# Patient Record
Sex: MALE | Race: WHITE | NOT HISPANIC OR LATINO | Employment: FULL TIME | URBAN - METROPOLITAN AREA
[De-identification: names, ages, dates, MRNs, and addresses within clinical notes are randomized per-mention and may not be internally consistent; named-entity substitution may affect disease eponyms.]

---

## 2017-03-17 NOTE — PROGRESS NOTES
"  SUBJECTIVE:                                                    Meño Omalley is a 46 year old male who presents to clinic today for the following health issues:      HPI    Diarrhea     Onset: 2 months    Description:   Consistency of stool: runny  Blood in stool: YES- occ  Number of loose stools in past 24 hours: 6    Progression of Symptoms:  same    Accompanying Signs & Symptoms:  Fever: no   Nausea or vomiting; no   Abdominal pain: no - stomach ache sometimes  Episodes of constipation: no   Weight loss: no    History:   Ill contacts: no   Recent use of antibiotics: no    Recent travels: no          Recent medication-new or changes(Rx or OTC): no     Precipitating factors:   Haven't changed anything eating habits    Alleviating factors:   no       Therapies Tried and outcome:  Haven't tried anything; Outcome:   Blood in stool, more red than brown. Not black. Also on toilet paper.   Has had hemorrhoids in the past, not specific to this and not similar.   Father colon cancer- Mid 60's   Sister Colon Cancer - Mid 50's.   No abdominal pain. Cramping occasionally.   No fevers or chills.   No nausea and no emesis  Able to eat and appetite is the same, no change in foods.   Stool is Soft and occasionally runnier as the day progresses.   Consistently the last 2 months. No formation to bowel movement, sometime malformed. Brown in color if no blood. Only seen blood 20% of the time.   No heart burn or history of heart burn.   No pain with urination.     Problem list and histories reviewed & adjusted, as indicated.  Additional history: as documented      ROS:  C: NEGATIVE for fever, chills, change in weight  E/M: NEGATIVE for ear, mouth and throat problems  R: NEGATIVE for significant cough or SOB  CV: NEGATIVE for chest pain, palpitations or peripheral edema    OBJECTIVE:                                                    /76  Pulse 72  Temp 98  F (36.7  C) (Temporal)  Resp 8  Ht 5' 7.95\" (1.726 m)  Wt 203 lb " 6.4 oz (92.3 kg)  BMI 30.97 kg/m2  Body mass index is 30.97 kg/(m^2).  Physical Exam   Constitutional: Vital signs are normal.   Eyes: Conjunctivae are normal.   Cardiovascular: Normal rate, regular rhythm and normal heart sounds.    Pulmonary/Chest: Effort normal and breath sounds normal.   Abdominal: Normal appearance and bowel sounds are normal. There is no tenderness. There is no rigidity, no rebound, no guarding and negative Cassidy's sign.   Skin: Skin is warm, dry and intact.         Diagnostic Test Results:  Stool studies pending  Colonoscopy scheduled for 3/24/2017       ASSESSMENT/PLAN:                                                        1. Diarrhea, unspecified type  - Stable diarrhea at this time. No active bleeding. Given his family history I did recommend he get a Colonoscopy. He did decline a rectal exam, I have ordered the stool studies and would like him to bring these in. If he decides he can't get the colonoscopy this Friday then we will have the occult stool.   - Patient was advised on signs and symptoms to monitor.   - Reassuring abdominal exam was negative for pain and tenderness and vitals are stable.   - CBC with platelets  - Ova and Parasite Exam Routine; Future  - Enteric Bacteria and Virus Panel by CELIA Stool; Future  - H Pylori antigen, stool; Future  - Clostridium difficile Toxin B PCR; Future  - GASTROENTEROLOGY ADULT REF PROCEDURE ONLY    2. Blood in stool    - CBC with platelets  - Fecal colorectal cancer screen (FIT); Future  - GASTROENTEROLOGY ADULT REF PROCEDURE ONLY    3. Family history of colon cancer    - GASTROENTEROLOGY ADULT REF PROCEDURE ONLY    See Patient Instructions    SARWAT Carreon [facility]

## 2017-03-20 ENCOUNTER — OFFICE VISIT (OUTPATIENT)
Dept: FAMILY MEDICINE | Facility: OTHER | Age: 47
End: 2017-03-20
Payer: COMMERCIAL

## 2017-03-20 VITALS
HEART RATE: 72 BPM | WEIGHT: 203.4 LBS | DIASTOLIC BLOOD PRESSURE: 76 MMHG | SYSTOLIC BLOOD PRESSURE: 120 MMHG | BODY MASS INDEX: 30.83 KG/M2 | TEMPERATURE: 98 F | RESPIRATION RATE: 8 BRPM | HEIGHT: 68 IN

## 2017-03-20 DIAGNOSIS — K92.1 BLOOD IN STOOL: ICD-10-CM

## 2017-03-20 DIAGNOSIS — Z80.0 FAMILY HISTORY OF COLON CANCER: ICD-10-CM

## 2017-03-20 DIAGNOSIS — R19.7 DIARRHEA, UNSPECIFIED TYPE: Primary | ICD-10-CM

## 2017-03-20 LAB
ERYTHROCYTE [DISTWIDTH] IN BLOOD BY AUTOMATED COUNT: 12.7 % (ref 10–15)
HCT VFR BLD AUTO: 43.1 % (ref 40–53)
HGB BLD-MCNC: 15 G/DL (ref 13.3–17.7)
MCH RBC QN AUTO: 30.9 PG (ref 26.5–33)
MCHC RBC AUTO-ENTMCNC: 34.8 G/DL (ref 31.5–36.5)
MCV RBC AUTO: 89 FL (ref 78–100)
PLATELET # BLD AUTO: 313 10E9/L (ref 150–450)
RBC # BLD AUTO: 4.85 10E12/L (ref 4.4–5.9)
WBC # BLD AUTO: 5.7 10E9/L (ref 4–11)

## 2017-03-20 PROCEDURE — 99214 OFFICE O/P EST MOD 30 MIN: CPT | Performed by: NURSE PRACTITIONER

## 2017-03-20 PROCEDURE — 36415 COLL VENOUS BLD VENIPUNCTURE: CPT | Performed by: NURSE PRACTITIONER

## 2017-03-20 PROCEDURE — 85027 COMPLETE CBC AUTOMATED: CPT | Performed by: NURSE PRACTITIONER

## 2017-03-20 RX ORDER — ESCITALOPRAM OXALATE 20 MG/1
20 TABLET ORAL EVERY MORNING
COMMUNITY
End: 2017-05-27

## 2017-03-20 ASSESSMENT — PAIN SCALES - GENERAL: PAINLEVEL: NO PAIN (0)

## 2017-03-20 NOTE — NURSING NOTE
"Chief Complaint   Patient presents with     Gastric Problem     Health Maintenance     norberto, laynet, weight, height, aap yearly, act       Initial /76  Pulse 72  Temp 98  F (36.7  C) (Temporal)  Resp 8  Ht 5' 7.95\" (1.726 m)  Wt 203 lb 6.4 oz (92.3 kg)  BMI 30.97 kg/m2 Estimated body mass index is 30.97 kg/(m^2) as calculated from the following:    Height as of this encounter: 5' 7.95\" (1.726 m).    Weight as of this encounter: 203 lb 6.4 oz (92.3 kg).  Medication Reconciliation: complete  Sherron Cano CMA (AAMA)    "

## 2017-03-20 NOTE — LETTER

## 2017-03-20 NOTE — MR AVS SNAPSHOT
After Visit Summary   3/20/2017    Meño Omalley    MRN: [id number]           Patient Information     Date Of Birth          1970        Visit Information        Provider Department      3/20/2017 1:00 PM Alejandrina Robertson APRN CNP [facility]        Today's Diagnoses     Diarrhea, unspecified type    -  1    Blood in stool        Family history of colon cancer          Care Instructions    - Bring in stool studies, ok to bring into [facility].   - Colonoscopy this Friday  - Follow up based on results.   - If you develop any abdominal pain, nausea, vomiting, bleeding please go to the Emergency Room.     SARWAT Carreon CNP          Follow-ups after your visit        Additional Services     GASTROENTEROLOGY ADULT REF PROCEDURE ONLY       Last Lab Result: Creatinine (mg/dL)       Date                     Value                 02/07/2013               0.80             ----------  Body mass index is 30.97 kg/(m^2).     Needed:  No  Language:  English    Patient will be contacted to schedule procedure.     Please be aware that coverage of these services is subject to the terms and limitations of your health insurance plan.  Call member services at your health plan with any benefit or coverage questions.  Any procedures must be performed at a [facility] facility OR coordinated by your clinic's referral office.    Please bring the following with you to your appointment:    (1) Any X-Rays, CTs or MRIs which have been performed.  Contact the facility where they were done to arrange for  prior to your scheduled appointment.    (2) List of current medications   (3) This referral request   (4) Any documents/labs given to you for this referral                  Follow-up notes from your care team     Return if symptoms worsen or fail to improve.      Future tests that were ordered for you today     Open Future Orders        Priority Expected Expires Ordered    Ova  "and Parasite Exam Routine Routine  3/20/2018 3/20/2017    Fecal colorectal cancer screen (FIT) Routine 4/10/2017 2017 3/20/2017    Enteric Bacteria and Virus Panel by CELIA Stool Routine  3/20/2018 3/20/2017    H Pylori antigen, stool Routine  2017 3/20/2017    Clostridium difficile Toxin B PCR Routine  3/20/2018 3/20/2017            Who to contact     If you have questions or need follow up information about today's clinic visit or your schedule please contact [facility] directly at [phone].  Normal or non-critical lab and imaging results will be communicated to you by MyChart, letter or phone within 4 business days after the clinic has received the results. If you do not hear from us within 7 days, please contact the clinic through [employer] or phone. If you have a critical or abnormal lab result, we will notify you by phone as soon as possible.  Submit refill requests through [employer] or call your pharmacy and they will forward the refill request to us. Please allow 3 business days for your refill to be completed.          Additional Information About Your Visit        MyChart Information     [employer] lets you send messages to your doctor, view your test results, renew your prescriptions, schedule appointments and more. To sign up, go to [email or web address] . Click on \"Log in\" on the left side of the screen, which will take you to the Welcome page. Then click on \"Sign up Now\" on the right side of the page.     You will be asked to enter the access code listed below, as well as some personal information. Please follow the directions to create your username and password.     Your access code is: [email or web address]  Expires: 2017  1:49 PM     Your access code will  in 90 days. If you need help or a new code, please call your [facility] or [phone].        Care EveryWhere ID     This is your Care EveryWhere ID. This could be used by other organizations to access your " "[facility] medical records  [id number]        Your Vitals Were     Pulse Temperature Respirations Height BMI (Body Mass Index)       72 98  F (36.7  C) (Temporal) 8 5' 7.95\" (1.726 m) 30.97 kg/m2        Blood Pressure from Last 3 Encounters:   03/20/17 120/76   11/25/14 120/80   07/31/13 104/64    Weight from Last 3 Encounters:   03/20/17 203 lb 6.4 oz (92.3 kg)   11/25/14 206 lb (93.4 kg)   07/31/13 204 lb (92.5 kg)              We Performed the Following     CBC with platelets     GASTROENTEROLOGY ADULT REF PROCEDURE ONLY        Primary Care Provider Office Phone # Fax #    Delio Alcala [phone]       [place] [address] [place] [address]        Thank you!     Thank you for choosing [facility]  for your care. Our goal is always to provide you with excellent care. Hearing back from our patients is one way we can continue to improve our services. Please take a few minutes to complete the written survey that you may receive in the mail after your visit with us. Thank you!             Your Updated Medication List - Protect others around you: Learn how to safely use, store and throw away your medicines at www.disposemymeds.org.          This list is accurate as of: 3/20/17  1:49 PM.  Always use your most recent med list.                   Brand Name Dispense Instructions for use    FLOVENT  MCG/ACT Inhaler   Generic drug:  fluticasone      Take 2 puffs by mouth 2 times daily.         "

## 2017-03-20 NOTE — PATIENT INSTRUCTIONS
- Bring in stool studies, ok to bring into [facility].   - Colonoscopy this Friday  - Follow up based on results.   - If you develop any abdominal pain, nausea, vomiting, bleeding please go to the Emergency Room.     SARWAT Carreon CNP

## 2017-03-21 DIAGNOSIS — R19.7 DIARRHEA, UNSPECIFIED TYPE: ICD-10-CM

## 2017-03-21 LAB
C DIFF TOX B STL QL: NORMAL
SPECIMEN SOURCE: NORMAL

## 2017-03-21 PROCEDURE — 87177 OVA AND PARASITES SMEARS: CPT | Performed by: NURSE PRACTITIONER

## 2017-03-21 PROCEDURE — 87209 SMEAR COMPLEX STAIN: CPT | Performed by: NURSE PRACTITIONER

## 2017-03-21 PROCEDURE — 87338 HPYLORI STOOL AG IA: CPT | Performed by: NURSE PRACTITIONER

## 2017-03-21 PROCEDURE — 87506 IADNA-DNA/RNA PROBE TQ 6-11: CPT | Performed by: NURSE PRACTITIONER

## 2017-03-21 PROCEDURE — 87493 C DIFF AMPLIFIED PROBE: CPT | Mod: 59 | Performed by: NURSE PRACTITIONER

## 2017-03-21 ASSESSMENT — ASTHMA QUESTIONNAIRES: ACT_TOTALSCORE: 25

## 2017-03-22 ENCOUNTER — TELEPHONE (OUTPATIENT)
Dept: FAMILY MEDICINE | Facility: OTHER | Age: 47
End: 2017-03-22

## 2017-03-22 LAB
CAMPYLOBACTER GROUP BY NAT: NOT DETECTED
ENTERIC PATHOGEN COMMENT: NORMAL
H PYLORI AG STL QL IA: NORMAL
MICRO REPORT STATUS: NORMAL
MICRO REPORT STATUS: NORMAL
NOROVIRUS I AND II BY NAT: NOT DETECTED
O+P STL MICRO: NORMAL
ROTAVIRUS A BY NAT: NOT DETECTED
SALMONELLA SPECIES BY NAT: NOT DETECTED
SHIGA TOXIN 1 GENE BY NAT: NOT DETECTED
SHIGA TOXIN 2 GENE BY NAT: NOT DETECTED
SHIGELLA SP+EIEC IPAH STL QL NAA+PROBE: NOT DETECTED
SPECIMEN SOURCE: NORMAL
SPECIMEN SOURCE: NORMAL
VIBRIO GROUP BY NAT: NOT DETECTED
YERSINIA ENTEROCOLITICA BY NAT: NOT DETECTED

## 2017-03-22 NOTE — TELEPHONE ENCOUNTER
----- Message from SARWAT Okeefe CNP sent at 3/22/2017  2:59 PM CDT -----  Please let patient know that all his stool studies were negative, continue with plan to have colonoscopy on Friday.   SARWAT Carreon CNP

## 2017-03-23 DIAGNOSIS — K92.1 BLOOD IN STOOL: ICD-10-CM

## 2017-03-23 PROCEDURE — 82274 ASSAY TEST FOR BLOOD FECAL: CPT | Performed by: NURSE PRACTITIONER

## 2017-03-24 ENCOUNTER — SURGERY (OUTPATIENT)
Age: 47
End: 2017-03-24

## 2017-03-24 ENCOUNTER — HOSPITAL ENCOUNTER (OUTPATIENT)
Facility: CLINIC | Age: 47
Discharge: HOME | End: 2017-03-24
Attending: INTERNAL MEDICINE | Admitting: INTERNAL MEDICINE
Payer: COMMERCIAL

## 2017-03-24 ENCOUNTER — TELEPHONE (OUTPATIENT)
Dept: FAMILY MEDICINE | Facility: OTHER | Age: 47
End: 2017-03-24

## 2017-03-24 VITALS
HEART RATE: 76 BPM | DIASTOLIC BLOOD PRESSURE: 90 MMHG | OXYGEN SATURATION: 97 % | TEMPERATURE: 98 F | SYSTOLIC BLOOD PRESSURE: 122 MMHG | RESPIRATION RATE: 16 BRPM

## 2017-03-24 DIAGNOSIS — C18.9 CARCINOMA OF COLON (H): Primary | ICD-10-CM

## 2017-03-24 LAB
COLONOSCOPY: NORMAL
HEMOCCULT STL QL IA: POSITIVE

## 2017-03-24 PROCEDURE — 45381 COLONOSCOPY SUBMUCOUS NJX: CPT | Mod: PT | Performed by: INTERNAL MEDICINE

## 2017-03-24 PROCEDURE — 88305 TISSUE EXAM BY PATHOLOGIST: CPT | Performed by: INTERNAL MEDICINE

## 2017-03-24 PROCEDURE — 45380 COLONOSCOPY AND BIOPSY: CPT | Mod: XU,PT

## 2017-03-24 PROCEDURE — 88342 IMHCHEM/IMCYTCHM 1ST ANTB: CPT | Performed by: INTERNAL MEDICINE

## 2017-03-24 PROCEDURE — 88341 IMHCHEM/IMCYTCHM EA ADD ANTB: CPT | Performed by: INTERNAL MEDICINE

## 2017-03-24 PROCEDURE — 40000296 ZZH STATISTIC ENDO RECOVERY CLASS 1:2 FIRST HOUR: Performed by: INTERNAL MEDICINE

## 2017-03-24 PROCEDURE — 45385 COLONOSCOPY W/LESION REMOVAL: CPT | Mod: PT | Performed by: INTERNAL MEDICINE

## 2017-03-24 PROCEDURE — 88341 IMHCHEM/IMCYTCHM EA ADD ANTB: CPT | Mod: 26,76 | Performed by: INTERNAL MEDICINE

## 2017-03-24 PROCEDURE — 88342 IMHCHEM/IMCYTCHM 1ST ANTB: CPT | Mod: 26,76 | Performed by: INTERNAL MEDICINE

## 2017-03-24 PROCEDURE — 25000125 ZZHC RX 250: Performed by: INTERNAL MEDICINE

## 2017-03-24 PROCEDURE — 25000128 H RX IP 250 OP 636: Performed by: INTERNAL MEDICINE

## 2017-03-24 PROCEDURE — 88305 TISSUE EXAM BY PATHOLOGIST: CPT | Mod: 26,76 | Performed by: INTERNAL MEDICINE

## 2017-03-24 RX ORDER — FENTANYL CITRATE 50 UG/ML
INJECTION, SOLUTION INTRAMUSCULAR; INTRAVENOUS PRN
Status: DISCONTINUED | OUTPATIENT
Start: 2017-03-24 | End: 2017-03-24 | Stop reason: HOSPADM

## 2017-03-24 RX ORDER — LIDOCAINE 40 MG/G
CREAM TOPICAL
Status: DISCONTINUED | OUTPATIENT
Start: 2017-03-24 | End: 2017-03-24 | Stop reason: HOSPADM

## 2017-03-24 RX ADMIN — FENTANYL CITRATE 25 MCG: 50 INJECTION, SOLUTION INTRAMUSCULAR; INTRAVENOUS at 13:50

## 2017-03-24 RX ADMIN — MIDAZOLAM HYDROCHLORIDE 1 MG: 1 INJECTION, SOLUTION INTRAMUSCULAR; INTRAVENOUS at 13:47

## 2017-03-24 RX ADMIN — FENTANYL CITRATE 25 MCG: 50 INJECTION, SOLUTION INTRAMUSCULAR; INTRAVENOUS at 13:54

## 2017-03-24 RX ADMIN — LIDOCAINE HYDROCHLORIDE 1 ML: 10 INJECTION, SOLUTION EPIDURAL; INFILTRATION; INTRACAUDAL; PERINEURAL at 13:18

## 2017-03-24 RX ADMIN — MIDAZOLAM HYDROCHLORIDE 1 MG: 1 INJECTION, SOLUTION INTRAMUSCULAR; INTRAVENOUS at 13:46

## 2017-03-24 RX ADMIN — MIDAZOLAM HYDROCHLORIDE 1 MG: 1 INJECTION, SOLUTION INTRAMUSCULAR; INTRAVENOUS at 13:48

## 2017-03-24 RX ADMIN — FENTANYL CITRATE 50 MCG: 50 INJECTION, SOLUTION INTRAMUSCULAR; INTRAVENOUS at 13:45

## 2017-03-24 RX ADMIN — MIDAZOLAM HYDROCHLORIDE 2 MG: 1 INJECTION, SOLUTION INTRAMUSCULAR; INTRAVENOUS at 13:45

## 2017-03-24 NOTE — TELEPHONE ENCOUNTER
Lm with voicemail- for High risk patient- vivek -[phone] and Maeve [phone]. Lm patient information to call patient to schedule. Patient is aware that someone will be calling him to set this up.

## 2017-03-24 NOTE — TELEPHONE ENCOUNTER
FYI : Attempted to call patient to give him his colonoscopy results. If he calls back please refer him to me.   SARWAT Carreon CNP

## 2017-03-24 NOTE — CONSULTS
[facility] GI Pre-Procedure Physical Assessment    Meño Omalley MRN# [id number]   Age: 46 year old YOB: 1970      Date of Surgery: 3/24/2017  Location [facility]      Date of Exam 3/24/2017 Facility (Same day)       Home clinic: [facility]  Primary care provider: Delio Alcala         Active problem list:   Patient Active Problem List   Diagnosis     Hyperlipidemia LDL goal <160     Family history of colon cancer     Obesity     Mild persistent asthma            Medications (include herbals and vitamins):   Any Plavix use in the last 7 days?  No     Current Facility-Administered Medications   Medication     lidocaine 1 % 1 mL     lidocaine (LMX4) kit     sodium chloride (PF) 0.9% PF flush 3 mL     sodium chloride (PF) 0.9% PF flush 3 mL     fentaNYL Citrate (PF) (SUBLIMAZE) injection     midazolam (VERSED) injection             Allergies:    No Known Allergies  Allergy to Latex?  No  Allergy to tape?    No          Social History:     Social History   Substance Use Topics     Smoking status: Never Smoker     Smokeless tobacco: Never Used     Alcohol use No      Comment: On occasion            Physical Exam:   All vitals have been reviewed  Blood pressure 120/82, temperature 98  F (36.7  C), temperature source Oral, resp. rate 16, SpO2 95 %.  Airway assessment:   Patient is able to open mouth wide  Patient is able to stick out tongue      Lungs:   No increased work of breathing, good air exchange, clear to auscultation bilaterally, no crackles or wheezing      Cardiovascular:   Normal apical impulse, regular rate and rhythm, normal S1 and S2, no S3 or S4, and no murmur noted           Lab / Radiology Results:   All laboratory data reviewed          Assessment:   Appropriately NPO  Chief complaint or anatomic assessment of involved area: rectal bleeding, diarrhea         Plan:   Moderate (conscious) sedation     Patient's active problems diagnostically and  therapeutically optimized for the planned procedure  Risks, benefits, alternatives to sedation and blood explained and consent obtained  Risks, benefits, alternatives to procedure explained and consent obtained  Orders and progress notes are in the chart  Discharge from Phase 1 and / or Phase 2 recovery when patient meets criteria    I have reviewed the history and physical, lab finding(s), diagnostic data, medicaitons, and the plan for sedation.  I have determined this patient to be an appropriate candidate for the planned sedation / procedure and have reassessed the patient immediately prior to sedation / procedure.    I have personally and medically directed the administration of medications used.    Delio Daniel MD

## 2017-03-24 NOTE — TELEPHONE ENCOUNTER
Please set patient up with the soonest colorectal appointment. Please ask where he would like to go.     SARWAT Carreon CNP

## 2017-03-24 NOTE — TELEPHONE ENCOUNTER
Spoke with patient regarding his colonoscopy results. We discussed the next step is to visit with colorectal surgery. I have placed a referral for this, he is expecting a call to get this set up. He also know to monitor his symptoms s/p colonoscopy. His occult stool was positive and his hemoglobin was normal.     SARWAT Carreon CNP

## 2017-03-27 ENCOUNTER — HOSPITAL ENCOUNTER (OUTPATIENT)
Dept: CT IMAGING | Facility: CLINIC | Age: 47
Discharge: HOME | End: 2017-03-27
Attending: COLON & RECTAL SURGERY | Admitting: COLON & RECTAL SURGERY
Payer: COMMERCIAL

## 2017-03-27 ENCOUNTER — TELEPHONE (OUTPATIENT)
Dept: SURGERY | Facility: CLINIC | Age: 47
End: 2017-03-27

## 2017-03-27 DIAGNOSIS — C18.9 COLON CANCER (H): Primary | ICD-10-CM

## 2017-03-27 DIAGNOSIS — C18.9 COLON CANCER (H): ICD-10-CM

## 2017-03-27 PROCEDURE — 74177 CT ABD & PELVIS W/CONTRAST: CPT

## 2017-03-27 PROCEDURE — 71260 CT THORAX DX C+: CPT

## 2017-03-27 PROCEDURE — 25500064 ZZH RX 255 OP 636: Performed by: RADIOLOGY

## 2017-03-27 PROCEDURE — 25000125 ZZHC RX 250: Performed by: RADIOLOGY

## 2017-03-27 RX ORDER — IOPAMIDOL 755 MG/ML
100 INJECTION, SOLUTION INTRAVASCULAR ONCE
Status: COMPLETED | OUTPATIENT
Start: 2017-03-27 | End: 2017-03-27

## 2017-03-27 RX ADMIN — SODIUM CHLORIDE 60 ML: 9 INJECTION, SOLUTION INTRAVENOUS at 16:18

## 2017-03-27 RX ADMIN — IOPAMIDOL 99 ML: 755 INJECTION, SOLUTION INTRAVENOUS at 16:17

## 2017-03-27 NOTE — TELEPHONE ENCOUNTER
Patient is scheduled tomorrow 3/28/17 with Colorectal at the - they ordered a ct scan and  Getting this done today at [place]. Patient notified.

## 2017-03-27 NOTE — TELEPHONE ENCOUNTER
I received a message from Beatriz with  [facility], referring patient for new colon mass (path pending).  Discussed scheduling CT CAP with Dr. Rose, even though biopsies are still pending.  Dr. Rose states we can proceed with scheduling CT CAP.  Patient is scheduled at [facility] today for CT CAP at 4:00 pm, and to see Dr. Rose tomorrow at 12:00 pm.  Called and spoke with patient.  Patient confirms upcoming appointments and NPO restrictions for today.  Instructed patient to go to [facility] to start oral rep for CT scan.  Provided direct clinic address for tomorrow's appointment.  Patient has my direct number for additional questions or concerns.

## 2017-03-28 ENCOUNTER — OFFICE VISIT (OUTPATIENT)
Dept: SURGERY | Facility: CLINIC | Age: 47
End: 2017-03-28

## 2017-03-28 VITALS
HEIGHT: 68 IN | TEMPERATURE: 98.2 F | WEIGHT: 208.9 LBS | HEART RATE: 65 BPM | BODY MASS INDEX: 31.66 KG/M2 | SYSTOLIC BLOOD PRESSURE: 123 MMHG | DIASTOLIC BLOOD PRESSURE: 64 MMHG | OXYGEN SATURATION: 96 %

## 2017-03-28 DIAGNOSIS — C20 RECTAL CANCER (H): Primary | ICD-10-CM

## 2017-03-28 DIAGNOSIS — C18.9 COLON CANCER (H): ICD-10-CM

## 2017-03-28 LAB
ALBUMIN SERPL-MCNC: 4.1 G/DL (ref 3.4–5)
ALP SERPL-CCNC: 47 U/L (ref 40–150)
ALT SERPL W P-5'-P-CCNC: 31 U/L (ref 0–70)
ANION GAP SERPL CALCULATED.3IONS-SCNC: 9 MMOL/L (ref 3–14)
AST SERPL W P-5'-P-CCNC: 14 U/L (ref 0–45)
BILIRUB SERPL-MCNC: 0.8 MG/DL (ref 0.2–1.3)
BUN SERPL-MCNC: 12 MG/DL (ref 7–30)
CALCIUM SERPL-MCNC: 9 MG/DL (ref 8.5–10.1)
CEA SERPL-MCNC: NORMAL UG/L (ref 0–2.5)
CHLORIDE SERPL-SCNC: 105 MMOL/L (ref 94–109)
CO2 SERPL-SCNC: 28 MMOL/L (ref 20–32)
CREAT SERPL-MCNC: 0.82 MG/DL (ref 0.66–1.25)
GFR SERPL CREATININE-BSD FRML MDRD: NORMAL ML/MIN/1.7M2
GLUCOSE SERPL-MCNC: 87 MG/DL (ref 70–99)
POTASSIUM SERPL-SCNC: 4.2 MMOL/L (ref 3.4–5.3)
PROT SERPL-MCNC: 7.7 G/DL (ref 6.8–8.8)
SODIUM SERPL-SCNC: 141 MMOL/L (ref 133–144)

## 2017-03-28 ASSESSMENT — ENCOUNTER SYMPTOMS
JAUNDICE: 0
RECTAL PAIN: 0
RECTAL BLEEDING: 0
VOMITING: 0
NAUSEA: 0
BOWEL INCONTINENCE: 0
CONSTIPATION: 0
BLOOD IN STOOL: 1
HEARTBURN: 0
DIARRHEA: 1
ABDOMINAL PAIN: 0
BLOATING: 0

## 2017-03-28 ASSESSMENT — PAIN SCALES - GENERAL: PAINLEVEL: NO PAIN (0)

## 2017-03-28 NOTE — NURSING NOTE
"Chief Complaint   Patient presents with     Consult     New Consultation       Vitals:    03/28/17 1159   BP: 123/64   BP Location: Left arm   Patient Position: Chair   Cuff Size: Adult Large   Pulse: 65   Temp: 98.2  F (36.8  C)   SpO2: 96%   Weight: 94.8 kg (208 lb 14.4 oz)   Height: 1.727 m (5' 8\")       Body mass index is 31.76 kg/(m^2).      Justina Layne"

## 2017-03-28 NOTE — PROGRESS NOTES
Colon and Rectal Surgery Clinic Note      RE: Meño Omalley  : 1970  JENNY: 3/28/2017      HISTORY OF PRESENT ILLNESS:  Meño is a very pleasant 46-year-old man seen today with his wife, Chasidy, due to a recent diagnosis of presumptive colon cancer.  Meño has had consistency to loose stools associated with rectal bleeding for roughly the past month.  This prompted a colonoscopy by Dr. Delio Daniel on 2017.  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  Biopsies remain pending at this time.  A CT scan of the chest, abdomen and pelvis confirms the presence of a possible thickening at the rectosigmoid junction but no evidence of metastases.  A 3 mm right infrarenal calculus is noted.      PAST MEDICAL HISTORY:  Mild asthma, mild depression.      PAST SURGICAL HISTORY:  None.        CIGARETTES:  None.        ALCOHOL:  Occasional beer.        BLEEDING HISTORY:  No known bleeding problems.      ANESTHETIC HISTORY:  No previous general anesthetics.      SOCIAL HISTORY:  The patient is  and lives with his wife and 2 adopted children.  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      FAMILY HISTORY:  The patient's father had colon cancer in his 50s.  A sister also had colon cancer in her 50s.  Two sisters evidently had malignant polyps.  One sister had some type of gynecologic polyp but it is not certain that this is a cancer.      PHYSICAL EXAMINATION:  Well-developed, well-nourished man in no distress.  Meño is 5 feet 8 inches and weighs 208 pounds with a BMI of 31.8.  Sclerae are anicteric.  The abdomen is obese and soft.  There are no palpable masses.  There is no hepatomegaly.  Digital rectal examination is normal with good sphincter tone and normal prostate.        IMAGING:  I obtained written informed consent.  I performed flexible sigmoidoscopy.  There is a tattoo in the upper rectum, and just above this,  behind what appears to be the most proximal rectal fold is a 1/3 circumferential tumor that measures about 4 cm in length with an ulcerated groove in its center.  The bowel proximal to this is normal to roughly 25 cm.      ASSESSMENT AND PLAN:  I had a detailed discussion with Patrick regarding treatment of colorectal cancer.  I believe this tumor is in the upper rectum.  I carefully measured the distal margin on the flexible sigmoidoscope at 12-13 cm.  Given this level, I think I am going to go ahead and get a 3T MRI of the pelvis, though if there is nothing concerning through mesorectal margins it would be reasonable to treat this as an upper third cancer and not necessarily mandate neoadjuvant chemoradiation.  Additionally, based on the patient's early onset cancer and strongly positive family history, I am very concerned that the patient could have a diagnosis of Rodriguez syndrome.  We will make arrangements for referral for genetic counseling at the [facility].  We should have some preliminary indication about the Rodriguez possibility based upon the immunohistochemistry of the tumor, but even if this is negative, I would proceed with the genetic testing.  We discussed that if this is Rodriguez cancer that we could consider a total abdominal colectomy.  This would be more problematic with the tumor being in the upper rectum rather than in the distal sigmoid, so this would require more thought.  We had a detailed discussion regarding surgery which I would plan to be laparoscopic, though if there is a pelvic dissection I would do this portion open.  We discussed the risks associated with surgery in detail including but not limited to bleeding, infection, DVT/PE, pneumonia, MI, CVA, anastomotic failure and death.  As I thought this was likely a sigmoid resection, we discussed that covering ileostomy would be unlikely, but given the level of the tumor, that would be a possibility.  There would  also be some risk of associated pelvic nerve dysfunction with an anterior resection rather than sigmoid resection.      I answered all of Meño and Chasidy's questions to their stated satisfaction.  They expressed their understanding and agreement with the proposed plan.  I will plan to present Meño after he has had his scan and after we have had the final pathology results at the upcoming Tumor Board next week.      Total time 1 hour.  Greater than half the time spent counseling.        Ignacio Rose MD        cc:     Alejandrina Robertson, APRN, CNP     [facility]    [address]     [place] [address]        MD Delio Beard MD     [facility]    [address] [place] [address]           For details of past medical history, surgical history, family history, medications, allergies, and review of systems, please see details below.    Medical history:  Past Medical History:   Diagnosis Date     Depressive disorder      Uncomplicated asthma        Surgical history:  Past Surgical History:   Procedure Laterality Date     NO HISTORY OF SURGERY         Family history:  Family History   Problem Relation Age of Onset     Cancer - colorectal Father 62     C.A.D. Father 59     Hypertension Father      Neurologic Disorder Mother 70     ALS     Cancer - colorectal Sister 54       Medications:  Current Outpatient Prescriptions   Medication Sig Dispense Refill     escitalopram (LEXAPRO) 20 MG tablet Take 20 mg by mouth daily       fluticasone (FLOVENT HFA) 110 MCG/ACT inhaler Take 2 puffs by mouth 2 times daily as needed        Allergies:  The patienthas No Known Allergies.    Social history:  Social History   Substance Use Topics     Smoking status: Never Smoker     Smokeless tobacco: Never Used     Alcohol use No      Comment: On occasion     Marital status: .    Review of Systems:  Nursing Notes:   Justina Layne MA  3/28/2017 12:03 PM   "Signed  Chief Complaint   Patient presents with     Consult     New Consultation       Vitals:    03/28/17 1159   BP: 123/64   BP Location: Left arm   Patient Position: Chair   Cuff Size: Adult Large   Pulse: 65   Temp: 98.2  F (36.8  C)   SpO2: 96%   Weight: 94.8 kg (208 lb 14.4 oz)   Height: 1.727 m (5' 8\")       Body mass index is 31.76 kg/(m^2).      Justina Rose MD   Professor and Chief  Division of Colon and Rectal Surgery  [facility]      Referring Provider:  SARWAT Okeefe [facility]  [address]  [place] [address]     Primary Care Provider:  Delio Alcala for HPI/ROS submitted by the patient on 3/28/2017   General Symptoms: No  Skin Symptoms: No  HENT Symptoms: No  EYE SYMPTOMS: No  HEART SYMPTOMS: No  LUNG SYMPTOMS: No  INTESTINAL SYMPTOMS: Yes  URINARY SYMPTOMS: No  REPRODUCTIVE SYMPTOMS: No  SKELETAL SYMPTOMS: No  BLOOD SYMPTOMS: No  NERVOUS SYSTEM SYMPTOMS: No  MENTAL HEALTH SYMPTOMS: No  Heart burn or indigestion: No  Nausea: No  Vomiting: No  Abdominal pain: No  Bloating: No  Constipation: No  Diarrhea: Yes  Blood in stool: Yes  Black stools: No  Rectal or Anal pain: No  Fecal incontinence: No  Rectal bleeding: No  Yellowing of skin or eyes: No  Vomit with blood: No  Change in stools: Yes  Hemorrhoids: Yes    "

## 2017-03-28 NOTE — LETTER
3/28/2017       RE: Meño Omalley  [address] [facility] [address]     Dear Colleague,    Thank you for referring your patient, Meño Omalley, to the [facility] COLON AND RECTAL SURGERY at [facility]. Please see a copy of my visit note below.    Colon and Rectal Surgery Clinic Note      RE: Meño Omalley  : 1970  JENNY: 3/28/2017      HISTORY OF PRESENT ILLNESS:  Meño is a very pleasant 46-year-old man seen today with his wife, Chasidy, due to a recent diagnosis of presumptive colon cancer.  Meño has had consistency to loose stools associated with rectal bleeding for roughly the past month.  This prompted a colonoscopy by Dr. Delio Daniel on 2017.  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  Biopsies remain pending at this time.  A CT scan of the chest, abdomen and pelvis confirms the presence of a possible thickening at the rectosigmoid junction but no evidence of metastases.  A 3 mm right infrarenal calculus is noted.      PAST MEDICAL HISTORY:  Mild asthma, mild depression.      PAST SURGICAL HISTORY:  None.        CIGARETTES:  None.        ALCOHOL:  Occasional beer.        BLEEDING HISTORY:  No known bleeding problems.      ANESTHETIC HISTORY:  No previous general anesthetics.      SOCIAL HISTORY:  The patient is  and lives with his wife and 2 adopted children.  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      FAMILY HISTORY:  The patient's father had colon cancer in his 50s.  A sister also had colon cancer in her 50s.  Two sisters evidently had malignant polyps.  One sister had some type of gynecologic polyp but it is not certain that this is a cancer.      PHYSICAL EXAMINATION:  Well-developed, well-nourished man in no distress.  Meño is 5 feet 8 inches and weighs 208 pounds with a BMI of 31.8.  Sclerae are anicteric.  The abdomen is obese and soft.   There are no palpable masses.  There is no hepatomegaly.  Digital rectal examination is normal with good sphincter tone and normal prostate.        IMAGING:  I obtained written informed consent.  I performed flexible sigmoidoscopy.  There is a tattoo in the upper rectum, and just above this, behind what appears to be the most proximal rectal fold is a 1/3 circumferential tumor that measures about 4 cm in length with an ulcerated groove in its center.  The bowel proximal to this is normal to roughly 25 cm.      ASSESSMENT AND PLAN:  I had a detailed discussion with Patrick regarding treatment of colorectal cancer.  I believe this tumor is in the upper rectum.  I carefully measured the distal margin on the flexible sigmoidoscope at 12-13 cm.  Given this level, I think I am going to go ahead and get a 3T MRI of the pelvis, though if there is nothing concerning through mesorectal margins it would be reasonable to treat this as an upper third cancer and not necessarily mandate neoadjuvant chemoradiation.  Additionally, based on the patient's early onset cancer and strongly positive family history, I am very concerned that the patient could have a diagnosis of Rodriguez syndrome.  We will make arrangements for referral for genetic counseling at the [facility].  We should have some preliminary indication about the Rodriguez possibility based upon the immunohistochemistry of the tumor, but even if this is negative, I would proceed with the genetic testing.  We discussed that if this is Rodriguez cancer that we could consider a total abdominal colectomy.  This would be more problematic with the tumor being in the upper rectum rather than in the distal sigmoid, so this would require more thought.  We had a detailed discussion regarding surgery which I would plan to be laparoscopic, though if there is a pelvic dissection I would do this portion open.  We discussed the risks associated with surgery in detail  including but not limited to bleeding, infection, DVT/PE, pneumonia, MI, CVA, anastomotic failure and death.  As I thought this was likely a sigmoid resection, we discussed that covering ileostomy would be unlikely, but given the level of the tumor, that would be a possibility.  There would also be some risk of associated pelvic nerve dysfunction with an anterior resection rather than sigmoid resection.      I answered all of Meño and Chasidy's questions to their stated satisfaction.  They expressed their understanding and agreement with the proposed plan.  I will plan to present Meño after he has had his scan and after we have had the final pathology results at the upcoming Tumor Board next week.      Total time 1 hour.  Greater than half the time spent counseling.        Ignacio Rose MD        cc:     Alejandrina Robertson, APRN, CNP     [facility]    [address]     [place] [address]        MD Delio Beard MD     [facility]    [address] [place] [address]           For details of past medical history, surgical history, family history, medications, allergies, and review of systems, please see details below.    Medical history:  Past Medical History:   Diagnosis Date     Depressive disorder      Uncomplicated asthma        Surgical history:  Past Surgical History:   Procedure Laterality Date     NO HISTORY OF SURGERY         Family history:  Family History   Problem Relation Age of Onset     Cancer - colorectal Father 62     C.A.D. Father 59     Hypertension Father      Neurologic Disorder Mother 70     ALS     Cancer - colorectal Sister 54       Medications:  Current Outpatient Prescriptions   Medication Sig Dispense Refill     escitalopram (LEXAPRO) 20 MG tablet Take 20 mg by mouth daily       fluticasone (FLOVENT HFA) 110 MCG/ACT inhaler Take 2 puffs by mouth 2 times daily as needed        Allergies:  The patienthas No Known  "Allergies.    Social history:  Social History   Substance Use Topics     Smoking status: Never Smoker     Smokeless tobacco: Never Used     Alcohol use No      Comment: On occasion     Marital status: .    Review of Systems:  Nursing Notes:   Justina Layne MA  3/28/2017 12:03 PM  Signed  Chief Complaint   Patient presents with     Consult     New Consultation       Vitals:    03/28/17 1159   BP: 123/64   BP Location: Left arm   Patient Position: Chair   Cuff Size: Adult Large   Pulse: 65   Temp: 98.2  F (36.8  C)   SpO2: 96%   Weight: 94.8 kg (208 lb 14.4 oz)   Height: 1.727 m (5' 8\")       Body mass index is 31.76 kg/(m^2).      Justina Rose MD   Professor and Chief  Division of Colon and Rectal Surgery  [facility]      Referring Provider:  SARWAT Okeefe [address]  [place] [address]     Primary Care Provider:  Delio Alcala    "

## 2017-03-29 ENCOUNTER — MYC MEDICAL ADVICE (OUTPATIENT)
Dept: FAMILY MEDICINE | Facility: OTHER | Age: 47
End: 2017-03-29

## 2017-03-30 ENCOUNTER — CARE COORDINATION (OUTPATIENT)
Dept: SURGERY | Facility: CLINIC | Age: 47
End: 2017-03-30

## 2017-03-30 NOTE — PROGRESS NOTES
At Dr. Rose's request, pt was notified of pathology result of adenocarcinoma.  I informed patient that I would let him know when results of MMR testing are completed.

## 2017-03-31 ENCOUNTER — HOSPITAL ENCOUNTER (OUTPATIENT)
Dept: MRI IMAGING | Facility: CLINIC | Age: 47
Discharge: HOME | End: 2017-03-31
Attending: COLON & RECTAL SURGERY | Admitting: COLON & RECTAL SURGERY
Payer: COMMERCIAL

## 2017-03-31 ENCOUNTER — TRANSFERRED RECORDS (OUTPATIENT)
Dept: HEALTH INFORMATION MANAGEMENT | Facility: CLINIC | Age: 47
End: 2017-03-31

## 2017-03-31 DIAGNOSIS — C18.9 COLON CANCER (H): ICD-10-CM

## 2017-03-31 PROCEDURE — A9585 GADOBUTROL INJECTION: HCPCS | Performed by: COLON & RECTAL SURGERY

## 2017-03-31 PROCEDURE — 72197 MRI PELVIS W/O & W/DYE: CPT

## 2017-03-31 PROCEDURE — 25500064 ZZH RX 255 OP 636: Performed by: COLON & RECTAL SURGERY

## 2017-03-31 RX ORDER — GADOBUTROL 604.72 MG/ML
10 INJECTION INTRAVENOUS ONCE
Status: COMPLETED | OUTPATIENT
Start: 2017-03-31 | End: 2017-03-31

## 2017-03-31 RX ADMIN — GADOBUTROL 10 ML: 604.72 INJECTION INTRAVENOUS at 11:52

## 2017-04-06 ENCOUNTER — TELEPHONE (OUTPATIENT)
Dept: SURGERY | Facility: CLINIC | Age: 47
End: 2017-04-06

## 2017-04-06 DIAGNOSIS — C18.9 COLON CANCER (H): Primary | ICD-10-CM

## 2017-04-06 RX ORDER — METRONIDAZOLE 500 MG/1
500 TABLET ORAL EVERY 8 HOURS
Qty: 3 TABLET | Refills: 0 | Status: SHIPPED | OUTPATIENT
Start: 2017-04-06 | End: 2017-04-07

## 2017-04-06 RX ORDER — ONDANSETRON 4 MG/1
4 TABLET, FILM COATED ORAL EVERY 6 HOURS PRN
Qty: 3 TABLET | Refills: 0 | Status: SHIPPED | OUTPATIENT
Start: 2017-04-06 | End: 2017-04-10

## 2017-04-06 RX ORDER — NEOMYCIN SULFATE 500 MG/1
1000 TABLET ORAL EVERY 8 HOURS
Qty: 6 TABLET | Refills: 0 | Status: SHIPPED | OUTPATIENT
Start: 2017-04-06 | End: 2017-04-07

## 2017-04-06 RX ORDER — MAGNESIUM CITRATE
296 SOLUTION, ORAL ORAL ONCE
Qty: 296 ML | Refills: 0 | Status: SHIPPED | OUTPATIENT
Start: 2017-04-06 | End: 2017-04-06

## 2017-04-06 RX ORDER — POLYETHYLENE GLYCOL 3350 17 G/17G
POWDER, FOR SOLUTION ORAL
Qty: 238 G | Refills: 0 | Status: SHIPPED | OUTPATIENT
Start: 2017-04-06 | End: 2017-05-01

## 2017-04-06 NOTE — TELEPHONE ENCOUNTER
Per the request of Dr. Rose, patient is scheduled for surgery 4/12/17 at 1:00 pm with a 10:30 am arrival.  Patient is scheduled for the following appointments on 4/10/2017:    8:00 am- Genetic counselor (Faby Santos)  9:15 am- Lab work  10:30 am- WOC   3:30 pm- PAC  4:30 pm- Dr. Rose    Patient confirms all upcoming appointment date, times, and locations.  Patient asked that I email all information.  Surgery packet including upcoming appointment information was emailed to patient's address in [place].  Patient has my direct contact information for questions or concerns.

## 2017-04-07 ENCOUNTER — ANESTHESIA EVENT (OUTPATIENT)
Dept: SURGERY | Facility: CLINIC | Age: 47
DRG: 331 | End: 2017-04-07
Payer: COMMERCIAL

## 2017-04-07 LAB — COPATH REPORT: NORMAL

## 2017-04-08 NOTE — ANESTHESIA PREPROCEDURE EVALUATION
Anesthesia Evaluation     . Pt has not had prior anesthetic            ROS/MED HX    ENT/Pulmonary:     (+)Intermittent asthma Last exacerbation: exercise induced,, . .    Neurologic:  - neg neurologic ROS     Cardiovascular:     (+) Dyslipidemia, ----. : . . . :. . No previous cardiac testing      (-) taking anticoagulants/antiplatelets   METS/Exercise Tolerance:  >4 METS   Hematologic:  - neg hematologic  ROS      (-) History of Transfusion   Musculoskeletal:  - neg musculoskeletal ROS       GI/Hepatic:     (+) bowel prep,      (-) GERD   Renal/Genitourinary:     (+) Nephrolithiasis ,       Endo:     (+) Obesity, .      Psychiatric:     (+) psychiatric history depression      Infectious Disease:         Malignancy:   (+) Malignancy History of GI  GI CA Active status post, Colon cancer        Other:                     Physical Exam      Airway   Mallampati: II  TM distance: >3 FB  Neck ROM: full    Dental   (+) caps  Comment: Capped front upper tooth    Cardiovascular   Rhythm and rate: regular and normal      Pulmonary    breath sounds clear to auscultation    Other findings:   3/28/2017 14:24  Sodium: 141  Potassium: 4.2  Chloride: 105  Carbon Dioxide: 28  Urea Nitrogen: 12  Creatinine: 0.82  GFR Estimate: >90...  GFR Estimate If Black: >90...  Calcium: 9.0  Anion Gap: 9  Albumin: 4.1  Protein Total: 7.7  Bilirubin Total: 0.8  Alkaline Phosphatase: 47  ALT: 31  AST: 14  Glucose: 87  CEA: <0.5...      4/10/2017 17:40  WBC: 5.7  Hemoglobin: 14.4  Hematocrit: 41.5  Platelet Count: 321             PAC Discussion and Assessment    ASA Classification: 2  Case is suitable for: Alvin  Anesthetic techniques and relevant risks discussed: GA with regional block for post-op pain control  Invasive monitoring and risk discussed: No  Types:   Possibility and Risk of blood transfusion discussed: Yes  NPO instructions given:   Additional anesthetic preparation and risks discussed:   Needs early admission to pre-op area:  "  Other:     PAC Resident/NP Anesthesia Assessment:  Scheduled for Open Low Anterior Resection with Ileostomy Anesthesia Block on 4/12/17 by Dr. Rose in treatment of colon cancer.  PAC referral for risk assessment and optimization for anesthesia with comorbid conditions of:    **MALIGNANT HYPERTHERMIA PRECAUTIONS**  Father had fevers with anesthesia, sister placed in precautions  Avoid triggering agents.  Have Dantrolene available.     ERAS CRS    Pre-operative considerations:  1.  Cardiac:  Functional status excellent.  Does sprint marathons. 0.9%  risk of major adverse cardiac event.  No cardiac history.   2.  Pulm:  Airway feasible.  AMY risk: intermediate.  + exercise induced asthma.  Rare use of albuterol.   3.  GI:  Risk of PONV score =1.  If > 2, anti-emetic intervention recommended.  4.  Psych:  + depression (on lexapro)      Patient is optimized and is acceptable candidate for the proposed procedure.  No further diagnostic evaluation is needed.     Patient also evaluated by Dr. KAMILLE Antoine. See recommendations below.           Reviewed and Signed by PAC Mid-Level Provider/Resident  Mid-Level Provider/Resident: Jojo Diamond PA-C  Date: 4/10/17  Time: 1640    Attending Anesthesiologist Anesthesia Assessment:  I have seen the patient in PAC, discussed the preoperative evaluation with the NP, and I agree with the assessment.  45 yo male for low anterior resection.  Father had unknown \"febrile \" reaction under anesthesia, possible MH?.  Sister had nontriggering anesthetic recently, uneventful.  Recommend nontrigerring anesthetic and MH precautions for this patient. He was rescheduled as the first case in the OR on his date of surgery. Patient is otherwise optimized without further workup or medical management change.  Questions answered, instructions given.  The final anesthesia plan will be determined by the physician anesthesiologist caring for the patient on the day of surgery.    Patricia Antoine, " MD  04/10/17      Reviewed and Signed by PAC Anesthesiologist  Anesthesiologist:   Date:   Time:   Pass/Fail: Pass  Disposition:     PAC Pharmacist Assessment:        Pharmacist:   Date:   Time:      Anesthesia Plan      History & Physical Review  History and physical reviewed and following examination; no interval change.    ASA Status:  2 .        Plan for General and ETT with Intravenous induction. Maintenance will be TIVA.    PONV prophylaxis:  Ondansetron (or other 5HT-3) and Dexamethasone or Solumedrol  - Will avoid MH triggering agents.  - Epidural for post op pain.      Postoperative Care  Postoperative pain management:  Neuraxial analgesia.      Consents  Anesthetic plan, risks, benefits and alternatives discussed with:  Patient..          Jevon Nieto MD  7:05 AM April 12, 2017                     .

## 2017-04-10 ENCOUNTER — ALLIED HEALTH/NURSE VISIT (OUTPATIENT)
Dept: SURGERY | Facility: CLINIC | Age: 47
End: 2017-04-10

## 2017-04-10 ENCOUNTER — OFFICE VISIT (OUTPATIENT)
Dept: SURGERY | Facility: CLINIC | Age: 47
End: 2017-04-10

## 2017-04-10 ENCOUNTER — OFFICE VISIT (OUTPATIENT)
Dept: WOUND CARE | Facility: CLINIC | Age: 47
End: 2017-04-10

## 2017-04-10 ENCOUNTER — OFFICE VISIT (OUTPATIENT)
Dept: ONCOLOGY | Facility: CLINIC | Age: 47
End: 2017-04-10
Attending: GENETIC COUNSELOR, MS
Payer: COMMERCIAL

## 2017-04-10 VITALS
WEIGHT: 209.1 LBS | OXYGEN SATURATION: 93 % | BODY MASS INDEX: 31.79 KG/M2 | HEART RATE: 89 BPM | RESPIRATION RATE: 16 BRPM | TEMPERATURE: 97.9 F | SYSTOLIC BLOOD PRESSURE: 135 MMHG | DIASTOLIC BLOOD PRESSURE: 85 MMHG

## 2017-04-10 VITALS
WEIGHT: 209 LBS | OXYGEN SATURATION: 93 % | RESPIRATION RATE: 16 BRPM | SYSTOLIC BLOOD PRESSURE: 135 MMHG | HEART RATE: 89 BPM | HEIGHT: 69 IN | DIASTOLIC BLOOD PRESSURE: 85 MMHG | BODY MASS INDEX: 30.96 KG/M2 | TEMPERATURE: 97.9 F

## 2017-04-10 DIAGNOSIS — C18.9 MALIGNANT NEOPLASM OF COLON, UNSPECIFIED PART OF COLON (H): ICD-10-CM

## 2017-04-10 DIAGNOSIS — C18.7 MALIGNANT NEOPLASM OF SIGMOID COLON (H): ICD-10-CM

## 2017-04-10 DIAGNOSIS — C18.7 MALIGNANT NEOPLASM OF SIGMOID COLON (H): Primary | ICD-10-CM

## 2017-04-10 DIAGNOSIS — Z80.0 FAMILY HISTORY OF COLON CANCER: ICD-10-CM

## 2017-04-10 DIAGNOSIS — Z83.719 FAMILY HISTORY OF COLONIC POLYPS: ICD-10-CM

## 2017-04-10 DIAGNOSIS — C20 RECTAL CANCER (H): ICD-10-CM

## 2017-04-10 DIAGNOSIS — C18.9 MALIGNANT NEOPLASM OF COLON, UNSPECIFIED PART OF COLON (H): Primary | ICD-10-CM

## 2017-04-10 DIAGNOSIS — C20 RECTAL CANCER (H): Primary | ICD-10-CM

## 2017-04-10 LAB
CEA SERPL-MCNC: NORMAL UG/L (ref 0–2.5)
ERYTHROCYTE [DISTWIDTH] IN BLOOD BY AUTOMATED COUNT: 12.2 % (ref 10–15)
HCT VFR BLD AUTO: 41.5 % (ref 40–53)
HGB BLD-MCNC: 14.4 G/DL (ref 13.3–17.7)
MCH RBC QN AUTO: 30.7 PG (ref 26.5–33)
MCHC RBC AUTO-ENTMCNC: 34.7 G/DL (ref 31.5–36.5)
MCV RBC AUTO: 89 FL (ref 78–100)
MISCELLANEOUS TEST: NORMAL
PLATELET # BLD AUTO: 321 10E9/L (ref 150–450)
RBC # BLD AUTO: 4.69 10E12/L (ref 4.4–5.9)
WBC # BLD AUTO: 5.7 10E9/L (ref 4–11)

## 2017-04-10 PROCEDURE — 36415 COLL VENOUS BLD VENIPUNCTURE: CPT | Performed by: COLON & RECTAL SURGERY

## 2017-04-10 PROCEDURE — 96040 ZZH GENETIC COUNSELING, EACH 30 MINUTES: CPT | Mod: ZF | Performed by: GENETIC COUNSELOR, MS

## 2017-04-10 NOTE — LETTER
4/10/2017       RE: Meño Omalley  [address] [facility] [address]     Dear Colleague,    Thank you for referring your patient, Meño Omalley, to the [facility] COLON AND RECTAL SURGERY at [facility]. Please see a copy of my visit note below.    Colon and Rectal Surgery Clinic Note    RE: Meño Omalley  : 1970  JENNY: 4/10/2017      HISTORY OF PRESENT ILLNESS:  Meño returns to clinic today in anticipation of surgery scheduled for 2 days from now.  His case was discussed at Tumor Board, and the consensus here was that we should proceed directly with surgery rather than offer neoadjuvant chemoradiation given that this appears to be an upper third rectal cancer with a rather equivocal node and no threatened mesorectal margin.  The other issue with Meño is his positive family history of colorectal cancer.  An immunohistochemistry finally came back and this shows intact mismatch repair, which militates against a diagnosis of Rodriguez syndrome.  Meño saw the genetic counselor this afternoon and his genetic testing blood work is pending.      I had a rather detailed discussion with Meño about these issues last week and further discussed them today.  I explained the rationale for proceeding directly with surgery and he is quite comfortable with this and wishes to proceed.  With respect to the potential for Rodriguez syndrome, we again went over the fact that management of rectal cancer in Rodriguez syndrome is controversial but the current ASCRS guidelines are to simply remove the tumor and discuss total proctocolectomy.  My guess is that Meño will end up with a mid rectal anastomosis, which would not likely be suitable for an ileorectal anastomosis.  He says he would not want to undergo a total colectomy even with the diagnosis of Rodriguez syndrome but prefers just to treat the presently diagnosed rectal cancer.  I think this is a very reasonable viewpoint.  We  "did discuss and he does understand that he does have a significant risk of metachronous cancer and will need close monitoring.  We again reviewed all the risks associated with major pelvic surgery, which I have gone over previously.  We discussed that he may or may not end up with a covering ileostomy, depending on the level of his anastomosis.  He did meet with the [facility] nurses today.      The only identified problem for Meño is that there is a questionable history of malignant hyperthermia in his family.  This apparently was some sort of postoperative fever in his father, but a firm diagnosis of malignant hyperthermia does not seem to be in hand.  In any case, the PAC Clinic feels that he needs to be on potential malignant hyperthermia protocol, and we will comply with that.        Meño appears to have a thorough understanding of all the issues involved.  I answered all of his questions to his stated satisfaction.  He expresses understanding and agreement with the proposed plan.      Total time 25 minutes.  Greater than half the time spent counseling.        Ignacio Rose MD        cc:     Delio Alcala MD           For details of past medical history, surgical history, family history, medications, allergies, and review of systems, please see details below.    Medical history:  Past Medical History:   Diagnosis Date     Colon cancer (H)      Depressive disorder      Family history of malignant hyperthermia     UNCONFIRMED BUT FATHER HAD \"FEVER WITH ANESTHESIA\"     Nephrolithiasis     asymptomatic     Obese      Uncomplicated asthma     exercise induced       Surgical history:  Past Surgical History:   Procedure Laterality Date     NO HISTORY OF SURGERY         Family history:  Family History   Problem Relation Age of Onset     Cancer - colorectal Father 62     C.A.D. Father 59     Hypertension Father      Neurologic Disorder Mother 70     ALS     Cancer - colorectal Sister 54 " "      Medications:  Current Outpatient Prescriptions   Medication Sig Dispense Refill     polyethylene glycol (MIRALAX) powder Please follow instructions on \"Getting Ready for Colonoscopy\" or Surgery packet 238 g 0     escitalopram (LEXAPRO) 20 MG tablet Take 20 mg by mouth every morning        fluticasone (FLOVENT HFA) 110 MCG/ACT inhaler Take 2 puffs by mouth 2 times daily as needed        Allergies:  The patienthas No Known Allergies.    Social history:  Social History   Substance Use Topics     Smoking status: Never Smoker     Smokeless tobacco: Never Used     Alcohol use Yes      Comment: beer couple times per months     Marital status: .    Review of Systems:  There are no exam notes on file for this visit.         Ignacio Rsoe MD   Professor and Chief  Division of Colon and Rectal Surgery  [facility]    Referring Provider:  Delio Alcala MD  [address] [place] [address]     Primary Care Provider:  Delio Alcala      "

## 2017-04-10 NOTE — PROGRESS NOTES
4/10/2017    Referring Provider: Ignacio Rose MD    Presenting Information:   I met with Meño Omalley today for genetic counseling at the Cancer Risk Management Program at the [facility] to discuss his personal history of colon cancer and family history of colon cancer and colon polyps.  He is here today to review this history, cancer screening recommendations, and available genetic testing options.    Personal History:  Meño is a 46 year old male. He was diagnosed with invasive moderately-differentiated adenocarcinoma of the sigmoid colon at age 46; surgery is currently planned on 4/12/2017. Immunohistochemistry (IHC) tumor testing of the sigmoid tumor showed intact mismatch repair proteins.    Meño had his first colonoscopy recently at age 46, after blood was found in his stool. This colonoscopy identified two sessile serrated adenomas, one tubular adenoma, and the sigmoid tumor. He does not regularly do any other cancer screening at this time.  Meño reported no tobacco use and occasional alcohol use.    Family History: (Please see scanned pedigree for detailed family history information)    One sister is 62 and was diagnosed with colon cancer at age 54; treatment included surgery. She also has a history of approximately 12 colon polyps removed in her lifetime. At age 60, a mass was identified in her thigh and she was treated with radiation. Further details are unknown.    One sister is 54 and was diagnosed with vulva cancer at age 53; treatment included surgery. She has had approximately 10 or less colon polyps removed in her lifetime.    Five other brothers and sisters have each had approximately 10 or less colon polyps removed in their lifetimes.    Meño's father was diagnosed with colon cancer at age 60 and passed away at age 72; treatment included surgery and chemotherapy.    His maternal ethnicity is [place]. His paternal ethnicity is [place] and [place]. There is no known  Ashkenazi Spiritism ancestry on either side of his family. There is no reported consanguinity.    Discussion:    Meño's personal and family history of colon polyps and cancer is suggestive of a possible hereditary cancer syndrome.    We reviewed the features of sporadic, familial, and hereditary cancers.  In looking at Meño's family history, it is possible that a cancer susceptibility gene is present as Meño was diagnosed with colon cancer under age 51 and he has several other relatives (in two generations) that have either been diagnosed with colon cancer or polyps. That being said, the majority of his relatives have not been diagnosed with cancer (including seven other siblings) and none of his relatives have had more than 20 colon polyps removed in their lifetime.    We discussed the natural history and genetics of several hereditary cancer syndromes, including Rodriguez syndrome. A detailed handout regarding these syndromes and the information we discussed was provided to Meño at the end of our appointment today and can be found in the after visit summary.  Topics included: inheritance pattern, cancer risks, cancer screening recommendations, and also risks, benefits and limitations of testing.    We reviewed that the most common cause of hereditary colon cancer is Rodriguez syndrome, which is caused by mutations in the mismatch repair genes MLH1, MSH2, MSH6, PMS2, and EPCAM. Individuals with Rodriguez syndrome are at increased risk for several different cancers, including colon, uterine, ovarian, and stomach cancer. Published screening and/or surgery guidelines are available to help manage these risks.    We discussed that immunohistochemistry (IHC) testing of a tumor allows us to screen a patient for Rodriguez syndrome and is ideally done on a colon or endometrial tumor. Each mismatch repair gene (MLH1, MSH2, MSH6, PMS2) makes a protein.  IHC testing involves looking at the tumor to determine which of the proteins is  present and which (if any) are absent. If one or more of the mismatch repair proteins is absent in the tumor, it can indicate an underlying inherited mutation in the respective gene. In this way, IHC testing can help determine who is a candidate for additional genetic blood testing for Rodriguez syndrome. Meño's IHC tumor testing was normal, meaning all mismatch repair proteins were present. This significantly reduces, but does not eliminate, the chance that Meño could have Rodriguez syndrome.     Based on his personal and family history, Meño meets current National Comprehensive Cancer Network (NCCN) and Cameron II criteria for genetic testing of Rodriguez syndrome.      We discussed that there are also other additional genes that could cause increased risk of colon cancer and polyps. For example, mutations in the genes MUTYH and APC are associated with increased risk for colon cancer and polyps, though Meño and his relatives do not have the typical amount of polyps expected with these conditions. Also, mutations in the gene CHEK2 are associated with a more moderate increased risk for colon cancer, as well as breast cancer. As many of these genes present with overlapping features in a family, it would be reasonable for Meño to consider panel genetic testing to analyze multiple genes at once.    Meño explained that he would be interested in gathering as much information as possible from genetic testing. As such, we discussed the ColoNext gene panel.    Genetic testing is available for 17 genes associated with increased risk for colon cancer: ColoNext (APC, BMPR1A, CDH1, CHEK2, GREM1, EPCAM, MLH1, MSH2, MSH6, MUTYH, PMS2, POLD1, POLE, PTEN, SMAD4, STK11, and TP53).    We discussed that some of the genes in the ColoNext panel are associated with specific hereditary cancer syndromes and have published management guidelines: Rodriguez syndrome (MLH1, MSH2, MSH6, PMS2, EPCAM), Familial Adenomatous Polyposis (APC),  MUTYH Associated Polyposis (MUTYH), Juvenile Polyposis syndrome (SMAD4, BMPR1A), Peutz-Jeghers syndrome (STK11), Cowden syndrome (PTEN), Li Fraumeni syndrome (TP53), and Hereditary Diffuse Gastric Cancer (CDH1).     The CHEK2, GREM1, POLD1, and POLE genes have also been associated with increased colon cancer risk and have published management guidelines.    Meño was provided with a detailed brochure from [facility] explaining the ColoNext testing.    Consent was obtained and genetic testing for ColoNext was sent to [facility]. Turn around time: approximately 4 weeks.    Medical Management: The information from genetic testing may determine additional cancer screening recommendations (i.e. mammogram and breast MRI, more frequent colonoscopies and/or upper endoscopies, thyroid ultrasounds, etc.) as well as options for risk reducing surgeries (i.e. colectomy, surgery to remove the ovaries and/or uterus, etc.) for Meño and his relatives. These recommendations will be discussed in detail once genetic testing is completed.    Plan:  1) Today Meño elected to proceed with genetic testing using the ColoNext gene panel offered by [facility].  2) This information should be available in approximately 4 weeks.  3) Meño will return to clinic to discuss the results.    Face to face time: 40 minutes    Faby Santos MS, [facility]  Certified Genetic Counselor  Office: [phone]  Pager: [phone]

## 2017-04-10 NOTE — H&P
"  Pre-Operative H & P     CC:  Preoperative exam to assess for increased cardiopulmonary risk while undergoing surgery and anesthesia.    Date of Encounter: 4/10/2017  Primary Care Physician:  Delio Alcala  Meño Omalley is a 46 year old male who presents for pre-operative H & P in preparation for  Open Low Anterior Resection with Ileostomy Anesthesia Block on 4/12/17 by Dr. Rose in treatment of colon cancer.  Surgery at [facility]. He has a family history of cancer.  He noted diarrhea and blood in stool on and off.  States blood was small amount.  No abdominal pain.  He had a colonoscopy on 3/24/17:    SPECIMEN(S):   A: Cecal polyp   B: Colon polyp, right ascending   C: Colon polyp, transverse   D: Sigmoid colon biopsy     FINAL DIAGNOSIS:   A. Cecal polyp:   - Sessile serrated adenoma.     B. Colon polyp, right ascending:   - Sessile serrated adenoma.     C. Colon polyp, transverse:   - Tubular adenoma.   - Negative for high grade dysplasia or malignancy.     D. Sigmoid colon biopsy:   - Invasive moderately differentiated colonic adenocarcinoma.     No prior surgery.  Father had fever with anesthesia.     History is obtained from the patient.     Past Medical History  Past Medical History:   Diagnosis Date     Colon cancer (H)      Depressive disorder      Family history of malignant hyperthermia     UNCONFIRMED BUT FATHER HAD \"FEVER WITH ANESTHESIA\"     Nephrolithiasis     asymptomatic     Obese      Uncomplicated asthma     exercise induced       Past Surgical History  Past Surgical History:   Procedure Laterality Date     NO HISTORY OF SURGERY         Hx of Blood transfusions/reactions: no     Hx of abnormal bleeding or anti-platelet use: no    Steroid use in the last year: no    Personal or FH with difficulty with Anesthesia:  \"FATHER WITH FEVER WITH ANESTHESIA\"    Prior to Admission Medications  Current Outpatient Prescriptions   Medication Sig " "Dispense Refill     escitalopram (LEXAPRO) 20 MG tablet Take 20 mg by mouth every morning        fluticasone (FLOVENT HFA) 110 MCG/ACT inhaler Take 2 puffs by mouth 2 times daily as needed        polyethylene glycol (MIRALAX) powder Please follow instructions on \"Getting Ready for Colonoscopy\" or Surgery packet 238 g 0       Allergies  No Known Allergies    Social History  Social History     Social History     Marital status:      Spouse name: N/A     Number of children: 2     Years of education: N/A     Occupational History      [employer]     Social History Main Topics     Smoking status: Never Smoker     Smokeless tobacco: Never Used     Alcohol use Yes      Comment: beer couple times per months     Drug use: No     Sexual activity: Yes     Partners: Female     Other Topics Concern     Not on file     Social History Narrative    .  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     2 children - adopted    9 siblings 1 with colon cancer.     Father history of colon cancer.  Passed away age 89 after CABG    Mother -  ALS    No family history of bleeding, clotting disorders or complications with anesthesia.               Family History  Family History   Problem Relation Age of Onset     Cancer - colorectal Father 62     C.A.D. Father 59     Hypertension Father      Neurologic Disorder Mother 70     ALS     Cancer - colorectal Sister 54         ROS/MED HX  The complete review of systems is negative other than noted in the HPI or here.     ENT/Pulmonary:     (+)Intermittent asthma Last exacerbation: exercise induced,, . .    Neurologic:  - neg neurologic ROS     Cardiovascular:     (+) Dyslipidemia, ----. : . . . :. . No previous cardiac testing      (-) taking anticoagulants/antiplatelets   METS/Exercise Tolerance:  >4 METS   Hematologic:  - neg hematologic  ROS      (-) History of Transfusion   Musculoskeletal:  - neg musculoskeletal ROS       GI/Hepatic:     (+) bowel prep,      (-) GERD " "  Renal/Genitourinary:     (+) Nephrolithiasis ,       Endo:     (+) Obesity, .      Psychiatric:     (+) psychiatric history depression      Infectious Disease:         Malignancy:   (+) Malignancy History of GI  GI CA Active status post, Colon cancer        Other:                   Temp: 97.9  F (36.6  C) Temp src: Oral BP: 135/85 Pulse: 89   Resp: 16 SpO2: 93 %         209 lbs 0 oz  5' 9\"   Body mass index is 30.86 kg/(m^2).       Physical Exam  Constitutional: Awake, alert, cooperative, no apparent distress, and appears stated age.  Eyes: Pupils equal, round and reactive to light,  sclera clear, conjunctiva normal.  HENT: Normocephalic, oral pharynx with moist mucus membranes, good dentition. No goiter appreciated.   Respiratory: Clear to auscultation bilaterally, no crackles or wheezing.  Cardiovascular: Regular rate and rhythm, normal S1 and S2, and no murmur noted.  Carotids +2, no bruits. No edema. Palpable pulses to DP and PT arteries.   GI: Normal bowel sounds, soft, non-distended, non-tender, no masses palpated, no hepatosplenomegaly.    Lymph/Hematologic: No cervical lymphadenopathy and no supraclavicular lymphadenopathy.  Skin: Warm and dry.  No rashes at anticipated surgical site.   Musculoskeletal: Full ROM of neck. There is no redness, warmth, or swelling of the joints. Gross motor strength is normal.    Neurologic: Awake, alert, oriented to name, place and time. Cranial nerves II-XII are grossly intact. Gait is normal.   Neuropsychiatric: Calm, cooperative. Normal affect.     Labs: (personally reviewed)  Last Basic Metabolic Panel:  Lab Results   Component Value Date     03/28/2017      Lab Results   Component Value Date    POTASSIUM 4.2 03/28/2017     Lab Results   Component Value Date    CHLORIDE 105 03/28/2017     Lab Results   Component Value Date    CANDY 9.0 03/28/2017     Lab Results   Component Value Date    CO2 28 03/28/2017     Lab Results   Component Value Date    BUN 12 03/28/2017 "     Lab Results   Component Value Date    CR 0.82 03/28/2017     Lab Results   Component Value Date    GLC 87 03/28/2017     4/10/2017 17:40  WBC: 5.7  Hemoglobin: 14.4  Hematocrit: 41.5  Platelet Count: 321    CT chest abdomen and pelvis:  IMPRESSION:  1. No evidence for metastasis is identified.  2. Nonobstructive right intrarenal calculus measures 0.3 cm.  3. Focal thickening of the wall of the distal sigmoid  colon/rectosigmoid junction could represent the patient's reported  colonic malignancy. Recommend clinical correlation.  4. Otherwise essentially negative CT chest, abdomen and pelvis.    Outside records reviewed from: NA    ASSESSMENT and PLAN  Meño Omalley is a 46 year old male scheduled to undergo  Open Low Anterior Resection with Ileostomy Anesthesia Block on 4/12/17 by Dr. Rose in treatment of colon cancer.  PAC referral for risk assessment and optimization for anesthesia with comorbid conditions of:    **MALIGNANT HYPERTHERMIA PRECAUTIONS**  Father had fevers with anesthesia, sister placed in precautions    [facility] CRS    Pre-operative considerations:  1.  Cardiac:  Functional status excellent.  Does sprint marathons. 0.9%  risk of major adverse cardiac event.  No cardiac history.   2.  Pulm:  Airway feasible.  AMY risk: intermediate.  + exercise induced asthma.  Rare use of albuterol.   3.  GI:  Risk of PONV score =1.  If > 2, anti-emetic intervention recommended.  Will get bowel prep.  Site marked in case needs colostomy.   4.  Psych:  + depression (on lexapro)    Discussed with CRS --> surgical case to be moved to 1st case in a.m.   Patient is optimized and is acceptable candidate for the proposed procedure.  No further diagnostic evaluation is needed.   Patient was discussed with Dr KAMILLE Antoine.    Jojo Diamond PA-C  Preoperative Assessment Center  [facility]  Phone: [phone]  Fax: [phone]

## 2017-04-10 NOTE — MR AVS SNAPSHOT
After Visit Summary   4/10/2017    Meño Omalley    MRN: [id number]           Patient Information     Date Of Birth          1970        Visit Information        Provider Department      4/10/2017 8:00 AM Faby Santos GC [facility]        Today's Diagnoses     Malignant neoplasm of sigmoid colon (H)    -  1    Family history of colon cancer        Family history of colonic polyps          Care Instructions      Assessing Cancer Risk  Only about 5-10% of cancers are thought to be due to an inherited cancer susceptibility gene.    These families often have:  ? Several people with the same or related types of cancer  ? Cancers diagnosed at a young age (before age 50)  ? Individuals with more than one primary cancer  ? Multiple generations of the family affected with cancer    Comprehensive Colon Cancer Panel  We each inherit two copies of every gene in our bodies: one from our mother, and one from our father.  Each gene has a specific job to do.  When a gene has a mistake or  mutation  in it, it does not work like it should.      This handout will review common hereditary colon cancer syndromes, and other genes related to an increased risk for colon cancer.  The genes that will be discussed in this handout are: APC, BMPR1A, CDH1, CHEK2, EPCAM, GREM1, MLH1, MSH2, MSH6, MUTYH, PMS2, POLD1, POLE, PTEN, SMAD4, STK11, and TP53.  These genes are clinically actionable, meaning there are published guidelines for cancer screening and management for individuals who are found to carry mutations in these genes. Inheriting a mutation does not mean a person will develop cancer, but it does significantly increase his or her risk above the general population risk.      Familial Adenomatous Polyposis (FAP)  FAP is a hereditary cancer syndrome caused by mutations in the APC gene. The condition is known to cause hundreds to thousands of adenomatous polyps in the colon, creating a  carpet  of  polyps. Some individuals have what is called attenuated FAP (AFAP), a milder form of FAP with fewer polyps and typically later onset. Individuals with an APC gene mutation are at an increased risk for colon, thyroid, and duodenal cancers, as well as several other types of cancer1.  Other features of this condition may include: osteomas, dental anomalies, benign skin lesions, CHRPE ( freckle  on the inside of the eye), and desmoid tumors.      Lifetime Cancer Risks     Cancer Type General Population FAP   Colon  5% near 100%   Thyroid (papillary) 1% 1-12%   Duodenal <1% 5%   Liver  <1% 1-2% before age 5   Pancreas <1% 1%   Stomach <1% 1%       Juvenile Polyposis Syndrome (JPS)  JPS is characterized by hamartomatous polyps, called juvenile polyps, in the gastrointestinal tract.  Juvenile  refers to the type of polyps seen in this hereditary cancer condition, not the age of onset. Currently, mutations in two genes are known to cause JPS: BMPR1A and SMAD4. Of individuals clinically diagnosed with JPS, only 40% have an identifiable mutation in one of these genes, suggesting there are other genes that cause JPS that have not been discovered yet. Individuals with JPS are at an increased risk for colon cancer and stomach cancer 2,3,4. Pancreatic and small bowel cancers have also been reported in JPS, but the actual risks are unknown.         Lifetime Cancer Risks    Cancer Type General Population JPS   Colon 5% 40-50%   Gastric/Duodenal <1% 10-21%     Some individuals with SMAD4 mutations have a condition called JPS/HHT (Juvenile Polyposis/Hereditary Hemorrhagic Telangiectasia) where in addition to JPS, individuals may have nose bleeds and clotting issues.     Hereditary Diffuse Gastric Cancer (HDGC)  Currently, mutations in one gene are known to cause Hereditary Diffuse Gastric Cancer: CDH1.  Individuals with HDGC are at increased risk for diffuse gastric cancer and lobular breast cancer. Of people diagnosed with HDGC,  30-50% have a mutation in the CDH1 gene.  This suggests there are likely mutations in other genes that may cause HDGC that have not been identified yet. Individuals with HDGC may also be at increased risk for colon cancer.      Lifetime Cancer Risks    Cancer Type General Population HDGC   Diffuse Gastric <1% 67-83%   Breast 12% 39-52%     Rodriguez syndrome  Mutations in five different genes are known to cause Rodriguez syndrome: MLH1, MSH2, MSH6, PMS2, and EPCAM. Individuals with Rodriguez syndrome have an increased risk for colon, uterine, ovarian, small bowel, stomach, urinary tract, and brain cancer, as well as several other types of cancer. The exact lifetime cancer risks are dependent upon the gene in which the mutation was identified.      Lifetime Cancer Risks    Cancer Type General Population Rodriguez syndrome   Colon 5% 10-80%   Uterine 2-3% 15-60%   Stomach <1% 6-13%   Ovarian 2% 4-24%   Urinary tract <1% 1-7%   Hepatobiliary tract <1% 1-4%   Small bowel <1% 3-6%   Brain/CNS <1% 1-3%   Pancreas <1% 1-6%       MUTYH-Associated Polyposis (MAP)  MAP is a hereditary cancer syndrome caused by mutations in the MUTYH gene. Unlike the other hereditary cancer genes discussed in this handout, two mutations in the MUTYH gene cause MAP and increase cancer risk. Those affected with MAP typically have between  adenomatous polyps. This syndrome also increases the risk for colon and duodenal cancer. Current research suggests that other cancers may be associated with MUTYH mutations, as well. The table below includes the risk that someone with two MUTYH gene mutations would develop cancer in their lifetime; of note, there is also an increased colon cancer risk for individuals who carry only one MUTYH gene mutation5,6,7.       Lifetime Cancer Risks    Cancer Type General Population MAP   Colon 5% %   Duodenal  <1% 5%       Cowden syndrome  Cowden syndrome is a hereditary condition that increases the risk for breast, thyroid,  endometrial, colon, and kidney cancer.  A single mutation in the PTEN gene causes Cowden syndrome and increases cancer risk.  The table below shows the chance that someone with a PTEN mutation would develop cancer in their lifetime8,9.  Other benign features seen in some individuals with Cowden syndrome include benign skin lesions (facial papules, keratoses, lipomas), learning disabilities, autism, thyroid nodules, hamartomatous colon polyps, and larger head size.      Lifetime Cancer Risks   Cancer Type General Population Cowden Syndrome   Breast 12% 25-50%*   Thyroid 1% 35%   Renal 1-2% 35%   Endometrial 2-3% 28%   Colon 5% 9%   Melanoma 2-3% >5%     *One recent study found breast cancer risk to be increased to 85%    Peutz-Jeghers syndrome (PJS)  PJS is a hereditary cancer syndrome caused by mutations in the STK11 gene. This condition can be distinguished from other hereditary syndromes by the presence of hamartomatous polyps in the gastrointestinal tract and freckles present in unusual places such as the hands, feet, neck, and lips. Individuals with Peutz-Jeghers syndrome have an increased risk for colon, breast, pancreatic, and other cancers3.  Men are at risk for testicular tumors which can affect hormones in the body. Women are at risk for sex cord tumors of the ovaries and a rare aggressive type of cervical cancer.     Lifetime Cancer Risks    Cancer Type General Population PJS   Breast 12% 45-50%   Colon 5% 39%   Stomach <1% 29%   Pancreas 1.5% 11-36%   Small Intestine <1% 13%     Ovarian  2%  18%   Lung 6-7% 15-17%     Additional Genes Associated with Increased Colon Cancer Risk  CHEK2  CHEK2 is a moderate-risk breast cancer gene.  Women who have a mutation in CHEK2 have around a 2-fold increased risk for breast cancer compared to the general population, and this risk may be higher depending upon family history.12,13,14.  Mutations in CHEK2 have also been shown to increase the risk of a number of other  cancers, including colon and prostate, however these cancer risks are currently not well understood.     GREM1  GREM1 is a moderate-risk colon polyposis gene. Duplications of this gene are more commonly found in individuals with [employer]kenazi [employer] [id number]. Mutations in GREM1 are associated with colon polyps and therefore an increased risk of colon cancer; however the estimated cancer risk is not well [id number].     POLD1 and POLE  POLD1 and POLE are moderate-risk colon cancer genes. Carriers of a mutation in one of these genes increases the lifetime risk of colorectal qlquby50,18,19,20. Mutations in these genes may also be associated with increased risk for other cancers including: endometrial cancer, duodenal adenomas and carcinomas, and brain tumors.    TP53  Li Fraumeni syndrome (LFS) is a hereditary cancer predisposition syndrome. LFS is caused by a mutation in the TP53 gene. A single mutation in one of the copies of TP53 increases the risk for multiple cancers. Individuals with LFS are at an increased risk for developing cancer at a young age. The general lifetime risk for development of cancer is 50% by age 30 and 90% by age 60.      Core Cancers: Sarcomas, Breast, Brain, Lung, Leukemias/Lymphomas, Adrenocortical carcinomas  Other Cancers: Gastrointestinal, Thyroid, Skin, Genitourinary    Genetic Testing  Genetic testing involves a simple blood test and will look at the genetic information in genes associated with an increased risk of colon cancer. The tests look for any harmful mutations that are associated with increased cancer risk.  If possible, it is recommended that the person(s) who has had cancer be tested before other family members.  That person will give us the most useful information about whether or not a specific gene mutation is associated with the cancer in the family.     Results  There are three possible results from genetic testing:  ? Positive--a harmful mutation was  identified  ? Negative--no mutation was identified  ? Variant of unknown significance--a variation in one of the genes was identified, but it is unclear how this impacts cancer risk in the family  Advantages and Disadvantages  There are advantages and disadvantages to genetic testing of these genes.    Advantages  ? May clarify your cancer risk  ? Can help you make medical decisions  ? May explain the cancers in your family  ? May give useful information to your family members (if you share your results)    Disadvantages  ? Possible negative emotional impact of learning about inherited cancer risk  ? Uncertainty in interpreting a negative test result in some situations  ? Possible genetic discrimination concerns (see below)    Inheritance   Most mutations in the genes outlined above are inherited in an autosomal dominant pattern.  This means that if a parent has a mutation, each of his or her children will have a 50% chance of inheriting that same mutation.  Therefore, each child--male or female--would have a 50% chance of being at increased risk for developing cancer.                                            Image obtained from [employer] Reference, 2013     In the case of MUTYH-Associated Polyposis (MAP) this hereditary cancer syndrome is inherited in an autosomal recessive pattern. This means that each parent of an individual with MAP is a carrier of MAP, meaning that they have only one mutation in MUTYH. They still have one functioning copy of their gene.  Carriers are at a slightly higher risk for colon cancer than the general population. If each parent is a carrier for MAP, they have a 25% of having a child who is affected with MAP, meaning the child inherited both gene mutations - one from each parent.       Image obtained from [employer] Reference, 2016    Genetic Information Nondiscrimination Act (DENISE)  DENISE is a federal law that protects individuals from health insurance or employment  discrimination based on a genetic test result alone.  Although rare, there are currently no legal protections in terms of life insurance, long term care, or disability insurances.  Visit the National Human Genome Research [place] at Genome.gov/41301439 to learn more.    Reducing Cancer Risk  Each of the genes listed within this handout have nationally recognized cancer screening guidelines that would be recommended for individuals who test positive.  In addition to increased cancer screening, surgeries may be offered or recommended to reduce cancer risk in certain cases.  Recommendations are based upon an individual s genetic test result as well as their personal and family history of cancer.    Questions to Think About Regarding Genetic Testing  ? What effect will the test result have on me and my relationship with my family members if I have an inherited gene mutation?  If I don t have a gene mutation?  ? Should I share my test results, and how will my family react to this news, which may also affect them?  ? Are my children ready to learn new information that may one day affect their own health?    Resources    PTEN World PTENworld.Endorse   No Stomach for Cancer, Inc. nostomachforcancer.[email or web address]   Stomach Cancer Relief Network scrnet.org   Collaborative Group of the Americas on Inherited Colorectal Cancer (CGA) cgaicc.com   Cancer Care cancercare.org   American Cancer Society (ACS) cancer.org   National Cancer [place] (NCI) cancer.org   Rodriguez Syndrome International lynchcancers.[email or web address]       Please call us if you have any questions or concerns.     Cancer Risk Management Program [phone] ([phone])  ? Dora Gupta, MS, [facility]  [phone]  ? Zahira Glass, MS, [facility]  [phone]  ? Faby Santos, MS, [facility]  [phone]  ? Adilia Vega, MS, [facility]  [phone]  ? Christiano Matthews, MS, [facility]  [phone]    References    1. Shannon CADET, Leidy J, Semaj G, Mendoza E, Lynn J, et al. The Prevalence of thyroid cancer and  benign thyroid disease in patients with familial adenomatous polyposis may be higher than previously recognized. Clin Colorectal Cancer. 2012;11:304-308.  2. Geovani L, Maximus Markham A, Anshu L, Ady ROMERO, Sebastián RUVALCABA, et al. Risk of colorectal cancer in juvenile polyposis. Gut. 2007;56:965-967.  3. Nishant FG, Ana MN, Daniel CA. Colorectal cancer risk in hamartomatous polyposis syndromes. World Journal of Gastrointestinal Surgery. 2015;27:25-32  4. Isabel MG. Guidance on gastrointestinal surveillance for hereditary non-polyposis colorectal cancer, familial adenomatous polypolis, juvenile polyposis, and Peutz-Jeghers syndrome. Gut. 2002;51:21-27.  5. Karthikeyan AK, Vernon HELENA, Corazon KESSELRG et al. Risk of extracolonic cancers for people with biallelic and monoallelic mutations in MUTYH. Int J of Cancer. 2016;139:3998-4180.  6. Kwesi S, Kristenlefraín S, Gomarybeth H, Robert K, Burroughs M, et al. MUTYH-associated polyposis: 70 of 71 patients with biallelic mutations present with an attenuated or atypical phenotype. Int J of Cancer. 2006;119:807-814.  7. Adelso G, Tiarra F, Deepak I, Pinwilliam M, Adelso H, et al. MUTYH mutation carriers have increased breast cancer risk. Cancer. 2012;0186-7610.  8. Jones MH, Dimitrios J, Martha J, Joselito LA, Ornori MS, Eng C. Lifetime cancer risks in individuals with germline PTEN mutations. Clin Cancer Res. 2012;18:400-7.  9. Christelki R. Cowden Syndrome: A Critical Review of the Clinical Literature. J Aminah . 2009:18:13-27.  10. National Comprehensive Cancer Network. Clinical practice guidelines in oncology, colorectal cancer screening. Available online (registration required). 2013.  11. National Cancer [place]. SEER Cancer Stat Fact Sheets.  December 2013.  12. CHEK2 Breast Cancer Case-Control Consortium. CHEK2*1100delC and susceptibility to breast cancer: A collaborative analysis involving 10,860 breast cancer cases and 9,065 controls from 10 studies. Am J Hum Aminah, 74 (2004), pp.  7109-2217  13. Padmini T, Dori S, Rafiq K, et al. Spectrum of Mutations in BRCA1, BRCA2, CHEK2, and TP53 in Families at High Risk of Breast Cancer. RANDAL. 2006;295(12):5311-8200.  14. Hardik ROMERO, Esha HELLER, Zechariah BRAGA, et al. Risk of breast cancer in women with a CHEK2 mutation with and without a family history of breast cancer. J Clin Oncol. 2011;29:5081-1689.  15. Anna KESSLER, Francesca CADET, Matthieu J, Rob N, Kamran MAST et al. Defining the polyposis/colorectal cancer phenotype associated with the GREM1 duplication: counseling and management guidelines. Aminah .Res. 2016;98:1-5.  16. Allegra CADET, Oniel S, Anthony A, Jaime Harp, et al. Hereditary mixed polyposis syndrome is caused by a 40kb upstream duplication that leads to increased and ectopic expression of the BMP antagonist GREM1. Char Aminah. 2015;44:699-703.  17. NICK Jj. et al. Germline mutations affecting the proofreading domains of POLE and POLD1 predispose to colorectal adenomas and carcinomas. Char. Aminah. 45, 136-44 (2013).  18. NA Oseguera. et al. POLE and POLD1 mutations in 529 jojo with familial colorectal cancer and/or polyposis: review of reported cases and recommendations for genetic testing and surveillance. Aminah. Med. (2015). doi:10.1038/gim.2015.75  19. EVELYN Mayberry. et al. New insights into POLE and POLD1 germline mutations in familial colorectal cancer and polyposis. Hum. Mol. Aminah. 23, 8981-12 (2014).  20. Zaire I. et al. Frequency and phenotypic spectrum of germline mutations in POLE and seven other polymerase genes in 266 patients with colorectal adenomas and carcinomas. Int. J. Cancer 137, 320-31 (2015).           Follow-ups after your visit        Follow-up notes from your care team     Return in about 4 weeks (around 5/8/2017).      Your next 10 appointments already scheduled     Apr 10, 2017  9:15 AM [facility]   [facility] Lab Draw with  [facility] LAB DRAW   [facility] Lab Draw ([facility])    [address] [place] [address]  Se  [address]nd [facility] [address]   [phone]            Apr 10, 2017 10:30 AM CDT   NEW OSTOMY NURSE VISIT with Jess Clayton RN   [facility] Wound Ostomy ([facility])    [address] [place] [address]   [phone]            Apr 10, 2017  3:30 PM CDT   (Arrive by 3:15 PM)   PAC EVALUATION with Uc Pac Paul 2   [facility] Preoperative Assessment [facility] ([facility])    [address] [place] [address]   [phone]            Apr 10, 2017  4:20 PM CDT   (Arrive by 4:05 PM)   PAC Anesthesia Consult with Uc Pac Anesthesiologist   [facility] Preoperative Assessment [facility] ([facility])    [address] [place] [address]   [phone]            Apr 10, 2017  4:30 PM CDT   (Arrive by 4:15 PM)   Return Visit with Ignacio Rose MD   [facility] Colon and Rectal Surgery ([facility])    [address] [place] [address]   [phone]            Apr 10, 2017  4:30 PM CDT   (Arrive by 4:15 PM)   PAC RN ASSESSMENT with Uc Pac Rn   [facility] Preoperative Assessment [facility] ([facility])    [address] [place] [address] [phone]            Apr 12, 2017   Procedure with Ignacio Rose MD   [facility] [place], Same Day Surgery (--)    [address] [address] [phone]              Future tests that were ordered for you today     Open Future Orders        Priority Expected Expires Ordered    [facility] genetic testing, [place] Test: Laboratory Miscellaneous Order Routine  4/10/2018 4/10/2017            Who to contact     If you have questions or need follow up information about today's clinic visit or your schedule please contact [facility] directly at [phone].  Normal or non-critical lab and imaging results will be communicated to  you by MyChart, letter or phone within 4 business days after the clinic has received the results. If you do not hear from us within 7 days, please contact the clinic through [employer] or phone. If you have a critical or abnormal lab result, we will notify you by phone as soon as possible.  Submit refill requests through "[employer] or call your pharmacy and they will forward the refill request to us. Please allow 3 business days for your refill to be completed.          Additional Information About Your Visit        [facility] Information     "[employer] gives you secure access to your electronic health record. If you see a primary care provider, you can also send messages to your care team and make appointments. If you have questions, please call your primary care clinic.  If you do not have a primary care provider, please call [phone] and they will assist you.        Care EveryWhere ID     This is your Care EveryWhere ID. This could be used by other organizations to access your [place] medical records  [id number]         Blood Pressure from Last 3 Encounters:   03/28/17 123/64   03/24/17 122/90   03/20/17 120/76    Weight from Last 3 Encounters:   03/28/17 94.8 kg (208 lb 14.4 oz)   03/20/17 92.3 kg (203 lb 6.4 oz)   11/25/14 93.4 kg (206 lb)               Primary Care Provider Office Phone # Fax #    Delio Alcala [phone]       [facility] [address] [place] [address]        Thank you!     Thank you for choosing [facility]  for your care. Our goal is always to provide you with excellent care. Hearing back from our patients is one way we can continue to improve our services. Please take a few minutes to complete the written survey that you may receive in the mail after your visit with us. Thank you!             Your Updated Medication List - Protect others around you: Learn how to safely use, store and throw away your medicines at  "www.disposemymeds.org.          This list is accurate as of: 4/10/17  8:50 AM.  Always use your most recent med list.                   Brand Name Dispense Instructions for use    escitalopram 20 MG tablet    LEXAPRO     Take 20 mg by mouth daily       FLOVENT  MCG/ACT Inhaler   Generic drug:  fluticasone      Take 2 puffs by mouth 2 times daily as needed       ondansetron 4 MG tablet    ZOFRAN    3 tablet    Take 1 tablet (4 mg) by mouth every 6 hours as needed for nausea when taking Neomycin and Flagyl.       polyethylene glycol powder    MIRALAX    238 g    Please follow instructions on \"Getting Ready for Colonoscopy\" or Surgery packet         "

## 2017-04-10 NOTE — PROGRESS NOTES
WOC Preoperative Ostomy    Referral from Dr. Rose  Consult attended by patient   Diagnosis: Colon Cancer Date of Surgery: 04/12/17    Type of Surgery: Open Low Anterior Resection with Ileostomy Anesthesia Block  Emotional readiness for surgery: no acute distress  Physical Limitations: Without limitations  Abdomen assessed for site by: Patient's ability to visiualize area, avoidance of skin creases and scars, palpating for rectus abdominus muscle and clothing fit  Teaching: Anatomy/picture of stoma, stoma/bowel function postop, intro to pouches, returning to work/lifting, written/media offered and role of WOC/postop followup explained  Stoma site marking:  Marking pen and tegaderm   Location chosen: RLQ    For ileostomy marking insert nutrition and diet phrase in patient instruction    Dr. Caal was available for supervision of care if needed or if questions should arise and regarding plan of care. Reji Driscoll RN, CWON

## 2017-04-10 NOTE — PROGRESS NOTES
Colon and Rectal Surgery Clinic Note      RE: Meño Omalley  : 1970  JENNY: 4/10/2017      HISTORY OF PRESENT ILLNESS:  Meño returns to clinic today in anticipation of surgery scheduled for 2 days from now.  His case was discussed at Tumor Board, and the consensus here was that we should proceed directly with surgery rather than offer neoadjuvant chemoradiation given that this appears to be an upper third rectal cancer with a rather equivocal node and no threatened mesorectal margin.  The other issue with Meño is his positive family history of colorectal cancer.  An immunohistochemistry finally came back and this shows intact mismatch repair, which militates against a diagnosis of Rodriguez syndrome.  Meño saw the genetic counselor this afternoon and his genetic testing blood work is pending.      I had a rather detailed discussion with Meño about these issues last week and further discussed them today.  I explained the rationale for proceeding directly with surgery and he is quite comfortable with this and wishes to proceed.  With respect to the potential for Rodriguez syndrome, we again went over the fact that management of rectal cancer in Rodriguez syndrome is controversial but the current ASCRS guidelines are to simply remove the tumor and discuss total proctocolectomy.  My guess is that Meño will end up with a mid rectal anastomosis, which would not likely be suitable for an ileorectal anastomosis.  He says he would not want to undergo a total colectomy even with the diagnosis of Rdoriguez syndrome but prefers just to treat the presently diagnosed rectal cancer.  I think this is a very reasonable viewpoint.  We did discuss and he does understand that he does have a significant risk of metachronous cancer and will need close monitoring.  We again reviewed all the risks associated with major pelvic surgery, which I have gone over previously.  We discussed that he may or may not end up with a covering  "ileostomy, depending on the level of his anastomosis.  He did meet with the [facility] nurses today.      The only identified problem for Meño is that there is a questionable history of malignant hyperthermia in his family.  This apparently was some sort of postoperative fever in his father, but a firm diagnosis of malignant hyperthermia does not seem to be in hand.  In any case, the [facility] feels that he needs to be on potential malignant hyperthermia protocol, and we will comply with that.        Meño appears to have a thorough understanding of all the issues involved.  I answered all of his questions to his stated satisfaction.  He expresses understanding and agreement with the proposed plan.      Total time 25 minutes.  Greater than half the time spent counseling.        Ignacio Rose MD        cc:     Delio Alcala MD           For details of past medical history, surgical history, family history, medications, allergies, and review of systems, please see details below.    Medical history:  Past Medical History:   Diagnosis Date     Colon cancer (H)      Depressive disorder      Family history of malignant hyperthermia     UNCONFIRMED BUT FATHER HAD \"FEVER WITH ANESTHESIA\"     Nephrolithiasis     asymptomatic     Obese      Uncomplicated asthma     exercise induced       Surgical history:  Past Surgical History:   Procedure Laterality Date     NO HISTORY OF SURGERY         Family history:  Family History   Problem Relation Age of Onset     Cancer - colorectal Father 62     C.A.D. Father 59     Hypertension Father      Neurologic Disorder Mother 70     ALS     Cancer - colorectal Sister 54       Medications:  Current Outpatient Prescriptions   Medication Sig Dispense Refill     polyethylene glycol (MIRALAX) powder Please follow instructions on \"Getting Ready for Colonoscopy\" or Surgery packet 238 g 0     escitalopram (LEXAPRO) 20 MG tablet Take 20 mg by mouth every morning        fluticasone (FLOVENT " HFA) 110 MCG/ACT inhaler Take 2 puffs by mouth 2 times daily as needed        Allergies:  The patienthas No Known Allergies.    Social history:  Social History   Substance Use Topics     Smoking status: Never Smoker     Smokeless tobacco: Never Used     Alcohol use Yes      Comment: beer couple times per months     Marital status: .    Review of Systems:  There are no exam notes on file for this visit.         Ignacio Rose MD   Professor and Chief  Division of Colon and Rectal Surgery  [facility]      Referring Provider:  Delio Alcala MD  [address] [place] [address]     Primary Care Provider:  Delio Alcala

## 2017-04-10 NOTE — NURSING NOTE
Chief Complaint   Patient presents with     Blood Draw     lab draw in right arm, vitals taken      Patricia Bosch CMA

## 2017-04-10 NOTE — PATIENT INSTRUCTIONS
Assessing Cancer Risk  Only about 5-10% of cancers are thought to be due to an inherited cancer susceptibility gene.    These families often have:  ? Several people with the same or related types of cancer  ? Cancers diagnosed at a young age (before age 50)  ? Individuals with more than one primary cancer  ? Multiple generations of the family affected with cancer    Comprehensive Colon Cancer Panel  We each inherit two copies of every gene in our bodies: one from our mother, and one from our father.  Each gene has a specific job to do.  When a gene has a mistake or  mutation  in it, it does not work like it should.      This handout will review common hereditary colon cancer syndromes, and other genes related to an increased risk for colon cancer.  The genes that will be discussed in this handout are: APC, BMPR1A, CDH1, CHEK2, EPCAM, GREM1, MLH1, MSH2, MSH6, MUTYH, PMS2, POLD1, POLE, PTEN, SMAD4, STK11, and TP53.  These genes are clinically actionable, meaning there are published guidelines for cancer screening and management for individuals who are found to carry mutations in these genes. Inheriting a mutation does not mean a person will develop cancer, but it does significantly increase his or her risk above the general population risk.      Familial Adenomatous Polyposis (FAP)  FAP is a hereditary cancer syndrome caused by mutations in the APC gene. The condition is known to cause hundreds to thousands of adenomatous polyps in the colon, creating a  carpet  of polyps. Some individuals have what is called attenuated FAP (AFAP), a milder form of FAP with fewer polyps and typically later onset. Individuals with an APC gene mutation are at an increased risk for colon, thyroid, and duodenal cancers, as well as several other types of cancer1.  Other features of this condition may include: osteomas, dental anomalies, benign skin lesions, CHRPE ( freckle  on the inside of the eye), and desmoid tumors.      Lifetime  Cancer Risks     Cancer Type General Population FAP   Colon  5% near 100%   Thyroid (papillary) 1% 1-12%   Duodenal <1% 5%   Liver  <1% 1-2% before age 5   Pancreas <1% 1%   Stomach <1% 1%       Juvenile Polyposis Syndrome (JPS)  JPS is characterized by hamartomatous polyps, called juvenile polyps, in the gastrointestinal tract.  Juvenile  refers to the type of polyps seen in this hereditary cancer condition, not the age of onset. Currently, mutations in two genes are known to cause JPS: BMPR1A and SMAD4. Of individuals clinically diagnosed with JPS, only 40% have an identifiable mutation in one of these genes, suggesting there are other genes that cause JPS that have not been discovered yet. Individuals with JPS are at an increased risk for colon cancer and stomach cancer 2,3,4. Pancreatic and small bowel cancers have also been reported in JPS, but the actual risks are unknown.         Lifetime Cancer Risks    Cancer Type General Population JPS   Colon 5% 40-50%   Gastric/Duodenal <1% 10-21%     Some individuals with SMAD4 mutations have a condition called JPS/HHT (Juvenile Polyposis/Hereditary Hemorrhagic Telangiectasia) where in addition to JPS, individuals may have nose bleeds and clotting issues.     Hereditary Diffuse Gastric Cancer (HDGC)  Currently, mutations in one gene are known to cause Hereditary Diffuse Gastric Cancer: CDH1.  Individuals with HDGC are at increased risk for diffuse gastric cancer and lobular breast cancer. Of people diagnosed with HDGC, 30-50% have a mutation in the CDH1 gene.  This suggests there are likely mutations in other genes that may cause HDGC that have not been identified yet. Individuals with HDGC may also be at increased risk for colon cancer.      Lifetime Cancer Risks    Cancer Type General Population HDGC   Diffuse Gastric <1% 67-83%   Breast 12% 39-52%     Rodriguez syndrome  Mutations in five different genes are known to cause Rodriguez syndrome: MLH1, MSH2, MSH6, PMS2, and  EPCAM. Individuals with Rodriguez syndrome have an increased risk for colon, uterine, ovarian, small bowel, stomach, urinary tract, and brain cancer, as well as several other types of cancer. The exact lifetime cancer risks are dependent upon the gene in which the mutation was identified.      Lifetime Cancer Risks    Cancer Type General Population Rodriguez syndrome   Colon 5% 10-80%   Uterine 2-3% 15-60%   Stomach <1% 6-13%   Ovarian 2% 4-24%   Urinary tract <1% 1-7%   Hepatobiliary tract <1% 1-4%   Small bowel <1% 3-6%   Brain/CNS <1% 1-3%   Pancreas <1% 1-6%       MUTYH-Associated Polyposis (MAP)  MAP is a hereditary cancer syndrome caused by mutations in the MUTYH gene. Unlike the other hereditary cancer genes discussed in this handout, two mutations in the MUTYH gene cause MAP and increase cancer risk. Those affected with MAP typically have between  adenomatous polyps. This syndrome also increases the risk for colon and duodenal cancer. Current research suggests that other cancers may be associated with MUTYH mutations, as well. The table below includes the risk that someone with two MUTYH gene mutations would develop cancer in their lifetime; of note, there is also an increased colon cancer risk for individuals who carry only one MUTYH gene mutation5,6,7.       Lifetime Cancer Risks    Cancer Type General Population MAP   Colon 5% %   Duodenal  <1% 5%       Cowden syndrome  Cowden syndrome is a hereditary condition that increases the risk for breast, thyroid, endometrial, colon, and kidney cancer.  A single mutation in the PTEN gene causes Cowden syndrome and increases cancer risk.  The table below shows the chance that someone with a PTEN mutation would develop cancer in their lifetime8,9.  Other benign features seen in some individuals with Cowden syndrome include benign skin lesions (facial papules, keratoses, lipomas), learning disabilities, autism, thyroid nodules, hamartomatous colon polyps, and  larger head size.      Lifetime Cancer Risks   Cancer Type General Population Cowden Syndrome   Breast 12% 25-50%*   Thyroid 1% 35%   Renal 1-2% 35%   Endometrial 2-3% 28%   Colon 5% 9%   Melanoma 2-3% >5%     *One recent study found breast cancer risk to be increased to 85%    Peutz-Jeghers syndrome (PJS)  PJS is a hereditary cancer syndrome caused by mutations in the STK11 gene. This condition can be distinguished from other hereditary syndromes by the presence of hamartomatous polyps in the gastrointestinal tract and freckles present in unusual places such as the hands, feet, neck, and lips. Individuals with Peutz-Jeghers syndrome have an increased risk for colon, breast, pancreatic, and other cancers3.  Men are at risk for testicular tumors which can affect hormones in the body. Women are at risk for sex cord tumors of the ovaries and a rare aggressive type of cervical cancer.     Lifetime Cancer Risks    Cancer Type General Population PJS   Breast 12% 45-50%   Colon 5% 39%   Stomach <1% 29%   Pancreas 1.5% 11-36%   Small Intestine <1% 13%     Ovarian  2%  18%   Lung 6-7% 15-17%     Additional Genes Associated with Increased Colon Cancer Risk  CHEK2  CHEK2 is a moderate-risk breast cancer gene.  Women who have a mutation in CHEK2 have around a 2-fold increased risk for breast cancer compared to the general population, and this risk may be higher depending upon family history.12,13,14.  Mutations in CHEK2 have also been shown to increase the risk of a number of other cancers, including colon and prostate, however these cancer risks are currently not well understood.     GREM1  GREM1 is a moderate-risk colon polyposis gene. Duplications of this gene are more commonly found in individuals with Ashkenazi Spiritism [id number]. Mutations in GREM1 are associated with colon polyps and therefore an increased risk of colon cancer; however the estimated cancer risk is not well [id number].     POLD1 and POLE  POLD1 and POLE  are moderate-risk colon cancer genes. Carriers of a mutation in one of these genes increases the lifetime risk of colorectal picftf90,18,19,20. Mutations in these genes may also be associated with increased risk for other cancers including: endometrial cancer, duodenal adenomas and carcinomas, and brain tumors.    TP53  Li Fraumeni syndrome (LFS) is a hereditary cancer predisposition syndrome. LFS is caused by a mutation in the TP53 gene. A single mutation in one of the copies of TP53 increases the risk for multiple cancers. Individuals with LFS are at an increased risk for developing cancer at a young age. The general lifetime risk for development of cancer is 50% by age 30 and 90% by age 60.      Core Cancers: Sarcomas, Breast, Brain, Lung, Leukemias/Lymphomas, Adrenocortical carcinomas  Other Cancers: Gastrointestinal, Thyroid, Skin, Genitourinary    Genetic Testing  Genetic testing involves a simple blood test and will look at the genetic information in genes associated with an increased risk of colon cancer. The tests look for any harmful mutations that are associated with increased cancer risk.  If possible, it is recommended that the person(s) who has had cancer be tested before other family members.  That person will give us the most useful information about whether or not a specific gene mutation is associated with the cancer in the family.     Results  There are three possible results from genetic testing:  ? Positive--a harmful mutation was identified  ? Negative--no mutation was identified  ? Variant of unknown significance--a variation in one of the genes was identified, but it is unclear how this impacts cancer risk in the family  Advantages and Disadvantages  There are advantages and disadvantages to genetic testing of these genes.    Advantages  ? May clarify your cancer risk  ? Can help you make medical decisions  ? May explain the cancers in your family  ? May give useful information to your family  members (if you share your results)    Disadvantages  ? Possible negative emotional impact of learning about inherited cancer risk  ? Uncertainty in interpreting a negative test result in some situations  ? Possible genetic discrimination concerns (see below)    Inheritance   Most mutations in the genes outlined above are inherited in an autosomal dominant pattern.  This means that if a parent has a mutation, each of his or her children will have a 50% chance of inheriting that same mutation.  Therefore, each child--male or female--would have a 50% chance of being at increased risk for developing cancer.                                            Image obtained from [employer], 2013     In the case of MUTYH-Associated Polyposis (MAP) this hereditary cancer syndrome is inherited in an autosomal recessive pattern. This means that each parent of an individual with MAP is a carrier of MAP, meaning that they have only one mutation in MUTYH. They still have one functioning copy of their gene.  Carriers are at a slightly higher risk for colon cancer than the general population. If each parent is a carrier for MAP, they have a 25% of having a child who is affected with MAP, meaning the child inherited both gene mutations - one from each parent.       Image obtained from [employer], 2016    Genetic Information Nondiscrimination Act (DENISE)  DENISE is a federal law that protects individuals from health insurance or employment discrimination based on a genetic test result alone.  Although rare, there are currently no legal protections in terms of life insurance, long term care, or disability insurances.  Visit the National Human Genome Research [place] at Genome.gov/53019828 to learn more.    Reducing Cancer Risk  Each of the genes listed within this handout have nationally recognized cancer screening guidelines that would be recommended for individuals who test positive.  In addition to increased  cancer screening, surgeries may be offered or recommended to reduce cancer risk in certain cases.  Recommendations are based upon an individual s genetic test result as well as their personal and family history of cancer.    Questions to Think About Regarding Genetic Testing  ? What effect will the test result have on me and my relationship with my family members if I have an inherited gene mutation?  If I don t have a gene mutation?  ? Should I share my test results, and how will my family react to this news, which may also affect them?  ? Are my children ready to learn new information that may one day affect their own health?    Resources    PTEN World PTENworld.Raising IT   No Stomach for Cancer, Inc. nostomachforcancer.org   Stomach Cancer Relief Network scrnet.org   Collaborative Group of the Americas on Inherited Colorectal Cancer (CGA) cgaicc.com   Cancer Care cancercare.org   American Cancer Society (ACS) cancer.org   National Cancer [place] (NCI) cancer.org   Rodriguez Syndrome International [email or web address].[email or web address]       Please call us if you have any questions or concerns.     Cancer Risk Management Program [phone]-CANCER ([phone])  ? Dora Alonso, MS, [facility] [place]  [phone]  ? Zahira Maria Luisa, MS, [facility] [place]  [phone]  ? Faby Santos, MS, [facility] [place]  [phone]  ? Adilia Vega, MS, [facility] [place]  [phone]  ? Rodon Byron, MS, [facility] [place]  [phone]    References    1. Shannon CADET, Leidy J, Cha G, Nils-Neha E, Lynn J, et al. The Prevalence of thyroid cancer and benign thyroid disease in patients with familial adenomatous polyposis may be higher than previously recognized. Clin Colorectal Cancer. 2012;11:304-308.  2. Wils L, Van Rashi A, Anshu L, Ady C, Sebastián K, et al. Risk of colorectal cancer in juvenile polyposis. Gut. 2007;56:965-967.  3. Nishant FG, Ana MN, Daniel CA. Colorectal cancer risk in hamartomatous polyposis syndromes. World Journal of Gastrointestinal Surgery. 2015;27:25-32  4. Isabel ELMA.  Guidance on gastrointestinal surveillance for hereditary non-polyposis colorectal cancer, familial adenomatous polypolis, juvenile polyposis, and Peutz-Jeghers syndrome. Gut. 2002;51:21-27.  5. Karthikeyan AK, Vernon HELENA, Corazon JG et al. Risk of extracolonic cancers for people with biallelic and monoallelic mutations in MUTYH. Int J of Cancer. 2016;139:3274-9762.  6. Kwesi S, Micheline S, Cathryn H, Robert K, Burroughs M, et al. MUTYH-associated polyposis: 70 of 71 patients with biallelic mutations present with an attenuated or atypical phenotype. Int J of Cancer. 2006;119:807-814.  7. Adelso G, Tiarra F, Deepak I, Idalia M, Adelso H, et al. MUTYH mutation carriers have increased breast cancer risk. Cancer. 2012;0128-8834.  8. Jones MH, Dimitrios J, Martha J, Joselito LA, Jaz MS, Eng C. Lifetime cancer risks in individuals with germline PTEN mutations. Clin Cancer Res. 2012;18:400-7.  9. Zoila R. Cowden Syndrome: A Critical Review of the Clinical Literature. J Aminah . 2009:18:13-27.  10. National Comprehensive Cancer Network. Clinical practice guidelines in oncology, colorectal cancer screening. Available online (registration required). 2013.  11. National Cancer [place]. SEER Cancer Stat Fact Sheets.  December 2013.  12. CHEK2 Breast Cancer Case-Control Consortium. CHEK2*1100delC and susceptibility to breast cancer: A collaborative analysis involving 10,860 breast cancer cases and 9,065 controls from 10 studies. Am J Hum Aminah, 74 (2004), pp. 4634-3963  13. Padmini T, Dori S, Rafiq K, et al. Spectrum of Mutations in BRCA1, BRCA2, CHEK2, and TP53 in Families at High Risk of Breast Cancer. RANDAL. 2006;295(12):0001-5916.  14. Hardik ROMERO, Esha D, Zechariah A, et al. Risk of breast cancer in women with a CHEK2 mutation with and without a family history of breast cancer. J Clin Oncol. 2011;29:1680-7423.  15. Anna KESSLER, Francesca CADET, Matthieu J, Rob N, Kamran MAST et al. Defining the polyposis/colorectal cancer phenotype  associated with the GREM1 duplication: counseling and management guidelines. Aminah .Res. 2016;98:1-5.  16. Allegar CADET, Oniel S, Anthony A, Jaime Harp, et al. Hereditary mixed polyposis syndrome is caused by a 40kb upstream duplication that leads to increased and ectopic expression of the BMP antagonist GREM1. Char Aminah. 2015;44:699-703.  17. NICK Jj. et al. Germline mutations affecting the proofreading domains of POLE and POLD1 predispose to colorectal adenomas and carcinomas. Char. Aminah. 45, 136-44 (2013).  18. NA Oseguera. et al. POLE and POLD1 mutations in 529 jojo with familial colorectal cancer and/or polyposis: review of reported cases and recommendations for genetic testing and surveillance. Aminah. Med. (2015). doi:10.1038/gim.2015.75  19. CHRISTIANO Mayberry et al. New insights into POLE and POLD1 germline mutations in familial colorectal cancer and polyposis. Hum. Mol. Aminah. 23, 3446-12 (2014).  20. JULIO Tai. et al. Frequency and phenotypic spectrum of germline mutations in POLE and seven other polymerase genes in 266 patients with colorectal adenomas and carcinomas. Int. J. Cancer 137, 320-31 (2015).

## 2017-04-10 NOTE — LETTER
4/10/2017       RE: Meño Omalley  [address] [facility] [address]     Dear Colleague,    Thank you for referring your patient, Meño Omalley, to the [facility]. Please see a copy of my visit note below.    4/10/2017    Referring Provider: Ignacio Rose MD    Presenting Information:   I met with Meño Omalley today for genetic counseling at the Cancer Risk Management Program at the [facility] to discuss his personal history of colon cancer and family history of colon cancer and colon polyps.  He is here today to review this history, cancer screening recommendations, and available genetic testing options.    Personal History:  Meño is a 46 year old male. He was diagnosed with invasive moderately-differentiated adenocarcinoma of the sigmoid colon at age 46; surgery is currently planned on 4/12/2017. Immunohistochemistry (IHC) tumor testing of the sigmoid tumor showed intact mismatch repair proteins.    Meño had his first colonoscopy recently at age 46, after blood was found in his stool. This colonoscopy identified two sessile serrated adenomas, one tubular adenoma, and the sigmoid tumor. He does not regularly do any other cancer screening at this time.  Meño reported no tobacco use and occasional alcohol use.    Family History: (Please see scanned pedigree for detailed family history information)    One sister is 62 and was diagnosed with colon cancer at age 54; treatment included surgery. She also has a history of approximately 12 colon polyps removed in her lifetime. At age 60, a mass was identified in her thigh and she was treated with radiation. Further details are unknown.    One sister is 54 and was diagnosed with vulva cancer at age 53; treatment included surgery. She has had approximately 10 or less colon polyps removed in her lifetime.    Five other brothers and sisters have each had approximately 10 or less colon polyps removed in their  lifetimes.    Meño's father was diagnosed with colon cancer at age 60 and passed away at age 72; treatment included surgery and chemotherapy.    His maternal ethnicity is Chinese. His paternal ethnicity is [place]ian and [place]ut. There is no known Ashkenazi Hindu ancestry on either side of his family. There is no reported consanguinity.    Discussion:    Meño's personal and family history of colon polyps and cancer is suggestive of a possible hereditary cancer syndrome.    We reviewed the features of sporadic, familial, and hereditary cancers.  In looking at Meño's family history, it is possible that a cancer susceptibility gene is present as Meño was diagnosed with colon cancer under age 51 and he has several other relatives (in two generations) that have either been diagnosed with colon cancer or polyps. That being said, the majority of his relatives have not been diagnosed with cancer (including seven other siblings) and none of his relatives have had more than 20 colon polyps removed in their lifetime.    We discussed the natural history and genetics of several hereditary cancer syndromes, including Rodriguez syndrome. A detailed handout regarding these syndromes and the information we discussed was provided to Meño at the end of our appointment today and can be found in the after visit summary.  Topics included: inheritance pattern, cancer risks, cancer screening recommendations, and also risks, benefits and limitations of testing.    We reviewed that the most common cause of hereditary colon cancer is Rodriguez syndrome, which is caused by mutations in the mismatch repair genes MLH1, MSH2, MSH6, PMS2, and EPCAM. Individuals with Rodriguez syndrome are at increased risk for several different cancers, including colon, uterine, ovarian, and stomach cancer. Published screening and/or surgery guidelines are available to help manage these risks.    We discussed that immunohistochemistry (IHC) testing of a tumor  allows us to screen a patient for Rodriguez syndrome and is ideally done on a colon or endometrial tumor. Each mismatch repair gene (MLH1, MSH2, MSH6, PMS2) makes a protein.  IHC testing involves looking at the tumor to determine which of the proteins is present and which (if any) are absent. If one or more of the mismatch repair proteins is absent in the tumor, it can indicate an underlying inherited mutation in the respective gene. In this way, IHC testing can help determine who is a candidate for additional genetic blood testing for Rodriguez syndrome. Meño's IHC tumor testing was normal, meaning all mismatch repair proteins were present. This significantly reduces, but does not eliminate, the chance that Meño could have Rodriguez syndrome.     Based on his personal and family history, Meño meets current National Comprehensive Cancer Network (NCCN) and [place] II criteria for genetic testing of Rodriguez syndrome.      We discussed that there are also other additional genes that could cause increased risk of colon cancer and polyps. For example, mutations in the genes MUTYH and APC are associated with increased risk for colon cancer and polyps, though Meño and his relatives do not have the typical amount of polyps expected with these conditions. Also, mutations in the gene CHEK2 are associated with a more moderate increased risk for colon cancer, as well as breast cancer. As many of these genes present with overlapping features in a family, it would be reasonable for Meño to consider panel genetic testing to analyze multiple genes at once.    Meño explained that he would be interested in gathering as much information as possible from genetic testing. As such, we discussed the ColoNext gene panel.    Genetic testing is available for 17 genes associated with increased risk for colon cancer: ColoNext (APC, BMPR1A, CDH1, CHEK2, GREM1, EPCAM, MLH1, MSH2, MSH6, MUTYH, PMS2, POLD1, POLE, PTEN, SMAD4, STK11, and  TP53).    We discussed that some of the genes in the ColoNext panel are associated with specific hereditary cancer syndromes and have published management guidelines: Rodriguez syndrome (MLH1, MSH2, MSH6, PMS2, EPCAM), Familial Adenomatous Polyposis (APC), MUTYH Associated Polyposis (MUTYH), Juvenile Polyposis syndrome (SMAD4, BMPR1A), Peutz-Jeghers syndrome (STK11), Cowden syndrome (PTEN), Li Fraumeni syndrome (TP53), and Hereditary Diffuse Gastric Cancer (CDH1).     The CHEK2, GREM1, POLD1, and POLE genes have also been associated with increased colon cancer risk and have published management guidelines.    Meño was provided with a detailed brochure from [facility] explaining the ColoNext testing.    Consent was obtained and genetic testing for ColoNext was sent to [facility]. Turn around time: approximately 4 weeks.    Medical Management: The information from genetic testing may determine additional cancer screening recommendations (i.e. mammogram and breast MRI, more frequent colonoscopies and/or upper endoscopies, thyroid ultrasounds, etc.) as well as options for risk reducing surgeries (i.e. colectomy, surgery to remove the ovaries and/or uterus, etc.) for Meño and his relatives. These recommendations will be discussed in detail once genetic testing is completed.    Plan:  1) Today Meño elected to proceed with genetic testing using the ColoNext gene panel offered by [facility].  2) This information should be available in approximately 4 weeks.  3) Meño will return to clinic to discuss the results.    Face to face time: 40 minutes    Faby Santos MS, [facility]  Certified Genetic Counselor  Office: [phone]  Pager: [phone]

## 2017-04-10 NOTE — LETTER
Cancer Risk Management  Program Locations    [facility]  [facility]  Mailing Address  Cancer Risk Management Program  [address]  [place] [address]    New patient appointments  [phone]  April 12, 2017    Meño Omalley  [address] [facility] [address]      Dear Meño,    It was a pleasure meeting with you at the [facility] on April 10, 2017. Here is a copy of the progress note from your recent genetic counseling visit to the Cancer Risk Management Program. If you have any additional questions, please feel free to call.    4/10/2017    Referring Provider: Ignacio Rose MD    Presenting Information:   I met with Meño Omalley today for genetic counseling at the Cancer Risk Management Program at the [facility] to discuss his personal history of colon cancer and family history of colon cancer and colon polyps.  He is here today to review this history, cancer screening recommendations, and available genetic testing options.    Personal History:  Meño is a 46 year old male. He was diagnosed with invasive moderately-differentiated adenocarcinoma of the sigmoid colon at age 46; surgery is currently planned on 4/12/2017. Immunohistochemistry (IHC) tumor testing of the sigmoid tumor showed intact mismatch repair proteins.    Meño had his first colonoscopy recently at age 46, after blood was found in his stool. This colonoscopy identified two sessile serrated adenomas, one tubular adenoma, and the sigmoid tumor. He does not regularly do any other cancer screening at this time.  Meño reported no tobacco use and occasional alcohol use.        Family History: (Please see scanned pedigree for detailed family history information)    One sister is 62 and was diagnosed  with colon cancer at age 54; treatment included surgery. She also has a history of approximately 12 colon polyps removed in her lifetime. At age 61, a mass was identified in her thigh and she was treated with radiation. Further details are unknown.    One sister is 54 and was diagnosed with vulva cancer at age 53; treatment included surgery. She has had approximately 10 or less colon polyps removed in her lifetime.    Five other brothers and sisters have each had approximately 10 or less colon polyps removed in their lifetimes.    Mñeo's father was diagnosed with colon cancer at age 60 and passed away at age 72; treatment included surgery and chemotherapy.    His maternal ethnicity is [place]. His paternal ethnicity is Cambodian and Afghan. There is no known Ashkenazi Islam ancestry on either side of his family. There is no reported consanguinity.    Discussion:    Meño's personal and family history of colon polyps and cancer is suggestive of a possible hereditary cancer syndrome.    We reviewed the features of sporadic, familial, and hereditary cancers.  In looking at Meño's family history, it is possible that a cancer susceptibility gene is present as Meño was diagnosed with colon cancer under age 51 and he has several other relatives (in two generations) that have either been diagnosed with colon cancer or polyps. That being said, the majority of his relatives have not been diagnosed with cancer (including seven other siblings) and none of his relatives have had more than 20 colon polyps removed in their lifetime.    We discussed the natural history and genetics of several hereditary cancer syndromes, including Rodriguez syndrome. A detailed handout regarding these syndromes and the information we discussed was provided to Meño at the end of our appointment today and can be found in the after visit summary.  Topics included: inheritance pattern, cancer risks, cancer screening recommendations, and also  risks, benefits and limitations of testing.    We reviewed that the most common cause of hereditary colon cancer is Rodriguez syndrome, which is caused by mutations in the mismatch repair genes MLH1, MSH2, MSH6, PMS2, and EPCAM. Individuals with Rodriguez syndrome are at increased risk for several different cancers, including colon, uterine, ovarian, and stomach cancer. Published screening and/or surgery guidelines are available to help manage these risks.    We discussed that immunohistochemistry (IHC) testing of a tumor allows us to screen a patient for Rodriguez syndrome and is ideally done on a colon or endometrial tumor. Each mismatch repair gene (MLH1, MSH2, MSH6, PMS2) makes a protein.  IHC testing involves looking at the tumor to determine which of the proteins is present and which (if any) are absent. If one or more of the mismatch repair proteins is absent in the tumor, it can indicate an underlying inherited mutation in the respective gene. In this way, IHC testing can help determine who is a candidate for additional genetic blood testing for Rodriguez syndrome. Meño's IHC tumor testing was normal, meaning all mismatch repair proteins were present. This significantly reduces, but does not eliminate, the chance that Meño could have Rodriguez syndrome.     Based on his personal and family history, Meño meets current National Comprehensive Cancer Network (NCCN) and [place] II criteria for genetic testing of Rodriguez syndrome.      We discussed that there are also other additional genes that could cause increased risk of colon cancer and polyps. For example, mutations in the genes MUTYH and APC are associated with increased risk for colon cancer and polyps, though Meño and his relatives do not have the typical amount of polyps expected with these conditions. Also, mutations in the gene CHEK2 are associated with a more moderate increased risk for colon cancer, as well as breast cancer. As many of these genes present  with overlapping features in a family, it would be reasonable for Meño to consider panel genetic testing to analyze multiple genes at once.    Meño explained that he would be interested in gathering as much information as possible from genetic testing. As such, we discussed the ColoNext gene panel.    Genetic testing is available for 17 genes associated with increased risk for colon cancer: ColoNext (APC, BMPR1A, CDH1, CHEK2, GREM1, EPCAM, MLH1, MSH2, MSH6, MUTYH, PMS2, POLD1, POLE, PTEN, SMAD4, STK11, and TP53).    We discussed that some of the genes in the ColoNext panel are associated with specific hereditary cancer syndromes and have published management guidelines: Rodriguez syndrome (MLH1, MSH2, MSH6, PMS2, EPCAM), Familial Adenomatous Polyposis (APC), MUTYH Associated Polyposis (MUTYH), Juvenile Polyposis syndrome (SMAD4, BMPR1A), Peutz-Jeghers syndrome (STK11), Cowden syndrome (PTEN), Li Fraumeni syndrome (TP53), and Hereditary Diffuse Gastric Cancer (CDH1).     The CHEK2, GREM1, POLD1, and POLE genes have also been associated with increased colon cancer risk and have published management guidelines.    Meño was provided with a detailed brochure from [facility] explaining the ColoNext testing.    Consent was obtained and genetic testing for ColoNext was sent to [facility]. Turn around time: approximately 4 weeks.    Medical Management: The information from genetic testing may determine additional cancer screening recommendations (i.e. mammogram and breast MRI, more frequent colonoscopies and/or upper endoscopies, thyroid ultrasounds, etc.) as well as options for risk reducing surgeries (i.e. colectomy, surgery to remove the ovaries and/or uterus, etc.) for Meño and his relatives. These recommendations will be discussed in detail once genetic testing is completed.    Plan:  1) Today Meño elected to proceed with genetic testing using the ColoNext gene panel offered by [facility].  2) This information should be available in approximately 4 weeks.  3) Meño will return to clinic to discuss the results.    Face to face time: 40 minutes    Faby Santos MS, [facility]  Certified Genetic Counselor  Office: [phone]  Pager: [phone]

## 2017-04-10 NOTE — MR AVS SNAPSHOT
After Visit Summary   4/10/2017    Meño Omalley    MRN: [id number]           Patient Information     Date Of Birth          1970        Visit Information        Provider Department      4/10/2017 4:30 PM Ignacio Rose MD M [facility] Colon and Rectal Surgery        Today's Diagnoses     Rectal cancer (H)    -  1       Follow-ups after your visit        Your next 10 appointments already scheduled     May 02, 2017 10:00 AM CDT   (Arrive by 9:45 AM)   Return Visit with MD PRO Desai [facility] Colon and Rectal Surgery ([facility] [place])    [address] [place] [id number] [phone]              Who to contact     Please call your clinic at [phone] to:    Ask questions about your health    Make or cancel appointments    Discuss your medicines    Learn about your test results    Speak to your doctor   If you have compliments or concerns about an experience at your clinic, or if you wish to file a complaint, please contact [facility] Patient Relations at [phone] or email us at [email or web address]         Additional Information About Your Visit        MyChart Information     [employer] gives you secure access to your electronic health record. If you see a primary care provider, you can also send messages to your care team and make appointments. If you have questions, please call your primary care clinic.  If you do not have a primary care provider, please call [phone] and they will assist you.      [employer] is an electronic gateway that provides easy, online access to your medical records. With [employer], you can request a clinic appointment, read your test results, renew a prescription or communicate with your care team.     To access your existing account, please contact your [facility] or call [phone] for assistance.        Care EveryWhere ID     This is your  "Care EveryWhere ID. This could be used by other organizations to access your [facility] medical records  [id number]         Blood Pressure from Last 3 Encounters:   No data found for BP    Weight from Last 3 Encounters:   No data found for Wt              Today, you had the following     No orders found for display       Primary Care Provider Office Phone # Fax #    Delio Alcala [phone]       [facility] [address] [place] [address]        Thank you!     Thank you for choosing [facility] COLON AND RECTAL SURGERY  for your care. Our goal is always to provide you with excellent care. Hearing back from our patients is one way we can continue to improve our services. Please take a few minutes to complete the written survey that you may receive in the mail after your visit with us. Thank you!             Your Updated Medication List - Protect others around you: Learn how to safely use, store and throw away your medicines at www.disposemymeds.org.          This list is accurate as of: 4/10/17 11:59 PM.  Always use your most recent med list.                   Brand Name Dispense Instructions for use    acetaminophen 325 MG tablet    TYLENOL    100 tablet    Take 2 tablets (650 mg) by mouth every 4 hours as needed for mild pain       enoxaparin 40 MG/0.4ML injection    LOVENOX    12 mL    Inject 0.4 mLs (40 mg) Subcutaneous every 24 hours       escitalopram 20 MG tablet    LEXAPRO     Take 20 mg by mouth every morning       FLOVENT  MCG/ACT Inhaler   Generic drug:  fluticasone      Take 2 puffs by mouth 2 times daily as needed       oxyCODONE 5 MG IR tablet    ROXICODONE    30 tablet    Take 1-2 tablets (5-10 mg) by mouth every 4 hours as needed for moderate to severe pain       polyethylene glycol powder    MIRALAX    238 g    Please follow instructions on \"Getting Ready for Colonoscopy\" or Surgery packet         "

## 2017-04-10 NOTE — MR AVS SNAPSHOT
After Visit Summary   4/10/2017    Meño Omalley    MRN: [id number]           Patient Information     Date Of Birth          1970        Visit Information        Provider Department      4/10/2017 4:30 PM Rn, [facility] Pre[facility] Assessment [facility]        Care Instructions    Preparing for Your Surgery      Name:  Meño Omalley   MRN:  [id number]   :  1970   Today's Date:  4/10/2017     Arriving for surgery:  Surgery date:  17  Surgery time:  1:10 pm  Arrival time:  10:40 am    Please come to:   [facility] [address]  [address] [place]  [address]    -   parking is available in front of the hospital from 5:15 am to 8:00 pm    -  Stop at the Information Desk in the lobby    -   Inform the information person that you are here for surgery. An escort to 3C will be provided. If you would not like an escort, please proceed to 3C on the 3rd floor. [phone]   -  Bring your ID and insurance card.    What can I eat or drink?  - Follow [facility] Rectal Clinic instructions regarding bowel prep.  -  You may have water, apple juice, BLACK coffee (NO creamer or nondairy creamer), or 7up/Sprite until 2 hours prior to your surgery. (10:40 am- Stop on arrival to hospital.)    Which medicines can I take?  -  Do not bring your own medications to the hospital, except for inhaler.    -  Please take these medications the day of surgery:  Escitalopram,      Flovent inhaler if needed       Acetaminophen (Tylenol) if needed    How do I prepare myself?  -  Take two showers: one the night before surgery; and one the morning of surgery.         Use Scrubcare or Hibiclens to wash from neck down.  You may use your own shampoo and conditioner. No other hair products.   -  Do NOT use lotion, powder, deodorant, or antiperspirant the day of your surgery.  -  Do NOT wear any jewelry.    -  Begin using Incentive Spirometer 1 week prior to surgery.  Use 4  times per day, up to 5-10 breaths each time.  Bring Incentive Spirometer to hospital.    Questions or Concerns:  If you have questions or concerns, please call the  Preoperative Assessment Center, Monday-Friday 7AM-7PM:  [phone]      Enhanced Recovery After Surgery     This is a team effort, including you, to get you back on your feet, eating and drinking normally and out of the hospital as quickly as possible.  The goals are: 1) NO INFECTIONS and   2) RETURN TO NORMAL DIET    How can we achieve these goals?  1) STAY ACTIVE: Walk every day before your surgery; try to increase the amount every day.  Walk after surgery as much as you can-the nurses will help you.  Walking speeds healing and gets you home quicker, you heal better at home and have less risk of infection.     2) INCENTIVE SPIROMETER: Practice your incentive spirometer 4 times per day with 5-10 repetitions each time.  Using the incentive spirometer can strengthen your muscles between your ribs and help you have a strong cough after surgery.  A more effective cough can help prevent problems with your lungs.    3) STAY HYDRATED: Drink clear liquids up until 2 hours before your surgery. We would like you to purchase a drink such as Gatorade.  Drink this before bedtime and on the way into the hospital, drink between 8-12 ounces or until you feel hydrated.  Keeping well hydrated leads to your veins being plump, you wake up faster, and you are less likely to be nauseated. Start drinking water as soon as you can after surgery and advance to clear liquids and food as tolerated.  IV fluids contain salt, drinking fluids will minimize the amount of IV fluids you need and decrease the amount of salt you get.     4) PAIN MANAGEMENT: If we minimize the amount of opioids and narcotics, and use regional blocks (which numb the area where your surgery is) along with oral pain medications; you will have less side effects of nausea and constipation. Narcotics can slow  down your bowels and cause you to stay in the hospital longer.     Our goal is to keep you comfortable; eating and drinking normally and back home safely.     AFTER YOUR SURGERY  Breathing exercises   Breathing exercises help you recover faster. Take deep breaths and let the air out slowly. This will:     Help you wake up after surgery.    Help prevent complications like pneumonia.  Preventing complications will help you go home sooner.   Nausea and vomiting   You may feel sick to your stomach after surgery; if so, let your nurse know.    Pain control:  After surgery, you may have pain. Our goal is to help you manage your pain. Pain medicine will help you feel comfortable enough to do activities that will help you heal.  These activities may include breathing exercises, walking and physical therapy.   To help your health care team treat your pain we will ask: 1) If you have pain  2) where it is located 3) describe your pain in your words  Methods of pain control include medications given by mouth, vein or by nerve block for some surgeries.  We may give you a pain control pump that will:  1) Deliver the medicine through a tube placed in your vein  2) Control the amount of medicine you receive  3) Allow you to push a button to deliver a dose of pain medicine  Sequential Compression Device (SCD) or Pneumo Boots:  You may need to wear SCD S on your legs or feet. These are wraps connected to a machine that pumps in air and releases it. The repeated pumping helps prevent blood clots from forming.             Follow-ups after your visit        Your next 10 appointments already scheduled     Apr 10, 2017  5:00 PM CDT   LAB with  LAB    Health Lab ([facility])    [address]  [address] [facility] [address] [phone]           Patient must bring picture ID.  Patient should be prepared to give a urine specimen  Please do not eat 10-12 hours before your appointment if you are  coming in fasting for labs on lipids, cholesterol, or glucose (sugar).  Pregnant women should follow their Care Team instructions. Water with medications is okay. Do not drink coffee or other fluids.   If you have concerns about taking  your medications, please ask at office or if scheduling via Digly, send a message by clicking on Secure Messaging, Message Your Care Team.            Apr 12, 2017   Procedure with Ignacio Rose MD   [facility], [place], Same Day Surgery (--)    [address]  [place] [address] [phone]              Future tests that were ordered for you today     Open Future Orders        Priority Expected Expires Ordered    ABO/Rh type and screen Routine 4/10/2017 5/10/2017 4/10/2017    CBC with platelets Routine 4/10/2017 5/10/2017 4/10/2017            Who to contact     Please call your clinic at [phone] to:    Ask questions about your health    Make or cancel appointments    Discuss your medicines    Learn about your test results    Speak to your doctor   If you have compliments or concerns about an experience at your clinic, or if you wish to file a complaint, please contact [facility] Patient Relations at [phone] or email us at [email or web address]         Additional Information About Your Visit        Digly Information     Digly gives you secure access to your electronic health record. If you see a primary care provider, you can also send messages to your care team and make appointments. If you have questions, please call your primary care clinic.  If you do not have a primary care provider, please call [phone] and they will assist you.      Digly is an electronic gateway that provides easy, online access to your medical records. With Digly, you can request a clinic appointment, read your test results, renew a prescription or communicate with your care team.     To access your existing account, please contact your [facility]  "[facility] or call [phone] for assistance.        Care EveryWhere ID     This is your Care EveryWhere ID. This could be used by other organizations to access your [place] medical records  [id number]         Blood Pressure from Last 3 Encounters:   04/10/17 135/85   04/10/17 135/85   03/28/17 123/64    Weight from Last 3 Encounters:   04/10/17 94.8 kg (209 lb)   04/10/17 94.8 kg (209 lb 1.6 oz)   03/28/17 94.8 kg (208 lb 14.4 oz)              Today, you had the following     No orders found for display       Primary Care Provider Office Phone # Fax #    Delio Alcala [phone]       [facility] [address] [place] [address]        Thank you!     Thank you for choosing [facility] PREOPERATIVE ASSESSMENT [place]  for your care. Our goal is always to provide you with excellent care. Hearing back from our patients is one way we can continue to improve our services. Please take a few minutes to complete the written survey that you may receive in the mail after your visit with us. Thank you!             Your Updated Medication List - Protect others around you: Learn how to safely use, store and throw away your medicines at www.disposemymeds.org.          This list is accurate as of: 4/10/17  4:35 PM.  Always use your most recent med list.                   Brand Name Dispense Instructions for use    escitalopram 20 MG tablet    LEXAPRO     Take 20 mg by mouth every morning       FLOVENT  MCG/ACT Inhaler   Generic drug:  fluticasone      Take 2 puffs by mouth 2 times daily as needed       polyethylene glycol powder    MIRALAX    238 g    Please follow instructions on \"Getting Ready for Colonoscopy\" or Surgery packet         "

## 2017-04-10 NOTE — MR AVS SNAPSHOT
After Visit Summary   4/10/2017    Meño Omalley    MRN: [id number]           Patient Information     Date Of Birth          1970        Visit Information        Provider Department      4/10/2017 10:30 AM Jess Clayton, APRIL [facility] Wound Ostomy        Today's Diagnoses     Malignant neoplasm of colon, unspecified part of colon (H)    -  1       Follow-ups after your visit        Your next 10 appointments already scheduled     Apr 10, 2017  3:30 PM CDT   (Arrive by 3:15 PM)   PAC EVALUATION with Uc Pac Paul 2   [facility] Preoperative Assessment Center ([facility])    [address] [place] [address]   [phone]            Apr 10, 2017  4:20 PM CDT   (Arrive by 4:05 PM)   PAC Anesthesia Consult with Uc Pac Anesthesiologist   [facility] Preoperative Assessment [place] ([facility])    [address] [place] [address] [phone]            Apr 10, 2017  4:30 PM CDT   (Arrive by 4:15 PM)   Return Visit with Ignacio Rose MD   [facility] Colon and Rectal Surgery ([facility])    [address] [place] [address]   [phone]            Apr 10, 2017  4:30 PM CDT   (Arrive by 4:15 PM)   PAC RN ASSESSMENT with Uc Pac Rn   [facility] Preoperative Assessment [place] ([facility])    [address] [place] [address] [phone]            Apr 12, 2017   Procedure with Ignacio Rose MD   [facility], [place], Same Day Surgery (--)    [address] [facility] [address] [id number] [phone]              Who to contact     Please call your clinic at [phone] to:    Ask questions about your health    Make or cancel appointments    Discuss your medicines    Learn about your test results    Speak to your doctor   If you have compliments or concerns about an experience at your clinic, or if you wish to file a  complaint, please contact [facility] Patient Relations at [phone] or email us at [email or web address]         Additional Information About Your Visit        MyChart Information     [employer] gives you secure access to your electronic health record. If you see a primary care provider, you can also send messages to your care team and make appointments. If you have questions, please call your primary care clinic.  If you do not have a primary care provider, please call [phone] and they will assist you.      OnKure is an electronic gateway that provides easy, online access to your medical records. With OnKure, you can request a clinic appointment, read your test results, renew a prescription or communicate with your care team.     To access your existing account, please contact your [facility] or call [phone] for assistance.        Care EveryWhere ID     This is your Care EveryWhere ID. This could be used by other organizations to access your [place] medical records  [id number]         Blood Pressure from Last 3 Encounters:   04/10/17 135/85   03/28/17 123/64   03/24/17 122/90    Weight from Last 3 Encounters:   04/10/17 94.8 kg (209 lb 1.6 oz)   03/28/17 94.8 kg (208 lb 14.4 oz)   03/20/17 92.3 kg (203 lb 6.4 oz)              Today, you had the following     No orders found for display       Primary Care Provider Office Phone # Fax #    Delio Alcala [phone]       [facility] [address] [place] [address]        Thank you!     Thank you for choosing [facility] WOUND OSTOMY  for your care. Our goal is always to provide you with excellent care. Hearing back from our patients is one way we can continue to improve our services. Please take a few minutes to complete the written survey that you may receive in the mail after your visit with us. Thank you!             Your Updated Medication  "List - Protect others around you: Learn how to safely use, store and throw away your medicines at www.disposemymeds.org.          This list is accurate as of: 4/10/17 11:42 AM.  Always use your most recent med list.                   Brand Name Dispense Instructions for use    escitalopram 20 MG tablet    LEXAPRO     Take 20 mg by mouth daily       FLOVENT  MCG/ACT Inhaler   Generic drug:  fluticasone      Take 2 puffs by mouth 2 times daily as needed       ondansetron 4 MG tablet    ZOFRAN    3 tablet    Take 1 tablet (4 mg) by mouth every 6 hours as needed for nausea when taking Neomycin and Flagyl.       polyethylene glycol powder    MIRALAX    238 g    Please follow instructions on \"Getting Ready for Colonoscopy\" or Surgery packet         "

## 2017-04-10 NOTE — PATIENT INSTRUCTIONS
Preparing for Your Surgery      Name:  Meño Omalley   MRN:  [id number]   :  1970   Today's Date:  4/10/2017     Arriving for surgery:  Surgery date:  17  Surgery time:  1:10 pm  Arrival time:  10:40 am    Please come to:   [facility] [address]  [address] [place]  [address]    -   parking is available in front of the hospital from 5:15 am to 8:00 pm    -  Stop at the Information Desk in the lobby    -   Inform the information person that you are here for surgery. An escort to 3C will be provided. If you would not like an escort, please proceed to 3C on the 3rd floor. [phone]   -  Bring your ID and insurance card.    What can I eat or drink?  - Follow Colon Rectal Clinic instructions regarding bowel prep.  -  You may have water, apple juice, BLACK coffee (NO creamer or nondairy creamer), or 7up/Sprite until 2 hours prior to your surgery. (10:40 am- Stop on arrival to hospital.)    Which medicines can I take?  -  Do not bring your own medications to the hospital, except for inhaler.    -  Please take these medications the day of surgery:  Escitalopram,      Flovent inhaler if needed       Acetaminophen (Tylenol) if needed    How do I prepare myself?  -  Take two showers: one the night before surgery; and one the morning of surgery.         Use Scrubcare or Hibiclens to wash from neck down.  You may use your own shampoo and conditioner. No other hair products.   -  Do NOT use lotion, powder, deodorant, or antiperspirant the day of your surgery.  -  Do NOT wear any jewelry.    -  Begin using Incentive Spirometer 1 week prior to surgery.  Use 4 times per day, up to 5-10 breaths each time.  Bring Incentive Spirometer to hospital.    Questions or Concerns:  If you have questions or concerns, please call the  Preoperative Assessment Center, Monday-Friday 7AM-7PM:  [phone]      Enhanced Recovery After Surgery     This is a team effort, including  you, to get you back on your feet, eating and drinking normally and out of the hospital as quickly as possible.  The goals are: 1) NO INFECTIONS and   2) RETURN TO NORMAL DIET    How can we achieve these goals?  1) STAY ACTIVE: Walk every day before your surgery; try to increase the amount every day.  Walk after surgery as much as you can-the nurses will help you.  Walking speeds healing and gets you home quicker, you heal better at home and have less risk of infection.     2) INCENTIVE SPIROMETER: Practice your incentive spirometer 4 times per day with 5-10 repetitions each time.  Using the incentive spirometer can strengthen your muscles between your ribs and help you have a strong cough after surgery.  A more effective cough can help prevent problems with your lungs.    3) STAY HYDRATED: Drink clear liquids up until 2 hours before your surgery. We would like you to purchase a drink such as Gatorade.  Drink this before bedtime and on the way into the hospital, drink between 8-12 ounces or until you feel hydrated.  Keeping well hydrated leads to your veins being plump, you wake up faster, and you are less likely to be nauseated. Start drinking water as soon as you can after surgery and advance to clear liquids and food as tolerated.  IV fluids contain salt, drinking fluids will minimize the amount of IV fluids you need and decrease the amount of salt you get.     4) PAIN MANAGEMENT: If we minimize the amount of opioids and narcotics, and use regional blocks (which numb the area where your surgery is) along with oral pain medications; you will have less side effects of nausea and constipation. Narcotics can slow down your bowels and cause you to stay in the hospital longer.     Our goal is to keep you comfortable; eating and drinking normally and back home safely.     AFTER YOUR SURGERY  Breathing exercises   Breathing exercises help you recover faster. Take deep breaths and let the air out slowly. This will:      Help you wake up after surgery.    Help prevent complications like pneumonia.  Preventing complications will help you go home sooner.   Nausea and vomiting   You may feel sick to your stomach after surgery; if so, let your nurse know.    Pain control:  After surgery, you may have pain. Our goal is to help you manage your pain. Pain medicine will help you feel comfortable enough to do activities that will help you heal.  These activities may include breathing exercises, walking and physical therapy.   To help your health care team treat your pain we will ask: 1) If you have pain  2) where it is located 3) describe your pain in your words  Methods of pain control include medications given by mouth, vein or by nerve block for some surgeries.  We may give you a pain control pump that will:  1) Deliver the medicine through a tube placed in your vein  2) Control the amount of medicine you receive  3) Allow you to push a button to deliver a dose of pain medicine  Sequential Compression Device (SCD) or Pneumo Boots:  You may need to wear SCD S on your legs or feet. These are wraps connected to a machine that pumps in air and releases it. The repeated pumping helps prevent blood clots from forming.

## 2017-04-12 ENCOUNTER — ANESTHESIA (OUTPATIENT)
Dept: SURGERY | Facility: CLINIC | Age: 47
DRG: 331 | End: 2017-04-12
Payer: COMMERCIAL

## 2017-04-12 ENCOUNTER — HOSPITAL ENCOUNTER (INPATIENT)
Facility: CLINIC | Age: 47
LOS: 3 days | Discharge: HOME | DRG: 331 | End: 2017-04-15
Attending: COLON & RECTAL SURGERY | Admitting: COLON & RECTAL SURGERY
Payer: COMMERCIAL

## 2017-04-12 DIAGNOSIS — C20 RECTAL CANCER (H): Primary | ICD-10-CM

## 2017-04-12 LAB
ABO + RH BLD: NORMAL
ABO + RH BLD: NORMAL
BLD GP AB SCN SERPL QL: NORMAL
BLOOD BANK CMNT PATIENT-IMP: NORMAL
BLOOD BANK CMNT PATIENT-IMP: NORMAL
CREAT SERPL-MCNC: 0.71 MG/DL (ref 0.66–1.25)
GFR SERPL CREATININE-BSD FRML MDRD: NORMAL ML/MIN/1.7M2
PLATELET # BLD AUTO: 243 10E9/L (ref 150–450)
SPECIMEN EXP DATE BLD: NORMAL

## 2017-04-12 PROCEDURE — 37000009 ZZH ANESTHESIA TECHNICAL FEE, EACH ADDTL 15 MIN: Performed by: COLON & RECTAL SURGERY

## 2017-04-12 PROCEDURE — C9399 UNCLASSIFIED DRUGS OR BIOLOG: HCPCS | Performed by: NURSE ANESTHETIST, CERTIFIED REGISTERED

## 2017-04-12 PROCEDURE — 25800025 ZZH RX 258: Performed by: ANESTHESIOLOGY

## 2017-04-12 PROCEDURE — 25800025 ZZH RX 258: Performed by: NURSE ANESTHETIST, CERTIFIED REGISTERED

## 2017-04-12 PROCEDURE — 25000125 ZZHC RX 250: Performed by: NURSE ANESTHETIST, CERTIFIED REGISTERED

## 2017-04-12 PROCEDURE — 27210794 ZZH OR GENERAL SUPPLY STERILE: Performed by: COLON & RECTAL SURGERY

## 2017-04-12 PROCEDURE — 25000125 ZZHC RX 250: Performed by: ANESTHESIOLOGY

## 2017-04-12 PROCEDURE — 25000132 ZZH RX MED GY IP 250 OP 250 PS 637: Performed by: COLON & RECTAL SURGERY

## 2017-04-12 PROCEDURE — 25000128 H RX IP 250 OP 636: Performed by: ANESTHESIOLOGY

## 2017-04-12 PROCEDURE — 40000171 ZZH STATISTIC PRE-PROCEDURE ASSESSMENT III: Performed by: COLON & RECTAL SURGERY

## 2017-04-12 PROCEDURE — 25000128 H RX IP 250 OP 636: Performed by: COLON & RECTAL SURGERY

## 2017-04-12 PROCEDURE — 12000008 ZZH R&B INTERMEDIATE UMMC

## 2017-04-12 PROCEDURE — 88309 TISSUE EXAM BY PATHOLOGIST: CPT | Performed by: COLON & RECTAL SURGERY

## 2017-04-12 PROCEDURE — 36415 COLL VENOUS BLD VENIPUNCTURE: CPT | Performed by: SURGERY

## 2017-04-12 PROCEDURE — 25800025 ZZH RX 258: Performed by: SURGERY

## 2017-04-12 PROCEDURE — 82565 ASSAY OF CREATININE: CPT | Performed by: SURGERY

## 2017-04-12 PROCEDURE — 0DBP0ZZ EXCISION OF RECTUM, OPEN APPROACH: ICD-10-PCS | Performed by: COLON & RECTAL SURGERY

## 2017-04-12 PROCEDURE — 3E0R3NZ INTRODUCTION OF ANALGESICS, HYPNOTICS, SEDATIVES INTO SPINAL CANAL, PERCUTANEOUS APPROACH: ICD-10-PCS | Performed by: ANESTHESIOLOGY

## 2017-04-12 PROCEDURE — 71000015 ZZH RECOVERY PHASE 1 LEVEL 2 EA ADDTL HR: Performed by: COLON & RECTAL SURGERY

## 2017-04-12 PROCEDURE — 36000070 ZZH SURGERY LEVEL 5 EA 15 ADDTL MIN - UMMC: Performed by: COLON & RECTAL SURGERY

## 2017-04-12 PROCEDURE — 0DBN0ZZ EXCISION OF SIGMOID COLON, OPEN APPROACH: ICD-10-PCS | Performed by: COLON & RECTAL SURGERY

## 2017-04-12 PROCEDURE — 71000014 ZZH RECOVERY PHASE 1 LEVEL 2 FIRST HR: Performed by: COLON & RECTAL SURGERY

## 2017-04-12 PROCEDURE — C9290 INJ, BUPIVACAINE LIPOSOME: HCPCS | Performed by: ANESTHESIOLOGY

## 2017-04-12 PROCEDURE — 37000008 ZZH ANESTHESIA TECHNICAL FEE, 1ST 30 MIN: Performed by: COLON & RECTAL SURGERY

## 2017-04-12 PROCEDURE — 85049 AUTOMATED PLATELET COUNT: CPT | Performed by: SURGERY

## 2017-04-12 PROCEDURE — 00HU33Z INSERTION OF INFUSION DEVICE INTO SPINAL CANAL, PERCUTANEOUS APPROACH: ICD-10-PCS | Performed by: ANESTHESIOLOGY

## 2017-04-12 PROCEDURE — 88304 TISSUE EXAM BY PATHOLOGIST: CPT | Performed by: COLON & RECTAL SURGERY

## 2017-04-12 PROCEDURE — 25000128 H RX IP 250 OP 636: Performed by: NURSE ANESTHETIST, CERTIFIED REGISTERED

## 2017-04-12 PROCEDURE — 0DJD8ZZ INSPECTION OF LOWER INTESTINAL TRACT, VIA NATURAL OR ARTIFICIAL OPENING ENDOSCOPIC: ICD-10-PCS | Performed by: COLON & RECTAL SURGERY

## 2017-04-12 PROCEDURE — 36000068 ZZH SURGERY LEVEL 5 1ST 30 MIN - UMMC: Performed by: COLON & RECTAL SURGERY

## 2017-04-12 PROCEDURE — 25000128 H RX IP 250 OP 636: Performed by: SURGERY

## 2017-04-12 RX ORDER — SODIUM CHLORIDE, SODIUM LACTATE, POTASSIUM CHLORIDE, CALCIUM CHLORIDE 600; 310; 30; 20 MG/100ML; MG/100ML; MG/100ML; MG/100ML
INJECTION, SOLUTION INTRAVENOUS CONTINUOUS
Status: DISCONTINUED | OUTPATIENT
Start: 2017-04-12 | End: 2017-04-12 | Stop reason: HOSPADM

## 2017-04-12 RX ORDER — DIPHENHYDRAMINE HCL 25 MG
25 CAPSULE ORAL EVERY 6 HOURS PRN
Status: DISCONTINUED | OUTPATIENT
Start: 2017-04-12 | End: 2017-04-15 | Stop reason: HOSPADM

## 2017-04-12 RX ORDER — PROPOFOL 10 MG/ML
INJECTION, EMULSION INTRAVENOUS CONTINUOUS PRN
Status: DISCONTINUED | OUTPATIENT
Start: 2017-04-12 | End: 2017-04-12

## 2017-04-12 RX ORDER — BUPIVACAINE HYDROCHLORIDE AND EPINEPHRINE 2.5; 5 MG/ML; UG/ML
INJECTION, SOLUTION INFILTRATION; PERINEURAL PRN
Status: DISCONTINUED | OUTPATIENT
Start: 2017-04-12 | End: 2017-04-12

## 2017-04-12 RX ORDER — SODIUM CHLORIDE, SODIUM LACTATE, POTASSIUM CHLORIDE, CALCIUM CHLORIDE 600; 310; 30; 20 MG/100ML; MG/100ML; MG/100ML; MG/100ML
INJECTION, SOLUTION INTRAVENOUS CONTINUOUS PRN
Status: DISCONTINUED | OUTPATIENT
Start: 2017-04-12 | End: 2017-04-12

## 2017-04-12 RX ORDER — NALOXONE HYDROCHLORIDE 0.4 MG/ML
.1-.4 INJECTION, SOLUTION INTRAMUSCULAR; INTRAVENOUS; SUBCUTANEOUS
Status: DISCONTINUED | OUTPATIENT
Start: 2017-04-12 | End: 2017-04-15 | Stop reason: HOSPADM

## 2017-04-12 RX ORDER — METOCLOPRAMIDE HYDROCHLORIDE 5 MG/ML
10 INJECTION INTRAMUSCULAR; INTRAVENOUS EVERY 6 HOURS PRN
Status: DISCONTINUED | OUTPATIENT
Start: 2017-04-12 | End: 2017-04-15 | Stop reason: HOSPADM

## 2017-04-12 RX ORDER — METOCLOPRAMIDE 10 MG/1
10 TABLET ORAL EVERY 6 HOURS PRN
Status: DISCONTINUED | OUTPATIENT
Start: 2017-04-12 | End: 2017-04-15 | Stop reason: HOSPADM

## 2017-04-12 RX ORDER — ONDANSETRON 2 MG/ML
4 INJECTION INTRAMUSCULAR; INTRAVENOUS EVERY 30 MIN PRN
Status: DISCONTINUED | OUTPATIENT
Start: 2017-04-12 | End: 2017-04-12 | Stop reason: HOSPADM

## 2017-04-12 RX ORDER — ONDANSETRON 2 MG/ML
4 INJECTION INTRAMUSCULAR; INTRAVENOUS EVERY 6 HOURS PRN
Status: DISCONTINUED | OUTPATIENT
Start: 2017-04-12 | End: 2017-04-15 | Stop reason: HOSPADM

## 2017-04-12 RX ORDER — ACETAMINOPHEN 10 MG/ML
1000 INJECTION, SOLUTION INTRAVENOUS ONCE
Status: COMPLETED | OUTPATIENT
Start: 2017-04-12 | End: 2017-04-12

## 2017-04-12 RX ORDER — NALOXONE HYDROCHLORIDE 0.4 MG/ML
.1-.4 INJECTION, SOLUTION INTRAMUSCULAR; INTRAVENOUS; SUBCUTANEOUS
Status: DISCONTINUED | OUTPATIENT
Start: 2017-04-12 | End: 2017-04-12

## 2017-04-12 RX ORDER — LIDOCAINE HYDROCHLORIDE 20 MG/ML
INJECTION, SOLUTION INFILTRATION; PERINEURAL PRN
Status: DISCONTINUED | OUTPATIENT
Start: 2017-04-12 | End: 2017-04-12

## 2017-04-12 RX ORDER — FLUMAZENIL 0.1 MG/ML
0.2 INJECTION, SOLUTION INTRAVENOUS
Status: DISCONTINUED | OUTPATIENT
Start: 2017-04-12 | End: 2017-04-12 | Stop reason: HOSPADM

## 2017-04-12 RX ORDER — NALOXONE HYDROCHLORIDE 0.4 MG/ML
.1-.4 INJECTION, SOLUTION INTRAMUSCULAR; INTRAVENOUS; SUBCUTANEOUS
Status: DISCONTINUED | OUTPATIENT
Start: 2017-04-12 | End: 2017-04-12 | Stop reason: ALTCHOICE

## 2017-04-12 RX ORDER — ONDANSETRON 4 MG/1
4 TABLET, ORALLY DISINTEGRATING ORAL EVERY 30 MIN PRN
Status: DISCONTINUED | OUTPATIENT
Start: 2017-04-12 | End: 2017-04-12 | Stop reason: HOSPADM

## 2017-04-12 RX ORDER — DEXAMETHASONE SODIUM PHOSPHATE 4 MG/ML
INJECTION, SOLUTION INTRA-ARTICULAR; INTRALESIONAL; INTRAMUSCULAR; INTRAVENOUS; SOFT TISSUE PRN
Status: DISCONTINUED | OUTPATIENT
Start: 2017-04-12 | End: 2017-04-12

## 2017-04-12 RX ORDER — ACETAMINOPHEN 325 MG/1
975 TABLET ORAL ONCE
Status: COMPLETED | OUTPATIENT
Start: 2017-04-12 | End: 2017-04-12

## 2017-04-12 RX ORDER — NALBUPHINE HYDROCHLORIDE 10 MG/ML
2.5-5 INJECTION, SOLUTION INTRAMUSCULAR; INTRAVENOUS; SUBCUTANEOUS EVERY 6 HOURS PRN
Status: DISCONTINUED | OUTPATIENT
Start: 2017-04-12 | End: 2017-04-12 | Stop reason: ALTCHOICE

## 2017-04-12 RX ORDER — ERTAPENEM 1 G/1
1 INJECTION, POWDER, LYOPHILIZED, FOR SOLUTION INTRAMUSCULAR; INTRAVENOUS SEE ADMIN INSTRUCTIONS
Status: DISCONTINUED | OUTPATIENT
Start: 2017-04-12 | End: 2017-04-12 | Stop reason: HOSPADM

## 2017-04-12 RX ORDER — PROCHLORPERAZINE MALEATE 5 MG
5-10 TABLET ORAL EVERY 6 HOURS PRN
Status: DISCONTINUED | OUTPATIENT
Start: 2017-04-12 | End: 2017-04-15 | Stop reason: HOSPADM

## 2017-04-12 RX ORDER — ERTAPENEM 1 G/1
1 INJECTION, POWDER, LYOPHILIZED, FOR SOLUTION INTRAMUSCULAR; INTRAVENOUS
Status: COMPLETED | OUTPATIENT
Start: 2017-04-12 | End: 2017-04-12

## 2017-04-12 RX ORDER — FENTANYL CITRATE 50 UG/ML
25-50 INJECTION, SOLUTION INTRAMUSCULAR; INTRAVENOUS
Status: DISCONTINUED | OUTPATIENT
Start: 2017-04-12 | End: 2017-04-12 | Stop reason: HOSPADM

## 2017-04-12 RX ORDER — ONDANSETRON 2 MG/ML
INJECTION INTRAMUSCULAR; INTRAVENOUS PRN
Status: DISCONTINUED | OUTPATIENT
Start: 2017-04-12 | End: 2017-04-12

## 2017-04-12 RX ORDER — PROCHLORPERAZINE 25 MG
25 SUPPOSITORY, RECTAL RECTAL EVERY 12 HOURS PRN
Status: DISCONTINUED | OUTPATIENT
Start: 2017-04-12 | End: 2017-04-12

## 2017-04-12 RX ORDER — HYDRALAZINE HYDROCHLORIDE 20 MG/ML
5-10 INJECTION INTRAMUSCULAR; INTRAVENOUS EVERY 6 HOURS PRN
Status: DISCONTINUED | OUTPATIENT
Start: 2017-04-12 | End: 2017-04-15 | Stop reason: HOSPADM

## 2017-04-12 RX ORDER — DIPHENHYDRAMINE HYDROCHLORIDE 50 MG/ML
25 INJECTION INTRAMUSCULAR; INTRAVENOUS EVERY 6 HOURS PRN
Status: DISCONTINUED | OUTPATIENT
Start: 2017-04-12 | End: 2017-04-15 | Stop reason: HOSPADM

## 2017-04-12 RX ORDER — HYDROMORPHONE HYDROCHLORIDE 1 MG/ML
.3-.5 INJECTION, SOLUTION INTRAMUSCULAR; INTRAVENOUS; SUBCUTANEOUS EVERY 5 MIN PRN
Status: DISCONTINUED | OUTPATIENT
Start: 2017-04-12 | End: 2017-04-12 | Stop reason: HOSPADM

## 2017-04-12 RX ORDER — PROPOFOL 10 MG/ML
INJECTION, EMULSION INTRAVENOUS PRN
Status: DISCONTINUED | OUTPATIENT
Start: 2017-04-12 | End: 2017-04-12

## 2017-04-12 RX ORDER — LIDOCAINE 40 MG/G
CREAM TOPICAL
Status: DISCONTINUED | OUTPATIENT
Start: 2017-04-12 | End: 2017-04-15 | Stop reason: HOSPADM

## 2017-04-12 RX ORDER — SODIUM CHLORIDE, SODIUM LACTATE, POTASSIUM CHLORIDE, CALCIUM CHLORIDE 600; 310; 30; 20 MG/100ML; MG/100ML; MG/100ML; MG/100ML
INJECTION, SOLUTION INTRAVENOUS CONTINUOUS
Status: DISCONTINUED | OUTPATIENT
Start: 2017-04-12 | End: 2017-04-15

## 2017-04-12 RX ORDER — ONDANSETRON 4 MG/1
4 TABLET, ORALLY DISINTEGRATING ORAL EVERY 6 HOURS PRN
Status: DISCONTINUED | OUTPATIENT
Start: 2017-04-12 | End: 2017-04-15 | Stop reason: HOSPADM

## 2017-04-12 RX ORDER — NALOXONE HYDROCHLORIDE 0.4 MG/ML
.1-.4 INJECTION, SOLUTION INTRAMUSCULAR; INTRAVENOUS; SUBCUTANEOUS
Status: DISCONTINUED | OUTPATIENT
Start: 2017-04-12 | End: 2017-04-12 | Stop reason: HOSPADM

## 2017-04-12 RX ADMIN — PROPOFOL 30 MG: 10 INJECTION, EMULSION INTRAVENOUS at 12:51

## 2017-04-12 RX ADMIN — FENTANYL CITRATE 25 MCG: 50 INJECTION, SOLUTION INTRAMUSCULAR; INTRAVENOUS at 13:06

## 2017-04-12 RX ADMIN — ACETAMINOPHEN 975 MG: 325 TABLET, FILM COATED ORAL at 07:01

## 2017-04-12 RX ADMIN — FENTANYL CITRATE 50 MCG: 50 INJECTION, SOLUTION INTRAMUSCULAR; INTRAVENOUS at 10:25

## 2017-04-12 RX ADMIN — FENTANYL CITRATE 50 MCG: 50 INJECTION, SOLUTION INTRAMUSCULAR; INTRAVENOUS at 13:30

## 2017-04-12 RX ADMIN — HYDROMORPHONE HYDROCHLORIDE 0.3 MG: 1 INJECTION, SOLUTION INTRAMUSCULAR; INTRAVENOUS; SUBCUTANEOUS at 11:58

## 2017-04-12 RX ADMIN — ONDANSETRON 4 MG: 2 INJECTION INTRAMUSCULAR; INTRAVENOUS at 12:41

## 2017-04-12 RX ADMIN — SODIUM CHLORIDE, POTASSIUM CHLORIDE, SODIUM LACTATE AND CALCIUM CHLORIDE: 600; 310; 30; 20 INJECTION, SOLUTION INTRAVENOUS at 13:48

## 2017-04-12 RX ADMIN — HYDROMORPHONE HYDROCHLORIDE 0.3 MG: 10 INJECTION, SOLUTION INTRAMUSCULAR; INTRAVENOUS; SUBCUTANEOUS at 14:26

## 2017-04-12 RX ADMIN — ROCURONIUM BROMIDE 50 MG: 10 INJECTION INTRAVENOUS at 07:56

## 2017-04-12 RX ADMIN — SODIUM CHLORIDE, POTASSIUM CHLORIDE, SODIUM LACTATE AND CALCIUM CHLORIDE: 600; 310; 30; 20 INJECTION, SOLUTION INTRAVENOUS at 08:05

## 2017-04-12 RX ADMIN — PROPOFOL 50 MG: 10 INJECTION, EMULSION INTRAVENOUS at 12:44

## 2017-04-12 RX ADMIN — ROCURONIUM BROMIDE 20 MG: 10 INJECTION INTRAVENOUS at 12:07

## 2017-04-12 RX ADMIN — ROCURONIUM BROMIDE 30 MG: 10 INJECTION INTRAVENOUS at 11:37

## 2017-04-12 RX ADMIN — BUPIVACAINE 20 ML: 13.3 INJECTION, SUSPENSION, LIPOSOMAL INFILTRATION at 12:52

## 2017-04-12 RX ADMIN — HYDROMORPHONE HYDROCHLORIDE 0.5 MG: 10 INJECTION, SOLUTION INTRAMUSCULAR; INTRAVENOUS; SUBCUTANEOUS at 14:33

## 2017-04-12 RX ADMIN — MIDAZOLAM 1 MG: 1 INJECTION INTRAMUSCULAR; INTRAVENOUS at 11:40

## 2017-04-12 RX ADMIN — ROCURONIUM BROMIDE 30 MG: 10 INJECTION INTRAVENOUS at 09:09

## 2017-04-12 RX ADMIN — FENTANYL CITRATE 50 MCG: 50 INJECTION, SOLUTION INTRAMUSCULAR; INTRAVENOUS at 09:45

## 2017-04-12 RX ADMIN — BUPIVACAINE HYDROCHLORIDE AND EPINEPHRINE BITARTRATE 20 ML: 2.5; .005 INJECTION, SOLUTION INFILTRATION; PERINEURAL at 12:52

## 2017-04-12 RX ADMIN — ROCURONIUM BROMIDE 10 MG: 10 INJECTION INTRAVENOUS at 10:07

## 2017-04-12 RX ADMIN — HYDROMORPHONE HYDROCHLORIDE 0.2 MG: 10 INJECTION, SOLUTION INTRAMUSCULAR; INTRAVENOUS; SUBCUTANEOUS at 14:10

## 2017-04-12 RX ADMIN — ACETAMINOPHEN 1000 MG: 10 INJECTION, SOLUTION INTRAVENOUS at 14:50

## 2017-04-12 RX ADMIN — ROCURONIUM BROMIDE 10 MG: 10 INJECTION INTRAVENOUS at 11:09

## 2017-04-12 RX ADMIN — PROPOFOL 30 MG: 10 INJECTION, EMULSION INTRAVENOUS at 12:48

## 2017-04-12 RX ADMIN — ROCURONIUM BROMIDE 10 MG: 10 INJECTION INTRAVENOUS at 11:39

## 2017-04-12 RX ADMIN — PROPOFOL 200 MCG/KG/MIN: 10 INJECTION, EMULSION INTRAVENOUS at 08:00

## 2017-04-12 RX ADMIN — PROPOFOL 200 MG: 10 INJECTION, EMULSION INTRAVENOUS at 07:56

## 2017-04-12 RX ADMIN — ROCURONIUM BROMIDE 10 MG: 10 INJECTION INTRAVENOUS at 09:54

## 2017-04-12 RX ADMIN — HYDROMORPHONE HYDROCHLORIDE: 10 INJECTION, SOLUTION INTRAMUSCULAR; INTRAVENOUS; SUBCUTANEOUS at 14:10

## 2017-04-12 RX ADMIN — MIDAZOLAM 1 MG: 1 INJECTION INTRAMUSCULAR; INTRAVENOUS at 07:33

## 2017-04-12 RX ADMIN — FENTANYL CITRATE 100 MCG: 50 INJECTION, SOLUTION INTRAMUSCULAR; INTRAVENOUS at 08:15

## 2017-04-12 RX ADMIN — FENTANYL CITRATE 25 MCG: 50 INJECTION, SOLUTION INTRAMUSCULAR; INTRAVENOUS at 13:40

## 2017-04-12 RX ADMIN — SODIUM CHLORIDE, POTASSIUM CHLORIDE, SODIUM LACTATE AND CALCIUM CHLORIDE: 600; 310; 30; 20 INJECTION, SOLUTION INTRAVENOUS at 22:54

## 2017-04-12 RX ADMIN — HYDROMORPHONE HYDROCHLORIDE 0.2 MG: 1 INJECTION, SOLUTION INTRAMUSCULAR; INTRAVENOUS; SUBCUTANEOUS at 12:20

## 2017-04-12 RX ADMIN — SODIUM CHLORIDE, POTASSIUM CHLORIDE, SODIUM LACTATE AND CALCIUM CHLORIDE: 600; 310; 30; 20 INJECTION, SOLUTION INTRAVENOUS at 07:45

## 2017-04-12 RX ADMIN — HYDROMORPHONE HYDROCHLORIDE 0.3 MG: 10 INJECTION, SOLUTION INTRAMUSCULAR; INTRAVENOUS; SUBCUTANEOUS at 14:00

## 2017-04-12 RX ADMIN — LIDOCAINE HYDROCHLORIDE 100 MG: 20 INJECTION, SOLUTION INFILTRATION; PERINEURAL at 07:56

## 2017-04-12 RX ADMIN — HYDROMORPHONE HYDROCHLORIDE 0.2 MG: 10 INJECTION, SOLUTION INTRAMUSCULAR; INTRAVENOUS; SUBCUTANEOUS at 14:15

## 2017-04-12 RX ADMIN — ROCURONIUM BROMIDE 50 MG: 10 INJECTION INTRAVENOUS at 08:30

## 2017-04-12 RX ADMIN — ROCURONIUM BROMIDE 20 MG: 10 INJECTION INTRAVENOUS at 09:45

## 2017-04-12 RX ADMIN — FENTANYL CITRATE 50 MCG: 50 INJECTION, SOLUTION INTRAMUSCULAR; INTRAVENOUS at 11:39

## 2017-04-12 RX ADMIN — ERTAPENEM SODIUM 1 G: 1 INJECTION, POWDER, LYOPHILIZED, FOR SOLUTION INTRAMUSCULAR; INTRAVENOUS at 08:06

## 2017-04-12 RX ADMIN — FENTANYL CITRATE 50 MCG: 50 INJECTION, SOLUTION INTRAMUSCULAR; INTRAVENOUS at 07:56

## 2017-04-12 RX ADMIN — DEXAMETHASONE SODIUM PHOSPHATE 4 MG: 4 INJECTION, SOLUTION INTRA-ARTICULAR; INTRALESIONAL; INTRAMUSCULAR; INTRAVENOUS; SOFT TISSUE at 07:56

## 2017-04-12 RX ADMIN — FENTANYL CITRATE 25 MCG: 50 INJECTION, SOLUTION INTRAMUSCULAR; INTRAVENOUS at 13:15

## 2017-04-12 RX ADMIN — FENTANYL CITRATE 50 MCG: 50 INJECTION, SOLUTION INTRAMUSCULAR; INTRAVENOUS at 13:50

## 2017-04-12 RX ADMIN — ROCURONIUM BROMIDE 20 MG: 10 INJECTION INTRAVENOUS at 10:45

## 2017-04-12 RX ADMIN — SUGAMMADEX 200 MG: 100 INJECTION, SOLUTION INTRAVENOUS at 12:53

## 2017-04-12 RX ADMIN — MIDAZOLAM 2 MG: 1 INJECTION INTRAMUSCULAR; INTRAVENOUS at 07:45

## 2017-04-12 RX ADMIN — FENTANYL CITRATE 50 MCG: 50 INJECTION, SOLUTION INTRAMUSCULAR; INTRAVENOUS at 07:35

## 2017-04-12 RX ADMIN — ENOXAPARIN SODIUM 40 MG: 40 INJECTION SUBCUTANEOUS at 07:41

## 2017-04-12 RX ADMIN — PROPOFOL 165 MCG/KG/MIN: 10 INJECTION, EMULSION INTRAVENOUS at 09:01

## 2017-04-12 ASSESSMENT — PAIN DESCRIPTION - DESCRIPTORS
DESCRIPTORS: ACHING

## 2017-04-12 NOTE — ANESTHESIA CARE TRANSFER NOTE
Patient: Meño Omalley    Procedure(s):  Laparoscopic Low Anterior Resection, Flexible Sigmoidoscopy - Wound Class: III-Contaminated   - Wound Class: III-Contaminated    Diagnosis: Colon Cancer   Diagnosis Additional Information: No value filed.    Anesthesia Type:   General, ETT     Note:  Airway :Face Mask  Patient transferred to:PACU  Comments: Pt transferred to PACU.   Maintaining airway and oxygenation on simple face mask.  VSS.  Pt denies pain and nausea.  Report to RN.  All questions answered.      Vitals: (Last set prior to Anesthesia Care Transfer)    CRNA VITALS  4/12/2017 1231 - 4/12/2017 1308      4/12/2017             Resp Rate (set): 10                Electronically Signed By: SARWAT Gardiner CRNA  April 12, 2017  1:08 PM

## 2017-04-12 NOTE — BRIEF OP NOTE
Colorectal Surgery Brief Op Note    Patient: Meño Omalley  MRN: [id number]  Date of Procedure: 4/12/2017    PREOPERATIVE DIAGNOSIS: Rectal cancer    POSTOPERATIVE DIAGNOSIS: Same, final pathology pending    PROCEDURES:   1. Laparoscopic-assisted low anterior resection with stapled end-to-end colorectal anastomosis via Pfannenstiel incision  2. Flexible sigmoidoscopy    SURGEON: Ignacio Rose MD     ASSISTANTS: Sharlene Black MD, Colorectal fellow; Nery Patel MD, Gyn Onc fellow.     ANESTHESIA: General endotracheal. Epidural placed preoperatively.    ESTIMATED BLOOD LOSS: 50 ml     IV FLUIDS: 1700 ml crystalloid    DRAINS: Joseph to dependent drainage productive of 250 ml clear light yellow urine at the end of the case     FINDINGS: 3 cm rectal tumor; approximately 13 cm from anal verge. Air-tight anastomosis on hydro-pneumatic test.    SPECIMENS:   1. Rectum and sigmoid  2. Anastomotic rings    COMPLICATIONS: None.     CONDITION: Stable to PACU.

## 2017-04-12 NOTE — ANESTHESIA PROCEDURE NOTES
Epidural Procedure Note    Staff:     Anesthesiologist:  JEREMIAH ALEJANDRO    Resident/CRNA:  DEREK SOTO    Procedure performed by resident/CRNA in the presence of a teaching physician    Location: Pre-op     Procedure start time:  4/12/2017 7:41 AM     Procedure end time:  4/12/2017 7:50 AM   Pre-procedure checklist:   patient identified, IV checked, site marked, risks and benefits discussed, informed consent, monitors and equipment checked, pre-op evaluation, at physician/surgeon's request and post-op pain management      Correct Patient: Yes      Correct Position: Yes      Correct Site: Yes      Correct Procedure: Yes      Correct Laterality:  Yes    Site Marked:  Yes  Procedure:     Procedure:  Epidural catheter    ASA:  3    Diagnosis:  Post op pain control     Position:  Sitting    Sterile Prep: chloraprep      Insertion site:  T9-10    Local skin infiltration:  1% lidocaine    amount (mL):  3    Approach:  Midline    Needle gauge (G):  17    Needle Length (in):  3.5    Block Needle Type:  Chey    JACK at (cm):  6    Threaded to cm at skin:  11    Threaded in epidural space (cm):  5    Paresthesias:  No    Aspiration negative for Heme or CSF: Yes       Local anesthetic:  Lidocaine 1.5% w/ 1:200,000 epinephrine    Test dose time:  07:47    Test dose negative for signs of intravascular, subdural or intrathecal injection: Yes

## 2017-04-12 NOTE — ANESTHESIA PROCEDURE NOTES
Peripheral Nerve Block Procedure Note    Staff:     Anesthesiologist:  JEREMIAH ALEJANDRO    Resident/CRNA:  DEREK SOTO    Block performed by resident/CRNA in the presence of a teaching physician    Location: OR AFTER induction  Procedure Start/Stop TImes:      4/12/2017 12:45 PM     4/12/2017 12:56 PM    patient identified, IV checked, site marked, risks and benefits discussed, informed consent, monitors and equipment checked, pre-op evaluation, at physician/surgeon's request and post-op pain management      Correct Patient: Yes      Correct Position: Yes      Correct Site: Yes      Correct Procedure: Yes      Correct Laterality:  Yes    Site Marked:  Yes  Procedure details:     Procedure:  TAP    ASA:  3    Diagnosis:  Post op pain control     Laterality:  Bilateral    Position:  Supine    Sterile Prep: chloraprep      Local skin infiltration:  None    Needle:  Niyay needle    Needle gauge:  21    Needle length (mm):  110    Ultrasound: Yes      Ultrasound used to identify targeted nerve, plexus, or vascular structure and placed a needle adjacent to it      Permanent Image entered into patiient's record      Blood Aspirated: No      Bleeding at site: No      Bolus via:  Needle    Infusion Method:  Single Shot    Complications:  None

## 2017-04-12 NOTE — IP AVS SNAPSHOT
MRN:[id number]                      After Visit Summary   4/12/2017    Meño Omalley    MRN: [id number]           Thank you!     Thank you for choosing [facility] for your care. Our goal is always to provide you with excellent care. Hearing back from our patients is one way we can continue to improve our services. Please take a few minutes to complete the written survey that you may receive in the mail after you visit with us. Thank you!        Patient Information     Date Of Birth          1970        Designated Caregiver       Most Recent Value    Caregiver    Will someone help with your care after discharge? yes    Name of designated caregiver Chasidy    Phone number of caregiver [phone]    Caregiver address [address] [place], [place]      About your hospital stay     You were admitted on:  April 12, 2017 You last received care in the:  [address] [place] [address]    You were discharged on:  April 15, 2017        Reason for your hospital stay       You were in the hospital to have surgery for rectal cancer.                  Who to Call     For medical emergencies, please call 911.  For non-urgent questions about your medical care, please call your primary care provider or clinic, [phone]  For questions related to your surgery, please call your surgery clinic        Attending Provider     Provider Specialty    Ignacio Rose MD Colon and Rectal Surgery       Primary Care Provider Office Phone # Fax #    Delio Alcala [phone]       [facility] [address] [place] [address]        After Care Instructions     Discharge Instructions       DIET  -Low Residue Diet for at least 4-6 weeks unless cleared by Colorectal surgery.  No raw vegetables, fruit skins, fibrous foods that require a lot of chewing, nuts, seeds, corn, popcorn.   -We recommend eating slowly, chewing thoroughly, eating small frequent meals throughout the day  -Stay well  hydrated    ACTIVITY  -No lifting, pushing, pulling greater than 10 lbs and no strenuous exercise for 6 weeks   -No driving while on narcotic analgesics (i.e. Percocet, oxycodone, Vicodin)  -No driving until you are able to fully twist to both sides quickly and without any pain    WOUND CLINIC  -Inspect your wounds daily for signs of infection (increased redness, drainage, pain)  -Keep your wound clean and dry  -You may shower, but do not soak in tub or pool  -If you have staples, they can be removed in 7-10 days by your PCP or in Colorectal Clinic    NOTIFY  Please contact Nancy Moss RN or Sharee Lee RN at [phone] for problems after discharge such as:  -Temperature > 101F, chills, rigors, dizziness  -Redness around or purulent drainage from wound  -Inability to tolerate diet, nausea or vomiting  -You stop passing gas (or your stoma stops functioning), develop significant bloating, abdominal pain  -Have blood in stools/vomit  -Have severe diarrhea/constipation  -Any other questions or concerns.  - At nights (after 4:30pm), on weekends, or if urgent, call [phone] and ask the  to speak with the on-call Colorectal Surgery resident or fellow    Medication Instructions  Some of your medications may have changed. Please take only prescribed and resumed medications     FOLLOW-UP  1.  You will need to follow-up with Dr. Rose in the Colon and Rectal Surgery clinic in 1-2 week(s).  Please contact our clinic scheduler Evelin Ludwig or Jen Washington (phone # [phone]) if you have not heard from our clinic in 3 business days afer discharge to schedule a follow-up appointment.    2.  Follow up with your primary care provider in 1-2 weeks after discharge from the hospital to review this hospitalization.       *For other appointments on [place] and/or [facility] (with [facility] or [facility] provider or service): Call [phone] if you have not heard regarding these appointments within 7 days of  "discharge.                  Pending Results     Date and Time Order Name Status Description    4/12/2017 1151 Surgical pathology exam In process     4/10/2017 0950 [facility]             Statement of Approval     Ordered          04/15/17 1604  I have reviewed and agree with all the recommendations and orders detailed in this document.  EFFECTIVE NOW     Approved and electronically signed by:  Jagjit Bolaños MD             Admission Information     Date & Time Provider Department Dept. Phone    4/12/2017 Ignacio Rose MD [address] [facility] [phone]      Your Vitals Were     Blood Pressure Pulse Temperature Respirations Height Weight    139/89 (BP Location: Right arm) 78 97.5  F (36.4  C) (Oral) 18 1.753 m (5' 9\") 91.6 kg (202 lb)    Pulse Oximetry BMI (Body Mass Index)                97% 29.83 kg/m2          MyChart Information     [employer] gives you secure access to your electronic health record. If you see a primary care provider, you can also send messages to your care team and make appointments. If you have questions, please call your primary care clinic.  If you do not have a primary care provider, please call [phone] and they will assist you.        Care EveryWhere ID     This is your Care EveryWhere ID. This could be used by other organizations to access your [place] medical records  [id number]           Review of your medicines      START taking        Dose / Directions    acetaminophen 325 MG tablet   Commonly known as:  TYLENOL        Dose:  650 mg   Take 2 tablets (650 mg) by mouth every 4 hours as needed for mild pain   Quantity:  100 tablet   Refills:  0       enoxaparin 40 MG/0.4ML injection   Commonly known as:  LOVENOX        Dose:  40 mg   Inject 0.4 mLs (40 mg) Subcutaneous every 24 hours   Quantity:  12 mL   Refills:  0       oxyCODONE 5 MG IR tablet   Commonly known as:  ROXICODONE        Dose:  5-10 mg   Take 1-2 tablets (5-10 mg) by mouth every 4 hours as " "needed for moderate to severe pain   Quantity:  30 tablet   Refills:  0         CONTINUE these medicines which have NOT CHANGED        Dose / Directions    escitalopram 20 MG tablet   Commonly known as:  LEXAPRO        Dose:  20 mg   Take 20 mg by mouth every morning   Refills:  0       FLOVENT  MCG/ACT Inhaler   Generic drug:  fluticasone        Dose:  2 puff   Take 2 puffs by mouth 2 times daily as needed   Refills:  0       polyethylene glycol powder   Commonly known as:  MIRALAX   Used for:  Colon cancer (H)        Please follow instructions on \"Getting Ready for Colonoscopy\" or Surgery packet   Quantity:  238 g   Refills:  0            Where to get your medicines      These medications were sent to [facility] [place], [place] - [address]  [address] [address]     Phone:  [phone]     acetaminophen 325 MG tablet    enoxaparin 40 MG/0.4ML injection         Some of these will need a paper prescription and others can be bought over the counter. Ask your nurse if you have questions.     Bring a paper prescription for each of these medications     oxyCODONE 5 MG IR tablet                Protect others around you: Learn how to safely use, store and throw away your medicines at www.disposemymeds.org.             Medication List: This is a list of all your medications and when to take them. Check marks below indicate your daily home schedule. Keep this list as a reference.      Medications           Morning Afternoon Evening Bedtime As Needed    acetaminophen 325 MG tablet   Commonly known as:  TYLENOL   Take 2 tablets (650 mg) by mouth every 4 hours as needed for mild pain   Last time this was given:  975 mg on 4/15/2017 10:22 AM                                enoxaparin 40 MG/0.4ML injection   Commonly known as:  LOVENOX   Inject 0.4 mLs (40 mg) Subcutaneous every 24 hours   Last time this was given:  40 mg on 4/15/2017 10:22 AM                             " "   escitalopram 20 MG tablet   Commonly known as:  LEXAPRO   Take 20 mg by mouth every morning   Last time this was given:  20 mg on 4/15/2017  8:11 AM                                FLOVENT  MCG/ACT Inhaler   Take 2 puffs by mouth 2 times daily as needed   Generic drug:  fluticasone                                oxyCODONE 5 MG IR tablet   Commonly known as:  ROXICODONE   Take 1-2 tablets (5-10 mg) by mouth every 4 hours as needed for moderate to severe pain                                polyethylene glycol powder   Commonly known as:  MIRALAX   Please follow instructions on \"Getting Ready for Colonoscopy\" or Surgery packet                                  "

## 2017-04-12 NOTE — IP AVS SNAPSHOT
[address] [address]    Phone:  [phone]                                       After Visit Summary   4/12/2017    Meño Omalley    MRN: [id number]           After Visit Summary Signature Page     I have received my discharge instructions, and my questions have been answered. I have discussed any challenges I see with this plan with the nurse or doctor.    ..........................................................................................................................................  Patient/Patient Representative Signature      ..........................................................................................................................................  Patient Representative Print Name and Relationship to Patient    ..................................................               ................................................  Date                                            Time    ..........................................................................................................................................  Reviewed by Signature/Title    ...................................................              ..............................................  Date                                                            Time

## 2017-04-12 NOTE — PROGRESS NOTES
Regional Anesthesia Note 4/12/17:    Epidural catheter was pulled while CRNAs and nurses were moving the patient from the preop bed to the OR table.  Tried cutting the epidural catheter since it was stretched and not working.  Cleaned with chloraprep and placed with new sterile filter and connector.  Epidural still would not work with saline bolus.  Decided to do a bilateral TAP block with Exparel.  Will remove epidural catheter 12 hours after Lovenox.    Jocelyn Colin MD  CA 2 Anesthesia Resident  Pager [phone]

## 2017-04-12 NOTE — ANESTHESIA POSTPROCEDURE EVALUATION
Patient: Meño Omalley    Procedure(s):  Laparoscopic Low Anterior Resection, Flexible Sigmoidoscopy - Wound Class: III-Contaminated   - Wound Class: III-Contaminated    Diagnosis:Colon Cancer   Diagnosis Additional Information: No value filed.    Anesthesia Type:  General, ETT    Note:  Anesthesia Post Evaluation    Patient location during evaluation: PACU  Patient participation: Able to fully participate in evaluation  Level of consciousness: awake and alert  Pain management: adequate  Airway patency: patent  Cardiovascular status: acceptable  Respiratory status: acceptable  Hydration status: acceptable  PONV: none     Anesthetic complications: None          Last vitals:  Vitals:    04/12/17 1400 04/12/17 1415 04/12/17 1430   BP: (!) 153/97 (!) 159/102 (!) 155/98   Pulse:      Resp: 16 16 16   Temp:      SpO2: 100% 100% 99%         Electronically Signed By: Jevon Nieto MD  April 12, 2017  2:39 PM

## 2017-04-13 LAB
ANION GAP SERPL CALCULATED.3IONS-SCNC: 7 MMOL/L (ref 3–14)
BUN SERPL-MCNC: 6 MG/DL (ref 7–30)
CALCIUM SERPL-MCNC: 8.7 MG/DL (ref 8.5–10.1)
CHLORIDE SERPL-SCNC: 102 MMOL/L (ref 94–109)
CO2 SERPL-SCNC: 31 MMOL/L (ref 20–32)
CREAT SERPL-MCNC: 0.8 MG/DL (ref 0.66–1.25)
ERYTHROCYTE [DISTWIDTH] IN BLOOD BY AUTOMATED COUNT: 12.5 % (ref 10–15)
GFR SERPL CREATININE-BSD FRML MDRD: ABNORMAL ML/MIN/1.7M2
GLUCOSE SERPL-MCNC: 98 MG/DL (ref 70–99)
HCT VFR BLD AUTO: 38.2 % (ref 40–53)
HGB BLD-MCNC: 13.4 G/DL (ref 13.3–17.7)
MAGNESIUM SERPL-MCNC: 2.1 MG/DL (ref 1.6–2.3)
MCH RBC QN AUTO: 31.1 PG (ref 26.5–33)
MCHC RBC AUTO-ENTMCNC: 35.1 G/DL (ref 31.5–36.5)
MCV RBC AUTO: 89 FL (ref 78–100)
PHOSPHATE SERPL-MCNC: 2.6 MG/DL (ref 2.5–4.5)
PLATELET # BLD AUTO: 224 10E9/L (ref 150–450)
POTASSIUM SERPL-SCNC: 4 MMOL/L (ref 3.4–5.3)
RBC # BLD AUTO: 4.31 10E12/L (ref 4.4–5.9)
SODIUM SERPL-SCNC: 140 MMOL/L (ref 133–144)
WBC # BLD AUTO: 7.4 10E9/L (ref 4–11)

## 2017-04-13 PROCEDURE — 85027 COMPLETE CBC AUTOMATED: CPT | Performed by: SURGERY

## 2017-04-13 PROCEDURE — 12000008 ZZH R&B INTERMEDIATE UMMC

## 2017-04-13 PROCEDURE — 84100 ASSAY OF PHOSPHORUS: CPT | Performed by: SURGERY

## 2017-04-13 PROCEDURE — 36415 COLL VENOUS BLD VENIPUNCTURE: CPT | Performed by: SURGERY

## 2017-04-13 PROCEDURE — 99231 SBSQ HOSP IP/OBS SF/LOW 25: CPT | Performed by: NURSE PRACTITIONER

## 2017-04-13 PROCEDURE — 25000132 ZZH RX MED GY IP 250 OP 250 PS 637: Performed by: PHYSICIAN ASSISTANT

## 2017-04-13 PROCEDURE — 25800025 ZZH RX 258: Performed by: SURGERY

## 2017-04-13 PROCEDURE — 83735 ASSAY OF MAGNESIUM: CPT | Performed by: SURGERY

## 2017-04-13 PROCEDURE — 80048 BASIC METABOLIC PNL TOTAL CA: CPT | Performed by: SURGERY

## 2017-04-13 PROCEDURE — 25000128 H RX IP 250 OP 636: Performed by: SURGERY

## 2017-04-13 RX ORDER — ACETAMINOPHEN 325 MG/1
975 TABLET ORAL EVERY 8 HOURS
Status: DISCONTINUED | OUTPATIENT
Start: 2017-04-13 | End: 2017-04-15 | Stop reason: HOSPADM

## 2017-04-13 RX ORDER — ESCITALOPRAM OXALATE 20 MG/1
20 TABLET ORAL EVERY MORNING
Status: DISCONTINUED | OUTPATIENT
Start: 2017-04-14 | End: 2017-04-15 | Stop reason: HOSPADM

## 2017-04-13 RX ADMIN — ACETAMINOPHEN 975 MG: 325 TABLET, FILM COATED ORAL at 10:19

## 2017-04-13 RX ADMIN — ENOXAPARIN SODIUM 40 MG: 40 INJECTION SUBCUTANEOUS at 08:25

## 2017-04-13 RX ADMIN — SODIUM CHLORIDE, POTASSIUM CHLORIDE, SODIUM LACTATE AND CALCIUM CHLORIDE: 600; 310; 30; 20 INJECTION, SOLUTION INTRAVENOUS at 08:36

## 2017-04-13 RX ADMIN — ACETAMINOPHEN 975 MG: 325 TABLET, FILM COATED ORAL at 17:54

## 2017-04-13 RX ADMIN — SODIUM CHLORIDE, POTASSIUM CHLORIDE, SODIUM LACTATE AND CALCIUM CHLORIDE: 600; 310; 30; 20 INJECTION, SOLUTION INTRAVENOUS at 18:28

## 2017-04-13 RX ADMIN — HYDROMORPHONE HYDROCHLORIDE: 10 INJECTION, SOLUTION INTRAMUSCULAR; INTRAVENOUS; SUBCUTANEOUS at 05:51

## 2017-04-13 ASSESSMENT — PAIN DESCRIPTION - DESCRIPTORS
DESCRIPTORS: ACHING

## 2017-04-13 NOTE — ADDENDUM NOTE
Addendum  created 04/13/17 0634 by Timothy Valle MD    Anesthesia Intra LDAs edited, LDA properties accepted

## 2017-04-13 NOTE — PROGRESS NOTES
CLINICAL NUTRITION SERVICES  Reason for Assessment:  Nutrition education regarding low fiber diet education  Diet History:  Patient has no history of low fiber diet education.  Nutrition Diagnosis:  Food- and nutrition-related knowledge deficit r/t no previous knowledge of low fiber diet as evidenced by patient report  Interventions:  Provided instruction on low fiber diet. Discussed each food group and foods to eat and avoid. Answered patient's questions regarding diet.   Provided handouts : Low Fiber Nutrition Therapy and Low Fiber Diet Hospital Menu  Goals:   Patient will verbalize at least 5 low fiber foods acceptable on diet and 5 high fiber foods to avoid.  Follow-up:    Patient to ask any further nutrition-related questions before discharge.  In addition, pt may request outpatient RD appointment.    Rosa White, Dietetic Intern  Pager: [phone]      Susie Fraire RD, MS, LD

## 2017-04-13 NOTE — OP NOTE
DATE OF PROCEDURE:  4/12/2017      PREOPERATIVE DIAGNOSIS:  Carcinoma of the rectosigmoid.      POSTOPERATIVE DIAGNOSIS:  Carcinoma of the rectosigmoid.      PROCEDURE:  Laparoscopic-assisted low anterior resection with splenic flexure mobilization, flexible sigmoidoscopy.      HISTORY:  Meño Omalley is a 46-year-old man who presented with loose stools and rectal bleeding and underwent a colonoscopy, which demonstrated a rectosigmoid cancer.  MRI of the pelvis raised a question of enlarged mesorectal lymph node, but there was no threatening of mesorectal margin and upon discussion in Tumor Board, it was felt that proceeding directly to surgery was the best option.  I discussed all the risks and alternatives to surgery with Meño and his wife, Chasidy.  Meño has a significant family history that includes colon cancer in his father and his sister.  He has been seen by the genetic counselors and awaits ColoNext gene testing.  His immunohistochemistry showed intact mismatch repair, which militates against the diagnosis of Rodriguez syndrome.  The patient did not wish to undergo a total abdominal colectomy, even if a diagnosis of Rodriguez syndrome were to be established, and for this reason we planned an anterior resection.  I answered all of Meño's and Chasidy's questions to their stated satisfaction.  They expressed their understanding and Meño provided written informed consent.      SURGEON:  Ignacio Rose MD      ASSISTANT:  Tricia Black MD, and Nery Patel MD       DESCRIPTION OF PROCEDURE:  After induction of adequate endotracheal anesthesia, the patient was placed in the modified lithotomy position using Yellofin stirrups and pneumatic compression stockings.  The abdomen was prepped and draped in sterile fashion.  A timeout was performed and the patient and procedure confirmed.  A 12 mm balloon port was placed just above the umbilicus using Miladys technique.  Additional 5 mm ports were placed in the  right upper quadrant, right lower quadrant and suprapubic positions and the left lower quadrant.  Intra-abdominal exploration demonstrated no evidence of hepatic metastases or intraperitoneal metastases.  There was visible tattoo present just above the peritoneal reflection.  Per the colonoscopy report, the tattoo lay beneath the tumor.  We began with the medial to lateral dissection.  We opened the peritoneum just lateral to the ligament of Treitz and we were able to mobilize the descending colon extending towards the splenic flexure all the way to the left lateral abdominal wall.  We divided the inferior mesenteric vein with the LigaSure.  We next opened the pelvic peritoneum and identified the mesorectal plane.  We identified the left ureter and preserved this posteriorly.  Again, we were able to carry out blunt dissection all the way to the abdominal wall.  We then cleared the inferior mesenteric artery pedicle.  This appeared to be a quite robust vessel and I was not comfortable dividing this with the LigaSure.  Instead, we utilized a 45 mm LINDA white load staple gun, dividing the inferior mesenteric artery roughly 1.5 to 2 cm from its origin on the aorta, again after identifying that the ureter was safely preserved posteriorly.  With this, we were able to complete the medial to lateral dissection.  We then took down the sigmoid and descending colon from the lateral attachments.  We mobilized the splenic flexure in both anterograde and retrograde, entering the lesser sac at the level of the mid to distal transverse colon.  When the splenic flexure was entirely mobilized, we ensured that intra-abdominal hemostasis was intact.  We then released our pneumoperitoneum and made a Pfannenstiel incision.  We placed an Dennis wound retractor.  We completed our upper rectal dissection in the mesorectal plane.  We selected a point that I estimated to be a minimum of 5 cm below the tumor and we came across the mesorectum  perpendicular to the axis of the rectum at this level.  Just as we were completing this dissection, we developed a small rent in the rectal wall and we had a minimal quantity of fecal spillage.  This was immediately suctioned away and the pelvis copiously irrigated with water.  The leakage was immediately controlled by placing a Kareen right angle across the bowel.  I colonoscoped the patient and ensured that the distal rectum was intact.  We divided our distal bowel with the green load contour and sent the specimen for immediate gross pathology.  This showed an ulcerated tumor with a distal margin of at least 8 cm in the transected specimen.  I repeated sigmoidoscopy of the rectal remnant.  This again showed an intact contour staple closure and no evidence of air leak.  Prior to removing the specimen, we took down the remaining mesentery to the soft and healthy-appearing midsigmoid colon using the LigaSure and had divided the bowel with a LINDA stapler.  We secured the anvil of an EEA 28 mm stapler in the proximal bowel with a 2-0 Prolene pursestring.  I returned to the perineal position and advanced the stapler under abdominal guidance to the apex of the staple line.  The trocar was advanced to the midportion of the staple line.  The anvil was reassembled with the stapler and the stapler was closed, taking care that the mesentery remained properly oriented and that there was no extraneous material.  The stapler was fired and removed with no difficulty.  There were 2 generous anastomotic rings.  I repeated the flexible sigmoidoscopy well into the descending colon.  Vascularity appeared excellent.  The anastomosis was anatomically intact and was airtight to insufflation testing.  All possible air was evacuated and the scope removed.  The anastomosis lay at the 12 cm level.  We again irrigated the abdomen and ensured hemostasis.  Based on the level of the anastomosis and lack of radiation as well as normal  colonoscopy testing, I did not place an ileostomy.  The pelvis was hemostatic, so no drain was placed.  We closed the Miladys port site with a 0 Vicryl figure-of-eight stitch.  The posterior peritoneum was closed with a 2-0 Vicryl.  The anterior rectal sheath was closed with 0 PDS.  Subcutaneous tissues were irrigated.  The skin was closed with 4-0 Monocryl and Dermabond.      Estimated blood loss was 50 mL.  The patient tolerated the procedure well.  Sponge and instrument counts were reported as correct at the conclusion of the case x2.         RUBY ROLAND MD             D: 2017 12:50   T: 2017 17:08   MT: TITO      Name:     DANII ANGULO   MRN:      [id number]        Account:        [id number]   :      1970           Procedure Date: 2017      Document: [id number]       cc: FINA Granda MD, MD, MD, MD       [facility] Surgery Billing

## 2017-04-13 NOTE — PROGRESS NOTES
"REGIONAL ANESTHESIA PAIN SERVICE  SUBJECTIVE: Pt reports pain varies control varies between comfortably manageable and tolerable with discomfort, reports incisional pain control following nerve block injections \"pretty good\".  Denies any weakness, paresthesias, circumoral numbness, metallic taste or tinnitus.  Patient is currently without nausea or vomiting.  Just returned from a long walk in hallway.      OBJECTIVE:    Blood pressure (!) 147/91, pulse 69, temperature 98.8  F (37.1  C), temperature source Oral, resp. rate 18, height 1.753 m (5' 9\"), weight 92.7 kg (204 lb 5.9 oz), SpO2 100 %.  Strength 5/5 and grossly symmetric in bilateral LE      ASSESSMENT/PLAN:    Meño Omalley is a 46 year old male POD #1 s/p  COLECTOMY LOW ANTERIOR ILEOSTOMY with single shot bilateral transversus abdominis plane (TAP) nerve block injections, bupivacaine 0.25% with epinephrine 1:200,000 20 mL, then liposome bupivacaine (Exparel) 1.3% 20mL given on 4/12 for postoperative analgesia.  Pt is ambulating without difficulty, no weakness or paresthesias.  No evidence of adverse side effects associated with previous epidural catheter (removed yesterday at 2045) and nerve block injections. Pt is receiving adequate incisional pain control.  Anticipate up to 72 hours of incisional pain control.  Anticipate patient will require opioid/nonopioid analgesics for visceral and muscle pain not controlled with local anesthetic.        - NO other local anesthetic use within 96 hours of liposome bupivacaine (Exparel)  - patient received verbal and written instructions about liposome bupivacaine and multimodal analgesia  - please call if questions or concerns  - discussed plan with attending anesthesiologist    SARWAT Olvera [facility]  Regional Anesthesia Pain Service  4/13/2017 12:54 PM    24 hour Job Code Pager.  For in-house use only.     Dial * * [phone] and  [place]:  [phone]  [place]: [phone]  Peds:  [phone]  Enter call-back number  May " text page using [employer], but NOT [employer].

## 2017-04-13 NOTE — PLAN OF CARE
Problem: Goal Outcome Summary  Goal: Goal Outcome Summary  Outcome: Therapy, progress toward functional goals as expected  OVSS, slightly hypertensive, MD paged. Pain comfortably managed with PCA 0.2. Epidural removed by anesthesia @2045. Denies nausea. Tolerating ice chips. Abdominal incisions CDI. Joseph with adequate output. Pt transferred from [place] to bed upon arrival; Tolerated well. Pt resting comfortably. Continue to monitor.

## 2017-04-13 NOTE — PROVIDER NOTIFICATION
Paged MD Skyla Nance to notify /96. MD Nance returned page. Orders for Hydralizine placed if systolic BP >160 or Diastolic >100 and to notify MD if diastolic > 110.

## 2017-04-13 NOTE — PLAN OF CARE
Problem: Goal Outcome Summary  Goal: Goal Outcome Summary  Outcome: Therapy, progress toward functional goals as expected  POD#1 of an LAR. A&Ox4, VSS on room air, LS clear, BS hypoactive, tolerating clears, denies nausea. Pain is controlled with a PCA of dilaudid. Walked x2. Opal has adequate UO, Has not passed gas or had a BM. CMS intact. IS at 2250. Continue with POC.

## 2017-04-13 NOTE — PROGRESS NOTES
Perioperative Pain Service Cross Cover Note    Patient with non-functioning epidural, but unable to remove catheter until after 2000 today due to Lovenox dose this am. Patient received TAP blocks today and denies symptoms of local anesthetic toxicity.    O: Dried blood at epidural site. Blue tip of epidural catheter intact after removal and band-aid placed at site.    A/P: 45 yo POD#0 for epidural catheter removal.  Epidural removed at 2045, blue tip intact.  Hold Lovenox for 4 hours after removal. Nurse aware.  May remove back bandage tonight or tomorrow.  Will sign off.    Timothy Valle MD  Anesthesiology Resident CA3 / PGY-4  24 hour Job Code Pager.  For in-house use only.     [place]:  * * [phone]  Enter call-back number and #      May text page using [employer], but NOT [employer].

## 2017-04-13 NOTE — PROGRESS NOTES
Colorectal Surgery Progress Note  POD#1    Subjective:  Pain controlled.  No nausea.  No gas/BM.     Vitals:  Vitals:    04/12/17 1925 04/12/17 2255 04/13/17 0347 04/13/17 0637   BP: 178/70 (!) 156/96 (!) 158/91 (!) 147/91   BP Location:   Right arm Left arm   Pulse:   92 69   Resp: 10 19 18 18   Temp:  99.1  F (37.3  C) 99.5  F (37.5  C) 98.8  F (37.1  C)   TempSrc:  Oral Oral Oral   SpO2: 96% 95% 100% 100%   Weight:       Height:         I/O:  I/O last 3 completed shifts:  In: 2228.33 [I.V.:2228.33]  Out: 2160 [Urine:2110; Blood:50]    Physical Exam:  Gen: AAOx3, NAD  Pulm: Non-labored breathing  Abd: Soft, mildly distended, appropriately tender, no guarding/rebound   Incision C/D/I   Ext:  Warm and well-perfused    BMP  Recent Labs  Lab 04/13/17  0656 04/12/17 1957     --    POTASSIUM 4.0  --    CHLORIDE 102  --    CO2 31  --    BUN 6*  --    CR 0.80 0.71   GLC 98  --    MAG 2.1  --    PHOS 2.6  --      CBC  Recent Labs  Lab 04/13/17  0656 04/12/17  1957 04/10/17  1740   WBC 7.4  --  5.7   HGB 13.4  --  14.4   HCT 38.2*  --  41.5    243 321       ASSESSMENT: This is a 46 year old male depression, family h/o malignant hyperthermia, exercise induced asthma.  Diagnosed with rectosigmoid cancer.  POD# 1 Lap-assisted LAR with stapled end-to-end colorectal anastomosis via Pfannenstiel incision, flex sig.     Neuro/Pain: home excitalopram.  Dilaudid PCA, scheduled tylenol.   CV: no acute issues  PULM: encourage IS.  GI/FEN:   - CLD  - IVF @ 100  - ambulate  : Joseph til POD#3  Heme: Hgb 14-15 to 13.4  ID: no acute issues    Activity: as tolerated.  Ppx: lovenox  Dispo: TBD.  Will need 30 days of prophylactic lovenox post-op.       Reshma Bird PA-C   Colon and Rectal Surgery  3157     Patient was seen and discussed with Dr. Black.     Attestation:  This patient has been seen and evaluated by me.  Discussed with the house staff team or resident(s) and agree with the findings and plan in this note.   He is comfortable with pca.  Details of surgery discussed.  Ignacio Rose MD  Colon and Rectal Surgery Attending  [phone]

## 2017-04-14 PROCEDURE — 12000001 ZZH R&B MED SURG/OB UMMC

## 2017-04-14 PROCEDURE — 25800025 ZZH RX 258: Performed by: PHYSICIAN ASSISTANT

## 2017-04-14 PROCEDURE — 25000128 H RX IP 250 OP 636: Performed by: SURGERY

## 2017-04-14 PROCEDURE — 25800025 ZZH RX 258: Performed by: SURGERY

## 2017-04-14 PROCEDURE — 25000132 ZZH RX MED GY IP 250 OP 250 PS 637: Performed by: PHYSICIAN ASSISTANT

## 2017-04-14 PROCEDURE — 25000132 ZZH RX MED GY IP 250 OP 250 PS 637: Performed by: SURGERY

## 2017-04-14 RX ADMIN — HYDROMORPHONE HYDROCHLORIDE: 10 INJECTION, SOLUTION INTRAMUSCULAR; INTRAVENOUS; SUBCUTANEOUS at 05:37

## 2017-04-14 RX ADMIN — ACETAMINOPHEN 975 MG: 325 TABLET, FILM COATED ORAL at 01:55

## 2017-04-14 RX ADMIN — SODIUM CHLORIDE, POTASSIUM CHLORIDE, SODIUM LACTATE AND CALCIUM CHLORIDE: 600; 310; 30; 20 INJECTION, SOLUTION INTRAVENOUS at 04:32

## 2017-04-14 RX ADMIN — ENOXAPARIN SODIUM 40 MG: 40 INJECTION SUBCUTANEOUS at 08:07

## 2017-04-14 RX ADMIN — ACETAMINOPHEN 975 MG: 325 TABLET, FILM COATED ORAL at 10:41

## 2017-04-14 RX ADMIN — SODIUM CHLORIDE, POTASSIUM CHLORIDE, SODIUM LACTATE AND CALCIUM CHLORIDE: 600; 310; 30; 20 INJECTION, SOLUTION INTRAVENOUS at 19:51

## 2017-04-14 RX ADMIN — ESCITALOPRAM OXALATE 20 MG: 20 TABLET ORAL at 08:07

## 2017-04-14 RX ADMIN — ACETAMINOPHEN 975 MG: 325 TABLET, FILM COATED ORAL at 18:04

## 2017-04-14 ASSESSMENT — PAIN DESCRIPTION - DESCRIPTORS
DESCRIPTORS: ACHING

## 2017-04-14 NOTE — PROGRESS NOTES
Colorectal Surgery Progress Note  POD#2    Subjective:  Pain controlled.  No nausea.  A few burps here and there, but not worse than yesterday.  Did pass a little gas.  No stool.     Vitals:  Vitals:    04/13/17 1342 04/13/17 1400 04/13/17 2204 04/14/17 0626   BP:  119/74 (!) 145/95 (!) 134/94   BP Location:  Left arm  Left arm   Pulse:  76 85    Resp:  18 16 18   Temp:  98.4  F (36.9  C) 100  F (37.8  C) 97.8  F (36.6  C)   TempSrc:  Oral Oral Oral   SpO2: 95% 94% 94% 95%   Weight:  91.3 kg (201 lb 4.8 oz)     Height:         I/O:  I/O last 3 completed shifts:  In: 2640 [P.O.:240; I.V.:2400]  Out: 3250 [Urine:3250]    Physical Exam:  Gen: AAOx3, NAD  Pulm: Non-labored breathing  Abd: Soft, mildly distended, appropriately tender, no guarding/rebound   Incision C/D/I   Ext:  Warm and well-perfused    BMP    Recent Labs  Lab 04/13/17  0656 04/12/17 1957     --    POTASSIUM 4.0  --    CHLORIDE 102  --    CO2 31  --    BUN 6*  --    CR 0.80 0.71   GLC 98  --    MAG 2.1  --    PHOS 2.6  --      CBC    Recent Labs  Lab 04/13/17  0656 04/12/17  1957 04/10/17  1740   WBC 7.4  --  5.7   HGB 13.4  --  14.4   HCT 38.2*  --  41.5    243 321       ASSESSMENT: This is a 46 year old male depression, family h/o malignant hyperthermia, exercise induced asthma.  Diagnosed with rectosigmoid cancer.  POD# 2 Lap-assisted LAR with stapled end-to-end colorectal anastomosis via Pfannenstiel incision, flex sig.     Neuro/Pain: home excitalopram.  Dilaudid PCA, scheduled tylenol.   CV: no acute issues  PULM: encourage IS.  GI/FEN:   - FLD  - IVF @ 50  - ambulate  : Joseph til POD#3  Heme: Hgb 14-15 to 13.4  ID: no acute issues    Activity: as tolerated.  Ppx: lovenox  Dispo: TBD.  Will need 30 days of prophylactic lovenox post-op.       Reshma Bird PA-C   Colon and Rectal Surgery  6537     Patient was seen and discussed with Dr. Black.     Attestation:  This patient has been seen and evaluated by me.  Discussed with the  house staff team or resident(s) and agree with the findings and plan in this note.   No c/o; tolarating fulls; passes a little gas.  Good progress.  Ignacio Rose MD  Colon and Rectal Surgery Attending  [phone]

## 2017-04-14 NOTE — PLAN OF CARE
Problem: Goal Outcome Summary  Goal: Goal Outcome Summary  Outcome: Improving  POD #2 AVSS. Pain control with dilaudid PCA [id number]. Denies nausea. Joseph producing adequate urine output. Abdominal incision c/d/i. No BM or flatus yet. Tolerating clears. Lovenox teaching needs to be done. Up SBA. Continue with plan

## 2017-04-14 NOTE — PLAN OF CARE
Problem: Goal Outcome Summary  Goal: Goal Outcome Summary  Outcome: Therapy, progress toward functional goals as expected  POD#2 of an LAR. A&Ox4, VSS on room air. Up independent. Pain is controlled with PCA of dilaudid. IVM running at 50ml/hr. 2 PIVs, Left is SL Right is infusing. Incision  Is open to air and CDI. LS clear, BS+ passed gas, no BM. Ambulating in the cadet. Tolerating a full liquid diet. Denies nausea. Joseph has adequate UO, Joseph cares complete. Waiting for ROBF, control of pain with PO meds.

## 2017-04-15 VITALS
BODY MASS INDEX: 29.92 KG/M2 | DIASTOLIC BLOOD PRESSURE: 89 MMHG | OXYGEN SATURATION: 97 % | HEIGHT: 69 IN | RESPIRATION RATE: 18 BRPM | WEIGHT: 202 LBS | HEART RATE: 78 BPM | TEMPERATURE: 97.5 F | SYSTOLIC BLOOD PRESSURE: 139 MMHG

## 2017-04-15 LAB — PLATELET # BLD AUTO: 221 10E9/L (ref 150–450)

## 2017-04-15 PROCEDURE — 25000132 ZZH RX MED GY IP 250 OP 250 PS 637: Performed by: SURGERY

## 2017-04-15 PROCEDURE — 36415 COLL VENOUS BLD VENIPUNCTURE: CPT | Performed by: SURGERY

## 2017-04-15 PROCEDURE — 25000128 H RX IP 250 OP 636: Performed by: SURGERY

## 2017-04-15 PROCEDURE — 85049 AUTOMATED PLATELET COUNT: CPT | Performed by: SURGERY

## 2017-04-15 PROCEDURE — 25000132 ZZH RX MED GY IP 250 OP 250 PS 637: Performed by: PHYSICIAN ASSISTANT

## 2017-04-15 PROCEDURE — 25800025 ZZH RX 258: Performed by: SURGERY

## 2017-04-15 RX ORDER — OXYCODONE HYDROCHLORIDE 5 MG/1
5-10 TABLET ORAL EVERY 4 HOURS PRN
Qty: 30 TABLET | Refills: 0 | Status: SHIPPED | OUTPATIENT
Start: 2017-04-15 | End: 2017-05-24

## 2017-04-15 RX ORDER — SODIUM CHLORIDE, SODIUM LACTATE, POTASSIUM CHLORIDE, CALCIUM CHLORIDE 600; 310; 30; 20 MG/100ML; MG/100ML; MG/100ML; MG/100ML
INJECTION, SOLUTION INTRAVENOUS CONTINUOUS
Status: DISCONTINUED | OUTPATIENT
Start: 2017-04-15 | End: 2017-04-15

## 2017-04-15 RX ORDER — ACETAMINOPHEN 325 MG/1
650 TABLET ORAL EVERY 4 HOURS PRN
Qty: 100 TABLET | Refills: 0 | Status: SHIPPED | OUTPATIENT
Start: 2017-04-15 | End: 2017-06-07

## 2017-04-15 RX ADMIN — ENOXAPARIN SODIUM 40 MG: 40 INJECTION SUBCUTANEOUS at 10:22

## 2017-04-15 RX ADMIN — ACETAMINOPHEN 975 MG: 325 TABLET, FILM COATED ORAL at 10:22

## 2017-04-15 RX ADMIN — SODIUM CHLORIDE, POTASSIUM CHLORIDE, SODIUM LACTATE AND CALCIUM CHLORIDE: 600; 310; 30; 20 INJECTION, SOLUTION INTRAVENOUS at 10:24

## 2017-04-15 RX ADMIN — ESCITALOPRAM OXALATE 20 MG: 20 TABLET ORAL at 08:11

## 2017-04-15 RX ADMIN — HYDROMORPHONE HYDROCHLORIDE: 10 INJECTION, SOLUTION INTRAMUSCULAR; INTRAVENOUS; SUBCUTANEOUS at 05:28

## 2017-04-15 RX ADMIN — ACETAMINOPHEN 975 MG: 325 TABLET, FILM COATED ORAL at 01:34

## 2017-04-15 NOTE — PROGRESS NOTES
COLON & RECTAL SURGERY PROGRESS NOTE    April 15, 2017  Post-op Day # 3    SUBJECTIVE:  No acute events overnight.  Tolerating a full liqudi diet. No nausea, vomiting or emesis.       OBJECTIVE:  Temp:  [97  F (36.1  C)-98.3  F (36.8  C)] 97.7  F (36.5  C)  Pulse:  [72-75] 72  Heart Rate:  [72-74] 74  Resp:  [16] 16  BP: (122-131)/(73-82) 131/82  SpO2:  [94 %-95 %] 95 %    Intake/Output Summary (Last 24 hours) at 04/15/17 1036  Last data filed at 04/15/17 0459   Gross per 24 hour   Intake             2370 ml   Output             2300 ml   Net               70 ml   UOP 2950  PO 1500    GENERAL:  Awake, alert, no acute distress.  ABDOMEN:  Soft, NT, non-distended, Incisions c/d/i. No rebound or guarding.     LABS:  Lab Results   Component Value Date    WBC 7.4 04/13/2017     Lab Results   Component Value Date    HGB 13.4 04/13/2017     Lab Results   Component Value Date    HCT 38.2 04/13/2017     Lab Results   Component Value Date     04/15/2017     Last Basic Metabolic Panel:  Lab Results   Component Value Date     04/13/2017      Lab Results   Component Value Date    POTASSIUM 4.0 04/13/2017     Lab Results   Component Value Date    CHLORIDE 102 04/13/2017     Lab Results   Component Value Date    CANDY 8.7 04/13/2017     Lab Results   Component Value Date    CO2 31 04/13/2017     Lab Results   Component Value Date    BUN 6 04/13/2017     Lab Results   Component Value Date    CR 0.80 04/13/2017     Lab Results   Component Value Date    GLC 98 04/13/2017       ASSESSMENT/PLAN:46 yom s/p LARrecovering appropriately  Low fiber diet.  PO pain meds  Ambulate  DC britton  Lovenox.  If he does well, consider dc home tomorrow. Will need 30d lovenox at the time of discharge  Pathology pending.  Tricia Black MD  Colon And Rectal Surgery Fellow  [phone]    4/15/2017  10:37 AM      Reviewed with attending staff on call Dr. boland    Attestation:  This patient has been seen and evaluated by me.  Discussed  with the house staff team or resident(s) and agree with the findings and plan in this note.  Having BM's and eating LRD.  Will d/c later today if able to void.  Ignacio Rose MD  Colon and Rectal Surgery Attending  [phone]

## 2017-04-15 NOTE — PLAN OF CARE
Problem: Goal Outcome Summary  Goal: Goal Outcome Summary  Outcome: Improving  Vitals:     04/13/17 2204 04/14/17 0626 04/14/17 1100 04/14/17 1457   BP: (!) 145/95 (!) 134/94   128/81   BP Location:   Left arm   Left arm   Pulse: 85     75   Resp: 16 18   16   Temp: 100  F (37.8  C) 97.8  F (36.6  C)   97.9  F (36.6  C)   TempSrc: Oral Oral   Oral   SpO2: 94% 95% 95% 95%   Weight:     91.6 kg (202 lb)     Height:             Pt A/O, VSS on RA. POD2. Pain managed with PCA Dilaudid and scheduled Tylenol, pt denies nausea. Tolerating low fiber diet. No PRNs this shift. R PIV infusing LR 50ml/hr, L PIV SL. Britton in place and patent, pt educated on britton cares, adequate UOP. Pt reports passing gas and having small soft BM in the afternoon. Abdominal incisions CDI. Pt up indep, ambulated in the cadet.Continue POC.

## 2017-04-15 NOTE — PLAN OF CARE
Problem: Goal Outcome Summary  Goal: Goal Outcome Summary  AVSS, denies need for pain med, PCA removed, declines oral med. Lap sites intact, tolerates activity up in cadet today, showered.  tolerating regular diet. Opal harper late am, this afternoon voided x 2 total of 400ml. Feels ready to go home today.

## 2017-04-15 NOTE — PLAN OF CARE
Problem: Goal Outcome Summary  Goal: Goal Outcome Summary  Outcome: Improving  POD #3. AVSS. Pain control with PCA dilaudid 0.2q10 and scheduled tylenol. Denies nausea. Abd wound liquid bandage and open to air. PIV infusing LR at 50mL/hr. Joseph producing adequate urine output. Passing flatus and having BM's. Tolerating low fiber diet. Up independently. Continue with plan.

## 2017-04-16 NOTE — PLAN OF CARE
Problem: Goal Outcome Summary  Goal: Goal Outcome Summary  Outcome: Adequate for Discharge Date Met:  04/15/17  POD#3 of an LAR. A&Ox4, denies pain and nausea. Up independent, britton removed at 1000, voided 400. Passing gas, and has had a BM. BS active, LS clear. Lovenox teaching complete with exception of demonstration. We did run though a mock demonstration with wife present. Both pt. And spouse verbalized understanding of care. Lap sites open to air and CDI.

## 2017-04-17 ENCOUNTER — TELEPHONE (OUTPATIENT)
Dept: SURGERY | Facility: CLINIC | Age: 47
End: 2017-04-17

## 2017-04-17 ENCOUNTER — CARE COORDINATION (OUTPATIENT)
Dept: CARDIOLOGY | Facility: CLINIC | Age: 47
End: 2017-04-17

## 2017-04-17 ENCOUNTER — TELEPHONE (OUTPATIENT)
Dept: FAMILY MEDICINE | Facility: OTHER | Age: 47
End: 2017-04-17

## 2017-04-17 NOTE — TELEPHONE ENCOUNTER
Called to check on patient after hospital discharge. He states that he is doing well. His pain is well controlled on tylenol, ibuprofen, and occasional oxycodone. He denies any fevers or chills. He is having normal, soft bowel movements. He is scheduled for follow up with Dr. Rose in 5/2/17. Encouraged the patient to contact the clinic in the meantime with any questions or concerns.    SARWAT Ricci, NP-C  Colon and Rectal Surgery  [facility]

## 2017-04-17 NOTE — TELEPHONE ENCOUNTER
ED / Discharge Outreach Protocol    Patient Contact    Attempt # 1    Was call answered?  No.  Left message on voicemail with information to call me back.    Xenia Hodgson RN

## 2017-04-17 NOTE — PROGRESS NOTES
Patient was Discharged from Colon and Rectal Surgery so their Care Coordinators will handle patient's Post Discharge Follow up            FOLLOW-UP  1. You will need to follow-up with Dr. Rose in the Colon and Rectal Surgery clinic in 1-2 week(s). Please contact our clinic scheduler Evelin Ludwig or Jen Washington (phone # [phone]) if you have not heard from our clinic in 3 business days afer discharge to schedule a follow-up appointment.     2. Follow up with your primary care provider in 1-2 weeks after discharge from the hospital to review this hospitalization.

## 2017-04-17 NOTE — DISCHARGE SUMMARY
[facility]  Discharge Summary  Colon and Rectal Surgery     Meño Omalley MRN# [id number]   YOB: 1970 Age: 46 year old     Date of Admission:  4/12/2017  Date of Discharge::  4/15/2017  Admitting Physician:  Ignacio Rose MD  Discharge Physician:  Ignacio Rose MD  Primary Care Physician:        Delio Alcala          Admission Diagnoses:   Rectal cancer (H)            Discharge Diagnosis:   Rectal Cancer         Procedures:   Laparoscopic assisted Low Anterior Resection              Consultations:   WOUND OSTOMY CONTINENCE NURSE  IP CONSULT         Imaging Studies:     Results for orders placed or performed during the hospital encounter of 03/31/17   MRI Pelvis - 3T Rectal Protocol    Narrative    EXAMINATION: MR PELVIS W/O & W CONTRAST, 3/31/2017 12:55 PM     COMPARISON: CT dated 3/27/2017    TECHNIQUE:  Images were acquired without and with intravenous contrast  through the pelvis. The following MR images were acquired without  contrast: multiplanar T1, multiplanar T2, axial diffusion-weighted and  axial apparent diffusion coefficient. T1-weighted images with fat  saturation were acquired at the following intervals relative to  intravenous contrast administration: pre-contrast, immediate post  contrast, 1 minute, 3 minutes and delayed.  Contrast dose: 10cc of  Gadavist injected.    HISTORY: New diagnosis of rectal cancer    FINDINGS:    LOCATION/SIZE:  In the upper rectum/rectosigmoid junction rectum there is a  circumferential wall thickening measuring 1.3 cm in length that is  approximately 4 cm in length series 8, image 13).    EXTENT:  The tumor does not involve the anal sphincters or levator ani muscle,  and, is located approximately 15 cm from the anal verge.     The tumor is predominantly submucosal with  extension through the  muscularis propria and minimal involvement of the perirectal fat image  11 and 12 series 7).The maximum extramural spread beyond  the  muscularis propria is 3 mm.    No definite invasion of the anterior peritoneal reflection.    STAGE: T3b    CIRCUMFERENTIAL RESECTION MARGIN (CRM):  In terms of the resection margin, there is minimal involvement of the  mesorectal fascia, with the nearest potential distance to the  circumferential margin being approximately 13 mm (> 1 mm indicates CRM  is clear).     LYMPH NODES:  No obvious suspicious perirectal lymph nodes.  Small upper presacral lymph node measuring 0.7 cm, with slightly  irregular shape. No other pelvic lymphadenopathy.   STAGE: N1    VEINS:  There is not evidence of extramural venous extension.     MISCELLANEOUS FINDINGS:  No pelvic lymphadenopathy. Urinary bladder is partially distended. No  free fluid in the pelvis. Prostate gland and seminal vesicles appear  unremarkable. No suspicious osseous lesions. Pelvic musculature  appears unremarkable.      Impression    IMPRESSION:   1. Circumferential wall thickening of the upper rectum/rectosigmoid  junction with minimal extension to the mesorectal fat consistent with  rectal cancer, stage T3b N1.  2. Single suspicious lymph node in the superior/posterior perirectal  fat.    T Staging on MRI  Tx Primary tumor cannot be assessed  T0 No evidence of primary tumor  T1 Tumor invades submucosa   T2 Tumor invades but does not penetrate muscularis propria  T3 Tumor invades subserosa through muscularis propria: broad-based  bulge or nodular projection (not fine spiculation) of intermediate  signal intensity projecting beyond outer muscle coat   T3a Tumor extends < 1 mm beyond muscularis propria  T3b Tumor extends 1 - 5 mm beyond muscularis propria  T3c Tumor extends > 5 - 15 mm beyond muscularis propria  T3d Tumor extends > 15 mm beyond muscularis propria  T4 Tumor invades other organs: extension of abnormal signal into  adjacent organ, extension of tumor signal through peritoneal  reflection      I have personally reviewed the examination and initial  "interpretation  and I agree with the findings.    JUDITH POLLACK              Medications Prior to Admission:     No prescriptions prior to admission.              Discharge Medications:     Discharge Medication List as of 4/15/2017  5:01 PM      START taking these medications    Details   oxyCODONE (ROXICODONE) 5 MG IR tablet Take 1-2 tablets (5-10 mg) by mouth every 4 hours as needed for moderate to severe pain, Disp-30 tablet, R-0, Local Print      acetaminophen (TYLENOL) 325 MG tablet Take 2 tablets (650 mg) by mouth every 4 hours as needed for mild pain, Disp-100 tablet, R-0, E-Prescribe      enoxaparin (LOVENOX) 40 MG/0.4ML injection Inject 0.4 mLs (40 mg) Subcutaneous every 24 hours, Disp-12 mL, R-0, E-Prescribe         CONTINUE these medications which have NOT CHANGED    Details   polyethylene glycol (MIRALAX) powder Please follow instructions on \"Getting Ready for Colonoscopy\" or Surgery packet, Disp-238 g, R-0, E-Prescribe      escitalopram (LEXAPRO) 20 MG tablet Take 20 mg by mouth every morning , Historical      fluticasone (FLOVENT HFA) 110 MCG/ACT inhaler Take 2 puffs by mouth 2 times daily as needed , Historical                     Medications Discontinued or Adjusted During This Hospitalization:   No change           Antibiotics Prescribed at Discharge:   None prescribed              Brief History of Illness:   46 year old male with a diagnosis of rectosigmoid cancer. He was found to have an equivocal node in the mesentery. After discussion at tumor board the decision was made to proceed with primary resection.            Hospital Course:   The patient underwent laparoscopic assisted low anterior resection with Dr. Rose on 4/12/2017. The procedure was uneventful. He was transferred to the general deng for recovery. On POD 1 he was advanced to clear liquids which he tolerated. He was ambulating, pain was controlled and he was voiding spontaneously after removal of the britton catheter on POD 3. He was " "advanced to a low fiber diet which he tolerated without issue. He was discharged home in improved condition.                Day of Discharge Physical Exam:   Blood pressure 139/89, pulse 78, temperature 97.5  F (36.4  C), temperature source Oral, resp. rate 18, height 5' 9\", weight 202 lb, SpO2 97 %.    Gen: AAOx3, NAD  Pulm: Non-labored breathing  Abd: Soft, appropriately tender, no guarding   Incision C/D/I with surgical glue in place  Ext:  Warm and well-perfused         Final Pathology Result:   Pending at time of discharge           Discharge Instructions and Follow-Up:   Discharge diet: Low residue   Discharge activity: No heavy lifting for 6 week(s)   Discharge follow-up: Follow up with Catherine Cunningham in 7 days  Follow up with Dr. Rose in 2-3  weeks   Tubes/Lines/Drains: None   Wound care: May get incision wet in shower but do not soak or scrub        Discharge Procedure Orders  Reason for your hospital stay   Order Comments: You were in the hospital to have surgery for rectal cancer.     Discharge Instructions   Order Comments: DIET  -Low Residue Diet for at least 4-6 weeks unless cleared by Colorectal surgery.  No raw vegetables, fruit skins, fibrous foods that require a lot of chewing, nuts, seeds, corn, popcorn.   -We recommend eating slowly, chewing thoroughly, eating small frequent meals throughout the day  -Stay well hydrated    ACTIVITY  -No lifting, pushing, pulling greater than 10 lbs and no strenuous exercise for 6 weeks   -No driving while on narcotic analgesics (i.e. Percocet, oxycodone, Vicodin)  -No driving until you are able to fully twist to both sides quickly and without any pain    WOUND CLINIC  -Inspect your wounds daily for signs of infection (increased redness, drainage, pain)  -Keep your wound clean and dry  -You may shower, but do not soak in tub or pool  -If you have staples, they can be removed in 7-10 days by your PCP or in Colorectal Clinic    NOTIFY  Please contact " Nancy Moss, RN or Sharee Lee, RN at [phone] for problems after discharge such as:  -Temperature > 101F, chills, rigors, dizziness  -Redness around or purulent drainage from wound  -Inability to tolerate diet, nausea or vomiting  -You stop passing gas (or your stoma stops functioning), develop significant bloating, abdominal pain  -Have blood in stools/vomit  -Have severe diarrhea/constipation  -Any other questions or concerns.  - At nights (after 4:30pm), on weekends, or if urgent, call [phone] and ask the  to speak with the on-call Colorectal Surgery resident or fellow    Medication Instructions  Some of your medications may have changed. Please take only prescribed and resumed medications     FOLLOW-UP  1.  You will need to follow-up with Dr. Rose in the Colon and Rectal Surgery clinic in 1-2 week(s).  Please contact our clinic scheduler Evelin Ludwig or Jen Washington (phone # [phone]) if you have not heard from our clinic in 3 business days afer discharge to schedule a follow-up appointment.    2.  Follow up with your primary care provider in 1-2 weeks after discharge from the hospital to review this hospitalization.       *For other appointments on [place] and/or [facility] (with [facility] or [facility] provider or service): Call [phone] if you have not heard regarding these appointments within 7 days of discharge.     Full Code              Home Health Care:   Not needed           Discharge Disposition:   Discharged to home      Condition at discharge: Godfrey Black MD  Colon And Rectal Surgery Fellow  [phone]    4/17/2017  12:52 PM        Pt was seen and discussed with Dr. Rose on 4/15/2017

## 2017-04-17 NOTE — TELEPHONE ENCOUNTER
RN to call for hospital follow up:    Reason for follow up: Meño Hanonnell appeared on our list for being seen in an Emergency Room or a recent Hospital discharge.    Admitting date: 4/12/17  Discharge date: 4/15/17  Location: [place]  Reason for visit:   Chief Complaint   Patient presents with     Hospital F/U     Rectal Cancer     Debra Wilkins RN, BSN

## 2017-04-19 LAB — COPATH REPORT: NORMAL

## 2017-04-21 ENCOUNTER — TELEPHONE (OUTPATIENT)
Dept: SURGERY | Facility: CLINIC | Age: 47
End: 2017-04-21

## 2017-04-24 ENCOUNTER — TELEPHONE (OUTPATIENT)
Dept: OTHER | Facility: CLINIC | Age: 47
End: 2017-04-24

## 2017-04-24 ENCOUNTER — OFFICE VISIT (OUTPATIENT)
Dept: FAMILY MEDICINE | Facility: CLINIC | Age: 47
End: 2017-04-24
Payer: COMMERCIAL

## 2017-04-24 VITALS
WEIGHT: 203 LBS | RESPIRATION RATE: 16 BRPM | BODY MASS INDEX: 29.98 KG/M2 | OXYGEN SATURATION: 97 % | TEMPERATURE: 96.7 F | HEART RATE: 80 BPM | DIASTOLIC BLOOD PRESSURE: 70 MMHG | SYSTOLIC BLOOD PRESSURE: 110 MMHG

## 2017-04-24 DIAGNOSIS — C20 ADENOCARCINOMA OF RECTUM (H): Primary | ICD-10-CM

## 2017-04-24 PROCEDURE — 99024 POSTOP FOLLOW-UP VISIT: CPT | Performed by: FAMILY MEDICINE

## 2017-04-24 NOTE — NURSING NOTE
"Chief Complaint   Patient presents with     Hospital F/U       Initial /70  Pulse 80  Temp 96.7  F (35.9  C) (Temporal)  Resp 16  Wt 203 lb (92.1 kg)  SpO2 97%  BMI 29.98 kg/m2 Estimated body mass index is 29.98 kg/(m^2) as calculated from the following:    Height as of 4/12/17: 5' 9\" (1.753 m).    Weight as of this encounter: 203 lb (92.1 kg).  Medication Reconciliation: complete    "

## 2017-04-24 NOTE — TELEPHONE ENCOUNTER
Called and reviewed path results.  Will arrange med onc f/u.  Pt clinically doing well and sched for f/u with me 5/2/17.

## 2017-04-24 NOTE — MR AVS SNAPSHOT
After Visit Summary   4/24/2017    Meño Omalley    MRN: [id number]           Patient Information     Date Of Birth          1970        Visit Information        Provider Department      4/24/2017 2:20 PM Delio Alcala MD [facility]        Today's Diagnoses     Adenocarcinoma of rectum (H)    -  1       Follow-ups after your visit        Your next 10 appointments already scheduled     May 01, 2017 12:30 PM CDT   (Arrive by 12:15 PM)   New Patient Visit with Jeanette Mcarthur MD   [facility] ([facility])    [address]  2nd Floor  [facility] [id number] [phone]            May 02, 2017 10:00 AM CDT   (Arrive by 9:45 AM)   Return Visit with Ignacio Rose MD   [facility] ([facility])    [address] [place] [address]  4th Floor  [facility] [id number] [phone]              Who to contact     If you have questions or need follow up information about today's clinic visit or your schedule please contact [facility] directly at [phone].  Normal or non-critical lab and imaging results will be communicated to you by [employer], letter or phone within 4 business days after the clinic has received the results. If you do not hear from us within 7 days, please contact the clinic through [employer] or phone. If you have a critical or abnormal lab result, we will notify you by phone as soon as possible.  Submit refill requests through [facility] or call your pharmacy and they will forward the refill request to us. Please allow 3 business days for your refill to be completed.          Additional Information About Your Visit        MyChart Information     [facility] gives you secure access to your electronic health record. If you see a primary care provider, you can also send messages to your care team and make appointments. If you have questions, please call your primary care  clinic.  If you do not have a primary care provider, please call [phone] and they will assist you.        Care EveryWhere ID     This is your Care EveryWhere ID. This could be used by other organizations to access your [facility] medical records  [id number]        Your Vitals Were     Pulse Temperature Respirations Pulse Oximetry BMI (Body Mass Index)       80 96.7  F (35.9  C) (Temporal) 16 97% 29.98 kg/m2        Blood Pressure from Last 3 Encounters:   04/24/17 110/70   04/15/17 139/89   04/10/17 135/85    Weight from Last 3 Encounters:   04/24/17 203 lb (92.1 kg)   04/14/17 202 lb (91.6 kg)   04/10/17 209 lb (94.8 kg)              Today, you had the following     No orders found for display       Primary Care Provider Office Phone # Fax #    Delio Alcala [phone]       [place] [id number] [place] [address]        Thank you!     Thank you for choosing [facility]  for your care. Our goal is always to provide you with excellent care. Hearing back from our patients is one way we can continue to improve our services. Please take a few minutes to complete the written survey that you may receive in the mail after your visit with us. Thank you!             Your Updated Medication List - Protect others around you: Learn how to safely use, store and throw away your medicines at www.disposemymeds.org.          This list is accurate as of: 4/24/17  3:08 PM.  Always use your most recent med list.                   Brand Name Dispense Instructions for use    acetaminophen 325 MG tablet    TYLENOL    100 tablet    Take 2 tablets (650 mg) by mouth every 4 hours as needed for mild pain       enoxaparin 40 MG/0.4ML injection    LOVENOX    12 mL    Inject 0.4 mLs (40 mg) Subcutaneous every 24 hours       escitalopram 20 MG tablet    LEXAPRO     Take 20 mg by mouth every morning       FLOVENT  MCG/ACT Inhaler   Generic drug:  fluticasone      Take 2  "puffs by mouth 2 times daily as needed       oxyCODONE 5 MG IR tablet    ROXICODONE    30 tablet    Take 1-2 tablets (5-10 mg) by mouth every 4 hours as needed for moderate to severe pain       polyethylene glycol powder    MIRALAX    238 g    Please follow instructions on \"Getting Ready for Colonoscopy\" or Surgery packet         "

## 2017-04-24 NOTE — PROGRESS NOTES
SUBJECTIVE:                                                    Meño Omalley is a 46 year old male who presents to clinic today for the following health issues:          Hospital Follow-up Visit:    Hospital/Nursing Home/IP Rehab Facility: [facility]  Date of Admission: 4/12/17  Date of Discharge: 4/15/17  Reason(s) for Admission: Rectal Cancer            Problems taking medications regularly:  None       Medication changes since discharge: Oxycodone, Tylenol, Lovenox       Problems adhering to non-medication therapy:  None    Summary of hospitalization:  [facility] discharge summary reviewed  Diagnostic Tests/Treatments reviewed.  Follow up needed: Oncology and colorectal surgery  Other Healthcare Providers Involved in Patient s Care:         Specialist appointment - May 1 and Surgical follow-up appointment - May 2  Update since discharge: worsened.     Post Discharge Medication Reconciliation: discharge medications reconciled, continue medications without change.  Plan of care communicated with patient     Coding guidelines for this visit:  Type of Medical   Decision Making Face-to-Face Visit       within 7 Days of discharge Face-to-Face Visit        within 14 days of discharge   Moderate Complexity [id number]   High Complexity [id number]                Problem list and histories reviewed & adjusted, as indicated.  Additional history: as documented        Reviewed and updated as needed this visit by clinical staff  Tobacco  Allergies  Meds  Soc Hx      Reviewed and updated as needed this visit by Provider        SUBJECTIVE:  Meño  is a 46 year old male who presents for:  Follow-up on his hospitalization after laparoscopic low anterior colon resection. This was for colon cancer. He was noted to have blood in his stool a little over a month ago. Went on to have colonoscopy with a tumor was noted any had a resection now. It sounds like there was some local invasion and he is  "being set up to see oncology. He is on very well after the surgery. He has good bowel movements. Minimal pain at this time. Is eating okay. No breathing difficulties. He has been on Lovenox and he was going to be on that for a month after the surgery. He has had no trouble with bleeding.    Past Medical History:   Diagnosis Date     Colon cancer (H)      Depressive disorder      Family history of malignant hyperthermia     UNCONFIRMED BUT FATHER HAD \"FEVER WITH ANESTHESIA\"     Nephrolithiasis     asymptomatic     Obese      Uncomplicated asthma     exercise induced     Past Surgical History:   Procedure Laterality Date     LAPAROSCOPIC COLECTOMY LOW ANTERIOR N/A 4/12/2017    Procedure: LAPAROSCOPIC COLECTOMY LOW ANTERIOR;  Surgeon: Ignacio Rose MD;  [facility]     NO HISTORY OF SURGERY       SIGMOIDOSCOPY FLEXIBLE N/A 4/12/2017    Procedure: SIGMOIDOSCOPY FLEXIBLE;  Surgeon: Ignacio Rose MD;  Location:  OR     Social History   Substance Use Topics     Smoking status: Never Smoker     Smokeless tobacco: Never Used     Alcohol use Yes      Comment: beer couple times per months     Current Outpatient Prescriptions   Medication Sig Dispense Refill     oxyCODONE (ROXICODONE) 5 MG IR tablet Take 1-2 tablets (5-10 mg) by mouth every 4 hours as needed for moderate to severe pain 30 tablet 0     acetaminophen (TYLENOL) 325 MG tablet Take 2 tablets (650 mg) by mouth every 4 hours as needed for mild pain 100 tablet 0     enoxaparin (LOVENOX) 40 MG/0.4ML injection Inject 0.4 mLs (40 mg) Subcutaneous every 24 hours 12 mL 0     polyethylene glycol (MIRALAX) powder Please follow instructions on \"Getting Ready for Colonoscopy\" or Surgery packet 238 g 0     escitalopram (LEXAPRO) 20 MG tablet Take 20 mg by mouth every morning        fluticasone (FLOVENT HFA) 110 MCG/ACT inhaler Take 2 puffs by mouth 2 times daily as needed          REVIEW OF SYSTEMS:   5 point ROS negative except as noted above in HPI, including Gen., " Resp, CV, GI &  system review.     OBJECTIVE:  Vitals: /70  Pulse 80  Temp 96.7  F (35.9  C) (Temporal)  Resp 16  Wt 203 lb (92.1 kg)  SpO2 97%  BMI 29.98 kg/m2  BMI= Body mass index is 29.98 kg/(m^2).  Pleasant gentleman in no distress. Good spirits. Mucous membranes moist. Lungs are clear. Heart regular rhythm no murmur. Abdomen soft bowel sounds present and well-healed surgical scars. No real tenderness. Skin clear. Extremities normal.    ASSESSMENT:  Adenocarcinoma of the rectum    PLAN:  We'll continue to monitor along with surgery and oncology. They're expecting some chemotherapy. We'll be happy to prescribe his pain medication if needed. But it doesn't seem like he needs anymore right now. To watch his Lovenox and report any bleeding problems.        Delio Alcala MD  [facility]

## 2017-05-01 ENCOUNTER — ONCOLOGY VISIT (OUTPATIENT)
Dept: ONCOLOGY | Facility: CLINIC | Age: 47
End: 2017-05-01
Payer: COMMERCIAL

## 2017-05-01 VITALS
RESPIRATION RATE: 18 BRPM | SYSTOLIC BLOOD PRESSURE: 129 MMHG | DIASTOLIC BLOOD PRESSURE: 87 MMHG | OXYGEN SATURATION: 96 % | TEMPERATURE: 97.5 F | HEART RATE: 70 BPM | BODY MASS INDEX: 29.92 KG/M2 | WEIGHT: 202.6 LBS

## 2017-05-01 DIAGNOSIS — C20 ADENOCARCINOMA OF RECTUM (H): Primary | ICD-10-CM

## 2017-05-01 PROCEDURE — 99212 OFFICE O/P EST SF 10 MIN: CPT | Mod: ZF

## 2017-05-01 PROCEDURE — 99204 OFFICE O/P NEW MOD 45 MIN: CPT | Performed by: INTERNAL MEDICINE

## 2017-05-01 ASSESSMENT — PAIN SCALES - GENERAL: PAINLEVEL: NO PAIN (0)

## 2017-05-01 NOTE — NURSING NOTE
"Oncology Rooming Note    May 1, 2017 12:41 PM   Meño Omalley is a 46 year old male who presents for: Oncology Clinic Visit    Initial Vitals: /87 (BP Location: Left arm, Patient Position: Chair, Cuff Size: Adult Large)  Pulse 70  Temp 97.5  F (36.4  C) (Oral)  Resp 18  Wt 91.9 kg (202 lb 9.6 oz)  SpO2 96%  BMI 29.92 kg/m2 Estimated body mass index is 29.92 kg/(m^2) as calculated from the following:    Height as of 4/12/17: 1.753 m (5' 9\").    Weight as of this encounter: 91.9 kg (202 lb 9.6 oz). Body surface area is 2.12 meters squared.  No Pain (0) Comment: Data Unavailable   No LMP for male patient.  Allergies reviewed: Yes  Medications reviewed: Yes    Medications: Medication refills not needed today.  Pharmacy name entered into EPIC:    [facility] [address] - [address]  [facility] [place], [place] - [address] [place]    Clinical concerns: Lyubov was NOT notified.    6 minutes for nursing intake (face to face time)     Antonette Banks MA                "

## 2017-05-01 NOTE — PROGRESS NOTES
Blood in stool for a few months , loose stool.     Father colon cancer - 60 , sister 55This is a new consultation for a recent diagnosis of rectosigmoid adenocarcinoma, status post resection under the care of Dr. Rose.      REFERRING PROVIDER:  Dr. Ignacio Rose.      HISTORY OF PRESENT ILLNESS:  Meño is a relatively healthy 46-year-old young male, with the past medical history remarkable for depression and uncomplicated asthma, who had initially presented with blood in his stool for a few months and a change in bowel habits with the stool being more lose, with a family history of father who developed colon cancer at age 60, and a sister who developed colon cancer at age 55.  He presented for colonoscopy with Dr. Delio Daniel on 03/24/2017.  Findings were several small polyps and a possible carcinoma of the rectosigmoid junction measured from 17-21 cm.  Biopsies obtained were remarkable for adenocarcinoma.  CT scan of the chest, abdomen and pelvis was done, which confirmed the presence of possible thickening in the rectosigmoid junction, but no evidence of metastases.  A 3D MRI was pursued on 03/31, and it showed circumferential wall thickening of the upper rectum/rectosigmoid with minimal extension into the mesorectal fat, consistent with rectal cancer, stage IIIB, N1.  His case was discussed in the Tumor Board, and the decision was made to forego chemoradiation, but to go straight to surgery, and he underwent a low anterior resection.  The pathology on the tumor was moderately differentiated adenocarcinoma invading the muscularis propria with a tumor size of 3.7 cm all negative  carcinoma margins.  Lymphovascular invasion was not identified.  Two non-renea tumor deposits were identified, and 6 lymph nodes were negative for metastatic carcinoma.  He was staged by pathological staging and pT2 N1c.  The mismatch repair testing was done on his original biopsy, and he had a normal pattern of mismatch repair  "protein expression, ruling out Rodriguez syndrome.      INTERIM HISTORY:  The patient states that he is recovering from surgery.  His bowel movements are starting to get more regular at this point, but is still not quite himself.  No fevers, chills, coughing or breathing troubles.  He denies any significant abdominal pain.  No nausea or vomiting.  He is sticking with the diet recommended by Colorectal Surgery, and is getting by for the most part.      PAST MEDICAL HISTORY:   Past Medical History:   Diagnosis Date     Colon cancer (H)      Depressive disorder      Family history of malignant hyperthermia     UNCONFIRMED BUT FATHER HAD \"FEVER WITH ANESTHESIA\"     Nephrolithiasis     asymptomatic     Obese      Uncomplicated asthma     exercise induced          PAST SURGICAL HISTORY:   Past Surgical History:   Procedure Laterality Date     LAPAROSCOPIC COLECTOMY LOW ANTERIOR N/A 4/12/2017    Procedure: LAPAROSCOPIC COLECTOMY LOW ANTERIOR;  Surgeon: Ignacio Rose MD;  [facility]     NO HISTORY OF SURGERY       SIGMOIDOSCOPY FLEXIBLE N/A 4/12/2017    Procedure: SIGMOIDOSCOPY FLEXIBLE;  Surgeon: Ignacio Rose MD;  [facility]          FAMILY HISTORY:     Family History   Problem Relation Age of Onset     Cancer - colorectal Father 62     C.A.D. Father 59     Hypertension Father      Neurologic Disorder Mother 70     ALS     Cancer - colorectal Sister 54          SOCIAL HISTORY:      Social History     Social History     Marital status:      Spouse name: N/A     Number of children: 2     Years of education: N/A     Occupational History     computer operations  [employer]     Social History Main Topics     Smoking status: Never Smoker     Smokeless tobacco: Never Used     Alcohol use Yes      Comment: beer couple times per months     Drug use: No     Sexual activity: Yes     Partners: Female     Other Topics Concern     Not on file     Social History Narrative    .  Lives with wife.  Computer " operations.     2 children - adopted    9 siblings 1 with colon cancer.     Father history of colon cancer.  Passed away age 89 after CABG    Mother -  ALS    No family history of bleeding, clotting disorders or complications with anesthesia.               ALLERGIES:     No Known Allergies       MEDICATIONS:      Current Outpatient Prescriptions   Medication Sig Dispense Refill     acetaminophen (TYLENOL) 325 MG tablet Take 2 tablets (650 mg) by mouth every 4 hours as needed for mild pain 100 tablet 0     enoxaparin (LOVENOX) 40 MG/0.4ML injection Inject 0.4 mLs (40 mg) Subcutaneous every 24 hours 12 mL 0     escitalopram (LEXAPRO) 20 MG tablet Take 20 mg by mouth every morning        fluticasone (FLOVENT HFA) 110 MCG/ACT inhaler Take 2 puffs by mouth 2 times daily as needed        oxyCODONE (ROXICODONE) 5 MG IR tablet Take 1-2 tablets (5-10 mg) by mouth every 4 hours as needed for moderate to severe pain 30 tablet 0       PHYSICAL EXAMINATION:   /87 (BP Location: Left arm, Patient Position: Chair, Cuff Size: Adult Large)  Pulse 70  Temp 97.5  F (36.4  C) (Oral)  Resp 18  Wt 91.9 kg (202 lb 9.6 oz)  SpO2 96%  BMI 29.92 kg/m2    GENERAL:  Alert and oriented x3, no apparent distress.   HEENT:  Moist mucous membranes.   CARDIOVASCULAR:  S1, S2.   RESPIRATORY:  Clear to auscultation.   ABDOMEN:  Nontender to palpation.  The scars from surgery are healing well.  Steri-Strips are still present and look like they are ready to fall in the next day or two.   EXTREMITIES:  Lower extremities with no pedal edema.   NEUROLOGIC:  Cranial nerves II-XII intact.  Moving all extremities appropriately.      LABORATORY DATA:  On his last lab check around the time of surgery, his creatinine was 0.80.  His hemoglobin was 13.4, white count of 7.4 and platelet count of 221,000.  His CEA has never been elevated, even prior to the diagnosis at less than 0.5.        PATHOLOGY REPORT: reviewed in EMR and discussed with the  patient            IMAGING: reviewed in EMR      ASSESSMENT AND PLAN:  Patient with stage III rectosigmoid adenocarcinoma, moderately differentiated, status post APR with good margins.  Here to discuss adjuvant treatment steps going further.  I had a detailed discussion with the patient regarding the pathology of his tumor, his stage, and the steps going forward.  We discussed that given stage III disease, he is a candidate for adjuvant chemotherapy.  We discussed the FOLFOX regimen, which is going to be every 14 days for 6 months.  We discussed the details of treatment including chemotherapy side effects which are inclusive but not limited to nausea/vomiting , hair loss, drop in blood counts causing anemia, infections , bleeding leading to hospitalization , fatigue, diarrhea, mouth sores, skin changes, constipation/diarrhea, muscle and bone pain , neuropathy, impact on the kidney and liver function , cystitis , risk for toxicity to heart and lung , infusion reactions,  decreased libido , teratogenicity in case of conception , permanent infertility and potential immunological side effects were explained to them . They asked several intelligent questions all of which were answered to their satisfaction and after processing  all the information they decided to proceed with the proposed treatment albiet they do understand that at any point in time they can decide to stop the current  treatment and consider other options .         We also discussed the alternative of doing Xeloda instead of 5-FU, and how I prefer to keep 5-FU over Xeloda, especially in younger patients.  They expressed understanding, and are agreeable to continuing with 5-FU.  At this point, I mentioned that I would want to get a baseline CT chest, abdomen and pelvis prior to initiating chemotherapy, but let him heal up.  In a few weeks from here, we will start him on FOLFOX.  The patient is agreeable to that.  He will get a port placed, and will see  either me or one of my APPs before initiating chemotherapy to consolidate the plan, and then for ongoing toxicity management.     I spent 55 minutes in the care of this patient >50% of which was spent in coordinating and counseling.    Jeanette Mcarthur   of Medicine   Hematology and medical Oncology   [facility]        Answers for HPI/ROS submitted by the patient on 4/27/2017   General Symptoms: No  Skin Symptoms: No  HENT Symptoms: No  EYE SYMPTOMS: No  HEART SYMPTOMS: No  LUNG SYMPTOMS: No  INTESTINAL SYMPTOMS: No  URINARY SYMPTOMS: No  REPRODUCTIVE SYMPTOMS: No  SKELETAL SYMPTOMS: No  BLOOD SYMPTOMS: No  NERVOUS SYSTEM SYMPTOMS: No  MENTAL HEALTH SYMPTOMS: No

## 2017-05-01 NOTE — NURSING NOTE
Presented the port book with visual of what a port looks like, provided descriptive explanation of placement, access, when to use and ongoing maintenance of port. Discussed risks of infection and blood clots. Provided [facility] Port information for patient to read at home.    Met with Patrick, to discuss chemotherapy and resources available at the [facility].  Provided patient with  My Cancer Guidebook , ACS Colorectal Cancer information, and [employer] printouts on FOLFOX.  Reviewed administration, side effects and ongoing symptom support management by APPs in clinic. Provided phone numbers for triage and after hours care. Highlighted steps to expect when getting chemotherapy (check in, labs, pre meds, infusion).  Discusses that chemo may be delayed by a day or two due to blood counts, infusion schedule or patient s need to modify.  Included a one page resource of symptoms and when to contact the Cancer Clinic with questions.

## 2017-05-01 NOTE — MR AVS SNAPSHOT
After Visit Summary   5/1/2017    Meño Omalley    MRN: [id number]           Patient Information     Date Of Birth          1970        Visit Information        Provider Department      5/1/2017 12:30 PM Jeanette Mcarthur MD [facility]        Today's Diagnoses     Adenocarcinoma of rectum (H)    -  1       Follow-ups after your visit        Your next 10 appointments already scheduled     May 11, 2017  8:30 AM CDT   CT CHEST ABDOMEN PELVIS W/O & W CONTRAST with PHCT1   [facility] CT Scan ([facility])    [address] [place] [address] [phone]           Please bring any scans or X-rays taken at other hospitals, if similar tests were done. Also bring a list of your medicines, including vitamins, minerals and over-the-counter drugs. It is safest to leave personal items at home.  Be sure to tell your doctor:   If you have any allergies.   If there s any chance you are pregnant.   If you are breastfeeding.   If you have any special needs.  You may have contrast for this exam. To prepare:   Do not eat or drink for 2 hours before your exam. If you need to take medicine, you may take it with small sips of water. (We may ask you to take liquid medicine as well.)   The day before your exam, drink extra fluids at least six 8-ounce glasses (unless your doctor tells you to restrict your fluids).  Patients over 70 or patients with diabetes or kidney problems:   If you haven t had a blood test (creatinine test) within the last 30 days, go to your clinic or Diagnostic Imaging Department for this test.  If you have diabetes:   If your kidney function is normal, continue taking your metformin (Avandamet, Glucophage, Glucovance, Metaglip) on the day of your exam.   If your kidney function is abnormal, wait 48 hours before restarting this medicine.  You will have oral contrast for this exam:   You will drink the contrast at home. Get this from your  clinic or Diagnostic Imaging Department. Please follow the directions given.  Please wear loose clothing, such as a sweat suit or jogging clothes. Avoid snaps, zippers and other metal. We may ask you to undress and put on a hospital gown.  If you have any questions, please call the Imaging Department where you will have your exam.            May 22, 2017   Procedure with Kristina Cooley PA-C   [facility] ([facility])    [address] [place] [address] [phone]           Located in the Clinics and Surgery Center at [address] [place] [address].   parking is very convenient and highly recommended.  is a $6 flat rate fee.  Both  and self parkers should enter the main arrival plaza from [facility]; parking attendants will direct you based on your parking preference.            May 22, 2017 11:00 AM CDT   (Arrive by 9:30 AM)   IR CHEST PORT PLACEMENT > 5 YRS OF AGE with [id number]   [facility] Imaging ([facility])    [address] [place] [address]              1. Your doctor will need to do a history and physical within 7 days before this procedure. 2. Your doctor will which medications should not be taken the morning of the exam. 3. Laboratory tests are to be obtained by your doctor prior to the exam (Basic Metabolic Panel, CBCP, PTT and INR) (No labs needed if you are having a tunneled catheter exchange or removal) 4. If you have allergies to x-ray contrast or iodine, contact your doctor or a Radiology nurse prior to the exam day for instructions. 5. Someone will need to drive you to and from the hospital. 6. If you are or may be pregnant, contact your doctor or a Radiology nurse prior to the day of the exam. 7. If you have diabetes, check with your doctor or a Radiology nurse to see if your insulin needs to be adjusted for the exam. 8. If you are taking  a medication called Glucophage or Glucovance; these medications need to be held the day of the exam and for approximately 48 hours following. A blood sample must be drawn so your creatinine level can be checked before resuming this medication. 9. If you are taking Coumadin (to thin you blood) please contact your doctor or a Radiology nurse at least 3 days before the exam for special instructions. 10. You should not have received contrast within 48 hours of this exam. 11. The day before your exam you may eat your regular diet and are encouraged to drink at least 2 quarts of clear liquids. Drink no alcoholic beverages for 24 hours prior to the exam. 12. If you have a colostomy you will need to irrigate it with tap water at 8PM the evening before and again at 6AM the morning of the exam. 13. Do not smoke for 24 hours prior to the procedure. 14. Birth to 4 years: - Breast feeding must be stopped 4 hours prior to exam - Solid food or formula must be stopped 6 hours prior to exam - Tube feedings must be stopped 6 hours prior to exam 15. 4-10 years old: - Nothing to eat or drink 6 hours prior to exam 16. 10+ years old: - Nothing to eat or drink 8 hours prior to exam 17. The morning of the exam you may brush your teeth and take medications as directed with a sip of water. 18. When discharged, you cannot drive until morning, and an adult must be with you until then. You should stay in the [facility] overnight. 19. Bring a list of all drugs you are taking; include supplements and over-the-counter medications. Wear comfortable clothes and leave your valuables at home.            May 24, 2017 10:45 AM T   [facility] Lab Draw with  [facility] DRAW   [facility] Draw ([facility])    [address]  2nd Floor  [facility] [address] [phone]            May 24, 2017 11:10 AM CDT   (Arrive by 10:55 AM)   Return Visit with ALFONSO Meyer [facility] ([facility])    [address]  2nd Floor  [facility] [address] [phone]            May 24, 2017  1:00 PM CDT   Infusion 240 with UC ONCOLOGY INFUSION, UC 10 ATC   [facility] ([facility])    [address] [place] [address]  2nd Floor  [facility] [address] [phone]            Sep 12, 2017 10:30 AM CDT   Flexible Sigmoidoscopy with Ignacio Rose MD   [facility]al [facility] ([facility])    [address] [place] [address]  4th Floor  [facility] [id number] [phone]              Who to contact     If you have questions or need follow up information about today's clinic visit or your schedule please contact [facility] directly at [phone].  Normal or non-critical lab and imaging results will be communicated to you by MyChart, letter or phone within 4 business days after the clinic has received the results. If you do not hear from us within 7 days, please contact the clinic through [employer] or phone. If you have a critical or abnormal lab result, we will notify you by phone as soon as possible.  Submit refill requests through [employer] or call your pharmacy and they will forward the refill request to us. Please allow 3 business days for your refill to be completed.          Additional Information About Your Visit        MyChart Information     [employer] gives you secure access to your electronic health record. If you see a primary care provider, you can also send messages to your care team and make appointments. If you have questions, please call your primary care clinic.  If you do not have a primary care provider, please call [phone] and they will assist you.        Care EveryWhere ID     This is your Care EveryWhere ID. This could be used by other organizations to access your [facility] medical records  [id number]        Your Vitals Were     Pulse Temperature  Respirations Pulse Oximetry BMI (Body Mass Index)       70 97.5  F (36.4  C) (Oral) 18 96% 29.92 kg/m2        Blood Pressure from Last 3 Encounters:   05/02/17 127/80   05/01/17 129/87   04/24/17 110/70    Weight from Last 3 Encounters:   05/02/17 91.4 kg (201 lb 8 oz)   05/01/17 91.9 kg (202 lb 9.6 oz)   04/24/17 92.1 kg (203 lb)                 Today's Medication Changes          These changes are accurate as of: 5/1/17 11:59 PM.  If you have any questions, ask your nurse or doctor.               Stop taking these medicines if you haven't already. Please contact your care team if you have questions.     polyethylene glycol powder   Commonly known as:  MIRALAX   Stopped by:  Jeanette Mcarthur MD                    Primary Care Provider Office Phone # Fax #    Delio Alcala [phone]       [facility] [address] [place] [address]        Thank you!     Thank you for choosing [facility]  for your care. Our goal is always to provide you with excellent care. Hearing back from our patients is one way we can continue to improve our services. Please take a few minutes to complete the written survey that you may receive in the mail after your visit with us. Thank you!             Your Updated Medication List - Protect others around you: Learn how to safely use, store and throw away your medicines at www.disposemymeds.org.          This list is accurate as of: 5/1/17 11:59 PM.  Always use your most recent med list.                   Brand Name Dispense Instructions for use    acetaminophen 325 MG tablet    TYLENOL    100 tablet    Take 2 tablets (650 mg) by mouth every 4 hours as needed for mild pain       enoxaparin 40 MG/0.4ML injection    LOVENOX    12 mL    Inject 0.4 mLs (40 mg) Subcutaneous every 24 hours       escitalopram 20 MG tablet    LEXAPRO     Take 20 mg by mouth every morning       FLOVENT  MCG/ACT Inhaler   Generic drug:  fluticasone       Take 2 puffs by mouth 2 times daily as needed       oxyCODONE 5 MG IR tablet    ROXICODONE    30 tablet    Take 1-2 tablets (5-10 mg) by mouth every 4 hours as needed for moderate to severe pain

## 2017-05-01 NOTE — LETTER
5/1/2017       RE: Meño Omalley  [address] [address]     Dear Colleague,    Thank you for referring your patient, Meño Omalley, to the [facility]. Please see a copy of my visit note below.    Blood in stool for a few months , loose stool.     Father colon cancer - 60 , sister 55  This is a new consultation for a recent diagnosis of rectosigmoid adenocarcinoma, status post resection under the care of Dr. Rose.      REFERRING PROVIDER:  Dr. Ignacio Rose.      HISTORY OF PRESENT ILLNESS:  Meño is a relatively healthy 46-year-old young male, with the past medical history remarkable for depression and uncomplicated asthma, who had initially presented with blood in his stool for a few months and a change in bowel habits with the stool being more lose, with a family history of father who developed colon cancer at age 60, and a sister who developed colon cancer at age 55.  He presented for colonoscopy with Dr. Delio Daniel on 03/24/2017.  Findings were several small polyps and a possible carcinoma of the rectosigmoid junction measured from 17-21 cm.  Biopsies obtained were remarkable for adenocarcinoma.  CT scan of the chest, abdomen and pelvis was done, which confirmed the presence of possible thickening in the rectosigmoid junction, but no evidence of metastases.  A 3D MRI was pursued on 03/31, and it showed circumferential wall thickening of the upper rectum/rectosigmoid with minimal extension into the mesorectal fat, consistent with rectal cancer, stage IIIB, N1.  His case was discussed in the Tumor Board, and the decision was made to forego chemoradiation, but to go straight to surgery, and he underwent a low anterior resection.  The pathology on the tumor was moderately differentiated adenocarcinoma invading the muscularis propria with a tumor size of 3.7 cm all negative  carcinoma margins.  Lymphovascular invasion was not identified.  Two non-renea tumor  "deposits were identified, and 6 lymph nodes were negative for metastatic carcinoma.  He was staged by pathological staging and pT2 N1c.  The mismatch repair testing was done on his original biopsy, and he had a normal pattern of mismatch repair protein expression, ruling out Rodriguez syndrome.      INTERIM HISTORY:  The patient states that he is recovering from surgery.  His bowel movements are starting to get more regular at this point, but is still not quite himself.  No fevers, chills, coughing or breathing troubles.  He denies any significant abdominal pain.  No nausea or vomiting.  He is sticking with the diet recommended by Colorectal Surgery, and is getting by for the most part.      PAST MEDICAL HISTORY:   Past Medical History:   Diagnosis Date     Colon cancer (H)      Depressive disorder      Family history of malignant hyperthermia     UNCONFIRMED BUT FATHER HAD \"FEVER WITH ANESTHESIA\"     Nephrolithiasis     asymptomatic     Obese      Uncomplicated asthma     exercise induced          PAST SURGICAL HISTORY:   Past Surgical History:   Procedure Laterality Date     LAPAROSCOPIC COLECTOMY LOW ANTERIOR N/A 4/12/2017    Procedure: LAPAROSCOPIC COLECTOMY LOW ANTERIOR;  Surgeon: Ignacio Rose MD;  Location: [facility] OR     NO HISTORY OF SURGERY       SIGMOIDOSCOPY FLEXIBLE N/A 4/12/2017    Procedure: SIGMOIDOSCOPY FLEXIBLE;  Surgeon: Ignacio Rose MD;  Location: [facility] OR          FAMILY HISTORY:     Family History   Problem Relation Age of Onset     Cancer - colorectal Father 62     C.A.D. Father 59     Hypertension Father      Neurologic Disorder Mother 70     ALS     Cancer - colorectal Sister 54          SOCIAL HISTORY:      Social History     Social History     Marital status:      Spouse name: N/A     Number of children: 2     Years of education: N/A     Occupational History     computer [employer]     Social History Main Topics     Smoking status: Never Smoker     Smokeless tobacco: " Never Used     Alcohol use Yes      Comment: beer couple times per months     Drug use: No     Sexual activity: Yes     Partners: Female     Other Topics Concern     Not on file     Social History Narrative    .  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     2 children - adopted    9 siblings 1 with colon cancer.     Father history of colon cancer.  Passed away age 89 after CABG    Mother -  ALS    No family history of bleeding, clotting disorders or complications with anesthesia.               ALLERGIES:     No Known Allergies       MEDICATIONS:      Current Outpatient Prescriptions   Medication Sig Dispense Refill     acetaminophen (TYLENOL) 325 MG tablet Take 2 tablets (650 mg) by mouth every 4 hours as needed for mild pain 100 tablet 0     enoxaparin (LOVENOX) 40 MG/0.4ML injection Inject 0.4 mLs (40 mg) Subcutaneous every 24 hours 12 mL 0     escitalopram (LEXAPRO) 20 MG tablet Take 20 mg by mouth every morning        fluticasone (FLOVENT HFA) 110 MCG/ACT inhaler Take 2 puffs by mouth 2 times daily as needed        oxyCODONE (ROXICODONE) 5 MG IR tablet Take 1-2 tablets (5-10 mg) by mouth every 4 hours as needed for moderate to severe pain 30 tablet 0       PHYSICAL EXAMINATION:   /87 (BP Location: Left arm, Patient Position: Chair, Cuff Size: Adult Large)  Pulse 70  Temp 97.5  F (36.4  C) (Oral)  Resp 18  Wt 91.9 kg (202 lb 9.6 oz)  SpO2 96%  BMI 29.92 kg/m2    GENERAL:  Alert and oriented x3, no apparent distress.   HEENT:  Moist mucous membranes.   CARDIOVASCULAR:  S1, S2.   RESPIRATORY:  Clear to auscultation.   ABDOMEN:  Nontender to palpation.  The scars from surgery are healing well.  Steri-Strips are still present and look like they are ready to fall in the next day or two.   EXTREMITIES:  Lower extremities with no pedal edema.   NEUROLOGIC:  Cranial nerves II-XII intact.  Moving all extremities appropriately.      LABORATORY DATA:  On his last lab check around the time of  surgery, his creatinine was 0.80.  His hemoglobin was 13.4, white count of 7.4 and platelet count of 221,000.  His CEA has never been elevated, even prior to the diagnosis at less than 0.5.        PATHOLOGY REPORT: reviewed in EMR and discussed with the patient            IMAGING: reviewed in EMR      ASSESSMENT AND PLAN:  Patient with stage III rectosigmoid adenocarcinoma, moderately differentiated, status post APR with good margins.  Here to discuss adjuvant treatment steps going further.  I had a detailed discussion with the patient regarding the pathology of his tumor, his stage, and the steps going forward.  We discussed that given stage III disease, he is a candidate for adjuvant chemotherapy.  We discussed the FOLFOX regimen, which is going to be every 14 days for 6 months.  We discussed the details of treatment including chemotherapy side effects which are inclusive but not limited to nausea/vomiting , hair loss, drop in blood counts causing anemia, infections , bleeding leading to hospitalization , fatigue, diarrhea, mouth sores, skin changes, constipation/diarrhea, muscle and bone pain , neuropathy, impact on the kidney and liver function , cystitis , risk for toxicity to heart and lung , infusion reactions,  decreased libido , teratogenicity in case of conception , permanent infertility and potential immunological side effects were explained to them . They asked several intelligent questions all of which were answered to their satisfaction and after processing  all the information they decided to proceed with the proposed treatment albiet they do understand that at any point in time they can decide to stop the current  treatment and consider other options .         We also discussed the alternative of doing Xeloda instead of 5-FU, and how I prefer to keep 5-FU over Xeloda, especially in younger patients.  They expressed understanding, and are agreeable to continuing with 5-FU.  At this point, I mentioned  that I would want to get a baseline CT chest, abdomen and pelvis prior to initiating chemotherapy, but let him heal up.  In a few weeks from here, we will start him on FOLFOX.  The patient is agreeable to that.  He will get a port placed, and will see either me or one of my APPs before initiating chemotherapy to consolidate the plan, and then for ongoing toxicity management.     I spent 55 minutes in the care of this patient >50% of which was spent in coordinating and counseling.    Jeanette Mcarthur   of Medicine   Hematology and medical Oncology   [facility]

## 2017-05-02 ENCOUNTER — OFFICE VISIT (OUTPATIENT)
Dept: SURGERY | Facility: CLINIC | Age: 47
End: 2017-05-02

## 2017-05-02 VITALS
HEART RATE: 82 BPM | HEIGHT: 69 IN | DIASTOLIC BLOOD PRESSURE: 80 MMHG | WEIGHT: 201.5 LBS | BODY MASS INDEX: 29.84 KG/M2 | TEMPERATURE: 98.1 F | OXYGEN SATURATION: 96 % | SYSTOLIC BLOOD PRESSURE: 127 MMHG

## 2017-05-02 DIAGNOSIS — C20 RECTAL CANCER (H): Primary | ICD-10-CM

## 2017-05-02 RX ORDER — EPINEPHRINE 0.3 MG/.3ML
0.3 INJECTION SUBCUTANEOUS EVERY 5 MIN PRN
Status: CANCELLED | OUTPATIENT
Start: 2017-05-24

## 2017-05-02 RX ORDER — LORAZEPAM 2 MG/ML
0.5 INJECTION INTRAMUSCULAR EVERY 4 HOURS PRN
Status: CANCELLED
Start: 2017-05-24

## 2017-05-02 RX ORDER — DIPHENHYDRAMINE HYDROCHLORIDE 50 MG/ML
50 INJECTION INTRAMUSCULAR; INTRAVENOUS
Status: CANCELLED
Start: 2017-05-24

## 2017-05-02 RX ORDER — FLUOROURACIL 50 MG/ML
400 INJECTION, SOLUTION INTRAVENOUS ONCE
Status: CANCELLED | OUTPATIENT
Start: 2017-05-24

## 2017-05-02 RX ORDER — ALBUTEROL SULFATE 90 UG/1
1-2 AEROSOL, METERED RESPIRATORY (INHALATION)
Status: CANCELLED
Start: 2017-05-24

## 2017-05-02 RX ORDER — METHYLPREDNISOLONE SODIUM SUCCINATE 125 MG/2ML
125 INJECTION, POWDER, LYOPHILIZED, FOR SOLUTION INTRAMUSCULAR; INTRAVENOUS
Status: CANCELLED
Start: 2017-05-24

## 2017-05-02 RX ORDER — MEPERIDINE HYDROCHLORIDE 25 MG/ML
25 INJECTION INTRAMUSCULAR; INTRAVENOUS; SUBCUTANEOUS EVERY 30 MIN PRN
Status: CANCELLED | OUTPATIENT
Start: 2017-05-24

## 2017-05-02 RX ORDER — ALBUTEROL SULFATE 0.83 MG/ML
2.5 SOLUTION RESPIRATORY (INHALATION)
Status: CANCELLED | OUTPATIENT
Start: 2017-05-24

## 2017-05-02 RX ORDER — SODIUM CHLORIDE 9 MG/ML
1000 INJECTION, SOLUTION INTRAVENOUS CONTINUOUS PRN
Status: CANCELLED
Start: 2017-05-24

## 2017-05-02 ASSESSMENT — PAIN SCALES - GENERAL: PAINLEVEL: NO PAIN (0)

## 2017-05-02 NOTE — PROGRESS NOTES
Colon and Rectal Surgery Clinic Note      RE: Meño Omalley  : 1970  JENNY: 2017      Meño returns for a postoperative visit, now roughly 3 weeks since his laparoscopic assisted low anterior resection for what proved to be a T2 N1C upper rectal cancer.  There were 2 extra rectal lymph node deposits present.  All margins were negative.  The patient was seen in consultation by Jeanette Mcarthur yesterday, and she advised adjuvant chemotherapy.       Meño is doing reasonably well at home.  He reports that his appetite has only been fair, but he feels that he is getting adequate nutritional input.  His bowels are somewhat different from preoperatively in that he has to push to defecate.  This is not a major problem.  He continues to have bowel movements daily.  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      PHYSICAL EXAMINATION:  Meño looks well.  His incisions are all clean.      I had a quite detailed discussion with Meño regarding plans moving forward.  I strongly advocated adjuvant chemotherapy as recommended by Dr. Mcarthur.  We went through the rationale for this, as well as the associated risks and potential benefits.  Meño indicated that he will plan to proceed with chemotherapy, though somewhat reluctantly, as he is concerned about neuropathy side effects.  I asked him to follow up with me with for flex sig in approximately 4 months.      I answered all of Meño's questions to his stated satisfaction.  He expressed his understanding and agreement with the plan.      Total time 25 minutes.  Greater than half the time spent in counseling.      cc:     Jeanette Mcarthur MD    [facility]   [address]   [place] [address]      SARWAT Alvarez, CNP   [facility]   [address]   [place] [address]      Delio Daniel MD   [facility]   [address]  "  [place] [address]      Delio Alcala MD           For details of past medical history, surgical history, family history, medications, allergies, and review of systems, please see details below.    Medical history:  Past Medical History:   Diagnosis Date     Colon cancer (H)      Depressive disorder      Family history of malignant hyperthermia     UNCONFIRMED BUT FATHER HAD \"FEVER WITH ANESTHESIA\"     Nephrolithiasis     asymptomatic     Obese      Uncomplicated asthma     exercise induced       Surgical history:  Past Surgical History:   Procedure Laterality Date     LAPAROSCOPIC COLECTOMY LOW ANTERIOR N/A 4/12/2017    Procedure: LAPAROSCOPIC COLECTOMY LOW ANTERIOR;  Surgeon: Ignacio Rose MD;  Location:  OR     NO HISTORY OF SURGERY       SIGMOIDOSCOPY FLEXIBLE N/A 4/12/2017    Procedure: SIGMOIDOSCOPY FLEXIBLE;  Surgeon: Ignacio Rose MD;  Location:  OR       Family history:  Family History   Problem Relation Age of Onset     Cancer - colorectal Father 62     C.A.D. Father 59     Hypertension Father      Neurologic Disorder Mother 70     ALS     Cancer - colorectal Sister 54       Medications:  Current Outpatient Prescriptions   Medication Sig Dispense Refill     oxyCODONE (ROXICODONE) 5 MG IR tablet Take 1-2 tablets (5-10 mg) by mouth every 4 hours as needed for moderate to severe pain 30 tablet 0     acetaminophen (TYLENOL) 325 MG tablet Take 2 tablets (650 mg) by mouth every 4 hours as needed for mild pain 100 tablet 0     enoxaparin (LOVENOX) 40 MG/0.4ML injection Inject 0.4 mLs (40 mg) Subcutaneous every 24 hours 12 mL 0     escitalopram (LEXAPRO) 20 MG tablet Take 20 mg by mouth every morning        fluticasone (FLOVENT HFA) 110 MCG/ACT inhaler Take 2 puffs by mouth 2 times daily as needed        Allergies:  The patienthas No Known Allergies.    Social history:  Social History   Substance Use Topics     Smoking status: Never Smoker     Smokeless tobacco: Never Used     Alcohol use Yes      " "Comment: beer couple times per months     Marital status: .    Review of Systems:  Nursing Notes:   Heron Argueta LPN  5/2/2017 10:08 AM  Signed  No chief complaint on file.      Vitals:    05/02/17 1006   BP: 127/80   Pulse: 82   Temp: 98.1  F (36.7  C)   TempSrc: Oral   SpO2: 96%   Weight: 91.4 kg (201 lb 8 oz)   Height: 1.753 m (5' 9\")       Body mass index is 29.76 kg/(m^2).    JONI Peoples MD   Professor and Chief  Division of Colon and Rectal Surgery  [facility]      Referring Provider:  Ignacio Rose MD   [facility]  [address]  [place] [address]     Primary Care Provider:  Delio Alcala  "

## 2017-05-02 NOTE — MR AVS SNAPSHOT
After Visit Summary   5/2/2017    Meño Omalley    MRN: [id number]           Patient Information     Date Of Birth          1970        Visit Information        Provider Department      5/2/2017 10:00 AM Ignacio Rose MD [facility] Colon and Rectal Surgery        Today's Diagnoses     Rectal cancer (H)    -  1       Follow-ups after your visit        Your next 10 appointments already scheduled     May 11, 2017  8:30 AM CDT   CT CHEST ABDOMEN PELVIS W/O & W CONTRAST with PHCT1   [facility] CT Scan ([facility])    [address] [place] [address] [phone]           Please bring any scans or X-rays taken at other hospitals, if similar tests were done. Also bring a list of your medicines, including vitamins, minerals and over-the-counter drugs. It is safest to leave personal items at home.  Be sure to tell your doctor:   If you have any allergies.   If there s any chance you are pregnant.   If you are breastfeeding.   If you have any special needs.  You may have contrast for this exam. To prepare:   Do not eat or drink for 2 hours before your exam. If you need to take medicine, you may take it with small sips of water. (We may ask you to take liquid medicine as well.)   The day before your exam, drink extra fluids at least six 8-ounce glasses (unless your doctor tells you to restrict your fluids).  Patients over 70 or patients with diabetes or kidney problems:   If you haven t had a blood test (creatinine test) within the last 30 days, go to your clinic or Diagnostic Imaging Department for this test.  If you have diabetes:   If your kidney function is normal, continue taking your metformin (Avandamet, Glucophage, Glucovance, Metaglip) on the day of your exam.   If your kidney function is abnormal, wait 48 hours before restarting this medicine.  You will have oral contrast for this exam:   You will drink the contrast at home. Get this from your clinic or  Diagnostic Imaging Department. Please follow the directions given.  Please wear loose clothing, such as a sweat suit or jogging clothes. Avoid snaps, zippers and other metal. We may ask you to undress and put on a hospital gown.  If you have any questions, please call the Imaging Department where you will have your exam.            May 22, 2017   Procedure with Kristina Cooley PA-C   [facility] ([facility])    [address] [place], [place] [id number] [phone]           Located in the Clinics and Surgery Center at [address] [place] [address].   parking is very convenient and highly recommended.  is a $6 flat rate fee.  Both  and self parkers should enter the main arrival plaza from [facility]; parking attendants will direct you based on your parking preference.            May 22, 2017 11:00 AM CDT   (Arrive by 9:30 AM)   IR CHEST PORT PLACEMENT > 5 YRS OF AGE with [id number]   [facility] ASC Imaging ([facility])    [address] [place] [id number]              1. Your doctor will need to do a history and physical within 7 days before this procedure. 2. Your doctor will which medications should not be taken the morning of the exam. 3. Laboratory tests are to be obtained by your doctor prior to the exam (Basic Metabolic Panel, CBCP, PTT and INR) (No labs needed if you are having a tunneled catheter exchange or removal) 4. If you have allergies to x-ray contrast or iodine, contact your doctor or a Radiology nurse prior to the exam day for instructions. 5. Someone will need to drive you to and from the hospital. 6. If you are or may be pregnant, contact your doctor or a Radiology nurse prior to the day of the exam. 7. If you have diabetes, check with your doctor or a Radiology nurse to see if your insulin needs to be adjusted for the exam. 8. If you are taking a  medication called Glucophage or Glucovance; these medications need to be held the day of the exam and for approximately 48 hours following. A blood sample must be drawn so your creatinine level can be checked before resuming this medication. 9. If you are taking Coumadin (to thin you blood) please contact your doctor or a Radiology nurse at least 3 days before the exam for special instructions. 10. You should not have received contrast within 48 hours of this exam. 11. The day before your exam you may eat your regular diet and are encouraged to drink at least 2 quarts of clear liquids. Drink no alcoholic beverages for 24 hours prior to the exam. 12. If you have a colostomy you will need to irrigate it with tap water at 8PM the evening before and again at 6AM the morning of the exam. 13. Do not smoke for 24 hours prior to the procedure. 14. Birth to 4 years: - Breast feeding must be stopped 4 hours prior to exam - Solid food or formula must be stopped 6 hours prior to exam - Tube feedings must be stopped 6 hours prior to exam 15. 4-10 years old: - Nothing to eat or drink 6 hours prior to exam 16. 10+ years old: - Nothing to eat or drink 8 hours prior to exam 17. The morning of the exam you may brush your teeth and take medications as directed with a sip of water. 18. When discharged, you cannot drive until morning, and an adult must be with you until then. You should stay in the [facility] overnight. 19. Bring a list of all drugs you are taking; include supplements and over-the-counter medications. Wear comfortable clothes and leave your valuables at home.            May 24, 2017 10:45 AM T   [facility] Lab Draw with  [facility] LAB DRAW   [facility] Lab Draw ([facility])    [address]  2nd Floor  [facility] [address] [phone]            May 24, 2017 11:10 AM CDT   (Arrive by 10:55 AM)   Return Visit with Zahira Christensen PA-C   [facility]  ([facility])    [address]  2nd Floor  [facility] [place] [facility] [address]   [phone]            May 24, 2017  1:00 PM CDT   Infusion 240 with UC ONCOLOGY INFUSION, UC 10 ATC   [facility] ([facility])    [address]  2nd Floor  [place] [address]   [phone]            Sep 12, 2017 10:30 AM CDT   Flexible Sigmoidoscopy with Ignacio Rose MD   [facility] Colon and Rectal Surgery ([facility])    [address]  4th Floor  [place] [address] [phone]              Who to contact     Please call your clinic at [phone] to:    Ask questions about your health    Make or cancel appointments    Discuss your medicines    Learn about your test results    Speak to your doctor   If you have compliments or concerns about an experience at your clinic, or if you wish to file a complaint, please contact [facility] Patient Relations at [phone] or email us at [email or web address]         Additional Information About Your Visit        [employer] Information     [employer] gives you secure access to your electronic health record. If you see a primary care provider, you can also send messages to your care team and make appointments. If you have questions, please call your primary care clinic.  If you do not have a primary care provider, please call [phone] and they will assist you.      [employer] is an electronic gateway that provides easy, online access to your medical records. With [employer], you can request a clinic appointment, read your test results, renew a prescription or communicate with your care team.     To access your existing account, please contact your [facility] or call [phone] for assistance.        Care EveryWhere ID     This is your Care EveryWhere ID. This could be used by other organizations to access your  "[place] medical records  [id number]        Your Vitals Were     Pulse Temperature Height Pulse Oximetry BMI (Body Mass Index)       82 98.1  F (36.7  C) (Oral) 1.753 m (5' 9\") 96% 29.76 kg/m2        Blood Pressure from Last 3 Encounters:   05/02/17 127/80   05/01/17 129/87   04/24/17 110/70    Weight from Last 3 Encounters:   05/02/17 91.4 kg (201 lb 8 oz)   05/01/17 91.9 kg (202 lb 9.6 oz)   04/24/17 92.1 kg (203 lb)              Today, you had the following     No orders found for display       Primary Care Provider Office Phone # Fax #    Delio Alcala [phone]       [facility] [address] [place] [address]        Thank you!     Thank you for choosing [facility] COLON AND RECTAL SURGERY  for your care. Our goal is always to provide you with excellent care. Hearing back from our patients is one way we can continue to improve our services. Please take a few minutes to complete the written survey that you may receive in the mail after your visit with us. Thank you!             Your Updated Medication List - Protect others around you: Learn how to safely use, store and throw away your medicines at www.disposemymeds.org.          This list is accurate as of: 5/2/17 11:59 PM.  Always use your most recent med list.                   Brand Name Dispense Instructions for use    acetaminophen 325 MG tablet    TYLENOL    100 tablet    Take 2 tablets (650 mg) by mouth every 4 hours as needed for mild pain       enoxaparin 40 MG/0.4ML injection    LOVENOX    12 mL    Inject 0.4 mLs (40 mg) Subcutaneous every 24 hours       escitalopram 20 MG tablet    LEXAPRO     Take 20 mg by mouth every morning       FLOVENT  MCG/ACT Inhaler   Generic drug:  fluticasone      Take 2 puffs by mouth 2 times daily as needed       oxyCODONE 5 MG IR tablet    ROXICODONE    30 tablet    Take 1-2 tablets (5-10 mg) by mouth every 4 hours as needed for moderate to severe pain         "

## 2017-05-02 NOTE — LETTER
2017       RE: Meño Omalley  [address] [facility] [address]     Dear Colleague,    Thank you for referring your patient, Meño Omalley, to the [facility] COLON AND RECTAL SURGERY at [facility]. Please see a copy of my visit note below.    Colon and Rectal Surgery Clinic Note      RE: Meño Omalley  : 1970  JENNY: 2017      Meño returns for a postoperative visit, now roughly 3 weeks since his laparoscopic assisted low anterior resection for what proved to be a T2 N1C upper rectal cancer.  There were 2 extra rectal lymph node deposits present.  All margins were negative.  The patient was seen in consultation by Jeanette Mcarthur yesterday, and she advised adjuvant chemotherapy.       Meño is doing reasonably well at home.  He reports that his appetite has only been fair, but he feels that he is getting adequate nutritional input.  His bowels are somewhat different from preoperatively in that he has to push to defecate.  This is not a major problem.  He continues to have bowel movements daily.  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      PHYSICAL EXAMINATION:  Meño looks well.  His incisions are all clean.      I had a quite detailed discussion with Meño regarding plans moving forward.  I strongly advocated adjuvant chemotherapy as recommended by Dr. Mcarthur.  We went through the rationale for this, as well as the associated risks and potential benefits.  Meño indicated that he will plan to proceed with chemotherapy, though somewhat reluctantly, as he is concerned about neuropathy side effects.  I asked him to follow up with me with for flex sig in approximately 4 months.      I answered all of Meño's questions to his stated satisfaction.  He expressed his understanding and agreement with the plan.      Total time 25 minutes.  Greater than half the time spent in  "counseling.      cc:     Jeanette Mcarthur MD    [facility]   [address]   [place] [address]      Alejandrina Robertson, APRN, CNP   [facility]   [address]   [place] [address]      Delio Daniel MD   [facility]   [address] [place] [address]      Delio Alcala MD           For details of past medical history, surgical history, family history, medications, allergies, and review of systems, please see details below.    Medical history:  Past Medical History:   Diagnosis Date     Colon cancer (H)      Depressive disorder      Family history of malignant hyperthermia     UNCONFIRMED BUT FATHER HAD \"FEVER WITH ANESTHESIA\"     Nephrolithiasis     asymptomatic     Obese      Uncomplicated asthma     exercise induced       Surgical history:  Past Surgical History:   Procedure Laterality Date     LAPAROSCOPIC COLECTOMY LOW ANTERIOR N/A 4/12/2017    Procedure: LAPAROSCOPIC COLECTOMY LOW ANTERIOR;  Surgeon: Ignacio Rose MD;  Location:  OR     NO HISTORY OF SURGERY       SIGMOIDOSCOPY FLEXIBLE N/A 4/12/2017    Procedure: SIGMOIDOSCOPY FLEXIBLE;  Surgeon: Ignacio Rose MD;  Location:  OR       Family history:  Family History   Problem Relation Age of Onset     Cancer - colorectal Father 62     C.A.D. Father 59     Hypertension Father      Neurologic Disorder Mother 70     ALS     Cancer - colorectal Sister 54       Medications:  Current Outpatient Prescriptions   Medication Sig Dispense Refill     oxyCODONE (ROXICODONE) 5 MG IR tablet Take 1-2 tablets (5-10 mg) by mouth every 4 hours as needed for moderate to severe pain 30 tablet 0     acetaminophen (TYLENOL) 325 MG tablet Take 2 tablets (650 mg) by mouth every 4 hours as needed for mild pain 100 tablet 0     enoxaparin (LOVENOX) 40 MG/0.4ML injection Inject 0.4 mLs (40 mg) Subcutaneous every 24 hours 12 mL 0     escitalopram (LEXAPRO) 20 MG tablet Take 20 mg by mouth every morning    " "    fluticasone (FLOVENT HFA) 110 MCG/ACT inhaler Take 2 puffs by mouth 2 times daily as needed        Allergies:  The patienthas No Known Allergies.    Social history:  Social History   Substance Use Topics     Smoking status: Never Smoker     Smokeless tobacco: Never Used     Alcohol use Yes      Comment: beer couple times per months     Marital status: .    Review of Systems:  Nursing Notes:   Heron Argueta LPN  5/2/2017 10:08 AM  Signed  No chief complaint on file.      Vitals:    05/02/17 1006   BP: 127/80   Pulse: 82   Temp: 98.1  F (36.7  C)   TempSrc: Oral   SpO2: 96%   Weight: 91.4 kg (201 lb 8 oz)   Height: 1.753 m (5' 9\")       Body mass index is 29.76 kg/(m^2).    JONI Peoples MD   Professor and Chief  Division of Colon and Rectal Surgery  [facility]      Referring Provider:  Ignacio Rose MD   [facility]  [address]  [place] [address]     Primary Care Provider:  Delio Alcala    Again, thank you for allowing me to participate in the care of your patient.      Sincerely,    Ignacio Rose MD      "

## 2017-05-02 NOTE — NURSING NOTE
"No chief complaint on file.      Vitals:    05/02/17 1006   BP: 127/80   Pulse: 82   Temp: 98.1  F (36.7  C)   TempSrc: Oral   SpO2: 96%   Weight: 91.4 kg (201 lb 8 oz)   Height: 1.753 m (5' 9\")       Body mass index is 29.76 kg/(m^2).    Jair LYONS LPN                       "

## 2017-05-03 ENCOUNTER — TELEPHONE (OUTPATIENT)
Dept: SURGERY | Facility: CLINIC | Age: 47
End: 2017-05-03

## 2017-05-03 NOTE — TELEPHONE ENCOUNTER
Called patient to schedule 4 month follow up/flexible sigmoidoscopy with Dr. Rose.  Patient is scheduled for 09/12/17 at 10:30 am.  Patient knows to check in at clinic 4K. I told patient to call me if he needs to reschedule or has any additional questions or concerns.  Patient has my direct number.

## 2017-05-09 ENCOUNTER — TELEPHONE (OUTPATIENT)
Dept: ONCOLOGY | Facility: CLINIC | Age: 47
End: 2017-05-09

## 2017-05-09 NOTE — TELEPHONE ENCOUNTER
5/9/2017    I called Meño today in order to discuss the ColoNext gene panel that was ordered through [employer] on 4/10/2017. Per [employer], Meño chose to cancel this testing due to a high OOP cost (~$1,000). Meño was reportedly offered information and the application for the [employer] financial assistance program, E.P.I.C., but declined to fill out the necessary forms.    Given this information, I called Meño to discuss his genetic testing options going forward. As he was not available, I left a voicemail message with my contact information and encouraged him to return my call.    Faby Santos MS, [facility]  Certified Genetic Counselor  Office: [phone]  Pager: [phone]

## 2017-05-11 ENCOUNTER — HOSPITAL ENCOUNTER (OUTPATIENT)
Dept: CT IMAGING | Facility: CLINIC | Age: 47
Discharge: HOME | End: 2017-05-11
Attending: INTERNAL MEDICINE | Admitting: INTERNAL MEDICINE
Payer: COMMERCIAL

## 2017-05-11 DIAGNOSIS — C20 ADENOCARCINOMA OF RECTUM (H): ICD-10-CM

## 2017-05-11 DIAGNOSIS — C20 ADENOCARCINOMA OF RECTUM (H): Primary | ICD-10-CM

## 2017-05-11 PROCEDURE — 71260 CT THORAX DX C+: CPT

## 2017-05-11 PROCEDURE — 74177 CT ABD & PELVIS W/CONTRAST: CPT

## 2017-05-11 PROCEDURE — 25500064 ZZH RX 255 OP 636: Performed by: RADIOLOGY

## 2017-05-11 PROCEDURE — 25000125 ZZHC RX 250: Performed by: RADIOLOGY

## 2017-05-11 RX ORDER — IOPAMIDOL 755 MG/ML
500 INJECTION, SOLUTION INTRAVASCULAR ONCE
Status: COMPLETED | OUTPATIENT
Start: 2017-05-11 | End: 2017-05-11

## 2017-05-11 RX ORDER — IOPAMIDOL 755 MG/ML
100 INJECTION, SOLUTION INTRAVASCULAR ONCE
Status: DISCONTINUED | OUTPATIENT
Start: 2017-05-11 | End: 2017-05-11

## 2017-05-11 RX ADMIN — IOPAMIDOL 98 ML: 755 INJECTION, SOLUTION INTRAVENOUS at 08:52

## 2017-05-11 RX ADMIN — SODIUM CHLORIDE 60 ML: 9 INJECTION, SOLUTION INTRAVENOUS at 08:52

## 2017-05-12 ENCOUNTER — CARE COORDINATION (OUTPATIENT)
Dept: ONCOLOGY | Facility: CLINIC | Age: 47
End: 2017-05-12

## 2017-05-12 ENCOUNTER — RADIANT APPOINTMENT (OUTPATIENT)
Dept: MRI IMAGING | Facility: CLINIC | Age: 47
End: 2017-05-12
Attending: INTERNAL MEDICINE
Payer: COMMERCIAL

## 2017-05-12 DIAGNOSIS — C20 ADENOCARCINOMA OF RECTUM (H): ICD-10-CM

## 2017-05-12 LAB
ALBUMIN SERPL-MCNC: 3.8 G/DL (ref 3.4–5)
ALP SERPL-CCNC: 58 U/L (ref 40–150)
ALT SERPL W P-5'-P-CCNC: 34 U/L (ref 0–70)
ANION GAP SERPL CALCULATED.3IONS-SCNC: 4 MMOL/L (ref 3–14)
AST SERPL W P-5'-P-CCNC: 16 U/L (ref 0–45)
BILIRUB SERPL-MCNC: 0.7 MG/DL (ref 0.2–1.3)
BUN SERPL-MCNC: 13 MG/DL (ref 7–30)
CALCIUM SERPL-MCNC: 9.3 MG/DL (ref 8.5–10.1)
CHLORIDE SERPL-SCNC: 104 MMOL/L (ref 94–109)
CO2 SERPL-SCNC: 32 MMOL/L (ref 20–32)
CREAT SERPL-MCNC: 0.89 MG/DL (ref 0.66–1.25)
GFR SERPL CREATININE-BSD FRML MDRD: NORMAL ML/MIN/1.7M2
GLUCOSE SERPL-MCNC: 85 MG/DL (ref 70–99)
POTASSIUM SERPL-SCNC: 4.4 MMOL/L (ref 3.4–5.3)
PROT SERPL-MCNC: 7.7 G/DL (ref 6.8–8.8)
SODIUM SERPL-SCNC: 140 MMOL/L (ref 133–144)

## 2017-05-12 PROCEDURE — 80053 COMPREHEN METABOLIC PANEL: CPT | Performed by: INTERNAL MEDICINE

## 2017-05-12 PROCEDURE — 74183 MRI ABD W/O CNTR FLWD CNTR: CPT | Performed by: RADIOLOGY

## 2017-05-12 PROCEDURE — 36415 COLL VENOUS BLD VENIPUNCTURE: CPT | Performed by: INTERNAL MEDICINE

## 2017-05-12 PROCEDURE — A9581 GADOXETATE DISODIUM INJ: HCPCS | Performed by: RADIOLOGY

## 2017-05-12 NOTE — PROGRESS NOTES
At Dr. Mcarthur's request called Meño to review that his MRI today is good.  There is not evidence of cancer in his liver.  We will proceed with appts as scheduled, port placement and RTC with labs, Zahira Christensen PA-C and chemotherapy.    Meño denied any questions at this time.

## 2017-05-19 ENCOUNTER — TELEPHONE (OUTPATIENT)
Dept: INTERVENTIONAL RADIOLOGY/VASCULAR | Facility: CLINIC | Age: 47
End: 2017-05-19

## 2017-05-22 ENCOUNTER — APPOINTMENT (OUTPATIENT)
Dept: MEDSURG UNIT | Facility: CLINIC | Age: 47
End: 2017-05-22
Attending: INTERNAL MEDICINE
Payer: COMMERCIAL

## 2017-05-22 ENCOUNTER — HOSPITAL ENCOUNTER (OUTPATIENT)
Facility: CLINIC | Age: 47
Discharge: HOME | End: 2017-05-22
Attending: INTERNAL MEDICINE | Admitting: INTERNAL MEDICINE
Payer: COMMERCIAL

## 2017-05-22 ENCOUNTER — APPOINTMENT (OUTPATIENT)
Dept: INTERVENTIONAL RADIOLOGY/VASCULAR | Facility: CLINIC | Age: 47
End: 2017-05-22
Attending: INTERNAL MEDICINE
Payer: COMMERCIAL

## 2017-05-22 VITALS
TEMPERATURE: 97.8 F | BODY MASS INDEX: 29.62 KG/M2 | DIASTOLIC BLOOD PRESSURE: 73 MMHG | HEIGHT: 69 IN | WEIGHT: 200 LBS | OXYGEN SATURATION: 97 % | HEART RATE: 63 BPM | RESPIRATION RATE: 16 BRPM | SYSTOLIC BLOOD PRESSURE: 155 MMHG

## 2017-05-22 DIAGNOSIS — C20 ADENOCARCINOMA OF RECTUM (H): ICD-10-CM

## 2017-05-22 LAB
ERYTHROCYTE [DISTWIDTH] IN BLOOD BY AUTOMATED COUNT: 13 % (ref 10–15)
HCT VFR BLD AUTO: 39.9 % (ref 40–53)
HGB BLD-MCNC: 13.5 G/DL (ref 13.3–17.7)
INR PPP: 1.01 (ref 0.86–1.14)
MCH RBC QN AUTO: 30.4 PG (ref 26.5–33)
MCHC RBC AUTO-ENTMCNC: 33.8 G/DL (ref 31.5–36.5)
MCV RBC AUTO: 90 FL (ref 78–100)
PLATELET # BLD AUTO: 233 10E9/L (ref 150–450)
RBC # BLD AUTO: 4.44 10E12/L (ref 4.4–5.9)
WBC # BLD AUTO: 4.5 10E9/L (ref 4–11)

## 2017-05-22 PROCEDURE — 25000128 H RX IP 250 OP 636: Performed by: RADIOLOGY

## 2017-05-22 PROCEDURE — 36561 INSERT TUNNELED CV CATH: CPT | Mod: LT

## 2017-05-22 PROCEDURE — 27210995 ZZH RX 272: Performed by: RADIOLOGY

## 2017-05-22 PROCEDURE — 25000125 ZZHC RX 250: Performed by: RADIOLOGY

## 2017-05-22 PROCEDURE — C1769 GUIDE WIRE: HCPCS

## 2017-05-22 PROCEDURE — 27210904 ZZH KIT CR6

## 2017-05-22 PROCEDURE — 99153 MOD SED SAME PHYS/QHP EA: CPT

## 2017-05-22 PROCEDURE — 27210732 ZZH ACCESSORY CR1

## 2017-05-22 PROCEDURE — 76937 US GUIDE VASCULAR ACCESS: CPT

## 2017-05-22 PROCEDURE — 85027 COMPLETE CBC AUTOMATED: CPT | Performed by: INTERNAL MEDICINE

## 2017-05-22 PROCEDURE — 27210908 ZZH NEEDLE CR4

## 2017-05-22 PROCEDURE — 27210738 ZZH ACCESSORY CR2

## 2017-05-22 PROCEDURE — 40000166 ZZH STATISTIC PP CARE STAGE 1

## 2017-05-22 PROCEDURE — C1788 PORT, INDWELLING, IMP: HCPCS

## 2017-05-22 PROCEDURE — 99152 MOD SED SAME PHYS/QHP 5/>YRS: CPT

## 2017-05-22 RX ORDER — LIDOCAINE 40 MG/G
CREAM TOPICAL
Status: DISCONTINUED | OUTPATIENT
Start: 2017-05-22 | End: 2017-05-22 | Stop reason: HOSPADM

## 2017-05-22 RX ORDER — HEPARIN SODIUM (PORCINE) LOCK FLUSH IV SOLN 100 UNIT/ML 100 UNIT/ML
500 SOLUTION INTRAVENOUS ONCE
Status: COMPLETED | OUTPATIENT
Start: 2017-05-22 | End: 2017-05-22

## 2017-05-22 RX ORDER — NALOXONE HYDROCHLORIDE 0.4 MG/ML
.1-.4 INJECTION, SOLUTION INTRAMUSCULAR; INTRAVENOUS; SUBCUTANEOUS
Status: DISCONTINUED | OUTPATIENT
Start: 2017-05-22 | End: 2017-05-22 | Stop reason: HOSPADM

## 2017-05-22 RX ORDER — SODIUM CHLORIDE 9 MG/ML
INJECTION, SOLUTION INTRAVENOUS CONTINUOUS
Status: DISCONTINUED | OUTPATIENT
Start: 2017-05-22 | End: 2017-05-22 | Stop reason: HOSPADM

## 2017-05-22 RX ORDER — CEFAZOLIN SODIUM 2 G/100ML
2 INJECTION, SOLUTION INTRAVENOUS
Status: COMPLETED | OUTPATIENT
Start: 2017-05-22 | End: 2017-05-22

## 2017-05-22 RX ORDER — FLUMAZENIL 0.1 MG/ML
0.2 INJECTION, SOLUTION INTRAVENOUS
Status: DISCONTINUED | OUTPATIENT
Start: 2017-05-22 | End: 2017-05-22 | Stop reason: HOSPADM

## 2017-05-22 RX ORDER — FENTANYL CITRATE 50 UG/ML
25-50 INJECTION, SOLUTION INTRAMUSCULAR; INTRAVENOUS EVERY 5 MIN PRN
Status: DISCONTINUED | OUTPATIENT
Start: 2017-05-22 | End: 2017-05-22 | Stop reason: HOSPADM

## 2017-05-22 RX ADMIN — LIDOCAINE HYDROCHLORIDE 20 ML: 10 INJECTION, SOLUTION EPIDURAL; INFILTRATION; INTRACAUDAL; PERINEURAL at 12:27

## 2017-05-22 RX ADMIN — MIDAZOLAM 2 MG: 1 INJECTION INTRAMUSCULAR; INTRAVENOUS at 12:10

## 2017-05-22 RX ADMIN — CEFAZOLIN SODIUM 2 G: 2 INJECTION, SOLUTION INTRAVENOUS at 10:37

## 2017-05-22 RX ADMIN — SODIUM CHLORIDE, PRESERVATIVE FREE 500 UNITS: 5 INJECTION INTRAVENOUS at 12:24

## 2017-05-22 RX ADMIN — FENTANYL CITRATE 100 MCG: 50 INJECTION, SOLUTION INTRAMUSCULAR; INTRAVENOUS at 12:10

## 2017-05-22 RX ADMIN — SODIUM CHLORIDE: 9 INJECTION, SOLUTION INTRAVENOUS at 10:38

## 2017-05-22 NOTE — PROGRESS NOTES
Tolerated bedrest without problems.  Tolerated food, fluids, urination and ambulation without issues.  Reviewed discharge instructions with patient and his wife.  Right neck and chest site CDI.  PIV removed.  Discharged to home with wife.

## 2017-05-22 NOTE — PROGRESS NOTES
Interventional Radiology Intra-procedural Nursing Note    Patient Name: Meño Omalley  Medical Record Number: [id number]  Today's Date: May 22, 2017    Start Time:1150  End of procedure time: 1245  Procedure: Power port Placement  Report given to: 2 A RN  Time pt departs:  1250  : No    Other Notes:  Escorted from 2 A after ID et  verbally verified by pt. To have PortBplaced to use for treating Colon Cancert. Tolerated procedure well w /mod. amt sed meds.    Elizabeth M. Agerbeck

## 2017-05-22 NOTE — IP AVS SNAPSHOT
MRN:[id number]                      After Visit Summary   5/22/2017    Meño Omalley    MRN: [id number]           Visit Information        Department      5/22/2017  9:42 AM [address] [facility]          Review of your medicines      UNREVIEWED medicines. Ask your doctor about these medicines        Dose / Directions    acetaminophen 325 MG tablet   Commonly known as:  TYLENOL   Used for:  Rectal cancer (H)        Dose:  650 mg   Take 2 tablets (650 mg) by mouth every 4 hours as needed for mild pain   Quantity:  100 tablet   Refills:  0       escitalopram 20 MG tablet   Commonly known as:  LEXAPRO        Dose:  20 mg   Take 20 mg by mouth every morning   Refills:  0       FLOVENT  MCG/ACT Inhaler   Generic drug:  fluticasone        Dose:  2 puff   Take 2 puffs by mouth 2 times daily as needed   Refills:  0       oxyCODONE 5 MG IR tablet   Commonly known as:  ROXICODONE   Used for:  Rectal cancer (H)        Dose:  5-10 mg   Take 1-2 tablets (5-10 mg) by mouth every 4 hours as needed for moderate to severe pain   Quantity:  30 tablet   Refills:  0                Protect others around you: Learn how to safely use, store and throw away your medicines at www.disposemymeds.org.         Follow-ups after your visit        Your next 10 appointments already scheduled     May 24, 2017 10:45 AM CDT   [facility] Lab Draw with  [facility] LAB DRAW   [facility] Lab Draw ([facility])    [address] [place] [id number]   [phone]            May 24, 2017 11:10 AM CDT   (Arrive by 10:55 AM)   Return Visit with Zahira Christensen PA-C   [facility] ([facility])    [address] [place] [address]   [phone]            May 24, 2017  1:00 PM CDT   Infusion 240 with MARAH ONCOLOGY INFUSION,  10 ATC   [facility] ([facility])    [facility] [address]  2nd Floor  [facility] [address]   [phone]            Sep 12, 2017 10:30 AM CDT   Flexible Sigmoidoscopy with Ignacio Rose MD   [facility] Colon and Rectal Surgery ([facility])    [address]  4th Floor  [facility] [address]   [phone]               Care Instructions        Further instructions from your care team       [facility]        Interventional Radiology  Discharge Instructions  Following Port Placement    Today you had a: ? Port placed    Your site(s) has been closed with   ? Absorbable suture    If after 10 days there are visible suture they may be trimmed by your primary doctor  ? Derma Marks (Skin Glue)    Do not apply any ointments over site    This thin layer will slough off in 7-10 days    May gently remove Derma Marks in 10 days if still present And Dressed with   ? Nothing           If there is any oozing or bleeding from the site, apply direct pressure for 5-10 minutes with a gauze pad.  If bleeding continues after 10 minutes call your primary doctor.  If bleeding cannot be controlled with direct pressure, call 911.    Call your Doctor if:    Bleeding as above    Swelling in your neck or arm    Sudden onset of Shortness of Breath, Lightheadedness, Palpitations.    Fever greater than 100.5  F    Other signs of infection such as, redness, tenderness, or drainage from the wound    If you were given sedation:    We recommend an adult stay with you for the first 24 hours.    No driving or alcoholic beverages for 24 hours.    ? Discharge Booklet given :  Purple Power Port    ADDITIONAL INSTRUCTIONS: May use ice packs 3-4 times a day for 15 minutes for minor swelling or pain    No heavy lifting greater than 10 lbs. for one week    No tub bath, hot tub, or swimming until Derma Marks (Skin Glue) is removed    It is OK to shower and get the incision wet but immediately pat it dry    No Emla Cream to Port site for 1  "week.    If you are on Coumadin, restart tonight.  Follow up with your Coumadin Clinic or Primary Care MD to have your INR rechecked.    [facility] INTERVENTIONAL RADIOLOGY DEPARTMENT  Procedure Physician:  Dr. Horner   Date of procedure: May 22, 2017  Telephone Numbers: [phone] Monday-Friday 8:00 am to 4:30 pm  [phone] After 4:30 pm Monday-Friday, Weekends & Holidays.   Ask for the Interventional Radiologist on call.  Someone is on call 24 hrs/day  [facility] toll free number: [phone] Monday-Friday 8:00 am to 4:30 pm  [facility] Emergency Dept: [phone]    I         Additional Information About Your Visit        [employer] Information     [employer] gives you secure access to your electronic health record. If you see a primary care provider, you can also send messages to your care team and make appointments. If you have questions, please call your primary care clinic.  If you do not have a primary care provider, please call [phone] and they will assist you.        Care EveryWhere ID     This is your Care EveryWhere ID. This could be used by other organizations to access your [facility] medical records  [id number]        Your Vitals Were     Blood Pressure Pulse Temperature Respirations Height Weight    116/69 (BP Location: Left arm) 68 97.8  F (36.6  C) (Oral) 16 1.753 m (5' 9\") 90.7 kg (200 lb)    Pulse Oximetry BMI (Body Mass Index)                97% 29.53 kg/m2           Primary Care Provider Office Phone # Fax #    Delio Alcala [phone]      Thank you!     Thank you for choosing [facility] for your care. Our goal is always to provide you with excellent care. Hearing back from our patients is one way we can continue to improve our services. Please take a few minutes to complete the written survey that you may receive in the mail after you visit with us. Thank you!             Medication List: This is a list of all your medications and when to take them. Check marks below indicate your " daily home schedule. Keep this list as a reference.      Medications           Morning Afternoon Evening Bedtime As Needed    acetaminophen 325 MG tablet   Commonly known as:  TYLENOL   Take 2 tablets (650 mg) by mouth every 4 hours as needed for mild pain                                escitalopram 20 MG tablet   Commonly known as:  LEXAPRO   Take 20 mg by mouth every morning                                FLOVENT  MCG/ACT Inhaler   Take 2 puffs by mouth 2 times daily as needed   Generic drug:  fluticasone                                oxyCODONE 5 MG IR tablet   Commonly known as:  ROXICODONE   Take 1-2 tablets (5-10 mg) by mouth every 4 hours as needed for moderate to severe pain

## 2017-05-22 NOTE — DISCHARGE INSTRUCTIONS
[facility]        Interventional Radiology  Discharge Instructions  Following Port Placement    Today you had a: ? Port placed    Your site(s) has been closed with   ? Absorbable suture    If after 10 days there are visible suture they may be trimmed by your primary doctor  ? Derma Marks (Skin Glue)    Do not apply any ointments over site    This thin layer will slough off in 7-10 days    May gently remove Derma Marks in 10 days if still present And Dressed with   ? Nothing           If there is any oozing or bleeding from the site, apply direct pressure for 5-10 minutes with a gauze pad.  If bleeding continues after 10 minutes call your primary doctor.  If bleeding cannot be controlled with direct pressure, call 911.    Call your Doctor if:    Bleeding as above    Swelling in your neck or arm    Sudden onset of Shortness of Breath, Lightheadedness, Palpitations.    Fever greater than 100.5  F    Other signs of infection such as, redness, tenderness, or drainage from the wound    If you were given sedation:    We recommend an adult stay with you for the first 24 hours.    No driving or alcoholic beverages for 24 hours.    ? Discharge Booklet given :  Purple Power Port    ADDITIONAL INSTRUCTIONS: May use ice packs 3-4 times a day for 15 minutes for minor swelling or pain    No heavy lifting greater than 10 lbs. for one week    No tub bath, hot tub, or swimming until Derma Marks (Skin Glue) is removed    It is OK to shower and get the incision wet but immediately pat it dry    No Emla Cream to Port site for 1 week.    If you are on Coumadin, restart tonight.  Follow up with your Coumadin Clinic or Primary Care MD to have your INR rechecked.    [facility] INTERVENTIONAL RADIOLOGY DEPARTMENT  Procedure Physician:  Dr. Horner   Date of procedure: May 22, 2017  Telephone Numbers: [phone] Monday-Friday 8:00 am to 4:30 pm  [phone] After 4:30 pm Monday-Friday, Weekends & Holidays.   Ask for the  Interventional Radiologist on call.  Someone is on call 24 hrs/day  [facility] toll free number: [phone] Monday-Friday 8:00 am to 4:30 pm  [facility] Emergency Dept: [phone]    I

## 2017-05-22 NOTE — PROGRESS NOTES
Arrived from home for a port placement.  VSS.  Denies pain.  PIV placed and labs sent.  Needs consent.  H&P current.  Wife at bedside.

## 2017-05-22 NOTE — PROGRESS NOTES
Interventional Radiology Pre-Procedure Sedation Assessment   Time of Assessment: 11:17 AM    Expected Level: Moderate Sedation    Indication: Sedation is required for the following type of Procedure: Venous Access    Sedation and procedural consent: Risks, benefits and alternatives were discussed with Patient    PO Intake: Appropriately NPO for procedure    ASA Class: Class 2 - MILD SYSTEMIC DISEASE, NO ACUTE PROBLEMS, NO FUNCTIONAL LIMITATIONS.    Mallampati: Grade 2:  Soft palate, base of uvula, tonsillar pillars, and portion of posterior pharyngeal wall visible    Lungs: Lungs Clear with good breath sounds bilaterally    Heart: Normal heart sounds and rate    History and physical reviewed and no updates needed. I have reviewed the lab findings, diagnostic data, medications, and the plan for sedation. I have determined this patient to be an appropriate candidate for the planned sedation and procedure and have reassessed the patient IMMEDIATELY PRIOR to sedation and procedure.    Salvatore Valadez MD

## 2017-05-22 NOTE — IP AVS SNAPSHOT
[address] [address]                                       After Visit Summary   5/22/2017    Meño Omalley    MRN: [id number]           After Visit Summary Signature Page     I have received my discharge instructions, and my questions have been answered. I have discussed any challenges I see with this plan with the nurse or doctor.    ..........................................................................................................................................  Patient/Patient Representative Signature      ..........................................................................................................................................  Patient Representative Print Name and Relationship to Patient    ..................................................               ................................................  Date                                            Time    ..........................................................................................................................................  Reviewed by Signature/Title    ...................................................              ..............................................  Date                                                            Time

## 2017-05-22 NOTE — BRIEF OP NOTE
Interventional Radiology Brief Post Procedure Note    Procedure: Right internal jugular Portacath placement    Proceduralist: Luis Horner MD    Assistant: Salvatore Valadez MD    Time Out: Prior to the start of the procedure and with procedural staff participation, I verbally confirmed the patient s identity using two indicators, relevant allergies, that the procedure was appropriate and matched the consent or emergent situation, and that the correct equipment/implants were available. Immediately prior to starting the procedure I conducted the Time Out with the procedural staff and re-confirmed the patient s name, procedure, and site/side. (The Joint Commission universal protocol was followed.)  Yes    Sedation: IR Nurse Monitored Care   Post Procedure Summary:  Prior to the start of the procedure and with procedural staff participation, I verbally confirmed the patient s identity using two indicators, relevant allergies, that the procedure was appropriate and matched the consent or emergent situation, and that the correct equipment/implants were available. Immediately prior to starting the procedure I conducted the Time Out with the procedural staff and re-confirmed the patient s name, procedure, and site/side. (The Joint Commission universal protocol was followed.)  Yes       Sedatives: Fentanyl and Midazolam (Versed)    Vital signs, airway and pulse oximetry were monitored and remained stable throughout the procedure and sedation was maintained until the procedure was complete.  The patient was monitored by staff until sedation discharge criteria were met.    Patient tolerance: Patient tolerated the procedure well with no immediate complications.    Time of sedation in minutes: 60 Minutes minutes from beginning to end of physician one to one monitoring.        Findings: Successful placement of right internal jugular Portacath.    Estimated Blood Loss: Less than 10 ml    Fluoroscopy Time: Less than 1  minute    SPECIMENS: None    Complications: 1. None     Condition: Stable    Plan: To 2A for postprocedure cares. Discharge to home.    Comments: See dictated procedure note for full details.    Salvatore Valadez MD

## 2017-05-23 RX ORDER — PALONOSETRON 0.05 MG/ML
0.25 INJECTION, SOLUTION INTRAVENOUS ONCE
Status: CANCELLED
Start: 2017-05-24 | End: 2017-05-24

## 2017-05-23 RX ORDER — LORAZEPAM 0.5 MG/1
0.5 TABLET ORAL EVERY 4 HOURS PRN
Qty: 30 TABLET | Refills: 2 | Status: SHIPPED | OUTPATIENT
Start: 2017-05-23 | End: 2018-07-20

## 2017-05-23 RX ORDER — ONDANSETRON 8 MG/1
8 TABLET, FILM COATED ORAL EVERY 8 HOURS PRN
Qty: 10 TABLET | Refills: 2 | Status: SHIPPED | OUTPATIENT
Start: 2017-05-23 | End: 2017-05-24

## 2017-05-23 RX ORDER — PROCHLORPERAZINE MALEATE 10 MG
10 TABLET ORAL EVERY 6 HOURS PRN
Qty: 30 TABLET | Refills: 2 | Status: SHIPPED | OUTPATIENT
Start: 2017-05-23 | End: 2017-05-24

## 2017-05-24 ENCOUNTER — INFUSION THERAPY VISIT (OUTPATIENT)
Dept: ONCOLOGY | Facility: CLINIC | Age: 47
End: 2017-05-24
Attending: INTERNAL MEDICINE
Payer: COMMERCIAL

## 2017-05-24 ENCOUNTER — ONCOLOGY VISIT (OUTPATIENT)
Dept: ONCOLOGY | Facility: CLINIC | Age: 47
End: 2017-05-24
Attending: PHYSICIAN ASSISTANT
Payer: COMMERCIAL

## 2017-05-24 ENCOUNTER — ALLIED HEALTH/NURSE VISIT (OUTPATIENT)
Dept: ONCOLOGY | Facility: CLINIC | Age: 47
End: 2017-05-24

## 2017-05-24 ENCOUNTER — APPOINTMENT (OUTPATIENT)
Dept: LAB | Facility: CLINIC | Age: 47
End: 2017-05-24
Attending: INTERNAL MEDICINE
Payer: COMMERCIAL

## 2017-05-24 ENCOUNTER — MYC MEDICAL ADVICE (OUTPATIENT)
Dept: FAMILY MEDICINE | Facility: CLINIC | Age: 47
End: 2017-05-24

## 2017-05-24 VITALS
RESPIRATION RATE: 18 BRPM | BODY MASS INDEX: 30.26 KG/M2 | TEMPERATURE: 98.4 F | HEART RATE: 64 BPM | WEIGHT: 204.9 LBS | DIASTOLIC BLOOD PRESSURE: 66 MMHG | OXYGEN SATURATION: 96 % | SYSTOLIC BLOOD PRESSURE: 133 MMHG

## 2017-05-24 DIAGNOSIS — C20 ADENOCARCINOMA OF RECTUM (H): Primary | ICD-10-CM

## 2017-05-24 DIAGNOSIS — Z71.9 VISIT FOR COUNSELING: Primary | ICD-10-CM

## 2017-05-24 DIAGNOSIS — C20 RECTAL CANCER (H): Primary | ICD-10-CM

## 2017-05-24 LAB
ALBUMIN SERPL-MCNC: 3.7 G/DL (ref 3.4–5)
ALP SERPL-CCNC: 54 U/L (ref 40–150)
ALT SERPL W P-5'-P-CCNC: 83 U/L (ref 0–70)
ANION GAP SERPL CALCULATED.3IONS-SCNC: 8 MMOL/L (ref 3–14)
AST SERPL W P-5'-P-CCNC: 44 U/L (ref 0–45)
BASOPHILS # BLD AUTO: 0.1 10E9/L (ref 0–0.2)
BASOPHILS NFR BLD AUTO: 1 %
BILIRUB SERPL-MCNC: 0.8 MG/DL (ref 0.2–1.3)
BUN SERPL-MCNC: 17 MG/DL (ref 7–30)
CALCIUM SERPL-MCNC: 9 MG/DL (ref 8.5–10.1)
CHLORIDE SERPL-SCNC: 104 MMOL/L (ref 94–109)
CO2 SERPL-SCNC: 26 MMOL/L (ref 20–32)
CREAT SERPL-MCNC: 0.68 MG/DL (ref 0.66–1.25)
DIFFERENTIAL METHOD BLD: NORMAL
EOSINOPHIL # BLD AUTO: 0.1 10E9/L (ref 0–0.7)
EOSINOPHIL NFR BLD AUTO: 2.9 %
ERYTHROCYTE [DISTWIDTH] IN BLOOD BY AUTOMATED COUNT: 12.4 % (ref 10–15)
GFR SERPL CREATININE-BSD FRML MDRD: ABNORMAL ML/MIN/1.7M2
GLUCOSE SERPL-MCNC: 91 MG/DL (ref 70–99)
HCT VFR BLD AUTO: 41.4 % (ref 40–53)
HGB BLD-MCNC: 14.2 G/DL (ref 13.3–17.7)
IMM GRANULOCYTES # BLD: 0 10E9/L (ref 0–0.4)
IMM GRANULOCYTES NFR BLD: 0.2 %
LYMPHOCYTES # BLD AUTO: 1.7 10E9/L (ref 0.8–5.3)
LYMPHOCYTES NFR BLD AUTO: 35 %
MCH RBC QN AUTO: 30.5 PG (ref 26.5–33)
MCHC RBC AUTO-ENTMCNC: 34.3 G/DL (ref 31.5–36.5)
MCV RBC AUTO: 89 FL (ref 78–100)
MONOCYTES # BLD AUTO: 0.5 10E9/L (ref 0–1.3)
MONOCYTES NFR BLD AUTO: 9.8 %
NEUTROPHILS # BLD AUTO: 2.5 10E9/L (ref 1.6–8.3)
NEUTROPHILS NFR BLD AUTO: 51.1 %
NRBC # BLD AUTO: 0 10*3/UL
NRBC BLD AUTO-RTO: 0 /100
PLATELET # BLD AUTO: 275 10E9/L (ref 150–450)
POTASSIUM SERPL-SCNC: 4.3 MMOL/L (ref 3.4–5.3)
PROT SERPL-MCNC: 7.3 G/DL (ref 6.8–8.8)
RBC # BLD AUTO: 4.65 10E12/L (ref 4.4–5.9)
SODIUM SERPL-SCNC: 139 MMOL/L (ref 133–144)
WBC # BLD AUTO: 4.9 10E9/L (ref 4–11)

## 2017-05-24 PROCEDURE — 99212 OFFICE O/P EST SF 10 MIN: CPT | Mod: ZF

## 2017-05-24 PROCEDURE — 99214 OFFICE O/P EST MOD 30 MIN: CPT | Mod: ZP | Performed by: PHYSICIAN ASSISTANT

## 2017-05-24 PROCEDURE — 96416 CHEMO PROLONG INFUSE W/PUMP: CPT

## 2017-05-24 PROCEDURE — 96411 CHEMO IV PUSH ADDL DRUG: CPT

## 2017-05-24 PROCEDURE — 80053 COMPREHEN METABOLIC PANEL: CPT | Performed by: INTERNAL MEDICINE

## 2017-05-24 PROCEDURE — 96375 TX/PRO/DX INJ NEW DRUG ADDON: CPT

## 2017-05-24 PROCEDURE — 93010 ELECTROCARDIOGRAM REPORT: CPT | Performed by: INTERNAL MEDICINE

## 2017-05-24 PROCEDURE — 25000125 ZZHC RX 250: Mod: ZF | Performed by: INTERNAL MEDICINE

## 2017-05-24 PROCEDURE — 25000128 H RX IP 250 OP 636: Mod: ZF | Performed by: INTERNAL MEDICINE

## 2017-05-24 PROCEDURE — 96413 CHEMO IV INFUSION 1 HR: CPT

## 2017-05-24 PROCEDURE — 85025 COMPLETE CBC W/AUTO DIFF WBC: CPT | Performed by: INTERNAL MEDICINE

## 2017-05-24 PROCEDURE — 25000128 H RX IP 250 OP 636: Mod: ZF | Performed by: PHYSICIAN ASSISTANT

## 2017-05-24 PROCEDURE — 96368 THER/DIAG CONCURRENT INF: CPT

## 2017-05-24 PROCEDURE — 96415 CHEMO IV INFUSION ADDL HR: CPT

## 2017-05-24 RX ORDER — PROCHLORPERAZINE MALEATE 10 MG
10 TABLET ORAL EVERY 6 HOURS PRN
Qty: 30 TABLET | Refills: 2 | Status: SHIPPED | OUTPATIENT
Start: 2017-05-24 | End: 2018-05-07

## 2017-05-24 RX ORDER — HEPARIN SODIUM (PORCINE) LOCK FLUSH IV SOLN 100 UNIT/ML 100 UNIT/ML
5 SOLUTION INTRAVENOUS EVERY 8 HOURS
Status: DISCONTINUED | OUTPATIENT
Start: 2017-05-24 | End: 2017-06-02 | Stop reason: HOSPADM

## 2017-05-24 RX ORDER — EPINEPHRINE 0.3 MG/.3ML
INJECTION SUBCUTANEOUS
Status: DISCONTINUED
Start: 2017-05-24 | End: 2017-05-24 | Stop reason: WASHOUT

## 2017-05-24 RX ORDER — FLUOROURACIL 50 MG/ML
400 INJECTION, SOLUTION INTRAVENOUS ONCE
Status: COMPLETED | OUTPATIENT
Start: 2017-05-24 | End: 2017-05-24

## 2017-05-24 RX ORDER — LIDOCAINE/PRILOCAINE 2.5 %-2.5%
CREAM (GRAM) TOPICAL PRN
Qty: 30 G | Refills: 1 | Status: SHIPPED | OUTPATIENT
Start: 2017-05-24 | End: 2018-05-07

## 2017-05-24 RX ORDER — PALONOSETRON 0.05 MG/ML
0.25 INJECTION, SOLUTION INTRAVENOUS ONCE
Status: COMPLETED | OUTPATIENT
Start: 2017-05-24 | End: 2017-05-24

## 2017-05-24 RX ORDER — ONDANSETRON 8 MG/1
8 TABLET, FILM COATED ORAL EVERY 8 HOURS PRN
Qty: 10 TABLET | Refills: 2 | Status: SHIPPED | OUTPATIENT
Start: 2017-05-24 | End: 2017-06-22

## 2017-05-24 RX ADMIN — DEXTROSE MONOHYDRATE 250 ML: 50 INJECTION, SOLUTION INTRAVENOUS at 13:53

## 2017-05-24 RX ADMIN — DEXAMETHASONE SODIUM PHOSPHATE: 10 INJECTION, SOLUTION INTRAMUSCULAR; INTRAVENOUS at 13:53

## 2017-05-24 RX ADMIN — PALONOSETRON HYDROCHLORIDE 0.25 MG: 0.25 INJECTION INTRAVENOUS at 13:54

## 2017-05-24 RX ADMIN — LEUCOVORIN CALCIUM 750 MG: 350 INJECTION, POWDER, LYOPHILIZED, FOR SOLUTION INTRAMUSCULAR; INTRAVENOUS at 14:08

## 2017-05-24 RX ADMIN — FLUOROURACIL 850 MG: 50 INJECTION, SOLUTION INTRAVENOUS at 16:18

## 2017-05-24 RX ADMIN — OXALIPLATIN 180 MG: 5 INJECTION, SOLUTION INTRAVENOUS at 14:10

## 2017-05-24 RX ADMIN — SODIUM CHLORIDE, PRESERVATIVE FREE 5 ML: 5 INJECTION INTRAVENOUS at 10:54

## 2017-05-24 ASSESSMENT — PAIN SCALES - GENERAL: PAINLEVEL: NO PAIN (0)

## 2017-05-24 NOTE — MR AVS SNAPSHOT
After Visit Summary   5/24/2017    Meño Omalley    MRN: [id number]           Patient Information     Date Of Birth          1970        Visit Information        Provider Department      5/24/2017 11:10 AM Zahira Christensen PA-C [facility]        Today's Diagnoses     Rectal cancer (H)    -  1       Follow-ups after your visit        Your next 10 appointments already scheduled     Jun 07, 2017  7:45 AM CDT   [facility] Lab Draw with [facility] LAB DRAW   [facility] Lab Draw ([facility])    [address] [place] [address]  [address] [facility] [address] [phone]            Jun 07, 2017  8:15 AM CDT   (Arrive by 8:00 AM)   Return Visit with Jeanette Mcarthur MD   [facility] ([facility])    [address] [place] [address] [phone]            Jun 07, 2017  9:00 AM CDT   Infusion 240 with  ONCOLOGY INFUSION, UC 17 ATC   [facility] ([facility])    [address] [place] [id number] [phone]            Sep 12, 2017 10:30 AM CDT   Flexible Sigmoidoscopy with Ignacio Rose MD   [facility] Colon and Rectal Surgery ([facility])    [address] [place] [id number] [phone]              Who to contact     If you have questions or need follow up information about today's clinic visit or your schedule please contact [facility] directly at [phone].  Normal or non-critical lab and imaging results will be communicated to you by MyChart, letter or phone within 4 business days after the clinic has received the results. If you do not hear from us within 7 days, please contact the clinic through MyChart or phone. If you have a critical or abnormal lab result, we will notify you by phone as soon as possible.  Submit refill requests  through "[employer] or call your pharmacy and they will forward the refill request to us. Please allow 3 business days for your refill to be completed.          Additional Information About Your Visit        [employer] Information     "[employer] gives you secure access to your electronic health record. If you see a primary care provider, you can also send messages to your care team and make appointments. If you have questions, please call your primary care clinic.  If you do not have a primary care provider, please call [phone] and they will assist you.        Care EveryWhere ID     This is your Care EveryWhere ID. This could be used by other organizations to access your [place] [facility] medical records  [id number]        Your Vitals Were     Pulse Temperature Respirations Pulse Oximetry BMI (Body Mass Index)       64 98.4  F (36.9  C) (Oral) 18 96% 30.26 kg/m2        Blood Pressure from Last 3 Encounters:   05/24/17 133/66   05/22/17 155/73   05/02/17 127/80    Weight from Last 3 Encounters:   05/24/17 92.9 kg (204 lb 14.4 oz)   05/22/17 90.7 kg (200 lb)   05/02/17 91.4 kg (201 lb 8 oz)              We Performed the Following     EKG 12-lead complete w/read - Clinics          Today's Medication Changes          These changes are accurate as of: 5/24/17 11:59 PM.  If you have any questions, ask your nurse or doctor.               Start taking these medicines.        Dose/Directions    lidocaine-prilocaine cream   Commonly known as:  EMLA   Used for:  Rectal cancer (H)   Started by:  Zahira Christensen PA-C        Apply topically as needed for moderate pain   Quantity:  30 g   Refills:  1       LORazepam 0.5 MG tablet   Commonly known as:  ATIVAN   Used for:  Adenocarcinoma of rectum (H)        Dose:  0.5 mg   Take 1 tablet (0.5 mg) by mouth every 4 hours as needed (Anxiety, Nausea/Vomiting or Sleep)   Quantity:  30 tablet   Refills:  2       ondansetron 8 MG tablet   Commonly known as:  ZOFRAN   Used for:  Adenocarcinoma of  rectum (H)        Dose:  8 mg   Take 1 tablet (8 mg) by mouth every 8 hours as needed (Nausea/Vomiting)   Quantity:  10 tablet   Refills:  2       prochlorperazine 10 MG tablet   Commonly known as:  COMPAZINE   Used for:  Adenocarcinoma of rectum (H)        Dose:  10 mg   Take 1 tablet (10 mg) by mouth every 6 hours as needed (Nausea/Vomiting)   Quantity:  30 tablet   Refills:  2         Stop taking these medicines if you haven't already. Please contact your care team if you have questions.     oxyCODONE 5 MG IR tablet   Commonly known as:  ROXICODONE   Stopped by:  Zahira Christensen PA-C                Where to get your medicines      These medications were sent to [facility] [address]  [address], [facility] [address]    Hours:  TRANSPLANT PHONE NUMBER [phone] Phone:  [phone]     lidocaine-prilocaine cream    ondansetron 8 MG tablet    prochlorperazine 10 MG tablet         Some of these will need a paper prescription and others can be bought over the counter.  Ask your nurse if you have questions.     Bring a paper prescription for each of these medications     LORazepam 0.5 MG tablet                Primary Care Provider Office Phone # Fax #    Delio Alcala [phone]       [facility] [address] [place] [address]        Thank you!     Thank you for choosing [facility]  for your care. Our goal is always to provide you with excellent care. Hearing back from our patients is one way we can continue to improve our services. Please take a few minutes to complete the written survey that you may receive in the mail after your visit with us. Thank you!             Your Updated Medication List - Protect others around you: Learn how to safely use, store and throw away your medicines at www.disposemymeds.org.          This list is accurate as of: 5/24/17 11:59 PM.   Always use your most recent med list.                   Brand Name Dispense Instructions for use    acetaminophen 325 MG tablet    TYLENOL    100 tablet    Take 2 tablets (650 mg) by mouth every 4 hours as needed for mild pain       FLOVENT  MCG/ACT Inhaler   Generic drug:  fluticasone      Take 2 puffs by mouth 2 times daily as needed       lidocaine-prilocaine cream    EMLA    30 g    Apply topically as needed for moderate pain       LORazepam 0.5 MG tablet    ATIVAN    30 tablet    Take 1 tablet (0.5 mg) by mouth every 4 hours as needed (Anxiety, Nausea/Vomiting or Sleep)       ondansetron 8 MG tablet    ZOFRAN    10 tablet    Take 1 tablet (8 mg) by mouth every 8 hours as needed (Nausea/Vomiting)       prochlorperazine 10 MG tablet    COMPAZINE    30 tablet    Take 1 tablet (10 mg) by mouth every 6 hours as needed (Nausea/Vomiting)

## 2017-05-24 NOTE — Clinical Note
5/24/2017       RE: Meño Omalley  [id number] [facility] [id number]     Dear Colleague,    Thank you for referring your patient, Meño Omalley, to the [facility]. Please see a copy of my visit note below.    Oncology/Hematology Visit Note  May 24, 2017    Reason for Visit: follow up of adenocarcinoma of the rectum    History of Present Illness: Meño Omalley is a 46 year old male with adenocarcinoma of rectum. He initially presented to primary care with blood in his stool for a few months and a change in bowel habits with the stool being more loose. Family history of father who developed colon cancer at age 60, and a sister who developed colon cancer at age 55.  He presented for colonoscopy with Dr. Delio Daniel on 03/24/2017.  Findings were several small polyps and a possible carcinoma of the rectosigmoid junction measured from 17-21 cm.  Biopsies obtained were remarkable for adenocarcinoma.  CT scan of the chest, abdomen and pelvis was done, which confirmed the presence of possible thickening in the rectosigmoid junction, but no evidence of metastases.  A 3D MRI was pursued on 03/31, and it showed circumferential wall thickening of the upper rectum/rectosigmoid with minimal extension into the mesorectal fat, consistent with rectal cancer, stage IIIB, N1.  His case was discussed in the Tumor Board, and the decision was made to forego chemoradiation, but to go straight to surgery, and he underwent a low anterior resection. Surgery took place on 4/12/2017. The pathology on the tumor was moderately differentiated adenocarcinoma invading the muscularis propria with a tumor size of 3.7 cm all negative  carcinoma margins.  Lymphovascular invasion was not identified.  Two non-renea tumor deposits were identified, and 6 lymph nodes were negative for metastatic carcinoma.  He was staged by pathological staging and pT2 N1c.  The mismatch repair testing was done on his original biopsy,  and he had a normal pattern of mismatch repair protein expression, ruling out Rodriguez syndrome. Port was placed by IR on 5/23/17.    He is here today to start treatment with FOLFOX.    Interval History:  Meño is here today along with his wife. He has been feeling well. His stools are formed, soft. They are not exactly how they were prior to surgery but he denies diarrhea. He is eating a low residue diet and drinking well. He denies any pain post operatively. Note that port site was a little sore from the recent placement when they accessed it today.      Review of Systems:  Patient denies any of the following except if noted above: fevers, chills, difficulty with energy, vision or hearing changes, chest pain, dyspnea, abdominal pain, nausea, vomiting, diarrhea, constipation, urinary concerns, headaches, numbness, tingling, issues with sleep or mood. He also denies lumps, bumps, rashes or skin lesions, bleeding or bruising issues.    Current Outpatient Prescriptions   Medication Sig Dispense Refill     lidocaine-prilocaine (EMLA) cream Apply topically as needed for moderate pain 30 g 1     escitalopram (LEXAPRO) 20 MG tablet Take 20 mg by mouth every morning        ondansetron (ZOFRAN) 8 MG tablet Take 1 tablet (8 mg) by mouth every 8 hours as needed (Nausea/Vomiting) 10 tablet 2     prochlorperazine (COMPAZINE) 10 MG tablet Take 1 tablet (10 mg) by mouth every 6 hours as needed (Nausea/Vomiting) 30 tablet 2     LORazepam (ATIVAN) 0.5 MG tablet Take 1 tablet (0.5 mg) by mouth every 4 hours as needed (Anxiety, Nausea/Vomiting or Sleep) (Patient not taking: Reported on 5/24/2017) 30 tablet 2     acetaminophen (TYLENOL) 325 MG tablet Take 2 tablets (650 mg) by mouth every 4 hours as needed for mild pain (Patient not taking: Reported on 5/24/2017) 100 tablet 0     fluticasone (FLOVENT HFA) 110 MCG/ACT inhaler Take 2 puffs by mouth 2 times daily as needed          Physical Examination:  General: The patient is a pleasant  male in no acute distress.  /66  Pulse 64  Temp 98.4  F (36.9  C) (Oral)  Resp 18  Wt 92.9 kg (204 lb 14.4 oz)  SpO2 96%  BMI 30.26 kg/m2  Wt Readings from Last 10 Encounters:   05/24/17 92.9 kg (204 lb 14.4 oz)   05/22/17 90.7 kg (200 lb)   05/02/17 91.4 kg (201 lb 8 oz)   05/01/17 91.9 kg (202 lb 9.6 oz)   04/24/17 92.1 kg (203 lb)   04/14/17 91.6 kg (202 lb)   04/10/17 94.8 kg (209 lb)   04/10/17 94.8 kg (209 lb 1.6 oz)   03/28/17 94.8 kg (208 lb 14.4 oz)   03/20/17 92.3 kg (203 lb 6.4 oz)     HEENT: EOMI, PERRL. Sclerae are anicteric. Oral mucosa is pink and moist with no lesions or thrush.   Lymph: Neck is supple with no lymphadenopathy in the cervical or supraclavicular areas.   Heart: Regular rate and rhythm.   Lungs: Clear to auscultation bilaterally. Port right chest accessed, c/d/i.  Abdomen: Bowel sounds present, soft, nontender with no palpable hepatosplenomegaly or masses. Laporscopic incisions healed - 5 total.   Extremities: No lower extremity edema noted bilaterally.   Neuro: Cranial nerves II through XII are grossly intact.  Skin: No rashes, petechiae, or bruising noted on exposed skin.    Laboratory Data:   5/24/2017 10:57   Sodium 139   Potassium 4.3   Chloride 104   Carbon Dioxide 26   Urea Nitrogen 17   Creatinine 0.68   GFR Estimate >90...   GFR Estimate If Black >90...   Calcium 9.0   Anion Gap 8   Albumin 3.7   Protein Total 7.3   Bilirubin Total 0.8   Alkaline Phosphatase 54   ALT 83 (H)   AST 44   Glucose 91   WBC 4.9   Hemoglobin 14.2   Hematocrit 41.4   Platelet Count 275   RBC Count 4.65   MCV 89   MCH 30.5   MCHC 34.3   RDW 12.4   Diff Method Automated Method   % Neutrophils 51.1   % Lymphocytes 35.0   % Monocytes 9.8   % Eosinophils 2.9   % Basophils 1.0   % Immature Granulocytes 0.2   Nucleated RBCs 0   Absolute Neutrophil 2.5   Absolute Lymphocytes 1.7   Absolute Monocytes 0.5   Absolute Eosinophils 0.1   Absolute Basophils 0.1   Abs Immature Granulocytes 0.0   Absolute  Nucleated RBC 0.0     Assessment and Plan:  1. Oncology. stage III rectosigmoid adenocarcinoma, moderately differentiated, status post APR with good margins here today to start adjuvant chemotherapy with FOLFOX. Reviewed chemotherapy regimen, expected side effects and how he can manage those side effects. Meño and his wife asked several questions all of which were answered. Encouraged him to call with any questions or concerns. Will have him seen prior to cycle 2 in two weeks.       Angie FAM NP-C [facility]    [facility] [place] [id number] [phone]    Again, thank you for allowing me to participate in the care of your patient.      Sincerely,    Zahira Christensen PA-C

## 2017-05-24 NOTE — PROGRESS NOTES
ONCOLOGY SOCIAL WORK  D) Meño is a 46-yr-old gentleman with stage III rectal cancer  I) introduction of oncology social work  A) Met w/pt and his wife Chasidy.  They live in [place] in [facility] [facility] with their two children, ages 14 and 16.  Meño works for [employer], and his wife is employed as well  Both employers are supportive although pt reports a limited number of days he can be off work in a two-week period.  He has short term and long term disability benefits thru his employer.  Informed him of social security disability and of the five month waiting period if approved after application.  At this time he plans to continue working thru his treatment.      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   Pt's wife declines resources for the children because she feels they will handle it better if there is not a lot of focus on it.  Informed her of [employer] support groups and summer camps.  Also gave them a list of resources with SW contact info.    They identify no financial concerns today    P) available for support and resources as needed  Address advance care directive at next meeting    Lashay Alonso, [facility]  [phone]

## 2017-05-24 NOTE — NURSING NOTE
"Oncology Rooming Note    May 24, 2017 11:19 AM   Meño Omalley is a 46 year old male who presents for:    Chief Complaint   Patient presents with     Port Draw     port blood draw, saline and heparin flush by RN, tegaderm dressing over port     Oncology Clinic Visit     Return for Rectal Ca      Initial Vitals: /66  Pulse 64  Temp 98.4  F (36.9  C) (Oral)  Resp 18  Wt 92.9 kg (204 lb 14.4 oz)  SpO2 96%  BMI 30.26 kg/m2 Estimated body mass index is 30.26 kg/(m^2) as calculated from the following:    Height as of 5/22/17: 1.753 m (5' 9\").    Weight as of this encounter: 92.9 kg (204 lb 14.4 oz). Body surface area is 2.13 meters squared.  No Pain (0) Comment: Data Unavailable   No LMP for male patient.  Allergies reviewed: Yes  Medications reviewed: Yes    Medications: Medication refills not needed today.  Pharmacy name entered into EPIC:    [facility] [address] [place] [address] - [address]  [facility] [place], [place] - [address]     Clinical concerns: no concerns  kwasi was notified.    6  minutes for nursing intake (face to face time)     Antonette Banks MA              "

## 2017-05-24 NOTE — LETTER
5/24/2017      RE: Meño Omalley  [id number] [id number]       Oncology/Hematology Visit Note  May 24, 2017    Reason for Visit: follow up of adenocarcinoma of the rectum    History of Present Illness: Meño Omalley is a 46 year old male with adenocarcinoma of rectum. He initially presented to primary care with blood in his stool for a few months and a change in bowel habits with the stool being more loose. Family history of father who developed colon cancer at age 60, and a sister who developed colon cancer at age 55.  He presented for colonoscopy with Dr. Delio Daniel on 03/24/2017.  Findings were several small polyps and a possible carcinoma of the rectosigmoid junction measured from 17-21 cm.  Biopsies obtained were remarkable for adenocarcinoma.  CT scan of the chest, abdomen and pelvis was done, which confirmed the presence of possible thickening in the rectosigmoid junction, but no evidence of metastases.  A 3D MRI was pursued on 03/31, and it showed circumferential wall thickening of the upper rectum/rectosigmoid with minimal extension into the mesorectal fat, consistent with rectal cancer, stage IIIB, N1.  His case was discussed in the Tumor Board, and the decision was made to forego chemoradiation, but to go straight to surgery, and he underwent a low anterior resection. Surgery took place on 4/12/2017. The pathology on the tumor was moderately differentiated adenocarcinoma invading the muscularis propria with a tumor size of 3.7 cm all negative  carcinoma margins.  Lymphovascular invasion was not identified.  Two non-renea tumor deposits were identified, and 6 lymph nodes were negative for metastatic carcinoma.  He was staged by pathological staging and pT2 N1c.  The mismatch repair testing was done on his original biopsy, and he had a normal pattern of mismatch repair protein expression, ruling out Rodriguez syndrome. Port was placed by IR on 5/23/17.    He is here today to start  treatment with 5-FU and oxaliplatin (FOLFOX). The overall plan is for chemotherapy every 14 days for 6 months.     Interval History:  Meño is here today along with his wife. He has been feeling well. His stools are formed, soft. They are not exactly how they were prior to surgery but he denies diarrhea. He is eating a low residue diet and drinking well. He denies any pain post operatively. Note that port site was a little sore from the recent placement when they accessed it today.      Review of Systems:  Patient denies any of the following except if noted above: fevers, chills, difficulty with energy, vision or hearing changes, chest pain, dyspnea, abdominal pain, nausea, vomiting, diarrhea, constipation, urinary concerns, headaches, numbness, tingling, issues with sleep or mood. He also denies lumps, bumps, rashes or skin lesions, bleeding or bruising issues.    Current Outpatient Prescriptions   Medication Sig Dispense Refill     lidocaine-prilocaine (EMLA) cream Apply topically as needed for moderate pain 30 g 1     escitalopram (LEXAPRO) 20 MG tablet Take 20 mg by mouth every morning        ondansetron (ZOFRAN) 8 MG tablet Take 1 tablet (8 mg) by mouth every 8 hours as needed (Nausea/Vomiting) 10 tablet 2     prochlorperazine (COMPAZINE) 10 MG tablet Take 1 tablet (10 mg) by mouth every 6 hours as needed (Nausea/Vomiting) 30 tablet 2     LORazepam (ATIVAN) 0.5 MG tablet Take 1 tablet (0.5 mg) by mouth every 4 hours as needed (Anxiety, Nausea/Vomiting or Sleep) (Patient not taking: Reported on 5/24/2017) 30 tablet 2     acetaminophen (TYLENOL) 325 MG tablet Take 2 tablets (650 mg) by mouth every 4 hours as needed for mild pain (Patient not taking: Reported on 5/24/2017) 100 tablet 0     fluticasone (FLOVENT HFA) 110 MCG/ACT inhaler Take 2 puffs by mouth 2 times daily as needed          Physical Examination:  General: The patient is a pleasant male in no acute distress.  /66  Pulse 64  Temp 98.4  F  (36.9  C) (Oral)  Resp 18  Wt 92.9 kg (204 lb 14.4 oz)  SpO2 96%  BMI 30.26 kg/m2  Wt Readings from Last 10 Encounters:   05/24/17 92.9 kg (204 lb 14.4 oz)   05/22/17 90.7 kg (200 lb)   05/02/17 91.4 kg (201 lb 8 oz)   05/01/17 91.9 kg (202 lb 9.6 oz)   04/24/17 92.1 kg (203 lb)   04/14/17 91.6 kg (202 lb)   04/10/17 94.8 kg (209 lb)   04/10/17 94.8 kg (209 lb 1.6 oz)   03/28/17 94.8 kg (208 lb 14.4 oz)   03/20/17 92.3 kg (203 lb 6.4 oz)     HEENT: EOMI, PERRL. Sclerae are anicteric. Oral mucosa is pink and moist with no lesions or thrush.   Lymph: Neck is supple with no lymphadenopathy in the cervical or supraclavicular areas.   Heart: Regular rate and rhythm.   Lungs: Clear to auscultation bilaterally. Port right chest accessed, c/d/i.  Abdomen: Bowel sounds present, soft, nontender with no palpable hepatosplenomegaly or masses. Laporscopic incisions healed - 5 total.   Extremities: No lower extremity edema noted bilaterally.   Neuro: Cranial nerves II through XII are grossly intact.  Skin: No rashes, petechiae, or bruising noted on exposed skin.    Laboratory Data:   5/24/2017 10:57   Sodium 139   Potassium 4.3   Chloride 104   Carbon Dioxide 26   Urea Nitrogen 17   Creatinine 0.68   GFR Estimate >90...   GFR Estimate If Black >90...   Calcium 9.0   Anion Gap 8   Albumin 3.7   Protein Total 7.3   Bilirubin Total 0.8   Alkaline Phosphatase 54   ALT 83 (H)   AST 44   Glucose 91   WBC 4.9   Hemoglobin 14.2   Hematocrit 41.4   Platelet Count 275   RBC Count 4.65   MCV 89   MCH 30.5   MCHC 34.3   RDW 12.4   Diff Method Automated Method   % Neutrophils 51.1   % Lymphocytes 35.0   % Monocytes 9.8   % Eosinophils 2.9   % Basophils 1.0   % Immature Granulocytes 0.2   Nucleated RBCs 0   Absolute Neutrophil 2.5   Absolute Lymphocytes 1.7   Absolute Monocytes 0.5   Absolute Eosinophils 0.1   Absolute Basophils 0.1   Abs Immature Granulocytes 0.0   Absolute Nucleated RBC 0.0     Assessment and Plan:  1. Oncology. stage  III rectosigmoid adenocarcinoma, moderately differentiated, status post APR with good margins here today to start adjuvant chemotherapy with FOLFOX. Reviewed chemotherapy regimen, expected side effects and how he can manage those side effects. Meño and his wife asked several questions all of which were answered. Encouraged him to call with any questions or concerns.  The overall plan is for chemotherapy every 14 days for 6 months.   - Will have him seen prior to cycle 2 in two weeks.       Angie WITT    [facility] [address] [address] [phone]    Patient was seen and examined by me, as noted above. GOPI Valencia acted as a scribe. I have reviewed and edited the above note.  Zahira Christensen PA-C

## 2017-05-24 NOTE — PROGRESS NOTES
Infusion Nursing Note:  Meño Omalley presents today for Cycle 1 Day 1 Oxaliplatin/Leucovorin/Fluorouracil bolus and 46 hour pump connect.    Patient seen by provider today: Yes: Zahira REN prior to infusion.   present during visit today: Not Applicable.    Note: Patient new to oncology infusion room and is receiving treatment for the first time.  Pt oriented to infusion room, location of bathrooms, nutrition stations, and call light. New patient teaching done previously. Pt received new pt folder prior to coming to infusion. Writer reinforced teaching in infusion. All medications and side effects reviewed with patient. Specific side effects of Oxaliplatin, and Fluorouracil discussed with patient such as cold sensitivity, neuropathy, nausea and vomiting, risk for mucositis, as well as pancytopenia. Patient also educated on when and how to call [facility] going forward.    Intravenous Access:  Implanted Port.    Treatment Conditions:  Lab Results   Component Value Date    HGB 14.2 05/24/2017     Lab Results   Component Value Date    WBC 4.9 05/24/2017      Lab Results   Component Value Date    ANEU 2.5 05/24/2017     Lab Results   Component Value Date     05/24/2017      Lab Results   Component Value Date     05/24/2017                   Lab Results   Component Value Date    POTASSIUM 4.3 05/24/2017           Lab Results   Component Value Date    MAG 2.1 04/13/2017            Lab Results   Component Value Date    CR 0.68 05/24/2017                   Lab Results   Component Value Date    CANDY 9.0 05/24/2017                Lab Results   Component Value Date    BILITOTAL 0.8 05/24/2017           Lab Results   Component Value Date    ALBUMIN 3.7 05/24/2017                    Lab Results   Component Value Date    ALT 83 05/24/2017           Lab Results   Component Value Date    AST 44 05/24/2017     Results reviewed, labs MET treatment parameters, ok to proceed with treatment.      Post Infusion  Assessment:  Patient tolerated infusion without incident.  Blood return noted pre and post infusion.  Site patent and intact, free from redness, edema or discomfort.  No evidence of extravasations.  Access discontinued per protocol.  + BR checked every 2ccs while administering Fluorouracil. Tubing filter taped to pt's skin with the  per protocol. CSeries pump running @ 5.2ml/hour for 46 hours. Pump attached per protocol. Nidia @ [facility] aware to disconnect pt on 5/26/17@ 1430. Connections checked with Johnny Purvis RN.      Discharge Plan:   Prescription refills given for Compazine, Zofran, Lorazepam.  Discharge instructions reviewed with: Patient and Family.  Patient and/or family verbalized understanding of discharge instructions and all questions answered.  Copy of AVS reviewed with patient and/or family.  Patient will return 6/7/17 for next appointment.  Patient discharged in stable condition accompanied by: self and wife.  Departure Mode: Ambulatory.  Face to Face time: 10min.    Hermila Vincent RN

## 2017-05-24 NOTE — PROGRESS NOTES
Oncology/Hematology Visit Note  May 24, 2017    Reason for Visit: follow up of adenocarcinoma of the rectum    History of Present Illness: Meño Omalley is a 46 year old male with adenocarcinoma of rectum. He initially presented to primary care with blood in his stool for a few months and a change in bowel habits with the stool being more loose. Family history of father who developed colon cancer at age 60, and a sister who developed colon cancer at age 55.  He presented for colonoscopy with Dr. Delio Daniel on 03/24/2017.  Findings were several small polyps and a possible carcinoma of the rectosigmoid junction measured from 17-21 cm.  Biopsies obtained were remarkable for adenocarcinoma.  CT scan of the chest, abdomen and pelvis was done, which confirmed the presence of possible thickening in the rectosigmoid junction, but no evidence of metastases.  A 3D MRI was pursued on 03/31, and it showed circumferential wall thickening of the upper rectum/rectosigmoid with minimal extension into the mesorectal fat, consistent with rectal cancer, stage IIIB, N1.  His case was discussed in the Tumor Board, and the decision was made to forego chemoradiation, but to go straight to surgery, and he underwent a low anterior resection. Surgery took place on 4/12/2017. The pathology on the tumor was moderately differentiated adenocarcinoma invading the muscularis propria with a tumor size of 3.7 cm all negative  carcinoma margins.  Lymphovascular invasion was not identified.  Two non-renea tumor deposits were identified, and 6 lymph nodes were negative for metastatic carcinoma.  He was staged by pathological staging and pT2 N1c.  The mismatch repair testing was done on his original biopsy, and he had a normal pattern of mismatch repair protein expression, ruling out Rodriguez syndrome. Port was placed by IR on 5/23/17.    He is here today to start treatment with 5-FU and oxaliplatin (FOLFOX). The overall plan is for chemotherapy  every 14 days for 6 months.     Interval History:  Meño is here today along with his wife. He has been feeling well. His stools are formed, soft. They are not exactly how they were prior to surgery but he denies diarrhea. He is eating a low residue diet and drinking well. He denies any pain post operatively. Note that port site was a little sore from the recent placement when they accessed it today.      Review of Systems:  Patient denies any of the following except if noted above: fevers, chills, difficulty with energy, vision or hearing changes, chest pain, dyspnea, abdominal pain, nausea, vomiting, diarrhea, constipation, urinary concerns, headaches, numbness, tingling, issues with sleep or mood. He also denies lumps, bumps, rashes or skin lesions, bleeding or bruising issues.    Current Outpatient Prescriptions   Medication Sig Dispense Refill     lidocaine-prilocaine (EMLA) cream Apply topically as needed for moderate pain 30 g 1     escitalopram (LEXAPRO) 20 MG tablet Take 20 mg by mouth every morning        ondansetron (ZOFRAN) 8 MG tablet Take 1 tablet (8 mg) by mouth every 8 hours as needed (Nausea/Vomiting) 10 tablet 2     prochlorperazine (COMPAZINE) 10 MG tablet Take 1 tablet (10 mg) by mouth every 6 hours as needed (Nausea/Vomiting) 30 tablet 2     LORazepam (ATIVAN) 0.5 MG tablet Take 1 tablet (0.5 mg) by mouth every 4 hours as needed (Anxiety, Nausea/Vomiting or Sleep) (Patient not taking: Reported on 5/24/2017) 30 tablet 2     acetaminophen (TYLENOL) 325 MG tablet Take 2 tablets (650 mg) by mouth every 4 hours as needed for mild pain (Patient not taking: Reported on 5/24/2017) 100 tablet 0     fluticasone (FLOVENT HFA) 110 MCG/ACT inhaler Take 2 puffs by mouth 2 times daily as needed          Physical Examination:  General: The patient is a pleasant male in no acute distress.  /66  Pulse 64  Temp 98.4  F (36.9  C) (Oral)  Resp 18  Wt 92.9 kg (204 lb 14.4 oz)  SpO2 96%  BMI 30.26  kg/m2  Wt Readings from Last 10 Encounters:   05/24/17 92.9 kg (204 lb 14.4 oz)   05/22/17 90.7 kg (200 lb)   05/02/17 91.4 kg (201 lb 8 oz)   05/01/17 91.9 kg (202 lb 9.6 oz)   04/24/17 92.1 kg (203 lb)   04/14/17 91.6 kg (202 lb)   04/10/17 94.8 kg (209 lb)   04/10/17 94.8 kg (209 lb 1.6 oz)   03/28/17 94.8 kg (208 lb 14.4 oz)   03/20/17 92.3 kg (203 lb 6.4 oz)     HEENT: EOMI, PERRL. Sclerae are anicteric. Oral mucosa is pink and moist with no lesions or thrush.   Lymph: Neck is supple with no lymphadenopathy in the cervical or supraclavicular areas.   Heart: Regular rate and rhythm.   Lungs: Clear to auscultation bilaterally. Port right chest accessed, c/d/i.  Abdomen: Bowel sounds present, soft, nontender with no palpable hepatosplenomegaly or masses. Laporscopic incisions healed - 5 total.   Extremities: No lower extremity edema noted bilaterally.   Neuro: Cranial nerves II through XII are grossly intact.  Skin: No rashes, petechiae, or bruising noted on exposed skin.    Laboratory Data:   5/24/2017 10:57   Sodium 139   Potassium 4.3   Chloride 104   Carbon Dioxide 26   Urea Nitrogen 17   Creatinine 0.68   GFR Estimate >90...   GFR Estimate If Black >90...   Calcium 9.0   Anion Gap 8   Albumin 3.7   Protein Total 7.3   Bilirubin Total 0.8   Alkaline Phosphatase 54   ALT 83 (H)   AST 44   Glucose 91   WBC 4.9   Hemoglobin 14.2   Hematocrit 41.4   Platelet Count 275   RBC Count 4.65   MCV 89   MCH 30.5   MCHC 34.3   RDW 12.4   Diff Method Automated Method   % Neutrophils 51.1   % Lymphocytes 35.0   % Monocytes 9.8   % Eosinophils 2.9   % Basophils 1.0   % Immature Granulocytes 0.2   Nucleated RBCs 0   Absolute Neutrophil 2.5   Absolute Lymphocytes 1.7   Absolute Monocytes 0.5   Absolute Eosinophils 0.1   Absolute Basophils 0.1   Abs Immature Granulocytes 0.0   Absolute Nucleated RBC 0.0     Assessment and Plan:  1. Oncology. stage III rectosigmoid adenocarcinoma, moderately differentiated, status post APR with  good margins here today to start adjuvant chemotherapy with FOLFOX. Reviewed chemotherapy regimen, expected side effects and how he can manage those side effects. Meño and his wife asked several questions all of which were answered. Encouraged him to call with any questions or concerns.  The overall plan is for chemotherapy every 14 days for 6 months.   - Will have him seen prior to cycle 2 in two weeks.       Angie WITT    [facility] [address] [id number] [phone]    Patient was seen and examined by me, as noted above. Angie Agrawal NP-NICK acted as a scribe. I have reviewed and edited the above note.  Zahira Christensen PA-C

## 2017-05-24 NOTE — NURSING NOTE
Chief Complaint   Patient presents with     Port Draw     port blood draw, saline and heparin flush by RN, tegaderm dressing over port   Rn accessed port, flushed with saline and heparin, tegaderm dressing over port AND vitals done

## 2017-05-24 NOTE — PATIENT INSTRUCTIONS
Contact Numbers    [facility] Main Line: [phone]  [facility] Triage:  [phone]    Call triage with chills and/or temperature greater than or equal to 100.5, uncontrolled nausea/vomiting, diarrhea, constipation, dizziness, shortness of breath, chest pain, bleeding, unexplained bruising, or any new/concerning symptoms, questions/concerns.     If you are having any concerning symptoms or wish to speak to a provider before your next infusion visit, please call your care coordinator or triage to notify them so we can adequately serve you.       After Hours: [phone]    If after hours, weekends, or holidays, call main hospital  and ask for Oncology doctor on call.           May 2017   [place] Monday Tuesday Wednesday Thursday Friday Saturday        1     [facility] NEW   12:15 PM   (60 min.)   Jeanette Mcarthur MD   [facility] 2     [facility] RETURN    9:45 AM   (30 min.)   Ignacio Rose MD   [facility] Colon and Rectal Surgery 3     4     5     6       7     8     9     10     11     CT CHEST ABDOMEN PELVIS WWO    8:30 AM   (30 min.)   PHCT1   [facility] CT Scan 12     MR ABDOMEN WWO    9:30 AM   (45 min.)   MGMR1   [facility]     LAB   10:30 AM   (10 min.)   LAB FIRST FLOOR [facility] 13       14     15     16     17     18     19     20       21     22     Admission    9:42 AM   Jeanette Mcarthur MD   [address] [facility]   (Discharge: 5/22/2017)     PROCEDURE - 4.5 HR   10:00 AM   (270 min.)   U2A ROOM 4   Unit 2A [facility]     IR CHEST PORT PLACEMENT >5 YRS   11:30 AM   (60 min.)   UUIR3   [facility], Interventional Radiology 23     24     [facility] LAB DRAW   10:45 AM   (15 min.)    [facility] LAB DRAW   [facility] Lab Draw     [facility] RETURN   10:55 AM   (50 min.)   Zahira Christensen PA-C   [facility] ONC INFUSION 240    1:00 PM   (240 min.)   [facility] ONCOLOGY INFUSION   [facility] 25     26      27       28     29     30     31 June 2017 Sunday Monday Tuesday Wednesday Thursday Friday Saturday                       1     2     3       4     5     6     7     [facility] LAB DRAW    7:45 AM   (15 min.)    [facility]   [facility] Lab Draw     [facility] RETURN    8:00 AM   (30 min.)   Jeanette Mcarthur MD   [facility] ONC INFUSION 240    9:00 AM   (240 min.)    ONCOLOGY INFUSION   [facility] Cancer [facility] 8     9     10       11     12     13     14     15     16  Happy Birthday!     17       18     19     20     21     22     23     24       25     26     27     28     29     30                       Lab Results:  Recent Results (from the past 12 hour(s))   CBC with platelets differential    Collection Time: 05/24/17 10:57 AM   Result Value Ref Range    WBC 4.9 4.0 - 11.0 10e9/L    RBC Count 4.65 4.4 - 5.9 10e12/L    Hemoglobin 14.2 13.3 - 17.7 g/dL    Hematocrit 41.4 40.0 - 53.0 %    MCV 89 78 - 100 fl    MCH 30.5 26.5 - 33.0 pg    MCHC 34.3 31.5 - 36.5 g/dL    RDW 12.4 10.0 - 15.0 %    Platelet Count 275 150 - 450 10e9/L    Diff Method Automated Method     % Neutrophils 51.1 %    % Lymphocytes 35.0 %    % Monocytes 9.8 %    % Eosinophils 2.9 %    % Basophils 1.0 %    % Immature Granulocytes 0.2 %    Nucleated RBCs 0 0 /100    Absolute Neutrophil 2.5 1.6 - 8.3 10e9/L    Absolute Lymphocytes 1.7 0.8 - 5.3 10e9/L    Absolute Monocytes 0.5 0.0 - 1.3 10e9/L    Absolute Eosinophils 0.1 0.0 - 0.7 10e9/L    Absolute Basophils 0.1 0.0 - 0.2 10e9/L    Abs Immature Granulocytes 0.0 0 - 0.4 10e9/L    Absolute Nucleated RBC 0.0    Comprehensive metabolic panel    Collection Time: 05/24/17 10:57 AM   Result Value Ref Range    Sodium 139 133 - 144 mmol/L    Potassium 4.3 3.4 - 5.3 mmol/L    Chloride 104 94 - 109 mmol/L    Carbon Dioxide 26 20 - 32 mmol/L    Anion Gap 8 3 - 14 mmol/L    Glucose 91 70 - 99 mg/dL    Urea Nitrogen 17 7 - 30 mg/dL     Creatinine 0.68 0.66 - 1.25 mg/dL    GFR Estimate >90  Non  GFR Calc   >60 mL/min/1.7m2    GFR Estimate If Black >90   GFR Calc   >60 mL/min/1.7m2    Calcium 9.0 8.5 - 10.1 mg/dL    Bilirubin Total 0.8 0.2 - 1.3 mg/dL    Albumin 3.7 3.4 - 5.0 g/dL    Protein Total 7.3 6.8 - 8.8 g/dL    Alkaline Phosphatase 54 40 - 150 U/L    ALT 83 (H) 0 - 70 U/L    AST 44 0 - 45 U/L   EKG 12-lead complete w/read - Clinics    Collection Time: 05/24/17 11:26 AM   Result Value Ref Range    Interpretation ECG Click View Image link to view waveform and result

## 2017-05-24 NOTE — MR AVS SNAPSHOT
After Visit Summary   5/24/2017    Meño Omalley    MRN: [id number]           Patient Information     Date Of Birth          1970        Visit Information        Provider Department      5/24/2017 3:16 PM Lashay Alonso, [facility]        Today's Diagnoses     Visit for counseling    -  1       Follow-ups after your visit        Your next 10 appointments already scheduled     Jun 07, 2017  7:45 AM CDT   [facility] Draw with [facility] DRAW   [facility] Lab Draw ([facility])    [address] [place] [address]  2nd [employer] [id number] [phone]            Jun 07, 2017  8:15 AM CDT   (Arrive by 8:00 AM)   Return Visit with Jeanette Mcarthur MD   [facility] ([facility])    [address] [place] [id number] [phone]            Jun 07, 2017  9:00 AM CDT   Infusion 240 with [facility] ONCOLOGY INFUSION, UC 17 ATC   [facility] ([facility])    [address] [place] [id number]   [phone]            Sep 12, 2017 10:30 AM CDT   Flexible Sigmoidoscopy with Ignacio Rose MD   [facility] Colon and Rectal Surgery ([facility])    [address] [place] [id number] [phone]              Who to contact     Please call your clinic at [phone] to:    Ask questions about your health    Make or cancel appointments    Discuss your medicines    Learn about your test results    Speak to your doctor   If you have compliments or concerns about an experience at your clinic, or if you wish to file a complaint, please contact [facility] Patient Relations at [phone] or email us at [email or web address]         Additional Information About Your Visit        MyChart Information     MyChart gives you secure access to your electronic  health record. If you see a primary care provider, you can also send messages to your care team and make appointments. If you have questions, please call your primary care clinic.  If you do not have a primary care provider, please call [phone] and they will assist you.      [employer] is an electronic gateway that provides easy, online access to your medical records. With [employer], you can request a clinic appointment, read your test results, renew a prescription or communicate with your care team.     To access your existing account, please contact your [facility] or call [phone] for assistance.        Care EveryWhere ID     This is your Care EveryWhere ID. This could be used by other organizations to access your [place] medical records  [id number]         Blood Pressure from Last 3 Encounters:   05/24/17 133/66   05/22/17 155/73   05/02/17 127/80    Weight from Last 3 Encounters:   05/24/17 92.9 kg (204 lb 14.4 oz)   05/22/17 90.7 kg (200 lb)   05/02/17 91.4 kg (201 lb 8 oz)              Today, you had the following     No orders found for display         Today's Medication Changes          These changes are accurate as of: 5/24/17  3:26 PM.  If you have any questions, ask your nurse or doctor.               Start taking these medicines.        Dose/Directions    lidocaine-prilocaine cream   Commonly known as:  EMLA   Used for:  Rectal cancer (H)   Started by:  Zahira Christensen PA-C        Apply topically as needed for moderate pain   Quantity:  30 g   Refills:  1       LORazepam 0.5 MG tablet   Commonly known as:  ATIVAN   Used for:  Adenocarcinoma of rectum (H)        Dose:  0.5 mg   Take 1 tablet (0.5 mg) by mouth every 4 hours as needed (Anxiety, Nausea/Vomiting or Sleep)   Quantity:  30 tablet   Refills:  2       ondansetron 8 MG tablet   Commonly known as:  ZOFRAN   Used for:  Adenocarcinoma of rectum (H)        Dose:  8 mg   Take 1 tablet (8 mg) by mouth every 8  hours as needed (Nausea/Vomiting)   Quantity:  10 tablet   Refills:  2       prochlorperazine 10 MG tablet   Commonly known as:  COMPAZINE   Used for:  Adenocarcinoma of rectum (H)        Dose:  10 mg   Take 1 tablet (10 mg) by mouth every 6 hours as needed (Nausea/Vomiting)   Quantity:  30 tablet   Refills:  2         Stop taking these medicines if you haven't already. Please contact your care team if you have questions.     oxyCODONE 5 MG IR tablet   Commonly known as:  ROXICODONE   Stopped by:  Zahira Christensen PA-C                Where to get your medicines      These medications were sent to [place], [place] - [address]  [address], [facility] [address]    Hours:  TRANSPLANT PHONE NUMBER [phone] Phone:  [phone]     lidocaine-prilocaine cream    ondansetron 8 MG tablet    prochlorperazine 10 MG tablet         Some of these will need a paper prescription and others can be bought over the counter.  Ask your nurse if you have questions.     Bring a paper prescription for each of these medications     LORazepam 0.5 MG tablet                Primary Care Provider Office Phone # Fax #    Delio Alcala [phone]       [facility] [address] [place] [address]        Thank you!     Thank you for choosing [facility]  for your care. Our goal is always to provide you with excellent care. Hearing back from our patients is one way we can continue to improve our services. Please take a few minutes to complete the written survey that you may receive in the mail after your visit with us. Thank you!             Your Updated Medication List - Protect others around you: Learn how to safely use, store and throw away your medicines at www.disposemymeds.org.          This list is accurate as of: 5/24/17  3:26 PM.  Always use your most recent med list.                   Brand Name Dispense  Instructions for use    acetaminophen 325 MG tablet    TYLENOL    100 tablet    Take 2 tablets (650 mg) by mouth every 4 hours as needed for mild pain       escitalopram 20 MG tablet    LEXAPRO     Take 20 mg by mouth every morning       FLOVENT  MCG/ACT Inhaler   Generic drug:  fluticasone      Take 2 puffs by mouth 2 times daily as needed       lidocaine-prilocaine cream    EMLA    30 g    Apply topically as needed for moderate pain       LORazepam 0.5 MG tablet    ATIVAN    30 tablet    Take 1 tablet (0.5 mg) by mouth every 4 hours as needed (Anxiety, Nausea/Vomiting or Sleep)       ondansetron 8 MG tablet    ZOFRAN    10 tablet    Take 1 tablet (8 mg) by mouth every 8 hours as needed (Nausea/Vomiting)       prochlorperazine 10 MG tablet    COMPAZINE    30 tablet    Take 1 tablet (10 mg) by mouth every 6 hours as needed (Nausea/Vomiting)

## 2017-05-24 NOTE — MR AVS SNAPSHOT
After Visit Summary   5/24/2017    Meño Omalley    MRN: [id number]           Patient Information     Date Of Birth          1970        Visit Information        Provider Department      5/24/2017 1:00 PM UC 10 ATC; UC ONCOLOGY INFUSION [facility]        Today's Diagnoses     Adenocarcinoma of rectum (H)    -  1      Care Instructions    Contact Numbers    [facility] Main Line: [phone]  [facility] Triage:  [phone]    Call triage with chills and/or temperature greater than or equal to 100.5, uncontrolled nausea/vomiting, diarrhea, constipation, dizziness, shortness of breath, chest pain, bleeding, unexplained bruising, or any new/concerning symptoms, questions/concerns.     If you are having any concerning symptoms or wish to speak to a provider before your next infusion visit, please call your care coordinator or triage to notify them so we can adequately serve you.       After Hours: [phone]    If after hours, weekends, or holidays, call main hospital  and ask for Oncology doctor on call.           May 2017   [place] Monday Tuesday Wednesday Thursday Friday Saturday        1     UMP NEW   12:15 PM   (60 min.)   Jeanette Mcarthur MD   [facility] 2     UMP RETURN    9:45 AM   (30 min.)   Ignacio Rose MD   [facility] Colon and Rectal Surgery 3     4     5     6       7     8     9     10     11     CT CHEST ABDOMEN PELVIS WWO    8:30 AM   (30 min.)   PHCT1   [facility] CT Scan 12     MR ABDOMEN WWO    9:30 AM   (45 min.)   [address]     LAB   10:30 AM   (10 min.)   LAB FIRST FLOOR [facility] 13       14     15     16     17     18     19     20       21     22     Admission    9:42 AM   Jeanette Mcarthur MD   Unit [address]A [facility]   (Discharge: 5/22/2017)     PROCEDURE - 4.5 HR   10:00 AM   (270 min.)   U2A ROOM 4   Unit 2A [facility]     IR CHEST PORT PLACEMENT >5 YRS   11:30 AM   (60  min.)   UUIR3   [facility] [place], Interventional Radiology 23     24     [facility] LAB DRAW   10:45 AM   (15 min.)   [facility] LAB DRAW   [facility] Lab Draw     UMP RETURN   10:55 AM   (50 min.)   Zahira Christensen PA-C   [facility] 240    1:00 PM   (240 min.)    ONCOLOGY INFUSION   [facility] 25     26     27       28     29     30     31 June 2017 Sunday Monday Tuesday Wednesday Thursday Friday Saturday                       1     2     3       4     5     6     7     P [facility] LAB DRAW    7:45 AM   (15 min.)    [facility] LAB DRAW   [facility] Lab Draw     UMP RETURN    8:00 AM   (30 min.)   Jeanette Mcarthur MD   [facility] ONC INFUSION 240    9:00 AM   (240 min.)    ONCOLOGY INFUSION   [facility] 8     9     10       11     12     13     14     15     16  Happy Birthday!     17       18     19     20     21     22     23     24       25     26     27     28     29     30                       Lab Results:  Recent Results (from the past 12 hour(s))   CBC with platelets differential    Collection Time: 05/24/17 10:57 AM   Result Value Ref Range    WBC 4.9 4.0 - 11.0 10e9/L    RBC Count 4.65 4.4 - 5.9 10e12/L    Hemoglobin 14.2 13.3 - 17.7 g/dL    Hematocrit 41.4 40.0 - 53.0 %    MCV 89 78 - 100 fl    MCH 30.5 26.5 - 33.0 pg    MCHC 34.3 31.5 - 36.5 g/dL    RDW 12.4 10.0 - 15.0 %    Platelet Count 275 150 - 450 10e9/L    Diff Method Automated Method     % Neutrophils 51.1 %    % Lymphocytes 35.0 %    % Monocytes 9.8 %    % Eosinophils 2.9 %    % Basophils 1.0 %    % Immature Granulocytes 0.2 %    Nucleated RBCs 0 0 /100    Absolute Neutrophil 2.5 1.6 - 8.3 10e9/L    Absolute Lymphocytes 1.7 0.8 - 5.3 10e9/L    Absolute Monocytes 0.5 0.0 - 1.3 10e9/L    Absolute Eosinophils 0.1 0.0 - 0.7 10e9/L    Absolute Basophils 0.1 0.0 - 0.2 10e9/L    Abs Immature  Granulocytes 0.0 0 - 0.4 10e9/L    Absolute Nucleated RBC 0.0    Comprehensive metabolic panel    Collection Time: 05/24/17 10:57 AM   Result Value Ref Range    Sodium 139 133 - 144 mmol/L    Potassium 4.3 3.4 - 5.3 mmol/L    Chloride 104 94 - 109 mmol/L    Carbon Dioxide 26 20 - 32 mmol/L    Anion Gap 8 3 - 14 mmol/L    Glucose 91 70 - 99 mg/dL    Urea Nitrogen 17 7 - 30 mg/dL    Creatinine 0.68 0.66 - 1.25 mg/dL    GFR Estimate >90  Non  GFR Calc   >60 mL/min/1.7m2    GFR Estimate If Black >90   GFR Calc   >60 mL/min/1.7m2    Calcium 9.0 8.5 - 10.1 mg/dL    Bilirubin Total 0.8 0.2 - 1.3 mg/dL    Albumin 3.7 3.4 - 5.0 g/dL    Protein Total 7.3 6.8 - 8.8 g/dL    Alkaline Phosphatase 54 40 - 150 U/L    ALT 83 (H) 0 - 70 U/L    AST 44 0 - 45 U/L   EKG 12-lead complete w/read - Clinics    Collection Time: 05/24/17 11:26 AM   Result Value Ref Range    Interpretation ECG Click View Image link to view waveform and result                Follow-ups after your visit        Your next 10 appointments already scheduled     Jun 07, 2017  7:45 AM CDT   [facility] Lab Draw with [facility] DRAW   [facility] Lab Draw ([facility])    [address] [place] [id number] [phone]            Jun 07, 2017  8:15 AM CDT   (Arrive by 8:00 AM)   Return Visit with Jeanette Mcarthur MD   [facility] ([facility])    [address] [place] [id number] [phone]            Jun 07, 2017  9:00 AM CDT   Infusion 240 with  ONCOLOGY INFUSION, UC 17 ATC   [facility] ([facility])    [address] [place] [id number]   [phone]            Sep 12, 2017 10:30 AM CDT   Flexible Sigmoidoscopy with Ignacio Rose MD   [facility] Colon and Rectal Surgery ([facility])    [address]  [facility] [address] [phone]              Who to contact     If you have questions or need follow up information about today's clinic visit or your schedule please contact [facility] directly at [phone].  Normal or non-critical lab and imaging results will be communicated to you by MyChart, letter or phone within 4 business days after the clinic has received the results. If you do not hear from us within 7 days, please contact the clinic through [employer] or phone. If you have a critical or abnormal lab result, we will notify you by phone as soon as possible.  Submit refill requests through [facility] or call your pharmacy and they will forward the refill request to us. Please allow 3 business days for your refill to be completed.          Additional Information About Your Visit        [employer] Information     [facility] gives you secure access to your electronic health record. If you see a primary care provider, you can also send messages to your care team and make appointments. If you have questions, please call your primary care clinic.  If you do not have a primary care provider, please call [phone] and they will assist you.        Care EveryWhere ID     This is your Care EveryWhere ID. This could be used by other organizations to access your [facility] medical records  [id number]         Blood Pressure from Last 3 Encounters:   05/24/17 133/66   05/22/17 155/73   05/02/17 127/80    Weight from Last 3 Encounters:   05/24/17 92.9 kg (204 lb 14.4 oz)   05/22/17 90.7 kg (200 lb)   05/02/17 91.4 kg (201 lb 8 oz)              We Performed the Following     CBC with platelets differential     Comprehensive metabolic panel          Today's Medication Changes          These changes are accurate as of: 5/24/17  2:17 PM.  If you have any questions, ask your nurse or doctor.               Start taking these medicines.        Dose/Directions    lidocaine-prilocaine cream   Commonly  known as:  EMLA   Used for:  Rectal cancer (H)   Started by:  Zahira Christensen PA-C        Apply topically as needed for moderate pain   Quantity:  30 g   Refills:  1       LORazepam 0.5 MG tablet   Commonly known as:  ATIVAN   Used for:  Adenocarcinoma of rectum (H)        Dose:  0.5 mg   Take 1 tablet (0.5 mg) by mouth every 4 hours as needed (Anxiety, Nausea/Vomiting or Sleep)   Quantity:  30 tablet   Refills:  2       ondansetron 8 MG tablet   Commonly known as:  ZOFRAN   Used for:  Adenocarcinoma of rectum (H)        Dose:  8 mg   Take 1 tablet (8 mg) by mouth every 8 hours as needed (Nausea/Vomiting)   Quantity:  10 tablet   Refills:  2       prochlorperazine 10 MG tablet   Commonly known as:  COMPAZINE   Used for:  Adenocarcinoma of rectum (H)        Dose:  10 mg   Take 1 tablet (10 mg) by mouth every 6 hours as needed (Nausea/Vomiting)   Quantity:  30 tablet   Refills:  2         Stop taking these medicines if you haven't already. Please contact your care team if you have questions.     oxyCODONE 5 MG IR tablet   Commonly known as:  ROXICODONE   Stopped by:  Zahira Christensen PA-C                Where to get your medicines      These medications were sent to [address] [address]    Hours:  TRANSPLANT PHONE NUMBER [phone] Phone:  [phone]     lidocaine-prilocaine cream    ondansetron 8 MG tablet    prochlorperazine 10 MG tablet         Some of these will need a paper prescription and others can be bought over the counter.  Ask your nurse if you have questions.     Bring a paper prescription for each of these medications     LORazepam 0.5 MG tablet                Primary Care Provider Office Phone # Fax #    Delio Alcala [phone]       [address] [place] [address]        Thank you!     Thank you for choosing [facility]  for your care. Our goal is always to provide you with excellent care. Hearing back from our patients is one way we can continue to improve our services. Please take a few minutes to complete the written survey that you may receive in the mail after your visit with us. Thank you!             Your Updated Medication List - Protect others around you: Learn how to safely use, store and throw away your medicines at www.disposemymeds.org.          This list is accurate as of: 5/24/17  2:17 PM.  Always use your most recent med list.                   Brand Name Dispense Instructions for use    acetaminophen 325 MG tablet    TYLENOL    100 tablet    Take 2 tablets (650 mg) by mouth every 4 hours as needed for mild pain       escitalopram 20 MG tablet    LEXAPRO     Take 20 mg by mouth every morning       FLOVENT  MCG/ACT Inhaler   Generic drug:  fluticasone      Take 2 puffs by mouth 2 times daily as needed       lidocaine-prilocaine cream    EMLA    30 g    Apply topically as needed for moderate pain       LORazepam 0.5 MG tablet    ATIVAN    30 tablet    Take 1 tablet (0.5 mg) by mouth every 4 hours as needed (Anxiety, Nausea/Vomiting or Sleep)       ondansetron 8 MG tablet    ZOFRAN    10 tablet    Take 1 tablet (8 mg) by mouth every 8 hours as needed (Nausea/Vomiting)       prochlorperazine 10 MG tablet    COMPAZINE    30 tablet    Take 1 tablet (10 mg) by mouth every 6 hours as needed (Nausea/Vomiting)

## 2017-05-25 LAB — INTERPRETATION ECG - MUSE: NORMAL

## 2017-05-27 DIAGNOSIS — F33.0 MAJOR DEPRESSIVE DISORDER, RECURRENT EPISODE, MILD (H): Primary | ICD-10-CM

## 2017-05-27 NOTE — TELEPHONE ENCOUNTER
Lexapro 20mg     Last Written Prescription Date: never written by you, patient reported medication; last filled 4/03/17 by Dr. Lavern Hassan  Last Fill Quantity: -, # refills: -  Last Office Visit with G, P or [facility] prescribing provider: 4/24/17        BP Readings from Last 3 Encounters:   05/24/17 133/66   05/22/17 155/73   05/02/17 127/80     Pulse: (for Fetzima)  Creatinine   Date Value Ref Range Status   05/24/2017 0.68 0.66 - 1.25 mg/dL Final   ]    Last PHQ-9 score on record= No flowsheet data found.    Chasidy Hodgson-Technician  [facility]  [phone]

## 2017-05-31 RX ORDER — ESCITALOPRAM OXALATE 20 MG/1
20 TABLET ORAL EVERY MORNING
Qty: 30 TABLET | Refills: 3 | Status: SHIPPED | OUTPATIENT
Start: 2017-05-31 | End: 2018-01-04

## 2017-06-01 ENCOUNTER — TELEPHONE (OUTPATIENT)
Dept: ONCOLOGY | Facility: CLINIC | Age: 47
End: 2017-06-01

## 2017-06-01 NOTE — TELEPHONE ENCOUNTER
6/1/2017    I called Meño again today to discuss the ColoNext genetic test we ordered through [facility] in April 2017, which he subsequently canceled due to a high out of pocket cost. Meño explained that he would be interested in pursuing genetic testing again, as he has recently met his deductible following surgery and chemotherapy. As such, I will contact [employer] to better understand his out of pocket cost for the ColoNext test given that he has met his deductible. I will also discuss with [facility] if we can use the sample he already submitted, or if a new blood sample will need to be collected. I will then contact Meño with this information. Meño verbalized agreement with this plan and denied having any other questions at this time.    Faby Santos MS, [facility]  Certified Genetic Counselor  Office: [phone]  Pager: [phone]

## 2017-06-07 ENCOUNTER — HOME INFUSION (PRE-WILLOW HOME INFUSION) (OUTPATIENT)
Dept: PHARMACY | Facility: CLINIC | Age: 47
End: 2017-06-07

## 2017-06-07 ENCOUNTER — INFUSION THERAPY VISIT (OUTPATIENT)
Dept: ONCOLOGY | Facility: CLINIC | Age: 47
End: 2017-06-07
Attending: INTERNAL MEDICINE
Payer: COMMERCIAL

## 2017-06-07 VITALS
TEMPERATURE: 98.7 F | DIASTOLIC BLOOD PRESSURE: 85 MMHG | OXYGEN SATURATION: 95 % | HEART RATE: 81 BPM | HEIGHT: 69 IN | BODY MASS INDEX: 30.07 KG/M2 | SYSTOLIC BLOOD PRESSURE: 132 MMHG | RESPIRATION RATE: 15 BRPM | WEIGHT: 203 LBS

## 2017-06-07 DIAGNOSIS — C20 ADENOCARCINOMA OF RECTUM (H): Primary | ICD-10-CM

## 2017-06-07 DIAGNOSIS — C20 ADENOCARCINOMA OF RECTUM (H): ICD-10-CM

## 2017-06-07 LAB
ALBUMIN SERPL-MCNC: 3.5 G/DL (ref 3.4–5)
ALP SERPL-CCNC: 52 U/L (ref 40–150)
ALT SERPL W P-5'-P-CCNC: 44 U/L (ref 0–70)
ANION GAP SERPL CALCULATED.3IONS-SCNC: 4 MMOL/L (ref 3–14)
AST SERPL W P-5'-P-CCNC: 20 U/L (ref 0–45)
BASOPHILS # BLD AUTO: 0 10E9/L (ref 0–0.2)
BASOPHILS NFR BLD AUTO: 0.7 %
BILIRUB SERPL-MCNC: 0.7 MG/DL (ref 0.2–1.3)
BUN SERPL-MCNC: 18 MG/DL (ref 7–30)
CALCIUM SERPL-MCNC: 8.8 MG/DL (ref 8.5–10.1)
CHLORIDE SERPL-SCNC: 108 MMOL/L (ref 94–109)
CO2 SERPL-SCNC: 29 MMOL/L (ref 20–32)
CREAT SERPL-MCNC: 0.83 MG/DL (ref 0.66–1.25)
DIFFERENTIAL METHOD BLD: NORMAL
EOSINOPHIL # BLD AUTO: 0.1 10E9/L (ref 0–0.7)
EOSINOPHIL NFR BLD AUTO: 2.7 %
ERYTHROCYTE [DISTWIDTH] IN BLOOD BY AUTOMATED COUNT: 12.6 % (ref 10–15)
GFR SERPL CREATININE-BSD FRML MDRD: NORMAL ML/MIN/1.7M2
GLUCOSE SERPL-MCNC: 85 MG/DL (ref 70–99)
HCT VFR BLD AUTO: 40.2 % (ref 40–53)
HGB BLD-MCNC: 13.5 G/DL (ref 13.3–17.7)
IMM GRANULOCYTES # BLD: 0 10E9/L (ref 0–0.4)
IMM GRANULOCYTES NFR BLD: 0 %
LYMPHOCYTES # BLD AUTO: 1.4 10E9/L (ref 0.8–5.3)
LYMPHOCYTES NFR BLD AUTO: 35.7 %
MCH RBC QN AUTO: 30 PG (ref 26.5–33)
MCHC RBC AUTO-ENTMCNC: 33.6 G/DL (ref 31.5–36.5)
MCV RBC AUTO: 89 FL (ref 78–100)
MONOCYTES # BLD AUTO: 0.4 10E9/L (ref 0–1.3)
MONOCYTES NFR BLD AUTO: 8.9 %
NEUTROPHILS # BLD AUTO: 2.1 10E9/L (ref 1.6–8.3)
NEUTROPHILS NFR BLD AUTO: 52 %
NRBC # BLD AUTO: 0 10*3/UL
NRBC BLD AUTO-RTO: 0 /100
PLATELET # BLD AUTO: 261 10E9/L (ref 150–450)
POTASSIUM SERPL-SCNC: 4.2 MMOL/L (ref 3.4–5.3)
PROT SERPL-MCNC: 7.3 G/DL (ref 6.8–8.8)
RBC # BLD AUTO: 4.5 10E12/L (ref 4.4–5.9)
SODIUM SERPL-SCNC: 141 MMOL/L (ref 133–144)
WBC # BLD AUTO: 4 10E9/L (ref 4–11)

## 2017-06-07 PROCEDURE — 99212 OFFICE O/P EST SF 10 MIN: CPT | Mod: ZF

## 2017-06-07 PROCEDURE — 96416 CHEMO PROLONG INFUSE W/PUMP: CPT

## 2017-06-07 PROCEDURE — 25000125 ZZHC RX 250: Mod: ZF | Performed by: INTERNAL MEDICINE

## 2017-06-07 PROCEDURE — 25000128 H RX IP 250 OP 636: Mod: ZF | Performed by: INTERNAL MEDICINE

## 2017-06-07 PROCEDURE — 99214 OFFICE O/P EST MOD 30 MIN: CPT | Mod: ZP | Performed by: INTERNAL MEDICINE

## 2017-06-07 PROCEDURE — 36415 COLL VENOUS BLD VENIPUNCTURE: CPT | Performed by: INTERNAL MEDICINE

## 2017-06-07 PROCEDURE — 80053 COMPREHEN METABOLIC PANEL: CPT | Performed by: INTERNAL MEDICINE

## 2017-06-07 PROCEDURE — 96367 TX/PROPH/DG ADDL SEQ IV INF: CPT

## 2017-06-07 PROCEDURE — 96413 CHEMO IV INFUSION 1 HR: CPT

## 2017-06-07 PROCEDURE — 96411 CHEMO IV PUSH ADDL DRUG: CPT

## 2017-06-07 PROCEDURE — 96368 THER/DIAG CONCURRENT INF: CPT

## 2017-06-07 PROCEDURE — 85025 COMPLETE CBC W/AUTO DIFF WBC: CPT | Performed by: INTERNAL MEDICINE

## 2017-06-07 PROCEDURE — 96415 CHEMO IV INFUSION ADDL HR: CPT

## 2017-06-07 PROCEDURE — 25000128 H RX IP 250 OP 636: Mod: JW,ZF | Performed by: INTERNAL MEDICINE

## 2017-06-07 RX ORDER — EPINEPHRINE 0.3 MG/.3ML
0.3 INJECTION SUBCUTANEOUS EVERY 5 MIN PRN
Status: CANCELLED | OUTPATIENT
Start: 2017-06-07

## 2017-06-07 RX ORDER — ALBUTEROL SULFATE 90 UG/1
1-2 AEROSOL, METERED RESPIRATORY (INHALATION)
Status: CANCELLED
Start: 2017-06-07

## 2017-06-07 RX ORDER — MEPERIDINE HYDROCHLORIDE 25 MG/ML
25 INJECTION INTRAMUSCULAR; INTRAVENOUS; SUBCUTANEOUS EVERY 30 MIN PRN
Status: CANCELLED | OUTPATIENT
Start: 2017-06-07

## 2017-06-07 RX ORDER — PALONOSETRON 0.05 MG/ML
0.25 INJECTION, SOLUTION INTRAVENOUS ONCE
Status: CANCELLED
Start: 2017-06-07 | End: 2017-06-07

## 2017-06-07 RX ORDER — ALBUTEROL SULFATE 0.83 MG/ML
2.5 SOLUTION RESPIRATORY (INHALATION)
Status: CANCELLED | OUTPATIENT
Start: 2017-06-07

## 2017-06-07 RX ORDER — LORAZEPAM 2 MG/ML
0.5 INJECTION INTRAMUSCULAR EVERY 4 HOURS PRN
Status: CANCELLED
Start: 2017-06-07

## 2017-06-07 RX ORDER — FLUOROURACIL 50 MG/ML
400 INJECTION, SOLUTION INTRAVENOUS ONCE
Status: COMPLETED | OUTPATIENT
Start: 2017-06-07 | End: 2017-06-07

## 2017-06-07 RX ORDER — SODIUM CHLORIDE 9 MG/ML
1000 INJECTION, SOLUTION INTRAVENOUS CONTINUOUS PRN
Status: CANCELLED
Start: 2017-06-07

## 2017-06-07 RX ORDER — HEPARIN SODIUM (PORCINE) LOCK FLUSH IV SOLN 100 UNIT/ML 100 UNIT/ML
500 SOLUTION INTRAVENOUS ONCE
Status: COMPLETED | OUTPATIENT
Start: 2017-06-07 | End: 2017-06-07

## 2017-06-07 RX ORDER — DIPHENHYDRAMINE HYDROCHLORIDE 50 MG/ML
50 INJECTION INTRAMUSCULAR; INTRAVENOUS
Status: CANCELLED
Start: 2017-06-07

## 2017-06-07 RX ORDER — FLUOROURACIL 50 MG/ML
400 INJECTION, SOLUTION INTRAVENOUS ONCE
Status: CANCELLED | OUTPATIENT
Start: 2017-06-07

## 2017-06-07 RX ORDER — METHYLPREDNISOLONE SODIUM SUCCINATE 125 MG/2ML
125 INJECTION, POWDER, LYOPHILIZED, FOR SOLUTION INTRAMUSCULAR; INTRAVENOUS
Status: CANCELLED
Start: 2017-06-07

## 2017-06-07 RX ORDER — PALONOSETRON 0.05 MG/ML
0.25 INJECTION, SOLUTION INTRAVENOUS ONCE
Status: COMPLETED | OUTPATIENT
Start: 2017-06-07 | End: 2017-06-07

## 2017-06-07 RX ADMIN — DEXTROSE MONOHYDRATE 250 ML: 50 INJECTION, SOLUTION INTRAVENOUS at 09:49

## 2017-06-07 RX ADMIN — LEUCOVORIN CALCIUM 750 MG: 350 INJECTION, POWDER, LYOPHILIZED, FOR SOLUTION INTRAMUSCULAR; INTRAVENOUS at 10:28

## 2017-06-07 RX ADMIN — SODIUM CHLORIDE, PRESERVATIVE FREE 500 UNITS: 5 INJECTION INTRAVENOUS at 08:32

## 2017-06-07 RX ADMIN — FLUOROURACIL 850 MG: 50 INJECTION, SOLUTION INTRAVENOUS at 12:42

## 2017-06-07 RX ADMIN — DEXAMETHASONE SODIUM PHOSPHATE: 10 INJECTION, SOLUTION INTRAMUSCULAR; INTRAVENOUS at 10:05

## 2017-06-07 RX ADMIN — OXALIPLATIN 180 MG: 5 INJECTION, SOLUTION, CONCENTRATE INTRAVENOUS at 10:32

## 2017-06-07 RX ADMIN — PALONOSETRON HYDROCHLORIDE 0.25 MG: 0.25 INJECTION INTRAVENOUS at 10:28

## 2017-06-07 ASSESSMENT — PAIN SCALES - GENERAL: PAINLEVEL: NO PAIN (0)

## 2017-06-07 NOTE — PATIENT INSTRUCTIONS
Contact Numbers    [facility] Main Line: [phone]  [facility] Triage:  [phone]    Call triage with chills and/or temperature greater than or equal to 100.5, uncontrolled nausea/vomiting, diarrhea, constipation, dizziness, shortness of breath, chest pain, bleeding, unexplained bruising, or any new/concerning symptoms, questions/concerns.     If you are having any concerning symptoms or wish to speak to a provider before your next infusion visit, please call your care coordinator or triage to notify them so we can adequately serve you.       After Hours: [phone]    If after hours, weekends, or holidays, call main hospital  and ask for Oncology doctor on call.           June 2017 Sunday Monday Tuesday Wednesday Thursday Friday Saturday                       1     2     3       4     5     6     7     [facility] LAB DRAW    7:45 AM   (15 min.)   [facility] DRAW   [facility] Lab Draw     UMP RETURN    8:00 AM   (30 min.)   Jeanette Mcarthur MD   [facility]     UMP ONC INFUSION 240    9:00 AM   (240 min.)    ONCOLOGY INFUSION   [facility] 8     9     10       11     12     13     14     15     16  Happy Birthday!     17       18     19     20     21     22     [facility] LAB DRAW    9:45 AM   (15 min.)   [facility] LAB DRAW   [facility] Lab Draw     UMP RETURN    9:55 AM   (50 min.)   Jessica Lomas PA   [facility]     UMP ONC INFUSION 240   11:30 AM   (240 min.)    ONCOLOGY INFUSION   [facility] 23     24       25     26     27     28     29     30 July 2017 Sunday Monday Tuesday Wednesday Thursday Friday Saturday                                 1       2     3     4     5     [facility] DRAW   10:30 AM   (15 min.)   [facility] DRAW   [facility] Lab Draw     [facility] ONC INFUSION 240   11:00 AM   (240 min.)    ONCOLOGY INFUSION   [facility] 6     7      8       9     10     11     12     13     14     15       16     17     18     19     20     21     22       23     24     25     26     27     28     29       30     31                                           Lab Results:  Recent Results (from the past 12 hour(s))   CBC with platelets differential    Collection Time: 06/07/17  8:42 AM   Result Value Ref Range    WBC 4.0 4.0 - 11.0 10e9/L    RBC Count 4.50 4.4 - 5.9 10e12/L    Hemoglobin 13.5 13.3 - 17.7 g/dL    Hematocrit 40.2 40.0 - 53.0 %    MCV 89 78 - 100 fl    MCH 30.0 26.5 - 33.0 pg    MCHC 33.6 31.5 - 36.5 g/dL    RDW 12.6 10.0 - 15.0 %    Platelet Count 261 150 - 450 10e9/L    Diff Method Automated Method     % Neutrophils 52.0 %    % Lymphocytes 35.7 %    % Monocytes 8.9 %    % Eosinophils 2.7 %    % Basophils 0.7 %    % Immature Granulocytes 0.0 %    Nucleated RBCs 0 0 /100    Absolute Neutrophil 2.1 1.6 - 8.3 10e9/L    Absolute Lymphocytes 1.4 0.8 - 5.3 10e9/L    Absolute Monocytes 0.4 0.0 - 1.3 10e9/L    Absolute Eosinophils 0.1 0.0 - 0.7 10e9/L    Absolute Basophils 0.0 0.0 - 0.2 10e9/L    Abs Immature Granulocytes 0.0 0 - 0.4 10e9/L    Absolute Nucleated RBC 0.0    Comprehensive metabolic panel    Collection Time: 06/07/17  8:42 AM   Result Value Ref Range    Sodium 141 133 - 144 mmol/L    Potassium 4.2 3.4 - 5.3 mmol/L    Chloride 108 94 - 109 mmol/L    Carbon Dioxide 29 20 - 32 mmol/L    Anion Gap 4 3 - 14 mmol/L    Glucose 85 70 - 99 mg/dL    Urea Nitrogen 18 7 - 30 mg/dL    Creatinine 0.83 0.66 - 1.25 mg/dL    GFR Estimate >90  Non  GFR Calc   >60 mL/min/1.7m2    GFR Estimate If Black >90   GFR Calc   >60 mL/min/1.7m2    Calcium 8.8 8.5 - 10.1 mg/dL    Bilirubin Total 0.7 0.2 - 1.3 mg/dL    Albumin 3.5 3.4 - 5.0 g/dL    Protein Total 7.3 6.8 - 8.8 g/dL    Alkaline Phosphatase 52 40 - 150 U/L    ALT 44 0 - 70 U/L    AST 20 0 - 45 U/L

## 2017-06-07 NOTE — NURSING NOTE
"Oncology Rooming Note    June 7, 2017 9:10 AM   Meño Omalley is a 46 year old male who presents for:    Chief Complaint   Patient presents with     Port Draw     Labs Drawn      Oncology Clinic Visit     Labs- Rectal CA follow up     Initial Vitals: /85  Pulse 81  Temp 98.7  F (37.1  C) (Oral)  Resp 15  Ht 1.753 m (5' 9.02\")  Wt 92.1 kg (203 lb)  SpO2 95%  BMI 29.96 kg/m2 Estimated body mass index is 29.96 kg/(m^2) as calculated from the following:    Height as of this encounter: 1.753 m (5' 9.02\").    Weight as of this encounter: 92.1 kg (203 lb). Body surface area is 2.12 meters squared.  No Pain (0) Comment: Data Unavailable   No LMP for male patient.  Allergies reviewed: Yes  Medications reviewed: Yes    Medications: Medication refills not needed today.  Pharmacy name entered into EPIC:    [facility] [address] [place] [address] - [address]  [facility] [place], [place] - [address]     Clinical concerns: No new concerns at this time. Would like to use nausea medication and when he should use them.     10 minutes for nursing intake (face to face time)     Frank Brown LPN            "

## 2017-06-07 NOTE — LETTER
6/7/2017     RE: Meño Omalley  [address] [facility] [address]     Dear Colleague,    Thank you for referring your patient, Meño Omalley, to the [facility]. Please see a copy of my visit note below.    This is a followup visit for a diagnosis of colon cancer, stage III adenocarcinoma of rectum.  Currently, he is getting adjuvant chemotherapy with FOLFOX.      HISTORY OF PRESENT ILLNESS:  Kindly refer to my previous notes for patient's presentation and treatment so far.  Briefly, from an oncological standpoint he underwent laparoscopic surgery for resection of the primary lesion, and was found to have stage IIIB disease; hence, he was initiated on FOLFOX adjuvant therapy.  He is status post 1 cycle.  He is coming in for followup for symptom management and next cycle.      INTERVAL HISTORY:  The patient states that he tolerated the first cycle of FOLFOX with the expected side effect profile.  We had changed Zofran to Aloxi for him because of medication interaction for chemotherapy, and he did fine, as far as nausea was concern for the first 2-3 days, and then after the pump disconnect he just felt like his nausea was totally out of control, but he did not start taking medications until he actually started throwing up multiple times through the course of the day, and he only took Zofran, and he could never catch up with the nausea.  He had 3 miserable days where he had significant nausea and vomiting, and then was also having some diarrhea, but then as things resolved by Monday night he was feeling quite a bit better.  He denies any fevers, chills, coughing, breathing trouble or chest pain around that time.  He did not have significant abdominal pain either      PAST MEDICAL HISTORY:  Unchanged.      PAST SURGICAL HISTORY:  Unchanged.      LABORATORY DATA:  Reviewed.      MEDICATIONS:  Reviewed.      ALLERGIES:  Reviewed.      PHYSICAL EXAMINATION:   VITAL SIGNS: /85  Pulse 81  " Temp 98.7  F (37.1  C) (Oral)  Resp 15  Ht 1.753 m (5' 9.02\")  Wt 92.1 kg (203 lb)  SpO2 95%  BMI 29.96 kg/m2    GENERAL:  Alert and oriented x3, in no apparent distress.   HEENT:  No mucositis   SKIN:  No palmar or plantar dysesthesia.   CVS/RESPIRATORY:  Unremarkable.   ABDOMEN:  Nontender to palpation.   EXTREMITIES:  Lower extremities with no pedal edema.      LABORATORY DATA:  Reviewed and were acceptable for next treatment.  His ALT, which was slightly elevated last time at 83, was down to 44.  White cell count 4, hemoglobin 13.5, and platelet count of 261.      ASSESSMENT AND PLAN:  Patient with stage III rectal cancer, currently undergoing adjuvant chemotherapy with FOLFOX.  He tolerated chemotherapy with the expected side effects.  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  We discussed modulation of nausea with the different drugs that we would be providing him with chemo, and how he needs to get ahead of the symptoms rather than play a catch-up game, because it is much harder in that effect.  He expressed understanding.  It was his first cycle, and now he knows how he feels, and he would be working to make sure that he takes his medications up front.  He does not endorse a competent of anticipatory nausea that we need to give him medications to deal with that at this point, but he will keep us posted.  He also brought up the consideration for switching times for chemotherapy, as his drive this morning was almost 2 hours.  He will work with the nurses in the infusion suite to see what time would work best for him.  He also brought up potential transfer of cared to [place], and I said I would be happy to have him work with one of my colleagues since I do not go out to [place].  As of right now he declined, but that would be a consideration that he would keep in mind.     I " spent 35 minutes in the care of this patient >50% of which was spent in coordinating and counseling.    Jeanette Mcarthur   of Medicine   Hematology and medical Oncology   [facility]

## 2017-06-07 NOTE — MR AVS SNAPSHOT
After Visit Summary   6/7/2017    Meño Omalley    MRN: [id number]           Patient Information     Date Of Birth          1970        Visit Information        Provider Department      6/7/2017 8:15 AM Jeanette Mcarthur MD [facility]        Today's Diagnoses     Adenocarcinoma of rectum (H)           Follow-ups after your visit        Follow-up notes from your care team     Return in about 2 weeks (around 6/21/2017) for Physical Exam, Lab Work.      Your next 10 appointments already scheduled     Jun 22, 2017  9:45 AM CDT   Masonic Lab Draw with UC MASONIC LAB DRAW   [facility] Masonic Lab Draw ([facility])    [address]  [address] [facility] [address]   [phone]            Jun 22, 2017 10:10 AM CDT   (Arrive by 9:55 AM)   Return Visit with GELA Monson   [facility] ([facility])    [address] [place] [address]  [address]nd [facility] [address]   [phone]            Jun 22, 2017 11:30 AM CDT   Infusion 240 with UC ONCOLOGY INFUSION, UC 16 ATC   [facility] ([facility])    [address] [place] [address]  [address] [facility] [address]   [phone]            Jul 05, 2017 10:30 AM CDT   Masonic Lab Draw with UC MASONIC LAB DRAW   [facility] Masonic Lab Draw ([facility])    [address] [place] [address]  [address] [facility] [address]   [phone]            Jul 05, 2017 11:00 AM CDT   Infusion 240 with UC ONCOLOGY INFUSION, UC 19 ATC   [facility] ([facility])    [address] [place] [address]  [address] [facility] [address]   [phone]            Sep 12, 2017 10:30 AM CDT   Flexible Sigmoidoscopy with Ignacio Rose MD   [facility] Colon and Rectal Surgery ([facility])    [address] [place] [address]  4th [facility] [address]   [phone]          "     Who to contact     If you have questions or need follow up information about today's clinic visit or your schedule please contact [facility] directly at [phone].  Normal or non-critical lab and imaging results will be communicated to you by MyChart, letter or phone within 4 business days after the clinic has received the results. If you do not hear from us within 7 days, please contact the clinic through MyChart or phone. If you have a critical or abnormal lab result, we will notify you by phone as soon as possible.  Submit refill requests through [email or web address] or call your pharmacy and they will forward the refill request to us. Please allow 3 business days for your refill to be completed.          Additional Information About Your Visit        [email or web address] Information     [email or web address] gives you secure access to your electronic health record. If you see a primary care provider, you can also send messages to your care team and make appointments. If you have questions, please call your primary care clinic.  If you do not have a primary care provider, please call [phone] and they will assist you.        Care EveryWhere ID     This is your Care EveryWhere ID. This could be used by other organizations to access your [place] medical records  [id number]        Your Vitals Were     Pulse Temperature Respirations Height Pulse Oximetry BMI (Body Mass Index)    81 98.7  F (37.1  C) (Oral) 15 1.753 m (5' 9.02\") 95% 29.96 kg/m2       Blood Pressure from Last 3 Encounters:   06/07/17 132/85   05/24/17 133/66   05/22/17 155/73    Weight from Last 3 Encounters:   06/07/17 92.1 kg (203 lb)   05/24/17 92.9 kg (204 lb 14.4 oz)   05/22/17 90.7 kg (200 lb)              Today, you had the following     No orders found for display       Primary Care Provider Office Phone # Fax #    Delio Alcala [phone]       [facility] [address] [place] [address]        Thank " you!     Thank you for choosing [facility]  for your care. Our goal is always to provide you with excellent care. Hearing back from our patients is one way we can continue to improve our services. Please take a few minutes to complete the written survey that you may receive in the mail after your visit with us. Thank you!             Your Updated Medication List - Protect others around you: Learn how to safely use, store and throw away your medicines at www.disposemymeds.org.          This list is accurate as of: 6/7/17 11:59 PM.  Always use your most recent med list.                   Brand Name Dispense Instructions for use    escitalopram 20 MG tablet    LEXAPRO    30 tablet    Take 1 tablet (20 mg) by mouth every morning       FLOVENT  MCG/ACT Inhaler   Generic drug:  fluticasone      Take 2 puffs by mouth 2 times daily as needed       lidocaine-prilocaine cream    EMLA    30 g    Apply topically as needed for moderate pain       LORazepam 0.5 MG tablet    ATIVAN    30 tablet    Take 1 tablet (0.5 mg) by mouth every 4 hours as needed (Anxiety, Nausea/Vomiting or Sleep)       ondansetron 8 MG tablet    ZOFRAN    10 tablet    Take 1 tablet (8 mg) by mouth every 8 hours as needed (Nausea/Vomiting)       prochlorperazine 10 MG tablet    COMPAZINE    30 tablet    Take 1 tablet (10 mg) by mouth every 6 hours as needed (Nausea/Vomiting)

## 2017-06-07 NOTE — PROGRESS NOTES
"This is a followup visit for a diagnosis of colon cancer, stage III adenocarcinoma of rectum.  Currently, he is getting adjuvant chemotherapy with FOLFOX.      HISTORY OF PRESENT ILLNESS:  Kindly refer to my previous notes for patient's presentation and treatment so far.  Briefly, from an oncological standpoint he underwent laparoscopic surgery for resection of the primary lesion, and was found to have stage IIIB disease; hence, he was initiated on FOLFOX adjuvant therapy.  He is status post 1 cycle.  He is coming in for followup for symptom management and next cycle.      INTERVAL HISTORY:  The patient states that he tolerated the first cycle of FOLFOX with the expected side effect profile.  We had changed Zofran to Aloxi for him because of medication interaction for chemotherapy, and he did fine, as far as nausea was concern for the first 2-3 days, and then after the pump disconnect he just felt like his nausea was totally out of control, but he did not start taking medications until he actually started throwing up multiple times through the course of the day, and he only took Zofran, and he could never catch up with the nausea.  He had 3 miserable days where he had significant nausea and vomiting, and then was also having some diarrhea, but then as things resolved by Monday night he was feeling quite a bit better.  He denies any fevers, chills, coughing, breathing trouble or chest pain around that time.  He did not have significant abdominal pain either      PAST MEDICAL HISTORY:  Unchanged.      PAST SURGICAL HISTORY:  Unchanged.      LABORATORY DATA:  Reviewed.      MEDICATIONS:  Reviewed.      ALLERGIES:  Reviewed.      PHYSICAL EXAMINATION:   VITAL SIGNS: /85  Pulse 81  Temp 98.7  F (37.1  C) (Oral)  Resp 15  Ht 1.753 m (5' 9.02\")  Wt 92.1 kg (203 lb)  SpO2 95%  BMI 29.96 kg/m2    GENERAL:  Alert and oriented x3, in no apparent distress.   HEENT:  No mucositis   SKIN:  No palmar or plantar " dysesthesia.   CVS/RESPIRATORY:  Unremarkable.   ABDOMEN:  Nontender to palpation.   EXTREMITIES:  Lower extremities with no pedal edema.      LABORATORY DATA:  Reviewed and were acceptable for next treatment.  His ALT, which was slightly elevated last time at 83, was down to 44.  White cell count 4, hemoglobin 13.5, and platelet count of 261.      ASSESSMENT AND PLAN:  Patient with stage III rectal cancer, currently undergoing adjuvant chemotherapy with FOLFOX.  He tolerated chemotherapy with the expected side effects.  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  We discussed modulation of nausea with the different drugs that we would be providing him with chemo, and how he needs to get ahead of the symptoms rather than play a catch-up game, because it is much harder in that effect.  He expressed understanding.  It was his first cycle, and now he knows how he feels, and he would be working to make sure that he takes his medications up front.  He does not endorse a competent of anticipatory nausea that we need to give him medications to deal with that at this point, but he will keep us posted.  He also brought up the consideration for switching times for chemotherapy, as his drive this morning was almost 2 hours.  He will work with the nurses in the infusion suite to see what time would work best for him.  He also brought up potential transfer of cared to [place], and I said I would be happy to have him work with one of my colleagues since I do not go out to [place].  As of right now he declined, but that would be a consideration that he would keep in mind.       I spent 35 minutes in the care of this patient >50% of which was spent in coordinating and counseling.  Jeanette Mcarthur   of Medicine   Hematology and medical Oncology   [facility]

## 2017-06-07 NOTE — MR AVS SNAPSHOT
After Visit Summary   6/7/2017    Meño Omalley    MRN: [id number]           Patient Information     Date Of Birth          1970        Visit Information        Provider Department      6/7/2017 9:00 AM UC 17 ATC;  ONCOLOGY INFUSION [facility]        Today's Diagnoses     Adenocarcinoma of rectum (H)    -  1      Care Instructions    Contact Numbers    [facility] [place]: [phone]  [facility] Triage:  [phone]    Call triage with chills and/or temperature greater than or equal to 100.5, uncontrolled nausea/vomiting, diarrhea, constipation, dizziness, shortness of breath, chest pain, bleeding, unexplained bruising, or any new/concerning symptoms, questions/concerns.     If you are having any concerning symptoms or wish to speak to a provider before your next infusion visit, please call your care coordinator or triage to notify them so we can adequately serve you.       After Hours: [phone]    If after hours, weekends, or holidays, call main hospital  and ask for Oncology doctor on call.           June 2017 Sunday Monday Tuesday Wednesday Thursday Friday Saturday                       1     2     3       4     5     6     7     [facility] MASONIC LAB DRAW    7:45 AM   (15 min.)    MASONIC LAB DRAW   [facility] Lab Draw     [facility] RETURN    8:00 AM   (30 min.)   Jeanette Mcarthur MD   [facility] ONC INFUSION 240    9:00 AM   (240 min.)    ONCOLOGY INFUSION   [facility] 8     9     10       11     12     13     14     15     16  Happy Birthday!     17       18     19     20     21     22     [facility] MASONIC LAB DRAW    9:45 AM   (15 min.)    MASONIC LAB DRAW   [facility] Lab Draw     [facility] RETURN    9:55 AM   (50 min.)   Jessica Lomas PA   [facility] ONC INFUSION 240   11:30 AM   (240 min.)    ONCOLOGY INFUSION   [facility] 23     24       25     26     27      28     29 30 July 2017 Sunday Monday Tuesday Wednesday Thursday Friday Saturday                                 1       2     3     4     5     [facility] MASONIC LAB DRAW   10:30 AM   (15 min.)    MASONIC LAB DRAW   [facility] Lab Draw     [facility] ONC INFUSION 240   11:00 AM   (240 min.)    ONCOLOGY INFUSION   [facility] 6     7     8       9     10     11     12     13     14     15       16     17     18     19     20     21     22       23     24     25     26     27     28     29       30     31                                           Lab Results:  Recent Results (from the past 12 hour(s))   CBC with platelets differential    Collection Time: 06/07/17  8:42 AM   Result Value Ref Range    WBC 4.0 4.0 - 11.0 10e9/L    RBC Count 4.50 4.4 - 5.9 10e12/L    Hemoglobin 13.5 13.3 - 17.7 g/dL    Hematocrit 40.2 40.0 - 53.0 %    MCV 89 78 - 100 fl    MCH 30.0 26.5 - 33.0 pg    MCHC 33.6 31.5 - 36.5 g/dL    RDW 12.6 10.0 - 15.0 %    Platelet Count 261 150 - 450 10e9/L    Diff Method Automated Method     % Neutrophils 52.0 %    % Lymphocytes 35.7 %    % Monocytes 8.9 %    % Eosinophils 2.7 %    % Basophils 0.7 %    % Immature Granulocytes 0.0 %    Nucleated RBCs 0 0 /100    Absolute Neutrophil 2.1 1.6 - 8.3 10e9/L    Absolute Lymphocytes 1.4 0.8 - 5.3 10e9/L    Absolute Monocytes 0.4 0.0 - 1.3 10e9/L    Absolute Eosinophils 0.1 0.0 - 0.7 10e9/L    Absolute Basophils 0.0 0.0 - 0.2 10e9/L    Abs Immature Granulocytes 0.0 0 - 0.4 10e9/L    Absolute Nucleated RBC 0.0    Comprehensive metabolic panel    Collection Time: 06/07/17  8:42 AM   Result Value Ref Range    Sodium 141 133 - 144 mmol/L    Potassium 4.2 3.4 - 5.3 mmol/L    Chloride 108 94 - 109 mmol/L    Carbon Dioxide 29 20 - 32 mmol/L    Anion Gap 4 3 - 14 mmol/L    Glucose 85 70 - 99 mg/dL    Urea Nitrogen 18 7 - 30 mg/dL    Creatinine 0.83 0.66 - 1.25 mg/dL    GFR Estimate >90  Non  GFR Calc   >60  mL/min/1.7m2    GFR Estimate If Black >90   GFR Calc   >60 mL/min/1.7m2    Calcium 8.8 8.5 - 10.1 mg/dL    Bilirubin Total 0.7 0.2 - 1.3 mg/dL    Albumin 3.5 3.4 - 5.0 g/dL    Protein Total 7.3 6.8 - 8.8 g/dL    Alkaline Phosphatase 52 40 - 150 U/L    ALT 44 0 - 70 U/L    AST 20 0 - 45 U/L                 Follow-ups after your visit        Your next 10 appointments already scheduled     Jun 22, 2017  9:45 AM CDT   Masonic Lab Draw with UC MASONIC LAB DRAW   [facility] Masonic Lab Draw ([facility])    [address]  2nd Floor  [facility] [address]   [phone]            Jun 22, 2017 10:10 AM CDT   (Arrive by 9:55 AM)   Return Visit with GELA Monson   [facility] ([facility])    [address]  [address] [facility] [address]   [phone]            Jun 22, 2017 11:30 AM CDT   Infusion 240 with UC ONCOLOGY INFUSION, UC 16 ATC   [facility] ([facility])    [address]  2nd Floor  [facility] [address]   [phone]            Jul 05, 2017 10:30 AM CDT   Masonic Lab Draw with UC MASONIC LAB DRAW   [facility] Masonic Lab Draw ([facility])    [address]  2nd Floor  [facility] [address]   [phone]            Jul 05, 2017 11:00 AM CDT   Infusion 240 with UC ONCOLOGY INFUSION, UC 19 ATC   [facility] ([facility])    [address]  2nd Floor  [facility] [address]   [phone]            Sep 12, 2017 10:30 AM CDT   Flexible Sigmoidoscopy with Ignacio Rose MD   [facility] Colon and Rectal Surgery ([facility])    [address] [place] [address]  4th Floor  [facility] [address] [phone]              Who to contact     If you have questions or need follow up information about today's clinic visit or your schedule please contact PRO  [facility] CANCER [facility] directly at [phone].  Normal or non-critical lab and imaging results will be communicated to you by MyChart, letter or phone within 4 business days after the clinic has received the results. If you do not hear from us within 7 days, please contact the clinic through [employer] or phone. If you have a critical or abnormal lab result, we will notify you by phone as soon as possible.  Submit refill requests through [employer] or call your pharmacy and they will forward the refill request to us. Please allow 3 business days for your refill to be completed.          Additional Information About Your Visit        [employer] Information     [employer] gives you secure access to your electronic health record. If you see a primary care provider, you can also send messages to your care team and make appointments. If you have questions, please call your primary care clinic.  If you do not have a primary care provider, please call [phone] and they will assist you.        Care EveryWhere ID     This is your Care EveryWhere ID. This could be used by other organizations to access your [place] medical records  [id number]         Blood Pressure from Last 3 Encounters:   06/07/17 132/85   05/24/17 133/66   05/22/17 155/73    Weight from Last 3 Encounters:   06/07/17 92.1 kg (203 lb)   05/24/17 92.9 kg (204 lb 14.4 oz)   05/22/17 90.7 kg (200 lb)              We Performed the Following     CBC with platelets differential     Comprehensive metabolic panel        Primary Care Provider Office Phone # Fax #    Delio Alcala [phone]       [facility] [address] [place] [address]        Thank you!     Thank you for choosing [facility] CANCER [facility]  for your care. Our goal is always to provide you with excellent care. Hearing back from our patients is one way we can continue to improve our services. Please take a few minutes to complete the written  survey that you may receive in the mail after your visit with us. Thank you!             Your Updated Medication List - Protect others around you: Learn how to safely use, store and throw away your medicines at www.disposemymeds.org.          This list is accurate as of: 6/7/17 11:39 AM.  Always use your most recent med list.                   Brand Name Dispense Instructions for use    escitalopram 20 MG tablet    LEXAPRO    30 tablet    Take 1 tablet (20 mg) by mouth every morning       FLOVENT  MCG/ACT Inhaler   Generic drug:  fluticasone      Take 2 puffs by mouth 2 times daily as needed       lidocaine-prilocaine cream    EMLA    30 g    Apply topically as needed for moderate pain       LORazepam 0.5 MG tablet    ATIVAN    30 tablet    Take 1 tablet (0.5 mg) by mouth every 4 hours as needed (Anxiety, Nausea/Vomiting or Sleep)       ondansetron 8 MG tablet    ZOFRAN    10 tablet    Take 1 tablet (8 mg) by mouth every 8 hours as needed (Nausea/Vomiting)       prochlorperazine 10 MG tablet    COMPAZINE    30 tablet    Take 1 tablet (10 mg) by mouth every 6 hours as needed (Nausea/Vomiting)

## 2017-06-21 NOTE — PROGRESS NOTES
This is a snapshot of the patient's [facility] medical record. For complete information or questions call [phone] or In [facility],  Home Infusion ([id number]).  [facility] Number:  [id number]

## 2017-06-22 ENCOUNTER — INFUSION THERAPY VISIT (OUTPATIENT)
Dept: ONCOLOGY | Facility: CLINIC | Age: 47
End: 2017-06-22
Attending: INTERNAL MEDICINE
Payer: COMMERCIAL

## 2017-06-22 ENCOUNTER — HOME INFUSION (PRE-WILLOW HOME INFUSION) (OUTPATIENT)
Dept: PHARMACY | Facility: CLINIC | Age: 47
End: 2017-06-22

## 2017-06-22 ENCOUNTER — APPOINTMENT (OUTPATIENT)
Dept: LAB | Facility: CLINIC | Age: 47
End: 2017-06-22
Attending: INTERNAL MEDICINE
Payer: COMMERCIAL

## 2017-06-22 VITALS
BODY MASS INDEX: 30.45 KG/M2 | TEMPERATURE: 98 F | RESPIRATION RATE: 15 BRPM | DIASTOLIC BLOOD PRESSURE: 87 MMHG | HEART RATE: 86 BPM | WEIGHT: 206.3 LBS | OXYGEN SATURATION: 96 % | SYSTOLIC BLOOD PRESSURE: 137 MMHG

## 2017-06-22 DIAGNOSIS — C20 ADENOCARCINOMA OF RECTUM (H): Primary | ICD-10-CM

## 2017-06-22 LAB
ALBUMIN SERPL-MCNC: 3.6 G/DL (ref 3.4–5)
ALP SERPL-CCNC: 68 U/L (ref 40–150)
ALT SERPL W P-5'-P-CCNC: 93 U/L (ref 0–70)
ANION GAP SERPL CALCULATED.3IONS-SCNC: 6 MMOL/L (ref 3–14)
AST SERPL W P-5'-P-CCNC: 41 U/L (ref 0–45)
BASOPHILS # BLD AUTO: 0 10E9/L (ref 0–0.2)
BASOPHILS NFR BLD AUTO: 0.6 %
BILIRUB SERPL-MCNC: 0.5 MG/DL (ref 0.2–1.3)
BUN SERPL-MCNC: 15 MG/DL (ref 7–30)
CALCIUM SERPL-MCNC: 8.8 MG/DL (ref 8.5–10.1)
CHLORIDE SERPL-SCNC: 106 MMOL/L (ref 94–109)
CO2 SERPL-SCNC: 28 MMOL/L (ref 20–32)
CREAT SERPL-MCNC: 0.77 MG/DL (ref 0.66–1.25)
DIFFERENTIAL METHOD BLD: ABNORMAL
EOSINOPHIL # BLD AUTO: 0.1 10E9/L (ref 0–0.7)
EOSINOPHIL NFR BLD AUTO: 1.6 %
ERYTHROCYTE [DISTWIDTH] IN BLOOD BY AUTOMATED COUNT: 13.4 % (ref 10–15)
GFR SERPL CREATININE-BSD FRML MDRD: ABNORMAL ML/MIN/1.7M2
GLUCOSE SERPL-MCNC: 105 MG/DL (ref 70–99)
HCT VFR BLD AUTO: 41.1 % (ref 40–53)
HGB BLD-MCNC: 14.1 G/DL (ref 13.3–17.7)
IMM GRANULOCYTES # BLD: 0 10E9/L (ref 0–0.4)
IMM GRANULOCYTES NFR BLD: 0 %
LYMPHOCYTES # BLD AUTO: 1.2 10E9/L (ref 0.8–5.3)
LYMPHOCYTES NFR BLD AUTO: 38.6 %
MCH RBC QN AUTO: 30.8 PG (ref 26.5–33)
MCHC RBC AUTO-ENTMCNC: 34.3 G/DL (ref 31.5–36.5)
MCV RBC AUTO: 90 FL (ref 78–100)
MONOCYTES # BLD AUTO: 0.4 10E9/L (ref 0–1.3)
MONOCYTES NFR BLD AUTO: 10.9 %
NEUTROPHILS # BLD AUTO: 1.6 10E9/L (ref 1.6–8.3)
NEUTROPHILS NFR BLD AUTO: 48.3 %
NRBC # BLD AUTO: 0 10*3/UL
NRBC BLD AUTO-RTO: 0 /100
PLATELET # BLD AUTO: 194 10E9/L (ref 150–450)
POTASSIUM SERPL-SCNC: 4 MMOL/L (ref 3.4–5.3)
PROT SERPL-MCNC: 7.2 G/DL (ref 6.8–8.8)
RBC # BLD AUTO: 4.58 10E12/L (ref 4.4–5.9)
SODIUM SERPL-SCNC: 141 MMOL/L (ref 133–144)
WBC # BLD AUTO: 3.2 10E9/L (ref 4–11)

## 2017-06-22 PROCEDURE — 96411 CHEMO IV PUSH ADDL DRUG: CPT

## 2017-06-22 PROCEDURE — 80053 COMPREHEN METABOLIC PANEL: CPT | Performed by: INTERNAL MEDICINE

## 2017-06-22 PROCEDURE — 25000128 H RX IP 250 OP 636: Mod: ZF | Performed by: PHYSICIAN ASSISTANT

## 2017-06-22 PROCEDURE — 96416 CHEMO PROLONG INFUSE W/PUMP: CPT

## 2017-06-22 PROCEDURE — 99214 OFFICE O/P EST MOD 30 MIN: CPT | Mod: ZP | Performed by: PHYSICIAN ASSISTANT

## 2017-06-22 PROCEDURE — 25000125 ZZHC RX 250: Mod: ZF | Performed by: PHYSICIAN ASSISTANT

## 2017-06-22 PROCEDURE — 85025 COMPLETE CBC W/AUTO DIFF WBC: CPT | Performed by: INTERNAL MEDICINE

## 2017-06-22 PROCEDURE — 96413 CHEMO IV INFUSION 1 HR: CPT

## 2017-06-22 PROCEDURE — 36415 COLL VENOUS BLD VENIPUNCTURE: CPT | Performed by: INTERNAL MEDICINE

## 2017-06-22 PROCEDURE — 96415 CHEMO IV INFUSION ADDL HR: CPT

## 2017-06-22 PROCEDURE — 96368 THER/DIAG CONCURRENT INF: CPT

## 2017-06-22 PROCEDURE — 96375 TX/PRO/DX INJ NEW DRUG ADDON: CPT

## 2017-06-22 RX ORDER — SODIUM CHLORIDE 9 MG/ML
1000 INJECTION, SOLUTION INTRAVENOUS CONTINUOUS PRN
Status: CANCELLED
Start: 2017-06-22

## 2017-06-22 RX ORDER — DIPHENHYDRAMINE HYDROCHLORIDE 50 MG/ML
50 INJECTION INTRAMUSCULAR; INTRAVENOUS
Status: CANCELLED
Start: 2017-06-22

## 2017-06-22 RX ORDER — METHYLPREDNISOLONE SODIUM SUCCINATE 125 MG/2ML
125 INJECTION, POWDER, LYOPHILIZED, FOR SOLUTION INTRAMUSCULAR; INTRAVENOUS
Status: CANCELLED
Start: 2017-07-05

## 2017-06-22 RX ORDER — MEPERIDINE HYDROCHLORIDE 25 MG/ML
25 INJECTION INTRAMUSCULAR; INTRAVENOUS; SUBCUTANEOUS EVERY 30 MIN PRN
Status: CANCELLED | OUTPATIENT
Start: 2017-07-05

## 2017-06-22 RX ORDER — PALONOSETRON 0.05 MG/ML
0.25 INJECTION, SOLUTION INTRAVENOUS ONCE
Status: COMPLETED | OUTPATIENT
Start: 2017-06-22 | End: 2017-06-22

## 2017-06-22 RX ORDER — ALBUTEROL SULFATE 90 UG/1
1-2 AEROSOL, METERED RESPIRATORY (INHALATION)
Status: CANCELLED
Start: 2017-07-05

## 2017-06-22 RX ORDER — EPINEPHRINE 0.3 MG/.3ML
0.3 INJECTION SUBCUTANEOUS EVERY 5 MIN PRN
Status: CANCELLED | OUTPATIENT
Start: 2017-07-05

## 2017-06-22 RX ORDER — METHYLPREDNISOLONE SODIUM SUCCINATE 125 MG/2ML
125 INJECTION, POWDER, LYOPHILIZED, FOR SOLUTION INTRAMUSCULAR; INTRAVENOUS
Status: CANCELLED
Start: 2017-06-22

## 2017-06-22 RX ORDER — ALBUTEROL SULFATE 90 UG/1
1-2 AEROSOL, METERED RESPIRATORY (INHALATION)
Status: CANCELLED
Start: 2017-06-22

## 2017-06-22 RX ORDER — EPINEPHRINE 0.3 MG/.3ML
0.3 INJECTION SUBCUTANEOUS EVERY 5 MIN PRN
Status: CANCELLED | OUTPATIENT
Start: 2017-06-22

## 2017-06-22 RX ORDER — ALBUTEROL SULFATE 0.83 MG/ML
2.5 SOLUTION RESPIRATORY (INHALATION)
Status: CANCELLED | OUTPATIENT
Start: 2017-07-05

## 2017-06-22 RX ORDER — ALBUTEROL SULFATE 0.83 MG/ML
2.5 SOLUTION RESPIRATORY (INHALATION)
Status: CANCELLED | OUTPATIENT
Start: 2017-06-22

## 2017-06-22 RX ORDER — FLUOROURACIL 50 MG/ML
400 INJECTION, SOLUTION INTRAVENOUS ONCE
Status: COMPLETED | OUTPATIENT
Start: 2017-06-22 | End: 2017-06-22

## 2017-06-22 RX ORDER — DIPHENHYDRAMINE HYDROCHLORIDE 50 MG/ML
50 INJECTION INTRAMUSCULAR; INTRAVENOUS
Status: CANCELLED
Start: 2017-07-05

## 2017-06-22 RX ORDER — PALONOSETRON 0.05 MG/ML
0.25 INJECTION, SOLUTION INTRAVENOUS ONCE
Status: CANCELLED
Start: 2017-06-22 | End: 2017-06-22

## 2017-06-22 RX ORDER — FLUOROURACIL 50 MG/ML
400 INJECTION, SOLUTION INTRAVENOUS ONCE
Status: CANCELLED | OUTPATIENT
Start: 2017-06-22

## 2017-06-22 RX ORDER — FLUOROURACIL 50 MG/ML
400 INJECTION, SOLUTION INTRAVENOUS ONCE
Status: CANCELLED | OUTPATIENT
Start: 2017-07-05

## 2017-06-22 RX ORDER — PALONOSETRON 0.05 MG/ML
0.25 INJECTION, SOLUTION INTRAVENOUS ONCE
Status: CANCELLED
Start: 2017-07-05 | End: 2017-07-05

## 2017-06-22 RX ORDER — MEPERIDINE HYDROCHLORIDE 25 MG/ML
25 INJECTION INTRAMUSCULAR; INTRAVENOUS; SUBCUTANEOUS EVERY 30 MIN PRN
Status: CANCELLED | OUTPATIENT
Start: 2017-06-22

## 2017-06-22 RX ORDER — LORAZEPAM 2 MG/ML
0.5 INJECTION INTRAMUSCULAR EVERY 4 HOURS PRN
Status: CANCELLED
Start: 2017-06-22

## 2017-06-22 RX ORDER — HEPARIN SODIUM (PORCINE) LOCK FLUSH IV SOLN 100 UNIT/ML 100 UNIT/ML
500 SOLUTION INTRAVENOUS ONCE
Status: COMPLETED | OUTPATIENT
Start: 2017-06-22 | End: 2017-06-22

## 2017-06-22 RX ORDER — ONDANSETRON 8 MG/1
8 TABLET, FILM COATED ORAL EVERY 8 HOURS PRN
Qty: 60 TABLET | Refills: 1 | Status: SHIPPED | OUTPATIENT
Start: 2017-06-22 | End: 2018-05-07

## 2017-06-22 RX ORDER — SODIUM CHLORIDE 9 MG/ML
1000 INJECTION, SOLUTION INTRAVENOUS CONTINUOUS PRN
Status: CANCELLED
Start: 2017-07-05

## 2017-06-22 RX ORDER — LORAZEPAM 2 MG/ML
0.5 INJECTION INTRAMUSCULAR EVERY 4 HOURS PRN
Status: CANCELLED
Start: 2017-07-05

## 2017-06-22 RX ADMIN — FLUOROURACIL 850 MG: 50 INJECTION, SOLUTION INTRAVENOUS at 13:53

## 2017-06-22 RX ADMIN — PALONOSETRON HYDROCHLORIDE 0.25 MG: 0.25 INJECTION INTRAVENOUS at 11:25

## 2017-06-22 RX ADMIN — DEXTROSE MONOHYDRATE 250 ML: 50 INJECTION, SOLUTION INTRAVENOUS at 11:25

## 2017-06-22 RX ADMIN — OXALIPLATIN 180 MG: 5 INJECTION, SOLUTION INTRAVENOUS at 11:43

## 2017-06-22 RX ADMIN — DEXAMETHASONE SODIUM PHOSPHATE: 10 INJECTION, SOLUTION INTRAMUSCULAR; INTRAVENOUS at 11:27

## 2017-06-22 RX ADMIN — SODIUM CHLORIDE, PRESERVATIVE FREE 500 UNITS: 5 INJECTION INTRAVENOUS at 10:06

## 2017-06-22 RX ADMIN — LEUCOVORIN CALCIUM 750 MG: 500 INJECTION, POWDER, LYOPHILIZED, FOR SOLUTION INTRAMUSCULAR; INTRAVENOUS at 12:17

## 2017-06-22 NOTE — NURSING NOTE
"Oncology Rooming Note    June 22, 2017 10:19 AM   Meño Omalley is a 47 year old male who presents for:    Chief Complaint   Patient presents with     Port Draw     Labs Drawn      Oncology Clinic Visit     rectal ca      Initial Vitals: /87  Pulse 86  Temp 98  F (36.7  C) (Oral)  Resp 15  Wt 93.6 kg (206 lb 4.8 oz)  SpO2 96%  BMI 30.45 kg/m2 Estimated body mass index is 30.45 kg/(m^2) as calculated from the following:    Height as of 6/7/17: 1.753 m (5' 9.02\").    Weight as of this encounter: 93.6 kg (206 lb 4.8 oz). Body surface area is 2.13 meters squared.  Data Unavailable Comment: Data Unavailable   No LMP for male patient.  Allergies reviewed: Yes  Medications reviewed: Yes    Medications: Medication refills not needed today.  Pharmacy name entered into EPIC:    [facility] [address] [place] [address] - [address]  [facility] [place], [place] - [address]     Clinical concerns: no doc was NOT notified.    5 minutes for nursing intake (face to face time)     Jagdeep Garcia CMA              "

## 2017-06-22 NOTE — LETTER
6/22/2017      RE: Meño Omalley  [address] [facility] [address]       Oncology/Hematology Visit Note  Jun 22, 2017    Reason for Visit: follow up of adenocarcinoma of the rectum    History of Present Illness: Meño Omalley is a 47 year old male with adenocarcinoma of rectum. He initially presented to primary care with blood in his stool for a few months and a change in bowel habits with the stool being more loose. Family history of father who developed colon cancer at age 60, and a sister who developed colon cancer at age 55.  He presented for colonoscopy with Dr. Delio Daniel on 03/24/2017.  Findings were several small polyps and a possible carcinoma of the rectosigmoid junction measured from 17-21 cm.  Biopsies obtained were remarkable for adenocarcinoma.  CT scan of the chest, abdomen and pelvis was done, which confirmed the presence of possible thickening in the rectosigmoid junction, but no evidence of metastases.  A 3D MRI was pursued on 03/31, and it showed circumferential wall thickening of the upper rectum/rectosigmoid with minimal extension into the mesorectal fat, consistent with rectal cancer, stage IIIB, N1.  His case was discussed in the Tumor Board, and the decision was made to forego chemoradiation, but to go straight to surgery, and he underwent a low anterior resection. Surgery took place on 4/12/2017. The pathology on the tumor was moderately differentiated adenocarcinoma invading the muscularis propria with a tumor size of 3.7 cm all negative  carcinoma margins.  Lymphovascular invasion was not identified.  Two non-renea tumor deposits were identified, and 6 lymph nodes were negative for metastatic carcinoma.  He was staged by pathological staging and pT2 N1c.  The mismatch repair testing was done on his original biopsy, and he had a normal pattern of mismatch repair protein expression, ruling out Rodriguez syndrome. Port was placed by IR on 5/23/17.    He started adjuvant  chemotherapy with FOLFOX on 5/24/17. He is here today prior to cycle 3.      Interval History:  Meño is here today along with his wife. Cycle 2 went much better than cycle 1. He took Zofran TID starting Friday evening along with ativan at bedtime. He did not need to use compazine. He was initially constipated and then stools normalized. He did have cold sensitivity on and off for a week. Had intermittent n/t in his feet in warm environments as well that is gone now. No HFS or overt mucositis. No fevers/chills or infectious concerns. He was able to eat and drink reasonably well and weight is stable.     Review of Systems:  Patient denies any of the following except if noted above: fevers, chills, difficulty with energy, vision or hearing changes, chest pain, dyspnea, abdominal pain, nausea, vomiting, diarrhea, constipation, urinary concerns, headaches, numbness, tingling, issues with sleep or mood. He also denies lumps, bumps, rashes or skin lesions, bleeding or bruising issues.    Current Outpatient Prescriptions   Medication Sig Dispense Refill     ondansetron (ZOFRAN) 8 MG tablet Take 1 tablet (8 mg) by mouth every 8 hours as needed for nausea 60 tablet 1     escitalopram (LEXAPRO) 20 MG tablet Take 1 tablet (20 mg) by mouth every morning 30 tablet 3     lidocaine-prilocaine (EMLA) cream Apply topically as needed for moderate pain 30 g 1     prochlorperazine (COMPAZINE) 10 MG tablet Take 1 tablet (10 mg) by mouth every 6 hours as needed (Nausea/Vomiting) 30 tablet 2     LORazepam (ATIVAN) 0.5 MG tablet Take 1 tablet (0.5 mg) by mouth every 4 hours as needed (Anxiety, Nausea/Vomiting or Sleep) 30 tablet 2     fluticasone (FLOVENT HFA) 110 MCG/ACT inhaler Take 2 puffs by mouth 2 times daily as needed          Physical Examination:  General: The patient is a pleasant male in no acute distress.  /87  Pulse 86  Temp 98  F (36.7  C) (Oral)  Resp 15  Wt 93.6 kg (206 lb 4.8 oz)  SpO2 96%  BMI 30.45  kg/m2  Wt Readings from Last 10 Encounters:   06/22/17 93.6 kg (206 lb 4.8 oz)   06/07/17 92.1 kg (203 lb)   05/24/17 92.9 kg (204 lb 14.4 oz)   05/22/17 90.7 kg (200 lb)   05/02/17 91.4 kg (201 lb 8 oz)   05/01/17 91.9 kg (202 lb 9.6 oz)   04/24/17 92.1 kg (203 lb)   04/14/17 91.6 kg (202 lb)   04/10/17 94.8 kg (209 lb)   04/10/17 94.8 kg (209 lb 1.6 oz)     HEENT: EOMI, PERRL. Sclerae are anicteric. Oral mucosa is pink and moist with no lesions or thrush.   Lymph: Neck is supple with no lymphadenopathy in the cervical or supraclavicular areas.   Heart: Regular rate and rhythm.   Lungs: Clear to auscultation bilaterally. Port right chest accessed, c/d/i.  Abdomen: Bowel sounds present, soft, nontender with no palpable hepatosplenomegaly or masses. Laporscopic incisions healed - 5 total.   Extremities: No lower extremity edema noted bilaterally.   Neuro: Cranial nerves II through XII are grossly intact.  Skin: No rashes, petechiae, or bruising noted on exposed skin.    Laboratory Data:    6/22/2017 10:15   Sodium 141   Potassium 4.0   Chloride 106   Carbon Dioxide 28   Urea Nitrogen 15   Creatinine 0.77   GFR Estimate >90...   GFR Estimate If Black >90...   Calcium 8.8   Anion Gap 6   Albumin 3.6   Protein Total 7.2   Bilirubin Total 0.5   Alkaline Phosphatase 68   ALT 93 (H)   AST 41   Glucose 105 (H)   WBC 3.2 (L)   Hemoglobin 14.1   Hematocrit 41.1   Platelet Count 194   RBC Count 4.58   MCV 90   MCH 30.8   MCHC 34.3   RDW 13.4   Diff Method Automated Method   % Neutrophils 48.3   % Lymphocytes 38.6   % Monocytes 10.9   % Eosinophils 1.6   % Basophils 0.6   % Immature Granulocytes 0.0   Nucleated RBCs 0   Absolute Neutrophil 1.6   Absolute Lymphocytes 1.2   Absolute Monocytes 0.4   Absolute Eosinophils 0.1   Absolute Basophils 0.0   Abs Immature Granulocytes 0.0   Absolute Nucleated RBC 0.0       Assessment and Plan:  1. Oncology. stage III rectosigmoid adenocarcinoma, moderately differentiated, status post APR  with good margins. Started adjuvant chemotherapy with FOLFOX and s/p 2 cycles. He is tolerating fairly well with nausea, fatigue, cold sensitivity. His nausea improved with taking scheduled antiemetics. His counts are adequate to proceed with cycle 3 today. Will have to monitor for neuropathy. Since he is feeling well, he can be seen every other infusion. He will call with any interval concerns in the meantime.     Jessica Lomas PA-C  [facility]  [address] [place], [place] [address] [phone]

## 2017-06-22 NOTE — NURSING NOTE
Chief Complaint   Patient presents with     Port Draw     Labs Drawn      Oncology Clinic Visit     rectal ca      Port accessed. Labs drawn. Flushed with heparin and NS.    Lauren Schoen, RN

## 2017-06-22 NOTE — MR AVS SNAPSHOT
After Visit Summary   6/22/2017    Meño Omalley    MRN: [id number]           Patient Information     Date Of Birth          1970        Visit Information        Provider Department      6/22/2017 11:30 AM UC 16 ATC;  ONCOLOGY INFUSION [facility]        Today's Diagnoses     Adenocarcinoma of rectum (H)    -  1      Care Instructions    Contact Numbers    [facility] Main Line: [phone]  [facility] Triage:  [phone]    Call triage with chills and/or temperature greater than or equal to 100.5, uncontrolled nausea/vomiting, diarrhea, constipation, dizziness, shortness of breath, chest pain, bleeding, unexplained bruising, or any new/concerning symptoms, questions/concerns.     If you are having any concerning symptoms or wish to speak to a provider before your next infusion visit, please call your care coordinator or triage to notify them so we can adequately serve you.       After Hours: [phone]    If after hours, weekends, or holidays, call main hospital  and ask for Oncology doctor on call.         June 2017 Sunday Monday Tuesday Wednesday Thursday Friday Saturday                       1     2     3       4     5     6     7     [facility] DRAW    7:45 AM   (15 min.)    [facility] DRAW   [facility] Lab Draw     [facility] RETURN    8:00 AM   (30 min.)   Jeanette Mcarthur MD   [facility] ONC INFUSION 240    9:00 AM   (240 min.)    ONCOLOGY INFUSION   [facility] 8     9     10       11     12     13     14     15     16  Happy Birthday!     17       18     19     20     21     22     [facility] DRAW    9:45 AM   (15 min.)    [facility] DRAW   [facility] Lab Draw     [facility] RETURN    9:55 AM   (50 min.)   Jessica Lomas PA   [facility] ONC INFUSION 240   11:30 AM   (240 min.)    ONCOLOGY INFUSION   [facility] 23     24       25     26     27      28 29 30 July 2017 Sunday Monday Tuesday Wednesday Thursday Friday Saturday                                 1       2     3     4     5     [facility] LAB DRAW   10:30 AM   (15 min.)    [facility] LAB DRAW   [facility] Lab Draw     P ONC INFUSION 240   11:00 AM   (240 min.)   UC ONCOLOGY INFUSION   [facility] 6     7     8       9     10     11     12     13     14     15       16     17     18     19     [facility] LAB DRAW    8:15 AM   (15 min.)    [facility] LAB DRAW   [facility] Lab Draw     P RETURN    8:25 AM   (50 min.)   Zahira Christensen PA-C   [facility] ONC INFUSION 240    9:30 AM   (240 min.)    ONCOLOGY INFUSION   [facility] 20     21     22       23     24     25     26     27     28     29       30     31                                           Lab Results:  Recent Results (from the past 12 hour(s))   CBC with platelets differential    Collection Time: 06/22/17 10:15 AM   Result Value Ref Range    WBC 3.2 (L) 4.0 - 11.0 10e9/L    RBC Count 4.58 4.4 - 5.9 10e12/L    Hemoglobin 14.1 13.3 - 17.7 g/dL    Hematocrit 41.1 40.0 - 53.0 %    MCV 90 78 - 100 fl    MCH 30.8 26.5 - 33.0 pg    MCHC 34.3 31.5 - 36.5 g/dL    RDW 13.4 10.0 - 15.0 %    Platelet Count 194 150 - 450 10e9/L    Diff Method Automated Method     % Neutrophils 48.3 %    % Lymphocytes 38.6 %    % Monocytes 10.9 %    % Eosinophils 1.6 %    % Basophils 0.6 %    % Immature Granulocytes 0.0 %    Nucleated RBCs 0 0 /100    Absolute Neutrophil 1.6 1.6 - 8.3 10e9/L    Absolute Lymphocytes 1.2 0.8 - 5.3 10e9/L    Absolute Monocytes 0.4 0.0 - 1.3 10e9/L    Absolute Eosinophils 0.1 0.0 - 0.7 10e9/L    Absolute Basophils 0.0 0.0 - 0.2 10e9/L    Abs Immature Granulocytes 0.0 0 - 0.4 10e9/L    Absolute Nucleated RBC 0.0    Comprehensive metabolic panel    Collection Time: 06/22/17 10:15 AM   Result Value Ref Range    Sodium 141 133 -  144 mmol/L    Potassium 4.0 3.4 - 5.3 mmol/L    Chloride 106 94 - 109 mmol/L    Carbon Dioxide 28 20 - 32 mmol/L    Anion Gap 6 3 - 14 mmol/L    Glucose 105 (H) 70 - 99 mg/dL    Urea Nitrogen 15 7 - 30 mg/dL    Creatinine 0.77 0.66 - 1.25 mg/dL    GFR Estimate >90  Non  GFR Calc   >60 mL/min/1.7m2    GFR Estimate If Black >90   GFR Calc   >60 mL/min/1.7m2    Calcium 8.8 8.5 - 10.1 mg/dL    Bilirubin Total 0.5 0.2 - 1.3 mg/dL    Albumin 3.6 3.4 - 5.0 g/dL    Protein Total 7.2 6.8 - 8.8 g/dL    Alkaline Phosphatase 68 40 - 150 U/L    ALT 93 (H) 0 - 70 U/L    AST 41 0 - 45 U/L               Follow-ups after your visit        Your next 10 appointments already scheduled     Jul 05, 2017 10:30 AM CDT   [facility] Draw with  [facility] DRAW   [facility] ([facility])    [address] [place] [id number] [phone]            Jul 05, 2017 11:00 AM CDT   Infusion 240 with UC ONCOLOGY INFUSION, UC 19 ATC   [facility] ([facility])    [address] [place] [id number] [phone]            Jul 19, 2017  8:15 AM CDT   [facility] Lab Draw with  [facility] LAB DRAW   [facility] Lab Draw ([facility])    [address] [place] [address]   [phone]            Jul 19, 2017  8:40 AM CDT   (Arrive by 8:25 AM)   Return Visit with Zahira Christensen PA-C   [facility] ([facility])    [address] [place] [address]   [phone]            Jul 19, 2017  9:30 AM CDT   Infusion 240 with UC ONCOLOGY INFUSION, UC 16 ATC   [facility] ([facility])    [address] [place] [address]   [phone]            Aug 02, 2017  9:00 AM CDT   [facility] Lab Draw with  [facility]  DRAW   [facility] Lab Draw ([facility])    [address]  [address]  [facility] [address] [phone]            Aug 02, 2017  9:30 AM CDT   Infusion 240 with UC ONCOLOGY INFUSION, UC 27 ATC   [facility] ([facility])    [address]  [address] [facility] [id number] [phone]              Who to contact     If you have questions or need follow up information about today's clinic visit or your schedule please contact [facility] directly at [phone].  Normal or non-critical lab and imaging results will be communicated to you by MyChart, letter or phone within 4 business days after the clinic has received the results. If you do not hear from us within 7 days, please contact the clinic through [employer] or phone. If you have a critical or abnormal lab result, we will notify you by phone as soon as possible.  Submit refill requests through [employer] or call your pharmacy and they will forward the refill request to us. Please allow 3 business days for your refill to be completed.          Additional Information About Your Visit        [employer] Information     [employer] gives you secure access to your electronic health record. If you see a primary care provider, you can also send messages to your care team and make appointments. If you have questions, please call your primary care clinic.  If you do not have a primary care provider, please call [phone] and they will assist you.        Care EveryWhere ID     This is your Care EveryWhere ID. This could be used by other organizations to access your [place] medical records  [id number]         Blood Pressure from Last 3 Encounters:   06/22/17 137/87   06/07/17 132/85   05/24/17 133/66    Weight from Last 3 Encounters:   06/22/17 93.6 kg (206 lb 4.8 oz)   06/07/17 92.1 kg (203 lb)   05/24/17 92.9 kg (204 lb 14.4 oz)              We Performed the  Following     CBC with platelets differential     Comprehensive metabolic panel          Today's Medication Changes          These changes are accurate as of: 6/22/17 12:14 PM.  If you have any questions, ask your nurse or doctor.               Start taking these medicines.        Dose/Directions    ondansetron 8 MG tablet   Commonly known as:  ZOFRAN   Used for:  Adenocarcinoma of rectum (H)   Started by:  Jessica Lomas PA        Dose:  8 mg   Take 1 tablet (8 mg) by mouth every 8 hours as needed for nausea   Quantity:  60 tablet   Refills:  1            Where to get your medicines      These medications were sent to [place], [place] - [address]  [address], [facility] [address]    Hours:  TRANSPLANT PHONE NUMBER [phone] Phone:  [phone]     ondansetron 8 MG tablet                Primary Care Provider Office Phone # Fax #    Delio Alcala [phone]       [place] [address] [place] [address]        Equal Access to Services     LUCIO BOGGS AH: Hadii snow ku hadasho Soomaali, waaxda luqadaha, qaybta kaalmada adeegyada, waxay idiin haykjn jacqui morales . So [facility] [phone].    ATENCIÓN: Si habla español, tiene a valle disposición servicios gratuitos de asistencia lingüística. Llame al [phone].    We comply with applicable federal civil rights laws and [place] laws. We do not discriminate on the basis of race, color, national origin, age, disability sex, sexual orientation or gender identity.            Thank you!     Thank you for choosing [facility]  for your care. Our goal is always to provide you with excellent care. Hearing back from our patients is one way we can continue to improve our services. Please take a few minutes to complete the written survey that you may receive in the mail after your visit with us. Thank you!             Your  Updated Medication List - Protect others around you: Learn how to safely use, store and throw away your medicines at www.disposemymeds.org.          This list is accurate as of: 6/22/17 12:14 PM.  Always use your most recent med list.                   Brand Name Dispense Instructions for use Diagnosis    escitalopram 20 MG tablet    LEXAPRO    30 tablet    Take 1 tablet (20 mg) by mouth every morning    Major depressive disorder, recurrent episode, mild (H)       FLOVENT  MCG/ACT Inhaler   Generic drug:  fluticasone      Take 2 puffs by mouth 2 times daily as needed        lidocaine-prilocaine cream    EMLA    30 g    Apply topically as needed for moderate pain    Rectal cancer (H)       LORazepam 0.5 MG tablet    ATIVAN    30 tablet    Take 1 tablet (0.5 mg) by mouth every 4 hours as needed (Anxiety, Nausea/Vomiting or Sleep)    Adenocarcinoma of rectum (H)       ondansetron 8 MG tablet    ZOFRAN    60 tablet    Take 1 tablet (8 mg) by mouth every 8 hours as needed for nausea    Adenocarcinoma of rectum (H)       prochlorperazine 10 MG tablet    COMPAZINE    30 tablet    Take 1 tablet (10 mg) by mouth every 6 hours as needed (Nausea/Vomiting)    Adenocarcinoma of rectum (H)

## 2017-06-22 NOTE — PROGRESS NOTES
Infusion Nursing Note:  Meño Omalley presents today for Cycle 3, day 1 Leucovorin, Oxaliplatin, fluorouracil bolus and fluorouracil pump connection.    Patient seen by provider today: Yes: GELA Tobin    Note: Patient presents to clinic today feeling well with no questions.      Intravenous Access:  Implanted Port.    Treatment Conditions:  Lab Results   Component Value Date    HGB 14.1 06/22/2017     Lab Results   Component Value Date    WBC 3.2 06/22/2017      Lab Results   Component Value Date    ANEU 1.6 06/22/2017     Lab Results   Component Value Date     06/22/2017      Lab Results   Component Value Date     06/22/2017                   Lab Results   Component Value Date    POTASSIUM 4.0 06/22/2017           Lab Results   Component Value Date    MAG 2.1 04/13/2017            Lab Results   Component Value Date    CR 0.77 06/22/2017                   Lab Results   Component Value Date    CANDY 8.8 06/22/2017                Lab Results   Component Value Date    BILITOTAL 0.5 06/22/2017           Lab Results   Component Value Date    ALBUMIN 3.6 06/22/2017                    Lab Results   Component Value Date    ALT 93 06/22/2017           Lab Results   Component Value Date    AST 41 06/22/2017     Results reviewed, labs MET treatment parameters, ok to proceed with treatment.    Post Infusion Assessment:  Patient tolerated infusion without incident.  Blood return noted pre and post infusion.  Site patent and intact, free from redness, edema or discomfort.  No evidence of extravasations.  Fluorouracil pump connected per protocol.  Connections taped, temperature sensor taped to skin.  Pump to infuse at 5.2 ml/hr for 46 hours.  Disconnect time of 1100 on 6/24/2017 called to [facility].  Spoke with Loc.    Discharge Plan:   Prescription refills given for Ativan and Zofran.  Discharge instructions reviewed with: Patient.  Patient and/or family verbalized understanding of discharge  instructions and all questions answered.  Copy of AVS reviewed with patient and/or family.  Patient will return 7/5/2017 for next appointment.  Departure Mode: Ambulatory.    Kassandra Ambrosio RN

## 2017-06-22 NOTE — Clinical Note
6/22/2017       RE: Meño Omalley  [address] [address] [facility] [address]     Dear Colleague,    Thank you for referring your patient, Meño Omalley, to the [facility]. Please see a copy of my visit note below.    No notes on file    Again, thank you for allowing me to participate in the care of your patient.      Sincerely,    GELA Monson

## 2017-06-22 NOTE — MR AVS SNAPSHOT
After Visit Summary   6/22/2017    Meño Omalley    MRN: [id number]           Patient Information     Date Of Birth          1970        Visit Information        Provider Department      6/22/2017 10:10 AM Jessica Lomas PA [facility]        Today's Diagnoses     Adenocarcinoma of rectum (H)    -  1       Follow-ups after your visit        Your next 10 appointments already scheduled     Jul 05, 2017 10:30 AM CDT   [facility] Draw with [facility]   [facility] Lab Draw ([facility])    [address] [address]   [phone]            Jul 05, 2017 11:00 AM CDT   Infusion 240 with UC ONCOLOGY INFUSION, UC 19 ATC   [facility] ([facility])    [address] [place] [address]   [phone]            Jul 19, 2017  8:15 AM CDT   [facility] Draw with [facility]   [facility])    [address] [place] [address]   [phone]            Jul 19, 2017  8:40 AM CDT   (Arrive by 8:25 AM)   Return Visit with Zahira Christensen PA-C   [facility] ([facility])    [address] [place] [address]   [phone]            Jul 19, 2017  9:30 AM CDT   Infusion 240 with UC ONCOLOGY INFUSION, UC 16 ATC   [facility] ([facility])    [address] [address]   [phone]            Aug 02, 2017  9:00 AM CDT   [facility] Lab Draw with [facility] DRAW   [facility] Lab Draw ([facility])    [address] [address]   [phone]            Aug 02, 2017  9:30 AM CDT   Infusion 240 with UC ONCOLOGY INFUSION, UC 27 ATC   [facility]  ([facility])    [address] [facility]  2nd Floor  [facility] [address] [phone]              Who to contact     If you have questions or need follow up information about today's clinic visit or your schedule please contact [facility] directly at [phone].  Normal or non-critical lab and imaging results will be communicated to you by MyChart, letter or phone within 4 business days after the clinic has received the results. If you do not hear from us within 7 days, please contact the clinic through [employer] or phone. If you have a critical or abnormal lab result, we will notify you by phone as soon as possible.  Submit refill requests through [employer] or call your pharmacy and they will forward the refill request to us. Please allow 3 business days for your refill to be completed.          Additional Information About Your Visit        MyChart Information     [employer] gives you secure access to your electronic health record. If you see a primary care provider, you can also send messages to your care team and make appointments. If you have questions, please call your primary care clinic.  If you do not have a primary care provider, please call [phone] and they will assist you.        Care EveryWhere ID     This is your Care EveryWhere ID. This could be used by other organizations to access your [facility] medical records  [id number]        Your Vitals Were     Pulse Temperature Respirations Pulse Oximetry BMI (Body Mass Index)       86 98  F (36.7  C) (Oral) 15 96% 30.45 kg/m2        Blood Pressure from Last 3 Encounters:   06/22/17 137/87   06/07/17 132/85   05/24/17 133/66    Weight from Last 3 Encounters:   06/22/17 93.6 kg (206 lb 4.8 oz)   06/07/17 92.1 kg (203 lb)   05/24/17 92.9 kg (204 lb 14.4 oz)              Today, you had the following     No orders found for display         Today's Medication Changes          These changes are accurate as of:  6/22/17 11:59 PM.  If you have any questions, ask your nurse or doctor.               Start taking these medicines.        Dose/Directions    ondansetron 8 MG tablet   Commonly known as:  ZOFRAN   Used for:  Adenocarcinoma of rectum (H)   Started by:  Jessica Lomas PA        Dose:  8 mg   Take 1 tablet (8 mg) by mouth every 8 hours as needed for nausea   Quantity:  60 tablet   Refills:  1            Where to get your medicines      These medications were sent to [place], [place] - [address] [facility] [address]  [address] [facility], [facility] [address]    Hours:  TRANSPLANT PHONE NUMBER [phone] Phone:  [phone]     ondansetron 8 MG tablet                Primary Care Provider Office Phone # Fax #    Delio Alcala [phone]       [place] [address] [place] [address]        Equal Access to Services     LUCIO BOGGS AH: Hadii snow ku hadasho Soomaali, waaxda luqadaha, qaybta kaalmada adeegyada, waxay idiin haykjn jacqui kharayoana hines. So [facility] [phone].    ATENCIÓN: Si habla español, tiene a valle disposición servicios gratuitos de asistencia lingüística. Llame al [phone].    We comply with applicable federal civil rights laws and [place] laws. We do not discriminate on the basis of race, color, national origin, age, disability sex, sexual orientation or gender identity.            Thank you!     Thank you for choosing [facility]  for your care. Our goal is always to provide you with excellent care. Hearing back from our patients is one way we can continue to improve our services. Please take a few minutes to complete the written survey that you may receive in the mail after your visit with us. Thank you!             Your Updated Medication List - Protect others around you: Learn how to safely use, store and throw away your medicines at www.disposemymeds.org.          This list is  accurate as of: 6/22/17 11:59 PM.  Always use your most recent med list.                   Brand Name Dispense Instructions for use Diagnosis    escitalopram 20 MG tablet    LEXAPRO    30 tablet    Take 1 tablet (20 mg) by mouth every morning    Major depressive disorder, recurrent episode, mild (H)       FLOVENT  MCG/ACT Inhaler   Generic drug:  fluticasone      Take 2 puffs by mouth 2 times daily as needed        lidocaine-prilocaine cream    EMLA    30 g    Apply topically as needed for moderate pain    Rectal cancer (H)       LORazepam 0.5 MG tablet    ATIVAN    30 tablet    Take 1 tablet (0.5 mg) by mouth every 4 hours as needed (Anxiety, Nausea/Vomiting or Sleep)    Adenocarcinoma of rectum (H)       ondansetron 8 MG tablet    ZOFRAN    60 tablet    Take 1 tablet (8 mg) by mouth every 8 hours as needed for nausea    Adenocarcinoma of rectum (H)       prochlorperazine 10 MG tablet    COMPAZINE    30 tablet    Take 1 tablet (10 mg) by mouth every 6 hours as needed (Nausea/Vomiting)    Adenocarcinoma of rectum (H)

## 2017-06-22 NOTE — PATIENT INSTRUCTIONS
Contact Numbers    [facility] [place] Main Line: [phone]  [facility] [place] Triage:  [phone]    Call triage with chills and/or temperature greater than or equal to 100.5, uncontrolled nausea/vomiting, diarrhea, constipation, dizziness, shortness of breath, chest pain, bleeding, unexplained bruising, or any new/concerning symptoms, questions/concerns.     If you are having any concerning symptoms or wish to speak to a provider before your next infusion visit, please call your care coordinator or triage to notify them so we can adequately serve you.       After Hours: [phone]    If after hours, weekends, or holidays, call main hospital  and ask for Oncology doctor on call.         June 2017 Sunday Monday Tuesday Wednesday Thursday Friday Saturday                       1     2     3       4     5     6     7     [facility]    7:45 AM   (15 min.)   [facility]   [facility] Draw     [facility] RETURN    8:00 AM   (30 min.)   Jeanette Mcarthur MD   [facility]     [facility] ONC INFUSION 240    9:00 AM   (240 min.)    ONCOLOGY INFUSION   [facility] 8     9     10       11     12     13     14     15     16  Happy Birthday!     17       18     19     20     21     22     [facility] DRAW    9:45 AM   (15 min.)   [facility]   [facility] Draw     [facility] RETURN    9:55 AM   (50 min.)   Jessica Lomas PA   [facility]     [facility] ONC INFUSION 240   11:30 AM   (240 min.)    ONCOLOGY INFUSION   [facility] 23     24       25     26     27     28     29     30 July 2017 Sunday Monday Tuesday Wednesday Thursday Friday Saturday                                 1       2     3     4     5     [facility] D[facility]   10:30 AM   (15 min.)   [facility]   [facility] Draw     [facility] ONC INFUSION 240   11:00 AM   (240 min.)    ONCOLOGY INFUSION   [facility] 6     7      8       9     10     11     12     13     14     15       16     17     18     19     [facility] LAB DRAW    8:15 AM   (15 min.)   [facility] LAB DRAW   [facility] Lab Draw     [facility] RETURN    8:25 AM   (50 min.)   Zahira Christensen PA-C   [facility] ONC INFUSION 240    9:30 AM   (240 min.)    ONCOLOGY INFUSION   [facility] 20     21     22       23     24     25     26     27     28     29       30     31                                           Lab Results:  Recent Results (from the past 12 hour(s))   CBC with platelets differential    Collection Time: 06/22/17 10:15 AM   Result Value Ref Range    WBC 3.2 (L) 4.0 - 11.0 10e9/L    RBC Count 4.58 4.4 - 5.9 10e12/L    Hemoglobin 14.1 13.3 - 17.7 g/dL    Hematocrit 41.1 40.0 - 53.0 %    MCV 90 78 - 100 fl    MCH 30.8 26.5 - 33.0 pg    MCHC 34.3 31.5 - 36.5 g/dL    RDW 13.4 10.0 - 15.0 %    Platelet Count 194 150 - 450 10e9/L    Diff Method Automated Method     % Neutrophils 48.3 %    % Lymphocytes 38.6 %    % Monocytes 10.9 %    % Eosinophils 1.6 %    % Basophils 0.6 %    % Immature Granulocytes 0.0 %    Nucleated RBCs 0 0 /100    Absolute Neutrophil 1.6 1.6 - 8.3 10e9/L    Absolute Lymphocytes 1.2 0.8 - 5.3 10e9/L    Absolute Monocytes 0.4 0.0 - 1.3 10e9/L    Absolute Eosinophils 0.1 0.0 - 0.7 10e9/L    Absolute Basophils 0.0 0.0 - 0.2 10e9/L    Abs Immature Granulocytes 0.0 0 - 0.4 10e9/L    Absolute Nucleated RBC 0.0    Comprehensive metabolic panel    Collection Time: 06/22/17 10:15 AM   Result Value Ref Range    Sodium 141 133 - 144 mmol/L    Potassium 4.0 3.4 - 5.3 mmol/L    Chloride 106 94 - 109 mmol/L    Carbon Dioxide 28 20 - 32 mmol/L    Anion Gap 6 3 - 14 mmol/L    Glucose 105 (H) 70 - 99 mg/dL    Urea Nitrogen 15 7 - 30 mg/dL    Creatinine 0.77 0.66 - 1.25 mg/dL    GFR Estimate >90  Non  GFR Calc   >60 mL/min/1.7m2    GFR Estimate If Black >90   GFR Calc   >60  mL/min/1.7m2    Calcium 8.8 8.5 - 10.1 mg/dL    Bilirubin Total 0.5 0.2 - 1.3 mg/dL    Albumin 3.6 3.4 - 5.0 g/dL    Protein Total 7.2 6.8 - 8.8 g/dL    Alkaline Phosphatase 68 40 - 150 U/L    ALT 93 (H) 0 - 70 U/L    AST 41 0 - 45 U/L

## 2017-06-23 DIAGNOSIS — C20 ADENOCARCINOMA OF RECTUM (H): Primary | ICD-10-CM

## 2017-06-23 NOTE — PROGRESS NOTES
Oncology/Hematology Visit Note  Jun 22, 2017    Reason for Visit: follow up of adenocarcinoma of the rectum    History of Present Illness: Meño Omalley is a 47 year old male with adenocarcinoma of rectum. He initially presented to primary care with blood in his stool for a few months and a change in bowel habits with the stool being more loose. Family history of father who developed colon cancer at age 60, and a sister who developed colon cancer at age 55.  He presented for colonoscopy with Dr. Delio Daniel on 03/24/2017.  Findings were several small polyps and a possible carcinoma of the rectosigmoid junction measured from 17-21 cm.  Biopsies obtained were remarkable for adenocarcinoma.  CT scan of the chest, abdomen and pelvis was done, which confirmed the presence of possible thickening in the rectosigmoid junction, but no evidence of metastases.  A 3D MRI was pursued on 03/31, and it showed circumferential wall thickening of the upper rectum/rectosigmoid with minimal extension into the mesorectal fat, consistent with rectal cancer, stage IIIB, N1.  His case was discussed in the Tumor Board, and the decision was made to forego chemoradiation, but to go straight to surgery, and he underwent a low anterior resection. Surgery took place on 4/12/2017. The pathology on the tumor was moderately differentiated adenocarcinoma invading the muscularis propria with a tumor size of 3.7 cm all negative  carcinoma margins.  Lymphovascular invasion was not identified.  Two non-renea tumor deposits were identified, and 6 lymph nodes were negative for metastatic carcinoma.  He was staged by pathological staging and pT2 N1c.  The mismatch repair testing was done on his original biopsy, and he had a normal pattern of mismatch repair protein expression, ruling out Rodriguez syndrome. Port was placed by IR on 5/23/17.    He started adjuvant chemotherapy with FOLFOX on 5/24/17. He is here today prior to cycle 3.      Interval  History:  Meño is here today along with his wife. Cycle 2 went much better than cycle 1. He took Zofran TID starting Friday evening along with ativan at bedtime. He did not need to use compazine. He was initially constipated and then stools normalized. He did have cold sensitivity on and off for a week. Had intermittent n/t in his feet in warm environments as well that is gone now. No HFS or overt mucositis. No fevers/chills or infectious concerns. He was able to eat and drink reasonably well and weight is stable.     Review of Systems:  Patient denies any of the following except if noted above: fevers, chills, difficulty with energy, vision or hearing changes, chest pain, dyspnea, abdominal pain, nausea, vomiting, diarrhea, constipation, urinary concerns, headaches, numbness, tingling, issues with sleep or mood. He also denies lumps, bumps, rashes or skin lesions, bleeding or bruising issues.    Current Outpatient Prescriptions   Medication Sig Dispense Refill     ondansetron (ZOFRAN) 8 MG tablet Take 1 tablet (8 mg) by mouth every 8 hours as needed for nausea 60 tablet 1     escitalopram (LEXAPRO) 20 MG tablet Take 1 tablet (20 mg) by mouth every morning 30 tablet 3     lidocaine-prilocaine (EMLA) cream Apply topically as needed for moderate pain 30 g 1     prochlorperazine (COMPAZINE) 10 MG tablet Take 1 tablet (10 mg) by mouth every 6 hours as needed (Nausea/Vomiting) 30 tablet 2     LORazepam (ATIVAN) 0.5 MG tablet Take 1 tablet (0.5 mg) by mouth every 4 hours as needed (Anxiety, Nausea/Vomiting or Sleep) 30 tablet 2     fluticasone (FLOVENT HFA) 110 MCG/ACT inhaler Take 2 puffs by mouth 2 times daily as needed          Physical Examination:  General: The patient is a pleasant male in no acute distress.  /87  Pulse 86  Temp 98  F (36.7  C) (Oral)  Resp 15  Wt 93.6 kg (206 lb 4.8 oz)  SpO2 96%  BMI 30.45 kg/m2  Wt Readings from Last 10 Encounters:   06/22/17 93.6 kg (206 lb 4.8 oz)   06/07/17 92.1  kg (203 lb)   05/24/17 92.9 kg (204 lb 14.4 oz)   05/22/17 90.7 kg (200 lb)   05/02/17 91.4 kg (201 lb 8 oz)   05/01/17 91.9 kg (202 lb 9.6 oz)   04/24/17 92.1 kg (203 lb)   04/14/17 91.6 kg (202 lb)   04/10/17 94.8 kg (209 lb)   04/10/17 94.8 kg (209 lb 1.6 oz)     HEENT: EOMI, PERRL. Sclerae are anicteric. Oral mucosa is pink and moist with no lesions or thrush.   Lymph: Neck is supple with no lymphadenopathy in the cervical or supraclavicular areas.   Heart: Regular rate and rhythm.   Lungs: Clear to auscultation bilaterally. Port right chest accessed, c/d/i.  Abdomen: Bowel sounds present, soft, nontender with no palpable hepatosplenomegaly or masses. Laporscopic incisions healed - 5 total.   Extremities: No lower extremity edema noted bilaterally.   Neuro: Cranial nerves II through XII are grossly intact.  Skin: No rashes, petechiae, or bruising noted on exposed skin.    Laboratory Data:    6/22/2017 10:15   Sodium 141   Potassium 4.0   Chloride 106   Carbon Dioxide 28   Urea Nitrogen 15   Creatinine 0.77   GFR Estimate >90...   GFR Estimate If Black >90...   Calcium 8.8   Anion Gap 6   Albumin 3.6   Protein Total 7.2   Bilirubin Total 0.5   Alkaline Phosphatase 68   ALT 93 (H)   AST 41   Glucose 105 (H)   WBC 3.2 (L)   Hemoglobin 14.1   Hematocrit 41.1   Platelet Count 194   RBC Count 4.58   MCV 90   MCH 30.8   MCHC 34.3   RDW 13.4   Diff Method Automated Method   % Neutrophils 48.3   % Lymphocytes 38.6   % Monocytes 10.9   % Eosinophils 1.6   % Basophils 0.6   % Immature Granulocytes 0.0   Nucleated RBCs 0   Absolute Neutrophil 1.6   Absolute Lymphocytes 1.2   Absolute Monocytes 0.4   Absolute Eosinophils 0.1   Absolute Basophils 0.0   Abs Immature Granulocytes 0.0   Absolute Nucleated RBC 0.0       Assessment and Plan:  1. Oncology. stage III rectosigmoid adenocarcinoma, moderately differentiated, status post APR with good margins. Started adjuvant chemotherapy with FOLFOX and s/p 2 cycles. He is tolerating  fairly well with nausea, fatigue, cold sensitivity. His nausea improved with taking scheduled antiemetics. His counts are adequate to proceed with cycle 3 today. Will have to monitor for neuropathy. Since he is feeling well, he can be seen every other infusion. He will call with any interval concerns in the meantime.     Jessica Lomas PA-C  [facility]  [address] [place] [id number] [phone]

## 2017-06-24 ENCOUNTER — HOME INFUSION (PRE-WILLOW HOME INFUSION) (OUTPATIENT)
Dept: PHARMACY | Facility: CLINIC | Age: 47
End: 2017-06-24

## 2017-07-05 ENCOUNTER — INFUSION THERAPY VISIT (OUTPATIENT)
Dept: ONCOLOGY | Facility: CLINIC | Age: 47
End: 2017-07-05
Attending: INTERNAL MEDICINE
Payer: COMMERCIAL

## 2017-07-05 ENCOUNTER — HOME INFUSION (PRE-WILLOW HOME INFUSION) (OUTPATIENT)
Dept: PHARMACY | Facility: CLINIC | Age: 47
End: 2017-07-05

## 2017-07-05 VITALS
WEIGHT: 205.2 LBS | HEART RATE: 76 BPM | SYSTOLIC BLOOD PRESSURE: 133 MMHG | RESPIRATION RATE: 16 BRPM | BODY MASS INDEX: 30.29 KG/M2 | DIASTOLIC BLOOD PRESSURE: 77 MMHG | TEMPERATURE: 98.2 F | OXYGEN SATURATION: 98 %

## 2017-07-05 DIAGNOSIS — C20 ADENOCARCINOMA OF RECTUM (H): Primary | ICD-10-CM

## 2017-07-05 LAB
ALBUMIN SERPL-MCNC: 3.6 G/DL (ref 3.4–5)
ALP SERPL-CCNC: 64 U/L (ref 40–150)
ALT SERPL W P-5'-P-CCNC: 64 U/L (ref 0–70)
ANION GAP SERPL CALCULATED.3IONS-SCNC: 6 MMOL/L (ref 3–14)
AST SERPL W P-5'-P-CCNC: 34 U/L (ref 0–45)
BASOPHILS # BLD AUTO: 0 10E9/L (ref 0–0.2)
BASOPHILS NFR BLD AUTO: 1.1 %
BILIRUB SERPL-MCNC: 0.7 MG/DL (ref 0.2–1.3)
BUN SERPL-MCNC: 16 MG/DL (ref 7–30)
CALCIUM SERPL-MCNC: 9 MG/DL (ref 8.5–10.1)
CHLORIDE SERPL-SCNC: 105 MMOL/L (ref 94–109)
CO2 SERPL-SCNC: 26 MMOL/L (ref 20–32)
CREAT SERPL-MCNC: 0.8 MG/DL (ref 0.66–1.25)
DIFFERENTIAL METHOD BLD: ABNORMAL
EOSINOPHIL # BLD AUTO: 0.1 10E9/L (ref 0–0.7)
EOSINOPHIL NFR BLD AUTO: 2.1 %
ERYTHROCYTE [DISTWIDTH] IN BLOOD BY AUTOMATED COUNT: 14.5 % (ref 10–15)
GFR SERPL CREATININE-BSD FRML MDRD: ABNORMAL ML/MIN/1.7M2
GLUCOSE SERPL-MCNC: 120 MG/DL (ref 70–99)
HCT VFR BLD AUTO: 41.5 % (ref 40–53)
HGB BLD-MCNC: 13.9 G/DL (ref 13.3–17.7)
IMM GRANULOCYTES # BLD: 0 10E9/L (ref 0–0.4)
IMM GRANULOCYTES NFR BLD: 0.3 %
LYMPHOCYTES # BLD AUTO: 1.4 10E9/L (ref 0.8–5.3)
LYMPHOCYTES NFR BLD AUTO: 36.2 %
MCH RBC QN AUTO: 30.5 PG (ref 26.5–33)
MCHC RBC AUTO-ENTMCNC: 33.5 G/DL (ref 31.5–36.5)
MCV RBC AUTO: 91 FL (ref 78–100)
MONOCYTES # BLD AUTO: 0.4 10E9/L (ref 0–1.3)
MONOCYTES NFR BLD AUTO: 9.8 %
NEUTROPHILS # BLD AUTO: 1.9 10E9/L (ref 1.6–8.3)
NEUTROPHILS NFR BLD AUTO: 50.5 %
NRBC # BLD AUTO: 0 10*3/UL
NRBC BLD AUTO-RTO: 0 /100
PLATELET # BLD AUTO: 170 10E9/L (ref 150–450)
POTASSIUM SERPL-SCNC: 4.1 MMOL/L (ref 3.4–5.3)
PROT SERPL-MCNC: 7.5 G/DL (ref 6.8–8.8)
RBC # BLD AUTO: 4.56 10E12/L (ref 4.4–5.9)
SODIUM SERPL-SCNC: 138 MMOL/L (ref 133–144)
WBC # BLD AUTO: 3.8 10E9/L (ref 4–11)

## 2017-07-05 PROCEDURE — 25000128 H RX IP 250 OP 636: Mod: JW,ZF | Performed by: PHYSICIAN ASSISTANT

## 2017-07-05 PROCEDURE — 25000125 ZZHC RX 250: Mod: ZF | Performed by: PHYSICIAN ASSISTANT

## 2017-07-05 PROCEDURE — 80053 COMPREHEN METABOLIC PANEL: CPT | Performed by: PHYSICIAN ASSISTANT

## 2017-07-05 PROCEDURE — 96416 CHEMO PROLONG INFUSE W/PUMP: CPT

## 2017-07-05 PROCEDURE — 96413 CHEMO IV INFUSION 1 HR: CPT

## 2017-07-05 PROCEDURE — 36415 COLL VENOUS BLD VENIPUNCTURE: CPT | Performed by: PHYSICIAN ASSISTANT

## 2017-07-05 PROCEDURE — 85025 COMPLETE CBC W/AUTO DIFF WBC: CPT | Performed by: PHYSICIAN ASSISTANT

## 2017-07-05 PROCEDURE — 96368 THER/DIAG CONCURRENT INF: CPT

## 2017-07-05 PROCEDURE — 25000128 H RX IP 250 OP 636: Mod: ZF | Performed by: INTERNAL MEDICINE

## 2017-07-05 PROCEDURE — 96411 CHEMO IV PUSH ADDL DRUG: CPT

## 2017-07-05 PROCEDURE — 96415 CHEMO IV INFUSION ADDL HR: CPT

## 2017-07-05 PROCEDURE — 96375 TX/PRO/DX INJ NEW DRUG ADDON: CPT

## 2017-07-05 RX ORDER — HEPARIN SODIUM (PORCINE) LOCK FLUSH IV SOLN 100 UNIT/ML 100 UNIT/ML
5 SOLUTION INTRAVENOUS ONCE
Status: COMPLETED | OUTPATIENT
Start: 2017-07-05 | End: 2017-07-05

## 2017-07-05 RX ORDER — PALONOSETRON 0.05 MG/ML
0.25 INJECTION, SOLUTION INTRAVENOUS ONCE
Status: COMPLETED | OUTPATIENT
Start: 2017-07-05 | End: 2017-07-05

## 2017-07-05 RX ORDER — FLUOROURACIL 50 MG/ML
400 INJECTION, SOLUTION INTRAVENOUS ONCE
Status: COMPLETED | OUTPATIENT
Start: 2017-07-05 | End: 2017-07-05

## 2017-07-05 RX ADMIN — DEXTROSE MONOHYDRATE 250 ML: 50 INJECTION, SOLUTION INTRAVENOUS at 12:09

## 2017-07-05 RX ADMIN — FLUOROURACIL 850 MG: 50 INJECTION, SOLUTION INTRAVENOUS at 15:05

## 2017-07-05 RX ADMIN — SODIUM CHLORIDE, PRESERVATIVE FREE 5 ML: 5 INJECTION INTRAVENOUS at 11:18

## 2017-07-05 RX ADMIN — DEXAMETHASONE SODIUM PHOSPHATE: 10 INJECTION, SOLUTION INTRAMUSCULAR; INTRAVENOUS at 12:09

## 2017-07-05 RX ADMIN — LEUCOVORIN CALCIUM 750 MG: 350 INJECTION, POWDER, LYOPHILIZED, FOR SOLUTION INTRAMUSCULAR; INTRAVENOUS at 12:40

## 2017-07-05 RX ADMIN — OXALIPLATIN 180 MG: 5 INJECTION, SOLUTION, CONCENTRATE INTRAVENOUS at 12:44

## 2017-07-05 RX ADMIN — PALONOSETRON HYDROCHLORIDE 0.25 MG: 0.25 INJECTION INTRAVENOUS at 12:10

## 2017-07-05 NOTE — PROGRESS NOTES
"Infusion Nursing Note:  Meño Omalley presents today for Cycle 4 day 1 Oxaliplatin/Leucovorin/Fluorouracil bolus and 46 hour pump connect.    Patient seen by provider today: No   present during visit today: Not Applicable.    Note: Pt denies complaints today. Does note that he is beginning to have some intermittent numbness and tingling to his fingertips bilaterally, but thus far it is not bothersome. Also reports that for a few days after chemotherapy he has \"sore\" gums when chewing on food, but has not noticed sores thus far. Patient encouraged to use a soft toothbrush and to do frequent salt/soda rinses. Pt verbalized understanding.    Intravenous Access:  Implanted Port.    Treatment Conditions:  Lab Results   Component Value Date    HGB 13.9 07/05/2017     Lab Results   Component Value Date    WBC 3.8 07/05/2017      Lab Results   Component Value Date    ANEU 1.9 07/05/2017     Lab Results   Component Value Date     07/05/2017      Lab Results   Component Value Date     07/05/2017                   Lab Results   Component Value Date    POTASSIUM 4.1 07/05/2017           Lab Results   Component Value Date    MAG 2.1 04/13/2017            Lab Results   Component Value Date    CR 0.80 07/05/2017                   Lab Results   Component Value Date    CANDY 9.0 07/05/2017                Lab Results   Component Value Date    BILITOTAL 0.7 07/05/2017           Lab Results   Component Value Date    ALBUMIN 3.6 07/05/2017                    Lab Results   Component Value Date    ALT 64 07/05/2017           Lab Results   Component Value Date    AST 34 07/05/2017     Results reviewed, labs MET treatment parameters, ok to proceed with treatment.      Post Infusion Assessment:  Patient tolerated infusion without incident.  Blood return noted pre and post infusion.  Site patent and intact, free from redness, edema or discomfort.  No evidence of extravasations.  Access discontinued per protocol.    + " BR checked every 2ccs while administering Fluorouracil. Tubing filter taped to pt's skin with the  per protocol. CSeries pump running @ 5.2ml/hour for 46hours. Pump attached per protocol. Herbert @ [facility] aware to disconnect pt on 7/7/17@ 1000. Connections checked with Lana Amezcua RN. Pt's pump started at 1500, and would be due to be changed at 1300 on 7/7/17, however, the patient reports that his pump almost always finishes the night prior to scheduled completion and requested at 10am pump disconnect on Friday 7/7/17.  The patient denies any untoward side effects from pump infusing more quickly, and declines the need to change to a CADD pump.    Discharge Plan:   Patient declined prescription refills.  Discharge instructions reviewed with: Patient and Family.  Patient and/or family verbalized understanding of discharge instructions and all questions answered.  Copy of AVS reviewed with patient and/or family.  Patient will return 7/19/17 for next appointment.  Patient discharged in stable condition accompanied by: self and wife.  Departure Mode: Ambulatory.  Face to Face time: 3 min.    Hermila Vincent RN

## 2017-07-05 NOTE — MR AVS SNAPSHOT
After Visit Summary   7/5/2017    Meño Omalley    MRN: [id number]           Patient Information     Date Of Birth          1970        Visit Information        Provider Department      7/5/2017 11:00 AM UC 19 ATC;  ONCOLOGY INFUSION [facility]        Today's Diagnoses     Adenocarcinoma of rectum (H)    -  1      Care Instructions    Contact Numbers    [facility] [place]: [phone]  [facility] [place]:  [phone]    Call triage with chills and/or temperature greater than or equal to 100.5, uncontrolled nausea/vomiting, diarrhea, constipation, dizziness, shortness of breath, chest pain, bleeding, unexplained bruising, or any new/concerning symptoms, questions/concerns.     If you are having any concerning symptoms or wish to speak to a provider before your next infusion visit, please call your care coordinator or triage to notify them so we can adequately serve you.       After Hours: [phone]    If after hours, weekends, or holidays, call main hospital  and ask for Oncology doctor on call.           July 2017 Sunday Monday Tuesday Wednesday Thursday Friday Saturday                                 1       2     3     4     5     [facility] LAB DRAW   10:30 AM   (15 min.)    [facility] LAB DRAW   [facility] Lab Draw     [facility] ONC INFUSION 240   11:00 AM   (240 min.)    ONCOLOGY INFUSION   [facility] 6     7     8       9     10     11     12     13     14     15       16     17     18     19     [facility] DRAW    8:15 AM   (15 min.)    [facility] LAB DRAW   [facility] Lab Draw     [facility] RETURN    8:25 AM   (50 min.)   Zahira Christensen PA-C   [facility]    9:30 AM   (240 min.)    ONCOLOGY INFUSION   [facility] 20     21     22       23     24     25     26     27     28     29       30     31                                         August 2017 Sunday Monday  Tuesday Wednesday Thursday Friday Saturday             1     2     [facility] DRAW    9:00 AM   (15 min.)    [facility]ON[facility] LAB DRAW   [facility] Mason[facility] Lab Draw     [facility] ONC INFUSION 240    9:30 AM   (240 min.)    ONCOLOGY INFUSION   [facility] 3     4     5       6     7     8     9     [facility] LAB DRAW    7:30 AM   (15 min.)    [facility] LAB DRAW   [facility] Lab Draw     CT CHEST ABDOMEN PELVIS WWO    7:45 AM   (20 min.)   UCCT2   [facility] CT     [facility] RETURN   11:00 AM   (30 min.)   Jeanette Mcarthur MD   [facility] 10     11     12       13     14     15     16     [facility] DRAW    9:00 AM   (15 min.)    [facility] LAB DRAW   [facility] Lab Draw     [facility] ONC INFUSION 240    9:30 AM   (240 min.)    ONCOLOGY INFUSION   [facility] 17     18     19       20     21     22     23     24     25     26       27     28     29     30     [facility] DRAW    9:00 AM   (15 min.)    [facility] LAB DRAW   [facility] Lab Draw     [facility] ONC INFUSION 240    9:30 AM   (240 min.)    ONCOLOGY INFUSION   [facility] 31                            Lab Results:  Recent Results (from the past 12 hour(s))   CBC with platelets differential    Collection Time: 07/05/17 11:13 AM   Result Value Ref Range    WBC 3.8 (L) 4.0 - 11.0 10e9/L    RBC Count 4.56 4.4 - 5.9 10e12/L    Hemoglobin 13.9 13.3 - 17.7 g/dL    Hematocrit 41.5 40.0 - 53.0 %    MCV 91 78 - 100 fl    MCH 30.5 26.5 - 33.0 pg    MCHC 33.5 31.5 - 36.5 g/dL    RDW 14.5 10.0 - 15.0 %    Platelet Count 170 150 - 450 10e9/L    Diff Method Automated Method     % Neutrophils 50.5 %    % Lymphocytes 36.2 %    % Monocytes 9.8 %    % Eosinophils 2.1 %    % Basophils 1.1 %    % Immature Granulocytes 0.3 %    Nucleated RBCs 0 0 /100    Absolute Neutrophil 1.9 1.6 - 8.3 10e9/L    Absolute Lymphocytes 1.4 0.8 - 5.3 10e9/L    Absolute Monocytes 0.4 0.0 - 1.3 10e9/L     Absolute Eosinophils 0.1 0.0 - 0.7 10e9/L    Absolute Basophils 0.0 0.0 - 0.2 10e9/L    Abs Immature Granulocytes 0.0 0 - 0.4 10e9/L    Absolute Nucleated RBC 0.0    Comprehensive metabolic panel    Collection Time: 07/05/17 11:13 AM   Result Value Ref Range    Sodium 138 133 - 144 mmol/L    Potassium 4.1 3.4 - 5.3 mmol/L    Chloride 105 94 - 109 mmol/L    Carbon Dioxide 26 20 - 32 mmol/L    Anion Gap 6 3 - 14 mmol/L    Glucose 120 (H) 70 - 99 mg/dL    Urea Nitrogen 16 7 - 30 mg/dL    Creatinine 0.80 0.66 - 1.25 mg/dL    GFR Estimate >90  Non  GFR Calc   >60 mL/min/1.7m2    GFR Estimate If Black >90   GFR Calc   >60 mL/min/1.7m2    Calcium 9.0 8.5 - 10.1 mg/dL    Bilirubin Total 0.7 0.2 - 1.3 mg/dL    Albumin 3.6 3.4 - 5.0 g/dL    Protein Total 7.5 6.8 - 8.8 g/dL    Alkaline Phosphatase 64 40 - 150 U/L    ALT 64 0 - 70 U/L    AST 34 0 - 45 U/L               Follow-ups after your visit        Your next 10 appointments already scheduled     Jul 19, 2017  8:15 AM CDT   [facility] Lab Draw with  [facility] LAB DRAW   [facility] Post-i Lab Draw ([facility])    [address] [place] [id number] [phone]            Jul 19, 2017  8:40 AM CDT   (Arrive by 8:25 AM)   Return Visit with Zahira Christensen PA-C   [facility] ([facility])    [address] [place] [id number] [phone]            Jul 19, 2017  9:30 AM CDT   Infusion 240 with  ONCOLOGY INFUSION, UC 16 ATC   [facility] ([facility])    [address] [place] [id number]   [phone]            Aug 02, 2017  9:00 AM CDT   [facility] Lab Draw with  [facility]IC LAB DRAW   [facility] Precious Lab Draw ([facility])    [address]  2nd Floor  [facility] [address]   [phone]            Aug 02, 2017  9:30 AM ALEXT    Infusion 240 with  ONCOLOGY INFUSION, UC 27 ATC   [facility] ([facility])    [address]  2nd Floor  [facility] [address]   [phone]            Aug 09, 2017  7:30 AM CDT   [facility] Lab Draw with UC [facility] LAB DRAW   [facility] Lab Draw ([facility])    [address]  [address] [facility] [address]   [phone]            Aug 09, 2017  8:00 AM CDT   (Arrive by 7:45 AM)   CT CHEST ABDOMEN PELVIS W/O & W CONTRAST with UCCT2   [facility] CT ([facility])    [address]  1st Floor  [facility] [id number] [phone]           Please bring any scans or X-rays taken at other hospitals, if similar tests were done. Also bring a list of your medicines, including vitamins, minerals and over-the-counter drugs. It is safest to leave personal items at home.  Be sure to tell your doctor:   If you have any allergies.   If there s any chance you are pregnant.   If you are breastfeeding.   If you have any special needs.  You may have contrast for this exam. To prepare:   Do not eat or drink for 2 hours before your exam. If you need to take medicine, you may take it with small sips of water. (We may ask you to take liquid medicine as well.)   The day before your exam, drink extra fluids at least six 8-ounce glasses (unless your doctor tells you to restrict your fluids).  Patients over 70 or patients with diabetes or kidney problems:   If you haven t had a blood test (creatinine test) within the last 30 days, go to your clinic or Diagnostic Imaging Department for this test.  If you have diabetes:   If your kidney function is normal, continue taking your metformin (Avandamet, Glucophage, Glucovance, Metaglip) on the day of your exam.   If your kidney function is abnormal, wait 48 hours before restarting this medicine.  You will have oral contrast for this exam:   You will  drink the contrast at home. Get this from your clinic or Diagnostic Imaging Department. Please follow the directions given.  Please wear loose clothing, such as a sweat suit or jogging clothes. Avoid snaps, zippers and other metal. We may ask you to undress and put on a hospital gown.  If you have any questions, please call the Imaging Department where you will have your exam.            Aug 09, 2017 11:15 AM CDT   (Arrive by 11:00 AM)   Return Visit with Jeanette Mcarthur MD   [facility] ([facility])    [address]  2nd Floor  [facility] [address] [phone]              Who to contact     If you have questions or need follow up information about today's clinic visit or your schedule please contact [facility] directly at [phone].  Normal or non-critical lab and imaging results will be communicated to you by MyChart, letter or phone within 4 business days after the clinic has received the results. If you do not hear from us within 7 days, please contact the clinic through Marqetah[employer] or phone. If you have a critical or abnormal lab result, we will notify you by phone as soon as possible.  Submit refill requests through [employer] or call your pharmacy and they will forward the refill request to us. Please allow 3 business days for your refill to be completed.          Additional Information About Your Visit        MyChart Information     [employer] gives you secure access to your electronic health record. If you see a primary care provider, you can also send messages to your care team and make appointments. If you have questions, please call your primary care clinic.  If you do not have a primary care provider, please call [phone] and they will assist you.        Care EveryWhere ID     This is your Care EveryWhere ID. This could be used by other organizations to access your [place] medical records  [id number]        Your Vitals Were      Pulse Temperature Respirations Pulse Oximetry BMI (Body Mass Index)       76 98.2  F (36.8  C) (Oral) 16 98% 30.29 kg/m2        Blood Pressure from Last 3 Encounters:   07/05/17 133/77   06/22/17 137/87   06/07/17 132/85    Weight from Last 3 Encounters:   07/05/17 93.1 kg (205 lb 3.2 oz)   06/22/17 93.6 kg (206 lb 4.8 oz)   06/07/17 92.1 kg (203 lb)              We Performed the Following     CBC with platelets differential     Comprehensive metabolic panel        Primary Care Provider Office Phone # Fax #    Delio Alcala [phone]       [facility] [address] [place] [address]        Equal Access to Services     LUCIO BOGGS : Hadii aad ku hadasho Soomaali, waaxda luqadaha, qaybta kaalmada adeegyada, lynda morales . So [facility] [phone].    ATENCIÓN: Si habla español, tiene a valle disposición servicios gratuitos de asistencia lingüística. LlKettering [facility] [phone].    We comply with applicable federal civil rights laws and [place] laws. We do not discriminate on the basis of race, color, national origin, age, disability sex, sexual orientation or gender identity.            Thank you!     Thank you for choosing [facility]  for your care. Our goal is always to provide you with excellent care. Hearing back from our patients is one way we can continue to improve our services. Please take a few minutes to complete the written survey that you may receive in the mail after your visit with us. Thank you!             Your Updated Medication List - Protect others around you: Learn how to safely use, store and throw away your medicines at www.disposemymeds.org.          This list is accurate as of: 7/5/17  3:01 PM.  Always use your most recent med list.                   Brand Name Dispense Instructions for use Diagnosis    escitalopram 20 MG tablet    LEXAPRO    30 tablet    Take 1 tablet (20 mg) by mouth every morning    Major  depressive disorder, recurrent episode, mild (H)       FLOVENT  MCG/ACT Inhaler   Generic drug:  fluticasone      Take 2 puffs by mouth 2 times daily as needed        lidocaine-prilocaine cream    EMLA    30 g    Apply topically as needed for moderate pain    Rectal cancer (H)       LORazepam 0.5 MG tablet    ATIVAN    30 tablet    Take 1 tablet (0.5 mg) by mouth every 4 hours as needed (Anxiety, Nausea/Vomiting or Sleep)    Adenocarcinoma of rectum (H)       ondansetron 8 MG tablet    ZOFRAN    60 tablet    Take 1 tablet (8 mg) by mouth every 8 hours as needed for nausea    Adenocarcinoma of rectum (H)       prochlorperazine 10 MG tablet    COMPAZINE    30 tablet    Take 1 tablet (10 mg) by mouth every 6 hours as needed (Nausea/Vomiting)    Adenocarcinoma of rectum (H)

## 2017-07-05 NOTE — PATIENT INSTRUCTIONS
Contact Numbers    [facility] [place]: [phone]  [facility] Triage:  [phone]    Call triage with chills and/or temperature greater than or equal to 100.5, uncontrolled nausea/vomiting, diarrhea, constipation, dizziness, shortness of breath, chest pain, bleeding, unexplained bruising, or any new/concerning symptoms, questions/concerns.     If you are having any concerning symptoms or wish to speak to a provider before your next infusion visit, please call your care coordinator or triage to notify them so we can adequately serve you.       After Hours: [phone]    If after hours, weekends, or holidays, call main hospital  and ask for Oncology doctor on call.           July 2017 Sunday Monday Tuesday Wednesday Thursday Friday Saturday                                 1       2     3     4     5     [facility] LAB DRAW   10:30 AM   (15 min.)   [facility] LAB DRAW   [facility] Mason[facility] Lab Draw     [facility] ONC INFUSION 240   11:00 AM   (240 min.)    ONCOLOGY INFUSION   [facility] 6     7     8       9     10     11     12     13     14     15       16     17     18     19     [facility] LAB DRAW    8:15 AM   (15 min.)    MASONIC LAB DRAW   [facility] Mason[facility] Lab Draw     [facility] RETURN    8:25 AM   (50 min.)   Zahira Christensen PA-C   [facility]     [facility] ONC INFUSION 240    9:30 AM   (240 min.)    ONCOLOGY INFUSION   [facility] 20     21     22       23     24     25     26     27     28     29       30     31                                         August 2017 Sunday Monday Tuesday Wednesday Thursday Friday Saturday             1     2     [facility] LAB DRAW    9:00 AM   (15 min.)   [facility] LAB DRAW   [facility] Mason[facility] Lab Draw     [facility] ONC INFUSION 240    9:30 AM   (240 min.)    ONCOLOGY INFUSION   [facility] 3     4     5       6     7     8     9     [facility] LAB DRAW    7:30 AM   (15 min.)   [facility] LAB DRAW   [facility]  [facility] Lab Draw     CT CHEST ABDOMEN PELVIS WWO    7:45 AM   (20 min.)   UCCT2   [facility] CT     UMP RETURN   11:00 AM   (30 min.)   Jeanette Mcarthur MD   [facility] 10     11     12       13     14     15     16     [facility] LAB DRAW    9:00 AM   (15 min.)    MASONIC LAB DRAW   [facility] Lab Draw     [facility] ONC INFUSION 240    9:30 AM   (240 min.)    ONCOLOGY INFUSION   [facility] 17     18     19       20     21     22     23     24     25     26       27     28     29     30     [facility] LAB DRAW    9:00 AM   (15 min.)    MASONIC LAB DRAW   [facility] Lab Draw     [facility] ONC INFUSION 240    9:30 AM   (240 min.)    ONCOLOGY INFUSION   [facility] 31                            Lab Results:  Recent Results (from the past 12 hour(s))   CBC with platelets differential    Collection Time: 07/05/17 11:13 AM   Result Value Ref Range    WBC 3.8 (L) 4.0 - 11.0 10e9/L    RBC Count 4.56 4.4 - 5.9 10e12/L    Hemoglobin 13.9 13.3 - 17.7 g/dL    Hematocrit 41.5 40.0 - 53.0 %    MCV 91 78 - 100 fl    MCH 30.5 26.5 - 33.0 pg    MCHC 33.5 31.5 - 36.5 g/dL    RDW 14.5 10.0 - 15.0 %    Platelet Count 170 150 - 450 10e9/L    Diff Method Automated Method     % Neutrophils 50.5 %    % Lymphocytes 36.2 %    % Monocytes 9.8 %    % Eosinophils 2.1 %    % Basophils 1.1 %    % Immature Granulocytes 0.3 %    Nucleated RBCs 0 0 /100    Absolute Neutrophil 1.9 1.6 - 8.3 10e9/L    Absolute Lymphocytes 1.4 0.8 - 5.3 10e9/L    Absolute Monocytes 0.4 0.0 - 1.3 10e9/L    Absolute Eosinophils 0.1 0.0 - 0.7 10e9/L    Absolute Basophils 0.0 0.0 - 0.2 10e9/L    Abs Immature Granulocytes 0.0 0 - 0.4 10e9/L    Absolute Nucleated RBC 0.0    Comprehensive metabolic panel    Collection Time: 07/05/17 11:13 AM   Result Value Ref Range    Sodium 138 133 - 144 mmol/L    Potassium 4.1 3.4 - 5.3 mmol/L    Chloride 105 94 - 109 mmol/L    Carbon Dioxide 26 20 - 32  mmol/L    Anion Gap 6 3 - 14 mmol/L    Glucose 120 (H) 70 - 99 mg/dL    Urea Nitrogen 16 7 - 30 mg/dL    Creatinine 0.80 0.66 - 1.25 mg/dL    GFR Estimate >90  Non  GFR Calc   >60 mL/min/1.7m2    GFR Estimate If Black >90   GFR Calc   >60 mL/min/1.7m2    Calcium 9.0 8.5 - 10.1 mg/dL    Bilirubin Total 0.7 0.2 - 1.3 mg/dL    Albumin 3.6 3.4 - 5.0 g/dL    Protein Total 7.5 6.8 - 8.8 g/dL    Alkaline Phosphatase 64 40 - 150 U/L    ALT 64 0 - 70 U/L    AST 34 0 - 45 U/L

## 2017-07-05 NOTE — NURSING NOTE
Chief Complaint   Patient presents with     Port Draw     Labs collected via port by RN.     Port accessed with power needle, labs collected, flushed and locked with normal saline and heparin.     Babita Kohli RN

## 2017-07-07 ENCOUNTER — TELEPHONE (OUTPATIENT)
Dept: ONCOLOGY | Facility: CLINIC | Age: 47
End: 2017-07-07

## 2017-07-07 NOTE — TELEPHONE ENCOUNTER
"7/7/2017    I called Meño today to discuss the pre-authorization we submitted via [employer] for the ColoNext gene panel. Per [employer]:     Insurance Name: [employer]    Plan Type: CHOICE PASSPORT    BENEFITS    Deductible: 500 (met)    Coinsurance %: 0    Insurance Estimated OOP: $0    Also per [employer], \"..we received an approval from the patient's insurance company...Authorization Number: [id number]. Authorization Date Range: 06/15/2017 - 09/15/2017\"    Meño was pleased to here we had received an approval and given this information, felt comfortable proceeding with genetic testing using the ColoNext gene panel. As such, I will contact [employer] to initiate this testing using the sample originally collected on 4/11/2017. Result are expected in approximately 4 weeks and Meño will return to clinic to discuss the results. He denied having any other questions at this time.    Faby Santos MS, [facility]  Certified Genetic Counselor  Office: [phone]  Pager: [phone]  "

## 2017-07-19 ENCOUNTER — ONCOLOGY VISIT (OUTPATIENT)
Dept: ONCOLOGY | Facility: CLINIC | Age: 47
End: 2017-07-19
Attending: PHYSICIAN ASSISTANT
Payer: COMMERCIAL

## 2017-07-19 VITALS
WEIGHT: 205.1 LBS | RESPIRATION RATE: 16 BRPM | HEART RATE: 76 BPM | SYSTOLIC BLOOD PRESSURE: 152 MMHG | TEMPERATURE: 96.9 F | OXYGEN SATURATION: 97 % | DIASTOLIC BLOOD PRESSURE: 89 MMHG | BODY MASS INDEX: 30.38 KG/M2 | HEIGHT: 69 IN

## 2017-07-19 DIAGNOSIS — C20 RECTAL CANCER (H): Primary | ICD-10-CM

## 2017-07-19 DIAGNOSIS — C20 ADENOCARCINOMA OF RECTUM (H): Primary | ICD-10-CM

## 2017-07-19 DIAGNOSIS — C20 ADENOCARCINOMA OF RECTUM (H): ICD-10-CM

## 2017-07-19 LAB
ALBUMIN SERPL-MCNC: 3.4 G/DL (ref 3.4–5)
ALP SERPL-CCNC: 80 U/L (ref 40–150)
ALT SERPL W P-5'-P-CCNC: 77 U/L (ref 0–70)
ANION GAP SERPL CALCULATED.3IONS-SCNC: 6 MMOL/L (ref 3–14)
AST SERPL W P-5'-P-CCNC: 55 U/L (ref 0–45)
BASOPHILS # BLD AUTO: 0 10E9/L (ref 0–0.2)
BASOPHILS NFR BLD AUTO: 1.1 %
BILIRUB SERPL-MCNC: 0.6 MG/DL (ref 0.2–1.3)
BUN SERPL-MCNC: 12 MG/DL (ref 7–30)
CALCIUM SERPL-MCNC: 9.1 MG/DL (ref 8.5–10.1)
CHLORIDE SERPL-SCNC: 103 MMOL/L (ref 94–109)
CO2 SERPL-SCNC: 27 MMOL/L (ref 20–32)
CREAT SERPL-MCNC: 0.82 MG/DL (ref 0.66–1.25)
DIFFERENTIAL METHOD BLD: ABNORMAL
EOSINOPHIL # BLD AUTO: 0.1 10E9/L (ref 0–0.7)
EOSINOPHIL NFR BLD AUTO: 3.5 %
ERYTHROCYTE [DISTWIDTH] IN BLOOD BY AUTOMATED COUNT: 16 % (ref 10–15)
GFR SERPL CREATININE-BSD FRML MDRD: ABNORMAL ML/MIN/1.7M2
GLUCOSE SERPL-MCNC: 83 MG/DL (ref 70–99)
HCT VFR BLD AUTO: 38.5 % (ref 40–53)
HGB BLD-MCNC: 13.7 G/DL (ref 13.3–17.7)
IMM GRANULOCYTES # BLD: 0 10E9/L (ref 0–0.4)
IMM GRANULOCYTES NFR BLD: 0.5 %
LYMPHOCYTES # BLD AUTO: 1.4 10E9/L (ref 0.8–5.3)
LYMPHOCYTES NFR BLD AUTO: 36.5 %
MCH RBC QN AUTO: 31.9 PG (ref 26.5–33)
MCHC RBC AUTO-ENTMCNC: 35.6 G/DL (ref 31.5–36.5)
MCV RBC AUTO: 90 FL (ref 78–100)
MONOCYTES # BLD AUTO: 0.5 10E9/L (ref 0–1.3)
MONOCYTES NFR BLD AUTO: 13.5 %
NEUTROPHILS # BLD AUTO: 1.7 10E9/L (ref 1.6–8.3)
NEUTROPHILS NFR BLD AUTO: 44.9 %
NRBC # BLD AUTO: 0 10*3/UL
NRBC BLD AUTO-RTO: 0 /100
PLATELET # BLD AUTO: 148 10E9/L (ref 150–450)
POTASSIUM SERPL-SCNC: 4.1 MMOL/L (ref 3.4–5.3)
PROT SERPL-MCNC: 7.1 G/DL (ref 6.8–8.8)
RBC # BLD AUTO: 4.29 10E12/L (ref 4.4–5.9)
SODIUM SERPL-SCNC: 136 MMOL/L (ref 133–144)
WBC # BLD AUTO: 3.7 10E9/L (ref 4–11)

## 2017-07-19 PROCEDURE — 25000128 H RX IP 250 OP 636: Mod: ZF | Performed by: PHYSICIAN ASSISTANT

## 2017-07-19 PROCEDURE — 96415 CHEMO IV INFUSION ADDL HR: CPT

## 2017-07-19 PROCEDURE — 25000125 ZZHC RX 250: Mod: ZF | Performed by: PHYSICIAN ASSISTANT

## 2017-07-19 PROCEDURE — 99212 OFFICE O/P EST SF 10 MIN: CPT | Mod: ZF

## 2017-07-19 PROCEDURE — 99215 OFFICE O/P EST HI 40 MIN: CPT | Mod: ZP | Performed by: PHYSICIAN ASSISTANT

## 2017-07-19 PROCEDURE — 85025 COMPLETE CBC W/AUTO DIFF WBC: CPT | Performed by: PHYSICIAN ASSISTANT

## 2017-07-19 PROCEDURE — 96368 THER/DIAG CONCURRENT INF: CPT

## 2017-07-19 PROCEDURE — 96416 CHEMO PROLONG INFUSE W/PUMP: CPT

## 2017-07-19 PROCEDURE — 80053 COMPREHEN METABOLIC PANEL: CPT | Performed by: PHYSICIAN ASSISTANT

## 2017-07-19 PROCEDURE — 96375 TX/PRO/DX INJ NEW DRUG ADDON: CPT

## 2017-07-19 PROCEDURE — 96411 CHEMO IV PUSH ADDL DRUG: CPT

## 2017-07-19 PROCEDURE — 96413 CHEMO IV INFUSION 1 HR: CPT

## 2017-07-19 RX ORDER — ALBUTEROL SULFATE 90 UG/1
1-2 AEROSOL, METERED RESPIRATORY (INHALATION)
Status: CANCELLED
Start: 2017-07-19

## 2017-07-19 RX ORDER — HEPARIN SODIUM (PORCINE) LOCK FLUSH IV SOLN 100 UNIT/ML 100 UNIT/ML
5 SOLUTION INTRAVENOUS EVERY 8 HOURS
Status: DISCONTINUED | OUTPATIENT
Start: 2017-07-19 | End: 2017-07-27 | Stop reason: HOSPADM

## 2017-07-19 RX ORDER — METHYLPREDNISOLONE SODIUM SUCCINATE 125 MG/2ML
125 INJECTION, POWDER, LYOPHILIZED, FOR SOLUTION INTRAMUSCULAR; INTRAVENOUS
Status: CANCELLED
Start: 2017-07-19

## 2017-07-19 RX ORDER — ALBUTEROL SULFATE 0.83 MG/ML
2.5 SOLUTION RESPIRATORY (INHALATION)
Status: CANCELLED | OUTPATIENT
Start: 2017-07-19

## 2017-07-19 RX ORDER — EPINEPHRINE 0.3 MG/.3ML
0.3 INJECTION SUBCUTANEOUS EVERY 5 MIN PRN
Status: CANCELLED | OUTPATIENT
Start: 2017-07-19

## 2017-07-19 RX ORDER — PALONOSETRON 0.05 MG/ML
0.25 INJECTION, SOLUTION INTRAVENOUS ONCE
Status: CANCELLED
Start: 2017-07-19 | End: 2017-07-19

## 2017-07-19 RX ORDER — PALONOSETRON 0.05 MG/ML
0.25 INJECTION, SOLUTION INTRAVENOUS ONCE
Status: COMPLETED | OUTPATIENT
Start: 2017-07-19 | End: 2017-07-19

## 2017-07-19 RX ORDER — DIPHENHYDRAMINE HYDROCHLORIDE 50 MG/ML
50 INJECTION INTRAMUSCULAR; INTRAVENOUS
Status: CANCELLED
Start: 2017-07-19

## 2017-07-19 RX ORDER — SODIUM CHLORIDE 9 MG/ML
1000 INJECTION, SOLUTION INTRAVENOUS CONTINUOUS PRN
Status: CANCELLED
Start: 2017-07-19

## 2017-07-19 RX ORDER — LORAZEPAM 2 MG/ML
0.5 INJECTION INTRAMUSCULAR EVERY 4 HOURS PRN
Status: CANCELLED
Start: 2017-07-19

## 2017-07-19 RX ORDER — MEPERIDINE HYDROCHLORIDE 25 MG/ML
25 INJECTION INTRAMUSCULAR; INTRAVENOUS; SUBCUTANEOUS EVERY 30 MIN PRN
Status: CANCELLED | OUTPATIENT
Start: 2017-07-19

## 2017-07-19 RX ORDER — FLUOROURACIL 50 MG/ML
400 INJECTION, SOLUTION INTRAVENOUS ONCE
Status: CANCELLED | OUTPATIENT
Start: 2017-07-19

## 2017-07-19 RX ORDER — FLUOROURACIL 50 MG/ML
400 INJECTION, SOLUTION INTRAVENOUS ONCE
Status: COMPLETED | OUTPATIENT
Start: 2017-07-19 | End: 2017-07-19

## 2017-07-19 RX ADMIN — OXALIPLATIN 144 MG: 5 INJECTION, SOLUTION, CONCENTRATE INTRAVENOUS at 11:25

## 2017-07-19 RX ADMIN — LEUCOVORIN CALCIUM 750 MG: 500 INJECTION, POWDER, LYOPHILIZED, FOR SOLUTION INTRAMUSCULAR; INTRAVENOUS at 11:24

## 2017-07-19 RX ADMIN — SODIUM CHLORIDE, PRESERVATIVE FREE 5 ML: 5 INJECTION INTRAVENOUS at 09:53

## 2017-07-19 RX ADMIN — DEXAMETHASONE SODIUM PHOSPHATE: 10 INJECTION, SOLUTION INTRAMUSCULAR; INTRAVENOUS at 10:36

## 2017-07-19 RX ADMIN — DEXTROSE MONOHYDRATE 250 ML: 50 INJECTION, SOLUTION INTRAVENOUS at 10:36

## 2017-07-19 RX ADMIN — PALONOSETRON HYDROCHLORIDE 0.25 MG: 0.25 INJECTION INTRAVENOUS at 10:36

## 2017-07-19 RX ADMIN — FLUOROURACIL 850 MG: 50 INJECTION, SOLUTION INTRAVENOUS at 13:33

## 2017-07-19 ASSESSMENT — PAIN SCALES - GENERAL: PAINLEVEL: NO PAIN (0)

## 2017-07-19 NOTE — LETTER
7/19/2017      RE: Meño Omalley  [address] [address]       Oncology/Hematology Visit Note  Jul 19, 2017    Reason for Visit: follow up of adenocarcinoma of the rectum    History of Present Illness: Meño Omalley is a 47 year old male with adenocarcinoma of the rectum. He initially presented to his primary care provider with blood in his stool for a few months and a change in bowel habits with the stool being more loose. Family history of father who developed colon cancer at age 60, and a sister who developed colon cancer at age 55.  He presented for colonoscopy with Dr. Delio Daniel on 03/24/2017.  Findings were several small polyps and a possible carcinoma of the rectosigmoid junction measured from 17-21 cm.  Biopsies obtained were remarkable for adenocarcinoma.  CT scan of the chest, abdomen and pelvis was done, which confirmed the presence of possible thickening in the rectosigmoid junction, but no evidence of metastases.  A 3D MRI was pursued on 03/31, and it showed circumferential wall thickening of the upper rectum/rectosigmoid with minimal extension into the mesorectal fat, consistent with rectal cancer, stage IIIB, N1.  His case was discussed in the Tumor Board, and the decision was made to forego chemoradiation, but to go straight to surgery, and he underwent a low anterior resection. Surgery took place on 4/12/2017. The pathology on the tumor was moderately differentiated adenocarcinoma invading the muscularis propria with a tumor size of 3.7 cm all negative  carcinoma margins.  Lymphovascular invasion was not identified.  Two non-renea tumor deposits were identified, and 6 lymph nodes were negative for metastatic carcinoma.  He was staged by pathological staging and pT2 N1c.  The mismatch repair testing was done on his original biopsy, and he had a normal pattern of mismatch repair protein expression, ruling out Rodriguez syndrome. Port was placed by IR on 5/23/17.    He started  "adjuvant chemotherapy with FOLFOX on 5/24/17. He is here today prior to cycle 5.      Interval History:  Meño is here today along with his wife, Zahira for follow up for his adenocarcinoma of the rectum. Today he is reporting that the cold neuropathy that was slightly more intense and lasted 5 days versus 3 on previous cycles. He even noticed numbness and tingling in his hands/fingers on the drive home from his last infusion. He even noticed some gum sensitivity to cold and room temperature liquids. He denies any nausea, vomiting or abdominal pain but his stomach was \"off\". He did take Zofran during the day and Ativan at night for this symptom. He is having normal bowel movements, no diarrhea. He has an appetite but not eating as much as usual due to taste changes. He is drinking adequate fluids.     Review of Systems:  Patient denies any of the following except if noted above: fevers, chills, difficulty with energy, vision or hearing changes, chest pain, dyspnea, abdominal pain, diarrhea, constipation, urinary concerns, headaches, issues with sleep or mood. He also denies lumps, bumps, rashes or skin lesions, bleeding or bruising issues.    Current Outpatient Prescriptions   Medication Sig Dispense Refill     ondansetron (ZOFRAN) 8 MG tablet Take 1 tablet (8 mg) by mouth every 8 hours as needed for nausea 60 tablet 1     escitalopram (LEXAPRO) 20 MG tablet Take 1 tablet (20 mg) by mouth every morning 30 tablet 3     prochlorperazine (COMPAZINE) 10 MG tablet Take 1 tablet (10 mg) by mouth every 6 hours as needed (Nausea/Vomiting) 30 tablet 2     LORazepam (ATIVAN) 0.5 MG tablet Take 1 tablet (0.5 mg) by mouth every 4 hours as needed (Anxiety, Nausea/Vomiting or Sleep) 30 tablet 2     lidocaine-prilocaine (EMLA) cream Apply topically as needed for moderate pain (Patient not taking: Reported on 7/19/2017) 30 g 1     fluticasone (FLOVENT HFA) 110 MCG/ACT inhaler Take 2 puffs by mouth 2 times daily as needed    " "      Physical Examination:  General: The patient is a pleasant male in no acute distress.  /89  Pulse 76  Temp 96.9  F (36.1  C) (Oral)  Resp 16  Ht 1.753 m (5' 9.02\")  Wt 93 kg (205 lb 1.6 oz)  SpO2 97%  BMI 30.27 kg/m2  Wt Readings from Last 10 Encounters:   07/19/17 93 kg (205 lb 1.6 oz)   07/05/17 93.1 kg (205 lb 3.2 oz)   06/22/17 93.6 kg (206 lb 4.8 oz)   06/07/17 92.1 kg (203 lb)   05/24/17 92.9 kg (204 lb 14.4 oz)   05/22/17 90.7 kg (200 lb)   05/02/17 91.4 kg (201 lb 8 oz)   05/01/17 91.9 kg (202 lb 9.6 oz)   04/24/17 92.1 kg (203 lb)   04/14/17 91.6 kg (202 lb)     HEENT: EOMI, PERRL. Sclerae are anicteric. Oral mucosa is pink,   Lymph: Neck is supple with no lymphadenopathy in the cervical or supraclavicular areas.   Heart: Regular rate and rhythm.   Lungs: Clear to auscultation bilaterally. Port right chest accessed, c/d/i.  Abdomen: Bowel sounds present, soft, nontender with no palpable hepatosplenomegaly or masses.   Extremities: No lower extremity edema noted bilaterally.   Neuro: Cranial nerves II through XII are grossly intact.  Skin: No rashes, petechiae, or bruising noted on exposed skin.    Laboratory Data:      7/19/2017 09:57   Sodium 136   Potassium 4.1   Chloride 103   Carbon Dioxide 27   Urea Nitrogen 12   Creatinine 0.82   GFR Estimate >90...   GFR Estimate If Black >90...   Calcium 9.1   Anion Gap 6   Albumin 3.4   Protein Total 7.1   Bilirubin Total 0.6   Alkaline Phosphatase 80   ALT 77 (H)   AST 55 (H)   Glucose 83   WBC 3.7 (L)   Hemoglobin 13.7   Hematocrit 38.5 (L)   Platelet Count 148 (L)   RBC Count 4.29 (L)   MCV 90   MCH 31.9   MCHC 35.6   RDW 16.0 (H)   Diff Method Automated Method   % Neutrophils 44.9   % Lymphocytes 36.5   % Monocytes 13.5   % Eosinophils 3.5   % Basophils 1.1   % Immature Granulocytes 0.5   Nucleated RBCs 0   Absolute Neutrophil 1.7   Absolute Lymphocytes 1.4   Absolute Monocytes 0.5   Absolute Eosinophils 0.1   Absolute Basophils 0.0   Abs " Immature Granulocytes 0.0   Absolute Nucleated RBC 0.0       Assessment and Plan:    1. Oncology. stage III rectosigmoid adenocarcinoma, moderately differentiated, status post APR with good margins. Started adjuvant chemotherapy with FOLFOX and s/p 4 cycles. He is tolerating fairly well with nausea, and fatigue. This last cycle the cold neuropathy was more intense and lasted through till this cycle. We will reduce the oxaliplatin by 20%. His counts are adequate to proceed with cycle 5 today. We will have him seen prior to next cycle to see how the dose reduction went. He would like to consider a shorter course of chemotherapy, given the data recently presented. I asked him to discuss this with Dr. Mcarthur at his next visit with her.       Angie NGUYỄN ABIOLA  [facility]  [address] [place] [id number]  [phone]    The patient was seen in conjunction with GOPI Valencia who served as a scribe for today's visit. I have reviewed and edited the note and agree with the above findings and plan.  ALFONSO Molina PA-C

## 2017-07-19 NOTE — PATIENT INSTRUCTIONS
Contact numbers:  Triage Main Line: [phone]  After hours: [phone]    Call with chills and/or temperature greater than or equal to 100.5 and questions or concerns.    If after hours, weekends, or holidays, call main hospital  at  [phone] and ask for Oncology doctor on call.           July 2017 Sunday Monday Tuesday Wednesday Thursday Friday Saturday                                 1       2     3     4     5     [facility] D[facility]   10:30 AM   (15 min.)   [facility]   [facility] Lab Draw     [facility] ONC INFUSION 240   11:00 AM   (240 min.)   [facility] ONCOLOGY INFUSION   [facility] 6     7     8       9     10     11     12     13     14     15       16     17     18     19     [facility]    8:15 AM   (15 min.)   [facility]     [facility] RETURN    8:25 AM   (50 min.)   Zahira Christensen PA-C   [facility]     [facility] ONC INFUSION 240    9:30 AM   (240 min.)   [facility] ONCOL[facility]Y INFUSION   [facility] 20     21     22       23     24     25     26     27     28     29       30     31                                         August 2017 Sunday Monday Tuesday Wednesday Thursday Friday Saturday             1     2     [facility] D[facility]    8:15 AM   (15 min.)   [facility]   [facility] Draw     [facility] RETURN    8:25 AM   (50 min.)   Jessica Lomas PA   [facility]     [facility] ONC INFUSION 240    9:30 AM   (240 min.)   [facility] ONCOLOGY INFUSION   [facility] 3     4     5       6     7     8     9     [facility] DRAW    7:30 AM   (15 min.)    [facility]     CT CHEST ABDOMEN PELVIS WWO    7:45 AM   (20 min.)   UCCT2   [facility] CT     [facility] RETURN   11:00 AM   (30 min.)   Jeanette Mcarthur MD   [facility] 10     11     12       13     14     15     16     [facility] DRAW     9:00 AM   (15 min.)    [facility] LAB DRAW   [facility] Lab Draw     [facility] ONC INFUSION 240    9:30 AM   (240 min.)    ONCOLOGY INFUSION   [facility] 17     18     19       20     21     22     23     24     25     26       27     28     29     30     [facility]ON[facility] LAB DRAW    9:00 AM   (15 min.)    [facility] LAB DRAW   [facility] Lab Draw     [facility] ONC INFUSION 240    9:30 AM   (240 min.)    ONCOLOGY INFUSION   [facility] 31                            Lab Results:  Recent Results (from the past 12 hour(s))   CBC with platelets differential    Collection Time: 07/19/17  9:57 AM   Result Value Ref Range    WBC 3.7 (L) 4.0 - 11.0 10e9/L    RBC Count 4.29 (L) 4.4 - 5.9 10e12/L    Hemoglobin 13.7 13.3 - 17.7 g/dL    Hematocrit 38.5 (L) 40.0 - 53.0 %    MCV 90 78 - 100 fl    MCH 31.9 26.5 - 33.0 pg    MCHC 35.6 31.5 - 36.5 g/dL    RDW 16.0 (H) 10.0 - 15.0 %    Platelet Count 148 (L) 150 - 450 10e9/L    Diff Method Automated Method     % Neutrophils 44.9 %    % Lymphocytes 36.5 %    % Monocytes 13.5 %    % Eosinophils 3.5 %    % Basophils 1.1 %    % Immature Granulocytes 0.5 %    Nucleated RBCs 0 0 /100    Absolute Neutrophil 1.7 1.6 - 8.3 10e9/L    Absolute Lymphocytes 1.4 0.8 - 5.3 10e9/L    Absolute Monocytes 0.5 0.0 - 1.3 10e9/L    Absolute Eosinophils 0.1 0.0 - 0.7 10e9/L    Absolute Basophils 0.0 0.0 - 0.2 10e9/L    Abs Immature Granulocytes 0.0 0 - 0.4 10e9/L    Absolute Nucleated RBC 0.0    Comprehensive metabolic panel    Collection Time: 07/19/17  9:57 AM   Result Value Ref Range    Sodium 136 133 - 144 mmol/L    Potassium 4.1 3.4 - 5.3 mmol/L    Chloride 103 94 - 109 mmol/L    Carbon Dioxide 27 20 - 32 mmol/L    Anion Gap 6 3 - 14 mmol/L    Glucose 83 70 - 99 mg/dL    Urea Nitrogen 12 7 - 30 mg/dL    Creatinine 0.82 0.66 - 1.25 mg/dL    GFR Estimate >90  Non  GFR Calc   >60 mL/min/1.7m2    GFR Estimate If Black >90   GFR Calc   >60  mL/min/1.7m2    Calcium 9.1 8.5 - 10.1 mg/dL    Bilirubin Total 0.6 0.2 - 1.3 mg/dL    Albumin 3.4 3.4 - 5.0 g/dL    Protein Total 7.1 6.8 - 8.8 g/dL    Alkaline Phosphatase 80 40 - 150 U/L    ALT 77 (H) 0 - 70 U/L    AST 55 (H) 0 - 45 U/L

## 2017-07-19 NOTE — MR AVS SNAPSHOT
After Visit Summary   7/19/2017    Meño Omalley    MRN: [id number]           Patient Information     Date Of Birth          1970        Visit Information        Provider Department      7/19/2017 8:40 AM Zahira Christensen PA-C [facility]        Today's Diagnoses     Rectal cancer (H)    -  1    Adenocarcinoma of rectum (H)           Follow-ups after your visit        Your next 10 appointments already scheduled     Aug 02, 2017  8:15 AM CDT   [facility] Draw with  [facility] LAB DRAW   [facility]    [address] [place] [address]   [phone]            Aug 02, 2017  8:40 AM CDT   (Arrive by 8:25 AM)   Return Visit with GELA Monson   [facility] ([facility])    [address] [place] [address]   [phone]            Aug 02, 2017  9:30 AM CDT   Infusion 240 with  ONCOLOGY INFUSION, UC 27 ATC   [facility] ([facility])    [address] [place] [address]   [phone]            Aug 09, 2017  7:30 AM CDT   Masonic Lab Draw with  MASONIC LAB DRAW   [facility]    [address] [place] [address]   [phone]            Aug 09, 2017  8:00 AM CDT   (Arrive by 7:45 AM)   CT CHEST ABDOMEN PELVIS W/O & W CONTRAST with UCCT2   [facility] ([facility])    [address] [place] [address]   [phone]           Please bring any scans or X-rays taken at other hospitals, if similar tests were done. Also bring a list of your medicines, including vitamins, minerals and over-the-counter drugs. It is safest to leave personal items at home.  Be sure to tell your doctor:   If you have any allergies.   If  there s any chance you are pregnant.   If you are breastfeeding.   If you have any special needs.  You may have contrast for this exam. To prepare:   Do not eat or drink for 2 hours before your exam. If you need to take medicine, you may take it with small sips of water. (We may ask you to take liquid medicine as well.)   The day before your exam, drink extra fluids at least six 8-ounce glasses (unless your doctor tells you to restrict your fluids).  Patients over 70 or patients with diabetes or kidney problems:   If you haven t had a blood test (creatinine test) within the last 30 days, go to your clinic or Diagnostic Imaging Department for this test.  If you have diabetes:   If your kidney function is normal, continue taking your metformin (Avandamet, Glucophage, Glucovance, Metaglip) on the day of your exam.   If your kidney function is abnormal, wait 48 hours before restarting this medicine.  You will have oral contrast for this exam:   You will drink the contrast at home. Get this from your clinic or Diagnostic Imaging Department. Please follow the directions given.  Please wear loose clothing, such as a sweat suit or jogging clothes. Avoid snaps, zippers and other metal. We may ask you to undress and put on a hospital gown.  If you have any questions, please call the Imaging Department where you will have your exam.            Aug 09, 2017 11:15 AM CDT   (Arrive by 11:00 AM)   Return Visit with Jeanette Mcarthur MD   [facility] ([facility])    [address] [place] [address]  2nd [facility] [address]   [phone]            Aug 16, 2017  9:00 AM CDT   Masonic Lab Draw with  MASONIC LAB DRAW   [facility] Lab Draw ([facility])    [address] [address]   [phone]            Aug 16, 2017  9:30 AM CDT   Infusion 240 with  ONCOLOGY INFUSION, UC 27 ATC   [facility] ([facility]  "Clinics and Surgery Center)    [address]  2nd Floor  [facility] [address] [phone]              Who to contact     If you have questions or need follow up information about today's clinic visit or your schedule please contact [facility] directly at [phone].  Normal or non-critical lab and imaging results will be communicated to you by MyChart, letter or phone within 4 business days after the clinic has received the results. If you do not hear from us within 7 days, please contact the clinic through [employer] or phone. If you have a critical or abnormal lab result, we will notify you by phone as soon as possible.  Submit refill requests through [employer] or call your pharmacy and they will forward the refill request to us. Please allow 3 business days for your refill to be completed.          Additional Information About Your Visit        [employer] Information     [employer] gives you secure access to your electronic health record. If you see a primary care provider, you can also send messages to your care team and make appointments. If you have questions, please call your primary care clinic.  If you do not have a primary care provider, please call [phone] and they will assist you.        Care EveryWhere ID     This is your Care EveryWhere ID. This could be used by other organizations to access your [facility] medical records  [id number]        Your Vitals Were     Pulse Temperature Respirations Height Pulse Oximetry BMI (Body Mass Index)    76 96.9  F (36.1  C) (Oral) 16 1.753 m (5' 9.02\") 97% 30.27 kg/m2       Blood Pressure from Last 3 Encounters:   No data found for BP    Weight from Last 3 Encounters:   No data found for Wt              Today, you had the following     No orders found for display       Primary Care Provider Office Phone # Fax #    Delio Alcala [phone]       [place] [address] [facility] [address] [place] [place] [address]   "      Equal Access to Services     [facility]: Hadii aad ku hadanshulshalom Curtali, wadida luqamerica, qaybta kabrigittelynda gonsales. So [facility] [phone].    ATENCIÓN: Si erlinda villegas, tiene a valle disposición servicios gratuitos de asistencia lingüística. Mariliaame al [phone].    We comply with applicable federal civil rights laws and [place] laws. We do not discriminate on the basis of race, color, national origin, age, disability sex, sexual orientation or gender identity.            Thank you!     Thank you for choosing [facility]  for your care. Our goal is always to provide you with excellent care. Hearing back from our patients is one way we can continue to improve our services. Please take a few minutes to complete the written survey that you may receive in the mail after your visit with us. Thank you!             Your Updated Medication List - Protect others around you: Learn how to safely use, store and throw away your medicines at www.disposemymeds.org.          This list is accurate as of: 7/19/17 11:59 PM.  Always use your most recent med list.                   Brand Name Dispense Instructions for use Diagnosis    escitalopram 20 MG tablet    LEXAPRO    30 tablet    Take 1 tablet (20 mg) by mouth every morning    Major depressive disorder, recurrent episode, mild (H)       FLOVENT  MCG/ACT Inhaler   Generic drug:  fluticasone      Take 2 puffs by mouth 2 times daily as needed        lidocaine-prilocaine cream    EMLA    30 g    Apply topically as needed for moderate pain    Rectal cancer (H)       LORazepam 0.5 MG tablet    ATIVAN    30 tablet    Take 1 tablet (0.5 mg) by mouth every 4 hours as needed (Anxiety, Nausea/Vomiting or Sleep)    Adenocarcinoma of rectum (H)       ondansetron 8 MG tablet    ZOFRAN    60 tablet    Take 1 tablet (8 mg) by mouth every 8 hours as needed for nausea    Adenocarcinoma of rectum (H)       prochlorperazine  10 MG tablet    COMPAZINE    30 tablet    Take 1 tablet (10 mg) by mouth every 6 hours as needed (Nausea/Vomiting)    Adenocarcinoma of rectum (H)

## 2017-07-19 NOTE — PROGRESS NOTES
"Infusion Nursing Note:  Meño Omalley presents for D1C5 Oxaliplatin, Leucovorin, Fluorouracil push and pump hook up  Met with GELA Molina before infusion.    Note: N/A.    Treatment Conditions:  Lab Results   Component Value Date    HGB 13.7 07/19/2017     Lab Results   Component Value Date    WBC 3.7 07/19/2017      Lab Results   Component Value Date    ANEU 1.7 07/19/2017     Lab Results   Component Value Date     07/19/2017      Lab Results   Component Value Date     07/19/2017                   Lab Results   Component Value Date    POTASSIUM 4.1 07/19/2017           Lab Results   Component Value Date    MAG 2.1 04/13/2017            Lab Results   Component Value Date    CR 0.82 07/19/2017                   Lab Results   Component Value Date    CANDY 9.1 07/19/2017                Lab Results   Component Value Date    BILITOTAL 0.6 07/19/2017           Lab Results   Component Value Date    ALBUMIN 3.4 07/19/2017                    Lab Results   Component Value Date    ALT 77 07/19/2017           Lab Results   Component Value Date    AST 55 07/19/2017     Results reviewed, labs MET treatment parameters, ok to proceed with treatment.    Intravenous Access:  Implanted Port.    Post Infusion Assessment:  Patient tolerated infusion without incident.  Blood return noted pre and post infusion.  Blood return noted every 2-3cc during fluorouracil push  No evidence of extravasations.    Prior to discharge: Port is secured in place with tegaderm and flushed with 10cc NS with positive blood return noted.  Continuous home infusion Dosi-Fuser pump connected.    All connectors secured in place and clamps taped open.    Pump started, \"running\" noted on display (CADD): Not Applicable.  Patient instructed to call our clinic or [facility] with any questions or concerns at home.  Patient verbalized understanding.    Patient set up for pump disconnect at home with [facility] on Friday, 7/21 " @10am. Pump connections checked Johnny Allred RN    Discharge Plan:   Patient declined prescription refills.  Discharge instructions reviewed with: Patient and Family.  AVS to patient via [employer].  Patient will return 8/2 for next appointment.   Patient discharged in stable condition accompanied by: self and wife.    Deborah Willard RN    Oxaliplatin, Leucovorin and D5 stopped at 13:30    Decadron stopped at 10:46

## 2017-07-19 NOTE — PROGRESS NOTES
Oncology/Hematology Visit Note  Jul 19, 2017    Reason for Visit: follow up of adenocarcinoma of the rectum    History of Present Illness: Meño Omalley is a 47 year old male with adenocarcinoma of the rectum. He initially presented to his primary care provider with blood in his stool for a few months and a change in bowel habits with the stool being more loose. Family history of father who developed colon cancer at age 60, and a sister who developed colon cancer at age 55.  He presented for colonoscopy with Dr. Delio Daniel on 03/24/2017.  Findings were several small polyps and a possible carcinoma of the rectosigmoid junction measured from 17-21 cm.  Biopsies obtained were remarkable for adenocarcinoma.  CT scan of the chest, abdomen and pelvis was done, which confirmed the presence of possible thickening in the rectosigmoid junction, but no evidence of metastases.  A 3D MRI was pursued on 03/31, and it showed circumferential wall thickening of the upper rectum/rectosigmoid with minimal extension into the mesorectal fat, consistent with rectal cancer, stage IIIB, N1.  His case was discussed in the Tumor Board, and the decision was made to forego chemoradiation, but to go straight to surgery, and he underwent a low anterior resection. Surgery took place on 4/12/2017. The pathology on the tumor was moderately differentiated adenocarcinoma invading the muscularis propria with a tumor size of 3.7 cm all negative  carcinoma margins.  Lymphovascular invasion was not identified.  Two non-renea tumor deposits were identified, and 6 lymph nodes were negative for metastatic carcinoma.  He was staged by pathological staging and pT2 N1c.  The mismatch repair testing was done on his original biopsy, and he had a normal pattern of mismatch repair protein expression, ruling out Rodriguez syndrome. Port was placed by IR on 5/23/17.    He started adjuvant chemotherapy with FOLFOX on 5/24/17. He is here today prior to cycle 5.   "    Interval History:  Meño is here today along with his wife, Zahira for follow up for his adenocarcinoma of the rectum. Today he is reporting that the cold neuropathy that was slightly more intense and lasted 5 days versus 3 on previous cycles. He even noticed numbness and tingling in his hands/fingers on the drive home from his last infusion. He even noticed some gum sensitivity to cold and room temperature liquids. He denies any nausea, vomiting or abdominal pain but his stomach was \"off\". He did take Zofran during the day and Ativan at night for this symptom. He is having normal bowel movements, no diarrhea. He has an appetite but not eating as much as usual due to taste changes. He is drinking adequate fluids.     Review of Systems:  Patient denies any of the following except if noted above: fevers, chills, difficulty with energy, vision or hearing changes, chest pain, dyspnea, abdominal pain, diarrhea, constipation, urinary concerns, headaches, issues with sleep or mood. He also denies lumps, bumps, rashes or skin lesions, bleeding or bruising issues.    Current Outpatient Prescriptions   Medication Sig Dispense Refill     ondansetron (ZOFRAN) 8 MG tablet Take 1 tablet (8 mg) by mouth every 8 hours as needed for nausea 60 tablet 1     escitalopram (LEXAPRO) 20 MG tablet Take 1 tablet (20 mg) by mouth every morning 30 tablet 3     prochlorperazine (COMPAZINE) 10 MG tablet Take 1 tablet (10 mg) by mouth every 6 hours as needed (Nausea/Vomiting) 30 tablet 2     LORazepam (ATIVAN) 0.5 MG tablet Take 1 tablet (0.5 mg) by mouth every 4 hours as needed (Anxiety, Nausea/Vomiting or Sleep) 30 tablet 2     lidocaine-prilocaine (EMLA) cream Apply topically as needed for moderate pain (Patient not taking: Reported on 7/19/2017) 30 g 1     fluticasone (FLOVENT HFA) 110 MCG/ACT inhaler Take 2 puffs by mouth 2 times daily as needed          Physical Examination:  General: The patient is a pleasant male in no acute " "distress.  /89  Pulse 76  Temp 96.9  F (36.1  C) (Oral)  Resp 16  Ht 1.753 m (5' 9.02\")  Wt 93 kg (205 lb 1.6 oz)  SpO2 97%  BMI 30.27 kg/m2  Wt Readings from Last 10 Encounters:   07/19/17 93 kg (205 lb 1.6 oz)   07/05/17 93.1 kg (205 lb 3.2 oz)   06/22/17 93.6 kg (206 lb 4.8 oz)   06/07/17 92.1 kg (203 lb)   05/24/17 92.9 kg (204 lb 14.4 oz)   05/22/17 90.7 kg (200 lb)   05/02/17 91.4 kg (201 lb 8 oz)   05/01/17 91.9 kg (202 lb 9.6 oz)   04/24/17 92.1 kg (203 lb)   04/14/17 91.6 kg (202 lb)     HEENT: EOMI, PERRL. Sclerae are anicteric. Oral mucosa is pink,   Lymph: Neck is supple with no lymphadenopathy in the cervical or supraclavicular areas.   Heart: Regular rate and rhythm.   Lungs: Clear to auscultation bilaterally. Port right chest accessed, c/d/i.  Abdomen: Bowel sounds present, soft, nontender with no palpable hepatosplenomegaly or masses.   Extremities: No lower extremity edema noted bilaterally.   Neuro: Cranial nerves II through XII are grossly intact.  Skin: No rashes, petechiae, or bruising noted on exposed skin.    Laboratory Data:      7/19/2017 09:57   Sodium 136   Potassium 4.1   Chloride 103   Carbon Dioxide 27   Urea Nitrogen 12   Creatinine 0.82   GFR Estimate >90...   GFR Estimate If Black >90...   Calcium 9.1   Anion Gap 6   Albumin 3.4   Protein Total 7.1   Bilirubin Total 0.6   Alkaline Phosphatase 80   ALT 77 (H)   AST 55 (H)   Glucose 83   WBC 3.7 (L)   Hemoglobin 13.7   Hematocrit 38.5 (L)   Platelet Count 148 (L)   RBC Count 4.29 (L)   MCV 90   MCH 31.9   MCHC 35.6   RDW 16.0 (H)   Diff Method Automated Method   % Neutrophils 44.9   % Lymphocytes 36.5   % Monocytes 13.5   % Eosinophils 3.5   % Basophils 1.1   % Immature Granulocytes 0.5   Nucleated RBCs 0   Absolute Neutrophil 1.7   Absolute Lymphocytes 1.4   Absolute Monocytes 0.5   Absolute Eosinophils 0.1   Absolute Basophils 0.0   Abs Immature Granulocytes 0.0   Absolute Nucleated RBC 0.0       Assessment and " Plan:    1. Oncology. stage III rectosigmoid adenocarcinoma, moderately differentiated, status post APR with good margins. Started adjuvant chemotherapy with FOLFOX and s/p 4 cycles. He is tolerating fairly well with nausea, and fatigue. This last cycle the cold neuropathy was more intense and lasted through till this cycle. We will reduce the oxaliplatin by 20%. His counts are adequate to proceed with cycle 5 today. We will have him seen prior to next cycle to see how the dose reduction went. He would like to consider a shorter course of chemotherapy, given the data recently presented. I asked him to discuss this with Dr. Mcarthur at his next visit with her.       Angie WITT  [facility]  [address] [place], [place] [address] [phone]    The patient was seen in conjunction with GOPI Valencia who served as a scribe for today's visit. I have reviewed and edited the note and agree with the above findings and plan.  Zahira Christensen PA-C

## 2017-07-19 NOTE — LETTER
7/19/2017       RE: Meño Omalley  [id number] [facility] [id number]     Dear Colleague,    Thank you for referring your patient, Meño Omalley, to the [facility]. Please see a copy of my visit note below.    Oncology/Hematology Visit Note  Jul 19, 2017    Reason for Visit: follow up of adenocarcinoma of the rectum    History of Present Illness: Meño Omalley is a 47 year old male with adenocarcinoma of the rectum. He initially presented to his primary care provider with blood in his stool for a few months and a change in bowel habits with the stool being more loose. Family history of father who developed colon cancer at age 60, and a sister who developed colon cancer at age 55.  He presented for colonoscopy with Dr. Delio Daniel on 03/24/2017.  Findings were several small polyps and a possible carcinoma of the rectosigmoid junction measured from 17-21 cm.  Biopsies obtained were remarkable for adenocarcinoma.  CT scan of the chest, abdomen and pelvis was done, which confirmed the presence of possible thickening in the rectosigmoid junction, but no evidence of metastases.  A 3D MRI was pursued on 03/31, and it showed circumferential wall thickening of the upper rectum/rectosigmoid with minimal extension into the mesorectal fat, consistent with rectal cancer, stage IIIB, N1.  His case was discussed in the Tumor Board, and the decision was made to forego chemoradiation, but to go straight to surgery, and he underwent a low anterior resection. Surgery took place on 4/12/2017. The pathology on the tumor was moderately differentiated adenocarcinoma invading the muscularis propria with a tumor size of 3.7 cm all negative  carcinoma margins.  Lymphovascular invasion was not identified.  Two non-renea tumor deposits were identified, and 6 lymph nodes were negative for metastatic carcinoma.  He was staged by pathological staging and pT2 N1c.  The mismatch repair testing was done on his  "original biopsy, and he had a normal pattern of mismatch repair protein expression, ruling out Rodriguez syndrome. Port was placed by IR on 5/23/17.    He started adjuvant chemotherapy with FOLFOX on 5/24/17. He is here today prior to cycle 5.      Interval History:  Meño is here today along with his wife, Zahira for follow up for his adenocarcinoma of the rectum. Today he is reporting that the cold neuropathy that was slightly more intense and lasted 5 days versus 3 on previous cycles. He even noticed numbness and tingling in his hands/fingers on the drive home from his last infusion. He even noticed some gum sensitivity to cold and room temperature liquids. He denies any nausea, vomiting or abdominal pain but his stomach was \"off\". He did take Zofran during the day and Ativan at night for this symptom. He is having normal bowel movements, no diarrhea. He has an appetite but not eating as much as usual due to taste changes. He is drinking adequate fluids.     Review of Systems:  Patient denies any of the following except if noted above: fevers, chills, difficulty with energy, vision or hearing changes, chest pain, dyspnea, abdominal pain, diarrhea, constipation, urinary concerns, headaches, issues with sleep or mood. He also denies lumps, bumps, rashes or skin lesions, bleeding or bruising issues.    Current Outpatient Prescriptions   Medication Sig Dispense Refill     ondansetron (ZOFRAN) 8 MG tablet Take 1 tablet (8 mg) by mouth every 8 hours as needed for nausea 60 tablet 1     escitalopram (LEXAPRO) 20 MG tablet Take 1 tablet (20 mg) by mouth every morning 30 tablet 3     prochlorperazine (COMPAZINE) 10 MG tablet Take 1 tablet (10 mg) by mouth every 6 hours as needed (Nausea/Vomiting) 30 tablet 2     LORazepam (ATIVAN) 0.5 MG tablet Take 1 tablet (0.5 mg) by mouth every 4 hours as needed (Anxiety, Nausea/Vomiting or Sleep) 30 tablet 2     lidocaine-prilocaine (EMLA) cream Apply topically as needed for moderate " "pain (Patient not taking: Reported on 7/19/2017) 30 g 1     fluticasone (FLOVENT HFA) 110 MCG/ACT inhaler Take 2 puffs by mouth 2 times daily as needed          Physical Examination:  General: The patient is a pleasant male in no acute distress.  /89  Pulse 76  Temp 96.9  F (36.1  C) (Oral)  Resp 16  Ht 1.753 m (5' 9.02\")  Wt 93 kg (205 lb 1.6 oz)  SpO2 97%  BMI 30.27 kg/m2  Wt Readings from Last 10 Encounters:   07/19/17 93 kg (205 lb 1.6 oz)   07/05/17 93.1 kg (205 lb 3.2 oz)   06/22/17 93.6 kg (206 lb 4.8 oz)   06/07/17 92.1 kg (203 lb)   05/24/17 92.9 kg (204 lb 14.4 oz)   05/22/17 90.7 kg (200 lb)   05/02/17 91.4 kg (201 lb 8 oz)   05/01/17 91.9 kg (202 lb 9.6 oz)   04/24/17 92.1 kg (203 lb)   04/14/17 91.6 kg (202 lb)     HEENT: EOMI, PERRL. Sclerae are anicteric. Oral mucosa is pink,   Lymph: Neck is supple with no lymphadenopathy in the cervical or supraclavicular areas.   Heart: Regular rate and rhythm.   Lungs: Clear to auscultation bilaterally. Port right chest accessed, c/d/i.  Abdomen: Bowel sounds present, soft, nontender with no palpable hepatosplenomegaly or masses.   Extremities: No lower extremity edema noted bilaterally.   Neuro: Cranial nerves II through XII are grossly intact.  Skin: No rashes, petechiae, or bruising noted on exposed skin.    Laboratory Data:      7/19/2017 09:57   Sodium 136   Potassium 4.1   Chloride 103   Carbon Dioxide 27   Urea Nitrogen 12   Creatinine 0.82   GFR Estimate >90...   GFR Estimate If Black >90...   Calcium 9.1   Anion Gap 6   Albumin 3.4   Protein Total 7.1   Bilirubin Total 0.6   Alkaline Phosphatase 80   ALT 77 (H)   AST 55 (H)   Glucose 83   WBC 3.7 (L)   Hemoglobin 13.7   Hematocrit 38.5 (L)   Platelet Count 148 (L)   RBC Count 4.29 (L)   MCV 90   MCH 31.9   MCHC 35.6   RDW 16.0 (H)   Diff Method Automated Method   % Neutrophils 44.9   % Lymphocytes 36.5   % Monocytes 13.5   % Eosinophils 3.5   % Basophils 1.1   % Immature Granulocytes 0.5 "   Nucleated RBCs 0   Absolute Neutrophil 1.7   Absolute Lymphocytes 1.4   Absolute Monocytes 0.5   Absolute Eosinophils 0.1   Absolute Basophils 0.0   Abs Immature Granulocytes 0.0   Absolute Nucleated RBC 0.0       Assessment and Plan:    1. Oncology. stage III rectosigmoid adenocarcinoma, moderately differentiated, status post APR with good margins. Started adjuvant chemotherapy with FOLFOX and s/p 4 cycles. He is tolerating fairly well with nausea, and fatigue. This last cycle the cold neuropathy was more intense and lasted through till this cycle. We will reduce the oxaliplatin by 20%. His counts are adequate to proceed with cycle 5 today. We will have him seen prior to next cycle to see how the dose reduction went. He would like to consider a shorter course of chemotherapy, given the data recently presented. I asked him to discuss this with Dr. Mcarthur at his next visit with her.       Angie WITT  [facility]  [address] [place] [address]  [phone]    The patient was seen in conjunction with GOPI Valencia who served as a scribe for today's visit. I have reviewed and edited the note and agree with the above findings and plan.  Zahira Christensen PA-C      Again, thank you for allowing me to participate in the care of your patient.      Sincerely,    Zahira Christensen PA-C

## 2017-07-19 NOTE — NURSING NOTE
"Oncology Rooming Note    July 19, 2017 8:50 AM   Meño Omalley is a 47 year old male who presents for:    Chief Complaint   Patient presents with     Oncology Clinic Visit     Rectal Ca- F/U     Initial Vitals: /89  Pulse 76  Temp 96.9  F (36.1  C) (Oral)  Resp 16  Ht 1.753 m (5' 9.02\")  Wt 93 kg (205 lb 1.6 oz)  SpO2 97%  BMI 30.27 kg/m2 Estimated body mass index is 30.27 kg/(m^2) as calculated from the following:    Height as of this encounter: 1.753 m (5' 9.02\").    Weight as of this encounter: 93 kg (205 lb 1.6 oz). Body surface area is 2.13 meters squared.  No Pain (0) Comment: Data Unavailable   No LMP for male patient.  Allergies reviewed: Yes  Medications reviewed: Yes    Medications: Medication refills not needed today.  Pharmacy name entered into EPIC:    [facility] [address] [place] [address] - [address]  [facility] [place], [place] - [address]     Clinical concerns: no clinical concerns  provider was notified.    7 minutes for nursing intake (face to face time)     Debra Trinidad CMA              "

## 2017-07-19 NOTE — NURSING NOTE
Chief Complaint   Patient presents with     Oncology Clinic Visit     Rectal Ca- F/U     Blood Draw     Labs drawn through portacath follow up for Colon CA   Alma Harris RN, OCN

## 2017-07-19 NOTE — MR AVS SNAPSHOT
After Visit Summary   7/19/2017    Meño Omalley    MRN: [id number]           Patient Information     Date Of Birth          1970        Visit Information        Provider Department      7/19/2017 9:30 AM UC 16 ATC; UC ONCOLOGY INFUSION [facility]        Today's Diagnoses     Adenocarcinoma of rectum (H)    -  1      Care Instructions    Contact numbers:  Triage Main Line: [phone]  After hours: [phone]    Call with chills and/or temperature greater than or equal to 100.5 and questions or concerns.    If after hours, weekends, or holidays, call main hospital  at  [phone] and ask for Oncology doctor on call.           July 2017 Sunday Monday Tuesday Wednesday Thursday Friday Saturday                                 1       2     3     4     5     [facility] DRAW   10:30 AM   (15 min.)    [facility] LAB DRAW   [facility] Lab Draw     [facility] ONC INFUSION 240   11:00 AM   (240 min.)    ONCOLOGY INFUSION   [facility] 6     7     8       9     10     11     12     13     14     15       16     17     18     19     [facility] DRAW    8:15 AM   (15 min.)    [facility] DRAW   [facility] Lab Draw     [facility] RETURN    8:25 AM   (50 min.)   Zahira Christensen PA-C   [facility]     [facility] ONC INFUSION 240    9:30 AM   (240 min.)    ONCOLOGY INFUSION   [facility] 20     21     22       23     24     25     26     27     28     29       30     31 August 2017 Sunday Monday Tuesday Wednesday Thursday Friday Saturday             1     2     [facility] DRAW    8:15 AM   (15 min.)    [facility] LAB DRAW   [facility] Lab Draw     [facility] RETURN    8:25 AM   (50 min.)   Jessica Lomas PA   [facility]     [facility] ONC INFUSION 240    9:30 AM   (240 min.)    ONCOLOGY INFUSION   [facility] 3     4      5       6     7     8     9     [facility] DRAW    7:30 AM   (15 min.)    [facility] LAB DRAW   [facility] Lab Draw     CT CHEST ABDOMEN PELVIS WWO    7:45 AM   (20 min.)   UCCT2   [facility] CT     [facility] RETURN   11:00 AM   (30 min.)   Jeanette Mcarthur MD   [facility] 10     11     12       13     14     15     16     [facility] DRAW    9:00 AM   (15 min.)    [facility] LAB DRAW   [facility] Lab Draw     [facility] ONC INFUSION 240    9:30 AM   (240 min.)    ONCOLOGY INFUSION   [facility] 17     18     19       20     21     22     23     24     25     26       27     28     29     30     [facility] DRAW    9:00 AM   (15 min.)    [facility] LAB DRAW   [facility] Lab Draw     [facility] ONC INFUSION 240    9:30 AM   (240 min.)    ONCOLOGY INFUSION   [facility] 31                            Lab Results:  Recent Results (from the past 12 hour(s))   CBC with platelets differential    Collection Time: 07/19/17  9:57 AM   Result Value Ref Range    WBC 3.7 (L) 4.0 - 11.0 10e9/L    RBC Count 4.29 (L) 4.4 - 5.9 10e12/L    Hemoglobin 13.7 13.3 - 17.7 g/dL    Hematocrit 38.5 (L) 40.0 - 53.0 %    MCV 90 78 - 100 fl    MCH 31.9 26.5 - 33.0 pg    MCHC 35.6 31.5 - 36.5 g/dL    RDW 16.0 (H) 10.0 - 15.0 %    Platelet Count 148 (L) 150 - 450 10e9/L    Diff Method Automated Method     % Neutrophils 44.9 %    % Lymphocytes 36.5 %    % Monocytes 13.5 %    % Eosinophils 3.5 %    % Basophils 1.1 %    % Immature Granulocytes 0.5 %    Nucleated RBCs 0 0 /100    Absolute Neutrophil 1.7 1.6 - 8.3 10e9/L    Absolute Lymphocytes 1.4 0.8 - 5.3 10e9/L    Absolute Monocytes 0.5 0.0 - 1.3 10e9/L    Absolute Eosinophils 0.1 0.0 - 0.7 10e9/L    Absolute Basophils 0.0 0.0 - 0.2 10e9/L    Abs Immature Granulocytes 0.0 0 - 0.4 10e9/L    Absolute Nucleated RBC 0.0    Comprehensive metabolic panel    Collection Time: 07/19/17  9:57 AM   Result Value Ref Range     Sodium 136 133 - 144 mmol/L    Potassium 4.1 3.4 - 5.3 mmol/L    Chloride 103 94 - 109 mmol/L    Carbon Dioxide 27 20 - 32 mmol/L    Anion Gap 6 3 - 14 mmol/L    Glucose 83 70 - 99 mg/dL    Urea Nitrogen 12 7 - 30 mg/dL    Creatinine 0.82 0.66 - 1.25 mg/dL    GFR Estimate >90  Non  GFR Calc   >60 mL/min/1.7m2    GFR Estimate If Black >90   GFR Calc   >60 mL/min/1.7m2    Calcium 9.1 8.5 - 10.1 mg/dL    Bilirubin Total 0.6 0.2 - 1.3 mg/dL    Albumin 3.4 3.4 - 5.0 g/dL    Protein Total 7.1 6.8 - 8.8 g/dL    Alkaline Phosphatase 80 40 - 150 U/L    ALT 77 (H) 0 - 70 U/L    AST 55 (H) 0 - 45 U/L               Follow-ups after your visit        Your next 10 appointments already scheduled     Aug 02, 2017  8:15 AM CDT   [facility] Lab Draw with  [facility] LAB DRAW   [facility] Lab Draw ([facility])    [address] [place] [address]   [phone]            Aug 02, 2017  8:40 AM CDT   (Arrive by 8:25 AM)   Return Visit with GELA Monson   [facility])    [address] [place] [address]   [phone]            Aug 02, 2017  9:30 AM CDT   Infusion 240 with UC ONCOLOGY INFUSION, UC 27 ATC   [facility] ([facility])    [address] [place] [address]   [phone]            Aug 09, 2017  7:30 AM CDT   [facility] Lab Draw with  [facility] LAB DRAW   [facility] Lab Draw ([facility])    [address] [place] [address]   [phone]            Aug 09, 2017  8:00 AM CDT   (Arrive by 7:45 AM)   CT CHEST ABDOMEN PELVIS W/O & W CONTRAST with UCCT2   [facility] [place] [facility] ([facility])    [address] [place] [address] Se  1st Floor  [facility] [address] [phone]           Please bring any scans or  X-rays taken at other hospitals, if similar tests were done. Also bring a list of your medicines, including vitamins, minerals and over-the-counter drugs. It is safest to leave personal items at home.  Be sure to tell your doctor:   If you have any allergies.   If there s any chance you are pregnant.   If you are breastfeeding.   If you have any special needs.  You may have contrast for this exam. To prepare:   Do not eat or drink for 2 hours before your exam. If you need to take medicine, you may take it with small sips of water. (We may ask you to take liquid medicine as well.)   The day before your exam, drink extra fluids at least six 8-ounce glasses (unless your doctor tells you to restrict your fluids).  Patients over 70 or patients with diabetes or kidney problems:   If you haven t had a blood test (creatinine test) within the last 30 days, go to your clinic or Diagnostic Imaging Department for this test.  If you have diabetes:   If your kidney function is normal, continue taking your metformin (Avandamet, Glucophage, Glucovance, Metaglip) on the day of your exam.   If your kidney function is abnormal, wait 48 hours before restarting this medicine.  You will have oral contrast for this exam:   You will drink the contrast at home. Get this from your clinic or Diagnostic Imaging Department. Please follow the directions given.  Please wear loose clothing, such as a sweat suit or jogging clothes. Avoid snaps, zippers and other metal. We may ask you to undress and put on a hospital gown.  If you have any questions, please call the Imaging Department where you will have your exam.            Aug 09, 2017 11:15 AM CDT   (Arrive by 11:00 AM)   Return Visit with Jeanette Mcarthur MD   [facility] ([facility])    [address]  2nd Floor  [facility] [address] [phone]            Aug 16, 2017  9:00 AM CDT   [facility] Draw with  [employer] LAB DRAW   [facility] Lab Draw ([facility])    [address]  2nd Floor  [facility] [address] [phone]            Aug 16, 2017  9:30 AM CDT   Infusion 240 with UC ONCOLOGY INFUSION, UC 27 ATC   [facility] ([facility])    [address]  2nd [facility] [id number] [phone]              Who to contact     If you have questions or need follow up information about today's clinic visit or your schedule please contact [facility] directly at [phone].  Normal or non-critical lab and imaging results will be communicated to you by Jellycoasterhart, letter or phone within 4 business days after the clinic has received the results. If you do not hear from us within 7 days, please contact the clinic through Diarizet or phone. If you have a critical or abnormal lab result, we will notify you by phone as soon as possible.  Submit refill requests through [employer] or call your pharmacy and they will forward the refill request to us. Please allow 3 business days for your refill to be completed.          Additional Information About Your Visit        [email or web address] Information     [employer] gives you secure access to your electronic health record. If you see a primary care provider, you can also send messages to your care team and make appointments. If you have questions, please call your primary care clinic.  If you do not have a primary care provider, please call [phone] and they will assist you.        Care EveryWhere ID     This is your Care EveryWhere ID. This could be used by other organizations to access your [facility] medical records  [id number]         Blood Pressure from Last 3 Encounters:   07/19/17 152/89   07/05/17 133/77   06/22/17 137/87    Weight from Last 3 Encounters:   07/19/17 93 kg (205 lb 1.6 oz)   07/05/17 93.1 kg (205 lb 3.2 oz)   06/22/17 93.6 kg (206 lb 4.8 oz)              We Performed the Following      CBC with platelets differential     Comprehensive metabolic panel        Primary Care Provider Office Phone # Fax #    Delio Alcala [phone]       [facility] [address] [place] [address]        Equal Access to Services     LUCIO BOGGS : Hadii snow white newtono Soomaali, waaxda luqadaha, qaybta kaalmada adeegyada, lynda mckeonn teodoroshantal novak andrew hines. So [facility] [phone].    ATENCIÓN: Si habla español, tiene a valle disposición servicios gratuitos de asistencia lingüística. Llame al [phone].    We comply with applicable federal civil rights laws and [place] laws. We do not discriminate on the basis of race, color, national origin, age, disability sex, sexual orientation or gender identity.            Thank you!     Thank you for choosing [facility]  for your care. Our goal is always to provide you with excellent care. Hearing back from our patients is one way we can continue to improve our services. Please take a few minutes to complete the written survey that you may receive in the mail after your visit with us. Thank you!             Your Updated Medication List - Protect others around you: Learn how to safely use, store and throw away your medicines at www.disposemymeds.org.          This list is accurate as of: 7/19/17  2:42 PM.  Always use your most recent med list.                   Brand Name Dispense Instructions for use Diagnosis    escitalopram 20 MG tablet    LEXAPRO    30 tablet    Take 1 tablet (20 mg) by mouth every morning    Major depressive disorder, recurrent episode, mild (H)       FLOVENT  MCG/ACT Inhaler   Generic drug:  fluticasone      Take 2 puffs by mouth 2 times daily as needed        lidocaine-prilocaine cream    EMLA    30 g    Apply topically as needed for moderate pain    Rectal cancer (H)       LORazepam 0.5 MG tablet    ATIVAN    30 tablet    Take 1 tablet (0.5 mg) by mouth every 4 hours as needed  (Anxiety, Nausea/Vomiting or Sleep)    Adenocarcinoma of rectum (H)       ondansetron 8 MG tablet    ZOFRAN    60 tablet    Take 1 tablet (8 mg) by mouth every 8 hours as needed for nausea    Adenocarcinoma of rectum (H)       prochlorperazine 10 MG tablet    COMPAZINE    30 tablet    Take 1 tablet (10 mg) by mouth every 6 hours as needed (Nausea/Vomiting)    Adenocarcinoma of rectum (H)

## 2017-07-24 LAB — LAB SCANNED RESULT: NORMAL

## 2017-07-25 ENCOUNTER — MEDICAL CORRESPONDENCE (OUTPATIENT)
Dept: HEALTH INFORMATION MANAGEMENT | Facility: CLINIC | Age: 47
End: 2017-07-25

## 2017-08-01 NOTE — PROGRESS NOTES
This is a recent snapshot of the patient's [facility] medical record.  For current drug dose and complete information and questions, call [phone] or In Basket pool, [facility]v home infusion ([id number])  CSN Number:  [id number]

## 2017-08-02 ENCOUNTER — INFUSION THERAPY VISIT (OUTPATIENT)
Dept: ONCOLOGY | Facility: CLINIC | Age: 47
End: 2017-08-02
Attending: INTERNAL MEDICINE
Payer: COMMERCIAL

## 2017-08-02 ENCOUNTER — ONCOLOGY VISIT (OUTPATIENT)
Dept: ONCOLOGY | Facility: CLINIC | Age: 47
End: 2017-08-02
Attending: PHYSICIAN ASSISTANT
Payer: COMMERCIAL

## 2017-08-02 VITALS
HEIGHT: 69 IN | DIASTOLIC BLOOD PRESSURE: 88 MMHG | OXYGEN SATURATION: 98 % | SYSTOLIC BLOOD PRESSURE: 146 MMHG | BODY MASS INDEX: 29.87 KG/M2 | TEMPERATURE: 98.5 F | RESPIRATION RATE: 16 BRPM | HEART RATE: 62 BPM | WEIGHT: 201.7 LBS

## 2017-08-02 DIAGNOSIS — C20 ADENOCARCINOMA OF RECTUM (H): Primary | ICD-10-CM

## 2017-08-02 LAB
ALBUMIN SERPL-MCNC: 3.3 G/DL (ref 3.4–5)
ALP SERPL-CCNC: 74 U/L (ref 40–150)
ALT SERPL W P-5'-P-CCNC: 59 U/L (ref 0–70)
ANION GAP SERPL CALCULATED.3IONS-SCNC: 9 MMOL/L (ref 3–14)
AST SERPL W P-5'-P-CCNC: 30 U/L (ref 0–45)
BASOPHILS # BLD AUTO: 0.1 10E9/L (ref 0–0.2)
BASOPHILS NFR BLD AUTO: 1.8 %
BILIRUB SERPL-MCNC: 0.8 MG/DL (ref 0.2–1.3)
BUN SERPL-MCNC: 14 MG/DL (ref 7–30)
CALCIUM SERPL-MCNC: 8.8 MG/DL (ref 8.5–10.1)
CHLORIDE SERPL-SCNC: 108 MMOL/L (ref 94–109)
CO2 SERPL-SCNC: 24 MMOL/L (ref 20–32)
CREAT SERPL-MCNC: 0.84 MG/DL (ref 0.66–1.25)
DIFFERENTIAL METHOD BLD: ABNORMAL
EOSINOPHIL # BLD AUTO: 0.1 10E9/L (ref 0–0.7)
EOSINOPHIL NFR BLD AUTO: 1.8 %
ERYTHROCYTE [DISTWIDTH] IN BLOOD BY AUTOMATED COUNT: 16.3 % (ref 10–15)
GFR SERPL CREATININE-BSD FRML MDRD: ABNORMAL ML/MIN/1.7M2
GLUCOSE SERPL-MCNC: 91 MG/DL (ref 70–99)
HCT VFR BLD AUTO: 39.5 % (ref 40–53)
HGB BLD-MCNC: 13.3 G/DL (ref 13.3–17.7)
IMM GRANULOCYTES # BLD: 0 10E9/L (ref 0–0.4)
IMM GRANULOCYTES NFR BLD: 0 %
LYMPHOCYTES # BLD AUTO: 1.1 10E9/L (ref 0.8–5.3)
LYMPHOCYTES NFR BLD AUTO: 37.7 %
MCH RBC QN AUTO: 31.5 PG (ref 26.5–33)
MCHC RBC AUTO-ENTMCNC: 33.7 G/DL (ref 31.5–36.5)
MCV RBC AUTO: 94 FL (ref 78–100)
MONOCYTES # BLD AUTO: 0.4 10E9/L (ref 0–1.3)
MONOCYTES NFR BLD AUTO: 13.9 %
NEUTROPHILS # BLD AUTO: 1.3 10E9/L (ref 1.6–8.3)
NEUTROPHILS NFR BLD AUTO: 44.8 %
NRBC # BLD AUTO: 0 10*3/UL
NRBC BLD AUTO-RTO: 0 /100
PLATELET # BLD AUTO: 146 10E9/L (ref 150–450)
POTASSIUM SERPL-SCNC: 4 MMOL/L (ref 3.4–5.3)
PROT SERPL-MCNC: 7 G/DL (ref 6.8–8.8)
RBC # BLD AUTO: 4.22 10E12/L (ref 4.4–5.9)
SODIUM SERPL-SCNC: 141 MMOL/L (ref 133–144)
WBC # BLD AUTO: 2.8 10E9/L (ref 4–11)

## 2017-08-02 PROCEDURE — 96411 CHEMO IV PUSH ADDL DRUG: CPT

## 2017-08-02 PROCEDURE — 96413 CHEMO IV INFUSION 1 HR: CPT

## 2017-08-02 PROCEDURE — 25000128 H RX IP 250 OP 636: Mod: ZF | Performed by: PHYSICIAN ASSISTANT

## 2017-08-02 PROCEDURE — 96375 TX/PRO/DX INJ NEW DRUG ADDON: CPT

## 2017-08-02 PROCEDURE — 99214 OFFICE O/P EST MOD 30 MIN: CPT | Mod: ZP | Performed by: PHYSICIAN ASSISTANT

## 2017-08-02 PROCEDURE — 80053 COMPREHEN METABOLIC PANEL: CPT | Performed by: INTERNAL MEDICINE

## 2017-08-02 PROCEDURE — 96415 CHEMO IV INFUSION ADDL HR: CPT

## 2017-08-02 PROCEDURE — 99212 OFFICE O/P EST SF 10 MIN: CPT | Mod: ZF

## 2017-08-02 PROCEDURE — 96368 THER/DIAG CONCURRENT INF: CPT

## 2017-08-02 PROCEDURE — 85025 COMPLETE CBC W/AUTO DIFF WBC: CPT | Performed by: INTERNAL MEDICINE

## 2017-08-02 PROCEDURE — 25000125 ZZHC RX 250: Mod: ZF | Performed by: PHYSICIAN ASSISTANT

## 2017-08-02 PROCEDURE — 96416 CHEMO PROLONG INFUSE W/PUMP: CPT

## 2017-08-02 RX ORDER — ALBUTEROL SULFATE 0.83 MG/ML
2.5 SOLUTION RESPIRATORY (INHALATION)
Status: CANCELLED | OUTPATIENT
Start: 2017-08-02

## 2017-08-02 RX ORDER — MEPERIDINE HYDROCHLORIDE 25 MG/ML
25 INJECTION INTRAMUSCULAR; INTRAVENOUS; SUBCUTANEOUS EVERY 30 MIN PRN
Status: CANCELLED | OUTPATIENT
Start: 2017-08-02

## 2017-08-02 RX ORDER — ALBUTEROL SULFATE 90 UG/1
1-2 AEROSOL, METERED RESPIRATORY (INHALATION)
Status: CANCELLED
Start: 2017-08-02

## 2017-08-02 RX ORDER — EPINEPHRINE 0.3 MG/.3ML
0.3 INJECTION SUBCUTANEOUS EVERY 5 MIN PRN
Status: CANCELLED | OUTPATIENT
Start: 2017-08-02

## 2017-08-02 RX ORDER — SODIUM CHLORIDE 9 MG/ML
1000 INJECTION, SOLUTION INTRAVENOUS CONTINUOUS PRN
Status: CANCELLED
Start: 2017-08-02

## 2017-08-02 RX ORDER — EPINEPHRINE 1 MG/ML
0.3 INJECTION INTRAMUSCULAR; INTRAVENOUS; SUBCUTANEOUS EVERY 5 MIN PRN
Status: CANCELLED | OUTPATIENT
Start: 2017-08-02

## 2017-08-02 RX ORDER — PALONOSETRON 0.05 MG/ML
0.25 INJECTION, SOLUTION INTRAVENOUS ONCE
Status: CANCELLED
Start: 2017-08-02 | End: 2017-08-02

## 2017-08-02 RX ORDER — FLUOROURACIL 50 MG/ML
400 INJECTION, SOLUTION INTRAVENOUS ONCE
Status: CANCELLED | OUTPATIENT
Start: 2017-08-02

## 2017-08-02 RX ORDER — METHYLPREDNISOLONE SODIUM SUCCINATE 125 MG/2ML
125 INJECTION, POWDER, LYOPHILIZED, FOR SOLUTION INTRAMUSCULAR; INTRAVENOUS
Status: CANCELLED
Start: 2017-08-02

## 2017-08-02 RX ORDER — HEPARIN SODIUM (PORCINE) LOCK FLUSH IV SOLN 100 UNIT/ML 100 UNIT/ML
5 SOLUTION INTRAVENOUS EVERY 8 HOURS
Status: DISCONTINUED | OUTPATIENT
Start: 2017-08-02 | End: 2017-08-02 | Stop reason: HOSPADM

## 2017-08-02 RX ORDER — DIPHENHYDRAMINE HYDROCHLORIDE 50 MG/ML
50 INJECTION INTRAMUSCULAR; INTRAVENOUS
Status: CANCELLED
Start: 2017-08-02

## 2017-08-02 RX ORDER — FLUOROURACIL 50 MG/ML
400 INJECTION, SOLUTION INTRAVENOUS ONCE
Status: COMPLETED | OUTPATIENT
Start: 2017-08-02 | End: 2017-08-02

## 2017-08-02 RX ORDER — LORAZEPAM 2 MG/ML
0.5 INJECTION INTRAMUSCULAR EVERY 4 HOURS PRN
Status: CANCELLED
Start: 2017-08-02

## 2017-08-02 RX ORDER — PALONOSETRON 0.05 MG/ML
0.25 INJECTION, SOLUTION INTRAVENOUS ONCE
Status: COMPLETED | OUTPATIENT
Start: 2017-08-02 | End: 2017-08-02

## 2017-08-02 RX ADMIN — PALONOSETRON HYDROCHLORIDE 0.25 MG: 0.25 INJECTION INTRAVENOUS at 09:41

## 2017-08-02 RX ADMIN — DEXAMETHASONE SODIUM PHOSPHATE: 10 INJECTION, SOLUTION INTRAMUSCULAR; INTRAVENOUS at 09:43

## 2017-08-02 RX ADMIN — SODIUM CHLORIDE, PRESERVATIVE FREE 5 ML: 5 INJECTION INTRAVENOUS at 08:32

## 2017-08-02 RX ADMIN — OXALIPLATIN 144 MG: 5 INJECTION, SOLUTION, CONCENTRATE INTRAVENOUS at 10:05

## 2017-08-02 RX ADMIN — LEUCOVORIN CALCIUM 750 MG: 350 INJECTION, POWDER, LYOPHILIZED, FOR SOLUTION INTRAMUSCULAR; INTRAVENOUS at 10:03

## 2017-08-02 RX ADMIN — FLUOROURACIL 850 MG: 50 INJECTION, SOLUTION INTRAVENOUS at 12:09

## 2017-08-02 RX ADMIN — DEXTROSE MONOHYDRATE 250 ML: 50 INJECTION, SOLUTION INTRAVENOUS at 09:41

## 2017-08-02 ASSESSMENT — PAIN SCALES - GENERAL: PAINLEVEL: NO PAIN (0)

## 2017-08-02 NOTE — PROGRESS NOTES
Infusion Nursing Note:  Meño Omalley presents today for Cycle 6 Day 1 Oxaliplatin, Leucovorin, Fluorouracil bolus and pump hook-up.    Patient seen by provider today: Yes: GELA Tobin   present during visit today: Not Applicable.    Note: C-series pump and thermoregulator intact upon patient's discharge. [facility] notified of patient's pump hook-up time and will disconnect patient's pump on 8/4/17 at 1015    Intravenous Access:  Implanted Port.    Treatment Conditions:  Lab Results   Component Value Date    HGB 13.3 08/02/2017     Lab Results   Component Value Date    WBC 2.8 08/02/2017      Lab Results   Component Value Date    ANEU 1.3 08/02/2017     Lab Results   Component Value Date     08/02/2017      Lab Results   Component Value Date     08/02/2017                   Lab Results   Component Value Date    POTASSIUM 4.0 08/02/2017           Lab Results   Component Value Date    MAG 2.1 04/13/2017            Lab Results   Component Value Date    CR 0.84 08/02/2017                   Lab Results   Component Value Date    CANDY 8.8 08/02/2017                Lab Results   Component Value Date    BILITOTAL 0.8 08/02/2017           Lab Results   Component Value Date    ALBUMIN 3.3 08/02/2017                    Lab Results   Component Value Date    ALT 59 08/02/2017           Lab Results   Component Value Date    AST 30 08/02/2017     Results reviewed, labs MET treatment parameters, ok to proceed with treatment.    Post Infusion Assessment:  Patient tolerated infusion without incident.  Blood return noted pre and post infusion.  Site patent and intact, free from redness, edema or discomfort.  No evidence of extravasations.    Discharge Plan:   Patient declined prescription refills.  AVS to patient via GTRAN.  Patient will return on 8/9/17 for CT scan and Dr. Mcarthur appointment and 8/16/17 for next infusion appointment.   Patient discharged in stable condition accompanied by: self and  wife.  Departure Mode: Ambulatory.    Carly Saucedo RN

## 2017-08-02 NOTE — PROGRESS NOTES
Left voicemail for patient, that she can start the artificial tears now, 2-4 times a day.   Oncology/Hematology Visit Note  Aug 2, 2017    Reason for Visit: follow up of adenocarcinoma of the rectum    History of Present Illness: Meño Omalley is a 47 year old male with adenocarcinoma of rectum. He initially presented to primary care with blood in his stool for a few months and a change in bowel habits with the stool being more loose. Family history of father who developed colon cancer at age 60, and a sister who developed colon cancer at age 55.  He presented for colonoscopy with Dr. Delio Daniel on 03/24/2017.  Findings were several small polyps and a possible carcinoma of the rectosigmoid junction measured from 17-21 cm.  Biopsies obtained were remarkable for adenocarcinoma.  CT scan of the chest, abdomen and pelvis was done, which confirmed the presence of possible thickening in the rectosigmoid junction, but no evidence of metastases.  A 3D MRI was pursued on 03/31, and it showed circumferential wall thickening of the upper rectum/rectosigmoid with minimal extension into the mesorectal fat, consistent with rectal cancer, stage IIIB, N1.  His case was discussed in the Tumor Board, and the decision was made to forego chemoradiation, but to go straight to surgery, and he underwent a low anterior resection. Surgery took place on 4/12/2017. The pathology on the tumor was moderately differentiated adenocarcinoma invading the muscularis propria with a tumor size of 3.7 cm all negative  carcinoma margins.  Lymphovascular invasion was not identified.  Two non-renea tumor deposits were identified, and 6 lymph nodes were negative for metastatic carcinoma.  He was staged by pathological staging and pT2 N1c.  The mismatch repair testing was done on his original biopsy, and he had a normal pattern of mismatch repair protein expression, ruling out Rodriguez syndrome. Port was placed by IR on 5/23/17.    He started adjuvant chemotherapy with FOLFOX on 5/24/17. Oxaliplatin was dose-reduced prior to cycle 5 for  worsening neuropathy. He is here today prior to cycle 6.      Interval History:  Meño is here today along with his wife. He is overall feeling well. His cold sensitivity last the majority of the cycle last time but his neuropathy was much improved and only lasted a few days before completely resolving. His nausea is still well-controlled with taking Zofran scheduled starting evening of pump d/c and then ativan at bedtime and then the following 4 days. Bowels have been regular to soft. No HFS or overt mucositis. No fevers/chills or infectious concerns. He was able to eat and drink reasonably well and weight is stable.     Review of Systems:  Patient denies any of the following except if noted above: fevers, chills, difficulty with energy, vision or hearing changes, chest pain, dyspnea, abdominal pain, nausea, vomiting, diarrhea, constipation, urinary concerns, headaches, numbness, tingling, issues with sleep or mood. He also denies lumps, bumps, rashes or skin lesions, bleeding or bruising issues.    Current Outpatient Prescriptions   Medication Sig Dispense Refill     ondansetron (ZOFRAN) 8 MG tablet Take 1 tablet (8 mg) by mouth every 8 hours as needed for nausea 60 tablet 1     escitalopram (LEXAPRO) 20 MG tablet Take 1 tablet (20 mg) by mouth every morning 30 tablet 3     prochlorperazine (COMPAZINE) 10 MG tablet Take 1 tablet (10 mg) by mouth every 6 hours as needed (Nausea/Vomiting) 30 tablet 2     LORazepam (ATIVAN) 0.5 MG tablet Take 1 tablet (0.5 mg) by mouth every 4 hours as needed (Anxiety, Nausea/Vomiting or Sleep) 30 tablet 2     fluticasone (FLOVENT HFA) 110 MCG/ACT inhaler Take 2 puffs by mouth 2 times daily as needed        lidocaine-prilocaine (EMLA) cream Apply topically as needed for moderate pain (Patient not taking: Reported on 7/19/2017) 30 g 1       Physical Examination:  General: The patient is a pleasant male in no acute distress.  /88  Pulse 62  Temp 98.5  F (36.9  C) (Oral)   "Resp 16  Ht 1.753 m (5' 9.02\")  Wt 91.5 kg (201 lb 11.2 oz)  SpO2 98%  BMI 29.77 kg/m2  Wt Readings from Last 10 Encounters:   08/02/17 91.5 kg (201 lb 11.2 oz)   07/19/17 93 kg (205 lb 1.6 oz)   07/05/17 93.1 kg (205 lb 3.2 oz)   06/22/17 93.6 kg (206 lb 4.8 oz)   06/07/17 92.1 kg (203 lb)   05/24/17 92.9 kg (204 lb 14.4 oz)   05/22/17 90.7 kg (200 lb)   05/02/17 91.4 kg (201 lb 8 oz)   05/01/17 91.9 kg (202 lb 9.6 oz)   04/24/17 92.1 kg (203 lb)     HEENT: EOMI, PERRL. Sclerae are anicteric. Oral mucosa is pink and moist with no lesions or thrush.   Lymph: Neck is supple with no lymphadenopathy in the cervical or supraclavicular areas.   Heart: Regular rate and rhythm.   Lungs: Clear to auscultation bilaterally. Port right chest accessed, c/d/i.  Abdomen: Bowel sounds present, soft, nontender with no palpable hepatosplenomegaly or masses. Laporscopic incisions healed - 5 total.   Extremities: No lower extremity edema noted bilaterally.   Neuro: Cranial nerves II through XII are grossly intact.  Skin: No rashes, petechiae, or bruising noted on exposed skin.    Laboratory Data:    8/2/2017 08:38   Sodium 141   Potassium 4.0   Chloride 108   Carbon Dioxide 24   Urea Nitrogen 14   Creatinine 0.84   GFR Estimate >90...   GFR Estimate If Black >90...   Calcium 8.8   Anion Gap 9   Albumin 3.3 (L)   Protein Total 7.0   Bilirubin Total 0.8   Alkaline Phosphatase 74   ALT 59   AST 30   Glucose 91   WBC 2.8 (L)   Hemoglobin 13.3   Hematocrit 39.5 (L)   Platelet Count 146 (L)   RBC Count 4.22 (L)   MCV 94   MCH 31.5   MCHC 33.7   RDW 16.3 (H)   Diff Method Automated Method   % Neutrophils 44.8   % Lymphocytes 37.7   % Monocytes 13.9   % Eosinophils 1.8   % Basophils 1.8   % Immature Granulocytes 0.0   Nucleated RBCs 0   Absolute Neutrophil 1.3 (L)   Absolute Lymphocytes 1.1   Absolute Monocytes 0.4   Absolute Eosinophils 0.1   Absolute Basophils 0.1   Abs Immature Granulocytes 0.0   Absolute Nucleated RBC 0.0 "       Assessment and Plan:  1. Oncology. stage III rectosigmoid adenocarcinoma, moderately differentiated, status post APR with good margins. Started adjuvant chemotherapy with FOLFOX and s/p 5 cycles. Oxaliplatin was dose reduced with cycle 5 due to progressive neuropathy which has improved. He is tolerating fairly well with nausea, fatigue, cold sensitivity. Has some dyspepsia as well so trial of H2 blocker daily. His counts are adequate to proceed with cycle 6 today. Reviewed neutropenic precautions days 7-10. Will have restaging after this cycle next week with Dr. Mcarthur. He will call with any interval concerns in the meantime.     Jessica Lomas PA-C  [facility]  [address] [place] [address] [phone]

## 2017-08-02 NOTE — NURSING NOTE
"Oncology Rooming Note    August 2, 2017 8:42 AM   Meño Omalley is a 47 year old male who presents for:    Chief Complaint   Patient presents with     Port Draw     accessed with power needle for labs and infusion, vitals checked     Oncology Clinic Visit     Return: rectal cancer      Initial Vitals: /88  Pulse 62  Temp 98.5  F (36.9  C) (Oral)  Resp 16  Ht 1.753 m (5' 9.02\")  Wt 91.5 kg (201 lb 11.2 oz)  SpO2 98%  BMI 29.77 kg/m2 Estimated body mass index is 29.77 kg/(m^2) as calculated from the following:    Height as of this encounter: 1.753 m (5' 9.02\").    Weight as of this encounter: 91.5 kg (201 lb 11.2 oz). Body surface area is 2.11 meters squared.  No Pain (0) Comment: Data Unavailable   No LMP for male patient.  Allergies reviewed: Yes  Medications reviewed: Yes    Medications: Medication refills not needed today.  Pharmacy name entered into EPIC:    [facility] [address] [place] [address] - [address]  [facility] [place], [place] - [address]     Clinical concerns: no new concerns     6 minutes for nursing intake (face to face time)     Patricia Bosch CMA                "

## 2017-08-02 NOTE — MR AVS SNAPSHOT
After Visit Summary   8/2/2017    Meño Omalley    MRN: [id number]           Patient Information     Date Of Birth          1970        Visit Information        Provider Department      8/2/2017 8:40 AM Jessica Lomas PA [facility]        Today's Diagnoses     Adenocarcinoma of rectum (H)    -  1       Follow-ups after your visit        Your next 10 appointments already scheduled     Aug 09, 2017  7:30 AM CDT   Masonic Lab Draw with UC MASONIC LAB DRAW   [facility] ([facility])    [address] [place] [address] [phone]            Aug 09, 2017  8:00 AM CDT   (Arrive by 7:45 AM)   CT CHEST ABDOMEN PELVIS W/O & W CONTRAST with UCCT2   [facility] CT ([facility])    [address] [place] [id number] [phone]           Please bring any scans or X-rays taken at other hospitals, if similar tests were done. Also bring a list of your medicines, including vitamins, minerals and over-the-counter drugs. It is safest to leave personal items at home.  Be sure to tell your doctor:   If you have any allergies.   If there s any chance you are pregnant.   If you are breastfeeding.   If you have any special needs.  You may have contrast for this exam. To prepare:   Do not eat or drink for 2 hours before your exam. If you need to take medicine, you may take it with small sips of water. (We may ask you to take liquid medicine as well.)   The day before your exam, drink extra fluids at least six 8-ounce glasses (unless your doctor tells you to restrict your fluids).  Patients over 70 or patients with diabetes or kidney problems:   If you haven t had a blood test (creatinine test) within the last 30 days, go to your clinic or Diagnostic Imaging Department for this test.  If you have diabetes:   If your kidney function is normal, continue taking  your metformin (Avandamet, Glucophage, Glucovance, Metaglip) on the day of your exam.   If your kidney function is abnormal, wait 48 hours before restarting this medicine.  You will have oral contrast for this exam:   You will drink the contrast at home. Get this from your clinic or Diagnostic Imaging Department. Please follow the directions given.  Please wear loose clothing, such as a sweat suit or jogging clothes. Avoid snaps, zippers and other metal. We may ask you to undress and put on a hospital gown.  If you have any questions, please call the Imaging Department where you will have your exam.            Aug 09, 2017 11:15 AM CDT   (Arrive by 11:00 AM)   Return Visit with Jeanette Mcarthur MD   [facility] ([facility])    [address] [place] [address]   [phone]            Aug 16, 2017  9:00 AM CDT   Masonic Lab Draw with UC MASONIC LAB DRAW   [facility]    [address] [place] [address]   [phone]            Aug 16, 2017  9:30 AM CDT   Infusion 240 with UC ONCOLOGY INFUSION, UC 27 ATC   [facility] ([facility])    [address] [place] [address]   [phone]            Aug 30, 2017  9:00 AM CDT   Masonic Lab Draw with UC MASONIC LAB DRAW   [facility]    [address] [place] [address]   [phone]            Aug 30, 2017  9:30 AM CDT   Infusion 240 with UC ONCOLOGY INFUSION, UC 27 ATC   [facility] ([facility])    [address] [place] [address]   [phone]            Sep 12, 2017 10:30 AM CDT   Flexible Sigmoidoscopy with Ignacio Rose MD   [facility] ([facility])    [address] [place] [address]  "Street   4th [facility] [address] [phone]              Who to contact     If you have questions or need follow up information about today's clinic visit or your schedule please contact [facility] directly at [phone].  Normal or non-critical lab and imaging results will be communicated to you by MyChart, letter or phone within 4 business days after the clinic has received the results. If you do not hear from us within 7 days, please contact the clinic through Visedohart or phone. If you have a critical or abnormal lab result, we will notify you by phone as soon as possible.  Submit refill requests through Wild Brain or call your pharmacy and they will forward the refill request to us. Please allow 3 business days for your refill to be completed.          Additional Information About Your Visit        Visedohart Information     Wild Brain gives you secure access to your electronic health record. If you see a primary care provider, you can also send messages to your care team and make appointments. If you have questions, please call your primary care clinic.  If you do not have a primary care provider, please call [phone] and they will assist you.        Care EveryWhere ID     This is your Care EveryWhere ID. This could be used by other organizations to access your [place] medical records  [id number]        Your Vitals Were     Pulse Temperature Respirations Height Pulse Oximetry BMI (Body Mass Index)    62 98.5  F (36.9  C) (Oral) 16 1.753 m (5' 9.02\") 98% 29.77 kg/m2       Blood Pressure from Last 3 Encounters:   08/02/17 146/88   07/19/17 152/89   07/05/17 133/77    Weight from Last 3 Encounters:   08/02/17 91.5 kg (201 lb 11.2 oz)   07/19/17 93 kg (205 lb 1.6 oz)   07/05/17 93.1 kg (205 lb 3.2 oz)              Today, you had the following     No orders found for display       Primary Care Provider Office Phone # Fax #    Delio Alcala [phone]       " [facility] [address] [place] [address]        Equal Access to Services     ORVILLEABBIE FLAKITA : Hadii aad ku hadanshulshalom Sostephen, waaxda luqadaha, qaybta kaalmada jacquialbertoalicia, waxay idiin haykjlaw vargasryleeyoana hines. So [facility] [phone].    ATENCIÓN: Si habla español, tiene a valle disposición servicios gratuitos de asistencia lingüística. Llame al [phone].    We comply with applicable federal civil rights laws and [place] laws. We do not discriminate on the basis of race, color, national origin, age, disability sex, sexual orientation or gender identity.            Thank you!     Thank you for choosing [facility]  for your care. Our goal is always to provide you with excellent care. Hearing back from our patients is one way we can continue to improve our services. Please take a few minutes to complete the written survey that you may receive in the mail after your visit with us. Thank you!             Your Updated Medication List - Protect others around you: Learn how to safely use, store and throw away your medicines at www.disposemymeds.org.          This list is accurate as of: 8/2/17  9:38 AM.  Always use your most recent med list.                   Brand Name Dispense Instructions for use Diagnosis    escitalopram 20 MG tablet    LEXAPRO    30 tablet    Take 1 tablet (20 mg) by mouth every morning    Major depressive disorder, recurrent episode, mild (H)       FLOVENT  MCG/ACT Inhaler   Generic drug:  fluticasone      Take 2 puffs by mouth 2 times daily as needed        lidocaine-prilocaine cream    EMLA    30 g    Apply topically as needed for moderate pain    Rectal cancer (H)       LORazepam 0.5 MG tablet    ATIVAN    30 tablet    Take 1 tablet (0.5 mg) by mouth every 4 hours as needed (Anxiety, Nausea/Vomiting or Sleep)    Adenocarcinoma of rectum (H)       ondansetron 8 MG tablet    ZOFRAN    60 tablet    Take 1 tablet (8 mg) by mouth every 8 hours as  needed for nausea    Adenocarcinoma of rectum (H)       prochlorperazine 10 MG tablet    COMPAZINE    30 tablet    Take 1 tablet (10 mg) by mouth every 6 hours as needed (Nausea/Vomiting)    Adenocarcinoma of rectum (H)

## 2017-08-02 NOTE — MR AVS SNAPSHOT
After Visit Summary   8/2/2017    Meño Omalley    MRN: [id number]           Patient Information     Date Of Birth          1970        Visit Information        Provider Department      8/2/2017 9:30 AM UC 27 ATC;  ONCOLOGY INFUSION [facility]        Today's Diagnoses     Adenocarcinoma of rectum (H)    -  1       Follow-ups after your visit        Your next 10 appointments already scheduled     Aug 09, 2017  7:30 AM CDT   Masonic Lab Draw with  MASONIC LAB DRAW   [facility] Draw ([facility])    [address] [id number] [phone]            Aug 09, 2017  8:00 AM CDT   (Arrive by 7:45 AM)   CT CHEST ABDOMEN PELVIS W/O & W CONTRAST with UCCT2   [facility] CT ([facility])    [address] [place] [id number] [phone]           Please bring any scans or X-rays taken at other hospitals, if similar tests were done. Also bring a list of your medicines, including vitamins, minerals and over-the-counter drugs. It is safest to leave personal items at home.  Be sure to tell your doctor:   If you have any allergies.   If there s any chance you are pregnant.   If you are breastfeeding.   If you have any special needs.  You may have contrast for this exam. To prepare:   Do not eat or drink for 2 hours before your exam. If you need to take medicine, you may take it with small sips of water. (We may ask you to take liquid medicine as well.)   The day before your exam, drink extra fluids at least six 8-ounce glasses (unless your doctor tells you to restrict your fluids).  Patients over 70 or patients with diabetes or kidney problems:   If you haven t had a blood test (creatinine test) within the last 30 days, go to your clinic or Diagnostic Imaging Department for this test.  If you have diabetes:   If your kidney function is normal, continue  taking your metformin (Avandamet, Glucophage, Glucovance, Metaglip) on the day of your exam.   If your kidney function is abnormal, wait 48 hours before restarting this medicine.  You will have oral contrast for this exam:   You will drink the contrast at home. Get this from your clinic or Diagnostic Imaging Department. Please follow the directions given.  Please wear loose clothing, such as a sweat suit or jogging clothes. Avoid snaps, zippers and other metal. We may ask you to undress and put on a hospital gown.  If you have any questions, please call the Imaging Department where you will have your exam.            Aug 09, 2017 11:15 AM CDT   (Arrive by 11:00 AM)   Return Visit with Jeanette Mcarthur MD   [facility] ([facility])    [address] [place] [address]   [phone]            Aug 16, 2017  9:00 AM CDT   Masonic Lab Draw with UC MASONIC LAB DRAW   [facility])    [address] [place] [address]   [phone]            Aug 16, 2017  9:30 AM CDT   Infusion 240 with UC ONCOLOGY INFUSION, UC 27 ATC   [facility] ([facility])    [address] [place] [address]   [phone]            Aug 30, 2017  9:00 AM CDT   Masonic Lab Draw with UC MASONIC LAB DRAW   [facility])    [address] [place] [address]   [phone]            Aug 30, 2017  9:30 AM CDT   Infusion 240 with UC ONCOLOGY INFUSION, UC 27 ATC   [facility] ([facility])    [address] [place] [address]   [phone]            Sep 12, 2017 10:30 AM CDT   Flexible Sigmoidoscopy with Ignacio Rose MD   [facility] ([facility])    [facility]  4th [facility] [address] [phone]              Who to contact     If you have questions or need follow up information about today's clinic visit or your schedule please contact [facility] directly at [phone].  Normal or non-critical lab and imaging results will be communicated to you by MyChart, letter or phone within 4 business days after the clinic has received the results. If you do not hear from us within 7 days, please contact the clinic through MyChart or phone. If you have a critical or abnormal lab result, we will notify you by phone as soon as possible.  Submit refill requests through Formabilio or call your pharmacy and they will forward the refill request to us. Please allow 3 business days for your refill to be completed.          Additional Information About Your Visit        MyChart Information     Form[employer]io gives you secure access to your electronic health record. If you see a primary care provider, you can also send messages to your care team and make appointments. If you have questions, please call your primary care clinic.  If you do not have a primary care provider, please call [phone] and they will assist you.        Care EveryWhere ID     This is your Care EveryWhere ID. This could be used by other organizations to access your [place] medical records  [id number]         Blood Pressure from Last 3 Encounters:   08/02/17 146/88   07/19/17 152/89   07/05/17 133/77    Weight from Last 3 Encounters:   08/02/17 91.5 kg (201 lb 11.2 oz)   07/19/17 93 kg (205 lb 1.6 oz)   07/05/17 93.1 kg (205 lb 3.2 oz)              We Performed the Following     CBC with platelets differential     Comprehensive metabolic panel        Primary Care Provider Office Phone # Fax #    Delio Alcala [phone]       [place] [id number] [place] [address]        Equal Access to Services     LUCIO KOROMA: Kevin  snow Anders, wadida darcieadaha, qaybta kaalmaite teodoromichelle, waxángel sergei vargasryleeyoana morales donny. So [facility] [phone].    ATENCIÓN: Si erlinda villegas, tiene a valle disposición servicios gratuitos de asistencia lingüística. Jose al [phone].    We comply with applicable federal civil rights laws and [place] laws. We do not discriminate on the basis of race, color, national origin, age, disability sex, sexual orientation or gender identity.            Thank you!     Thank you for choosing [facility]  for your care. Our goal is always to provide you with excellent care. Hearing back from our patients is one way we can continue to improve our services. Please take a few minutes to complete the written survey that you may receive in the mail after your visit with us. Thank you!             Your Updated Medication List - Protect others around you: Learn how to safely use, store and throw away your medicines at www.disposemymeds.org.          This list is accurate as of: 8/2/17  2:10 PM.  Always use your most recent med list.                   Brand Name Dispense Instructions for use Diagnosis    escitalopram 20 MG tablet    LEXAPRO    30 tablet    Take 1 tablet (20 mg) by mouth every morning    Major depressive disorder, recurrent episode, mild (H)       FLOVENT  MCG/ACT Inhaler   Generic drug:  fluticasone      Take 2 puffs by mouth 2 times daily as needed        lidocaine-prilocaine cream    EMLA    30 g    Apply topically as needed for moderate pain    Rectal cancer (H)       LORazepam 0.5 MG tablet    ATIVAN    30 tablet    Take 1 tablet (0.5 mg) by mouth every 4 hours as needed (Anxiety, Nausea/Vomiting or Sleep)    Adenocarcinoma of rectum (H)       ondansetron 8 MG tablet    ZOFRAN    60 tablet    Take 1 tablet (8 mg) by mouth every 8 hours as needed for nausea    Adenocarcinoma of rectum (H)       prochlorperazine 10 MG tablet    COMPAZINE    30 tablet    Take 1 tablet  (10 mg) by mouth every 6 hours as needed (Nausea/Vomiting)    Adenocarcinoma of rectum (H)

## 2017-08-03 ENCOUNTER — TELEPHONE (OUTPATIENT)
Dept: ONCOLOGY | Facility: CLINIC | Age: 47
End: 2017-08-03

## 2017-08-03 NOTE — Clinical Note
"Please print and send a copy of this letter and the patient's genetic testing report to the patient.    Please enclose test report: \"Send outs misc test [[id number]] (Order [id number])\"  "

## 2017-08-03 NOTE — TELEPHONE ENCOUNTER
"8/3/2017    Referring Provider: Ignacio Rose MD    Presenting Information:  I spoke to Meño by phone today to discuss his genetic testing results. His blood was drawn on 4/10/2017. Rodriguez Syndrome Analyses with the [employer]oNext gene panel was ordered from [facility]. This testing was done because of Meño's personal and family history of colon cancer.    Genetic Testing Result: NEGATIVE  Meño is negative for mutations in the 17 genes on the ColoNext gene panel: APC, BMPR1A, CDH1, CHEK2, EPCAM, GREM1, MLH1, MSH2, MSH6, MUTYH, PMS2, POLD1, POLE, PTEN, SMAD4, STK11, and TP53. This test involved sequencing and deletion/duplication analysis of all genes with the exceptions of EPCAM and GREM1 (deletions and duplications only).    Testing did not detect an identifiable mutation associated with Rodriguez syndrome (MLH1, MSH2, MSH6, PMS2, EPCAM), Familial Adenomatous Polyposis (APC), Hereditary Diffuse Gastric Cancer (CDH1), Juvenile Polyposis syndrome (BMPR1A, SMAD4), Cowden syndrome (PTEN), Li Fraumeni syndrome (TP53), Peutz-Jeghers syndrome (STK11), or MUTYH Associated Polyposis (MUTYH).     A copy of the test report can be found in the Laboratory tab, dated 4/10/2017, and named \"SEND OUTS [facility] TEST\". The report is scanned in as a linked document.    Interpretation:  We discussed several different interpretations of this negative test result.    1. One explanation may be that there is a different gene or combination of genes and environment that are associated with the cancers in Meño and/or his relatives. We reviewed that additional genetic testing may be available in the future that can identify a genetic/hereditary explanation for Meño's colon cancer. As such, he is encouraged to contact me annually to review any new genetic testing options that may be appropriate for him.  2. It is possible that another relative, such as his father and/or sister with colon cancer, does have a mutation in one of these 17 " genes and Meño did not inherit it. If that is the case, Meño's colon cancer may be a sporadic cancer caused by random cellular changes.  3. There is also a small possibility that there is a mutation in one of these genes, and we could not find it with our current testing methods.       Screening:  Based on this negative test result, it is important for Meño and his relatives to refer back to the family history for appropriate cancer screening.      Meño should continue to follow all colon cancer treatment and future screening recommendations as made by his medical providers.  Per current National Comprehensive Cancer Network (NCCN) guidelines, individuals with a first degree relative with colorectal cancer diagnosed at any age should begin colonoscopy at age 40, or 10 years before the earliest diagnosis of colorectal cancer, whichever is first. In this family, colonoscopy should start at age 35. Colonoscopy should be repeated every 5-10 years, or per colonoscopy findings. This would apply to Meño's siblings.  Per current NCCN guidelines, individuals with a second-degree relative with colon cancer diagnosed less than age 50, should start colonoscopy at age 50, and should be repeated every 5-10 years, or per colonoscopy findings. This would apply to Meño's nieces and nephews. That being said, each niece and nephew are encouraged to review colon screening recommendations with their primary care providers based on Meño's and their own parent's history of colon cancer/polyps. It may be recommended that certain nieces and nephews begin colonoscopies earlier and more frequently depending upon their parents' colon cancer/polyp history.  Other population cancer screening options, such as those recommended by the American Cancer Society and NCCN, are also appropriate for Meño and his family. These screening recommendations may change if there are changes to Meño's personal and/or family history.  Final screening recommendations should be made by each individual's managing physician.    Additional Testing:  Although Meño's genetic testing result was negative, it is possible (though less likely) that other relatives may still carry a gene mutation associated with hereditary colon cancer. Genetic counseling could be considered for Meño's sister with colon cancer to discuss her genetic testing options. If she does pursue genetic testing, Meño is encouraged to contact me so that we may review the impact of her test results on Meño.    Summary:  We do not have an explanation for Meño's colon cancer. Because of that, it is important that he continue with cancer screening based on his personal and family history as discussed above.    Genetic testing is rapidly advancing, and new cancer susceptibility genes will most likely be identified in the future. Therefore, I encouraged Meño to contact me annually or if there are changes in his personal or family history. This may change how we assess his cancer risk, screening, and the testing we would offer.    Plan:  1. Meño will be mailed a copy of his test results.  2. He plans to follow-up with his medical providers, including Dr. Rose.  3. He should contact me annually, or sooner if his family history changes.    If Meño has any further questions, I encouraged him to contact me at [phone].    Faby Santos MS, [facility]  Certified Genetic Counselor  Office: [phone]  Pager: [phone]

## 2017-08-03 NOTE — LETTER
"    Cancer Risk Management  Program [facility]  [facility]  Mailing Address  Cancer Risk Management Program  [address]  [place] [address]    New patient appointments  [phone]  August 10, 2017    Meño Omalley  [address] [address] [address]      Dear Meño,    It was a pleasure speaking with you over the phone recently regarding your genetic testing results. Here is a copy of the progress note summarizing our conversation. If you have any additional questions, please feel free to call.    8/3/2017    Referring Provider: Ignacio Rose MD    Presenting Information:  I spoke to Meño by phone today to discuss his genetic testing results. His blood was drawn on 4/10/2017. Rodriguez Syndrome Analyses with the ColoNext gene panel was ordered from [employer]. This testing was done because of Meño's personal and family history of colon cancer.    Genetic Testing Result: NEGATIVE  Meño is negative for mutations in the 17 genes on the ColoNext gene panel: APC, BMPR1A, CDH1, CHEK2, EPCAM, GREM1, MLH1, MSH2, MSH6, MUTYH, PMS2, POLD1, POLE, PTEN, SMAD4, STK11, and TP53. This test involved sequencing and deletion/duplication analysis of all genes with the exceptions of EPCAM and GREM1 (deletions and duplications only).    Testing did not detect an identifiable mutation associated with Rodriguez syndrome (MLH1, MSH2, MSH6, PMS2, EPCAM), Familial Adenomatous Polyposis (APC), Hereditary Diffuse Gastric Cancer (CDH1), Juvenile Polyposis syndrome (BMPR1A, SMAD4), Cowden syndrome (PTEN), Li Fraumeni syndrome (TP53), Peutz-Jeghers syndrome (STK11), or MUTYH Associated Polyposis (MUTYH).     A copy of the test report can be found in the Laboratory tab, dated 4/10/2017, and named \"SEND OUTS " "MISC TEST\". The report is scanned in as a linked document.    Interpretation:  We discussed several different interpretations of this negative test result.    1. One explanation may be that there is a different gene or combination of genes and environment that are associated with the cancers in Meño and/or his relatives. We reviewed that additional genetic testing may be available in the future that can identify a genetic/hereditary explanation for Meño's colon cancer. As such, he is encouraged to contact me annually to review any new genetic testing options that may be appropriate for him.  2. It is possible that another relative, such as his father and/or sister with colon cancer, does have a mutation in one of these 17 genes and Meño did not inherit it. If that is the case, Jesenias colon cancer may be a sporadic cancer caused by random cellular changes.  3. There is also a small possibility that there is a mutation in one of these genes, and we could not find it with our current testing methods.       Screening:  Based on this negative test result, it is important for Meño and his relatives to refer back to the family history for appropriate cancer screening.      Meño should continue to follow all colon cancer treatment and future screening recommendations as made by his medical providers.  Per current National Comprehensive Cancer Network (NCCN) guidelines, individuals with a first degree relative with colorectal cancer diagnosed at any age should begin colonoscopy at age 40, or 10 years before the earliest diagnosis of colorectal cancer, whichever is first. In this family, colonoscopy should start at age 35. Colonoscopy should be repeated every 5-10 years, or per colonoscopy findings. This would apply to Meño's siblings.  Per current NCCN guidelines, individuals with a second-degree relative with colon cancer diagnosed less than age 50, should start colonoscopy at age 50, and should be " repeated every 5-10 years, or per colonoscopy findings. This would apply to Meño's nieces and nephews. That being said, each niece and nephew are encouraged to review colon screening recommendations with their primary care providers based on Meño's and their own parent's history of colon cancer/polyps. It may be recommended that certain nieces and nephews begin colonoscopies earlier and more frequently depending upon their parents' colon cancer/polyp history.  Other population cancer screening options, such as those recommended by the American Cancer Society and NCCN, are also appropriate for Meño and his family. These screening recommendations may change if there are changes to Meño's personal and/or family history. Final screening recommendations should be made by each individual's managing physician.    Additional Testing:  Although Meño's genetic testing result was negative, it is possible (though less likely) that other relatives may still carry a gene mutation associated with hereditary colon cancer. Genetic counseling could be considered for Meño's sister with colon cancer to discuss her genetic testing options. If she does pursue genetic testing, Meño is encouraged to contact me so that we may review the impact of her test results on Meño.    Summary:  We do not have an explanation for Meño's colon cancer. Because of that, it is important that he continue with cancer screening based on his personal and family history as discussed above.    Genetic testing is rapidly advancing, and new cancer susceptibility genes will most likely be identified in the future. Therefore, I encouraged Meño to contact me annually or if there are changes in his personal or family history. This may change how we assess his cancer risk, screening, and the testing we would offer.    Plan:  1. Meño will be mailed a copy of his test results.  2. He plans to follow-up with his medical providers,  including Dr. Rose.  3. He should contact me annually, or sooner if his family history changes.    If Meño has any further questions, I encouraged him to contact me at [phone].    Faby Santos MS, [facility]  Certified Genetic Counselor  Office: [phone]  Pager: [phone]

## 2017-08-09 ENCOUNTER — CARE COORDINATION (OUTPATIENT)
Dept: ONCOLOGY | Facility: CLINIC | Age: 47
End: 2017-08-09

## 2017-08-09 ENCOUNTER — ONCOLOGY VISIT (OUTPATIENT)
Dept: ONCOLOGY | Facility: CLINIC | Age: 47
End: 2017-08-09
Attending: INTERNAL MEDICINE
Payer: COMMERCIAL

## 2017-08-09 ENCOUNTER — APPOINTMENT (OUTPATIENT)
Dept: LAB | Facility: CLINIC | Age: 47
End: 2017-08-09
Attending: PHYSICIAN ASSISTANT
Payer: COMMERCIAL

## 2017-08-09 VITALS
TEMPERATURE: 98.4 F | BODY MASS INDEX: 29.67 KG/M2 | RESPIRATION RATE: 16 BRPM | HEIGHT: 69 IN | OXYGEN SATURATION: 98 % | HEART RATE: 69 BPM | SYSTOLIC BLOOD PRESSURE: 128 MMHG | WEIGHT: 200.3 LBS | DIASTOLIC BLOOD PRESSURE: 94 MMHG

## 2017-08-09 DIAGNOSIS — C20 ADENOCARCINOMA OF RECTUM (H): Primary | ICD-10-CM

## 2017-08-09 PROCEDURE — 99214 OFFICE O/P EST MOD 30 MIN: CPT | Mod: ZP | Performed by: INTERNAL MEDICINE

## 2017-08-09 PROCEDURE — 36591 DRAW BLOOD OFF VENOUS DEVICE: CPT

## 2017-08-09 PROCEDURE — 99212 OFFICE O/P EST SF 10 MIN: CPT | Mod: ZF

## 2017-08-09 PROCEDURE — 25000128 H RX IP 250 OP 636: Mod: ZF | Performed by: INTERNAL MEDICINE

## 2017-08-09 RX ORDER — HEPARIN SODIUM (PORCINE) LOCK FLUSH IV SOLN 100 UNIT/ML 100 UNIT/ML
5 SOLUTION INTRAVENOUS
Status: COMPLETED | OUTPATIENT
Start: 2017-08-09 | End: 2017-08-09

## 2017-08-09 RX ADMIN — SODIUM CHLORIDE, PRESERVATIVE FREE 5 ML: 5 INJECTION INTRAVENOUS at 07:49

## 2017-08-09 ASSESSMENT — PAIN SCALES - GENERAL: PAINLEVEL: NO PAIN (0)

## 2017-08-09 NOTE — NURSING NOTE
"Oncology Rooming Note    August 9, 2017 11:10 AM   Meño Omalley is a 47 year old male who presents for:    Chief Complaint   Patient presents with     Port Draw     port accessed and labs drawn by rn.  vs taken.     Oncology Clinic Visit     Return: Rectal cancer      Initial Vitals: BP (!) 128/94 (BP Location: Right arm, Patient Position: Chair, Cuff Size: Adult Regular)  Pulse 69  Temp 98.4  F (36.9  C) (Oral)  Resp 16  Ht 1.753 m (5' 9.02\")  Wt 90.9 kg (200 lb 4.8 oz)  SpO2 98%  BMI 29.57 kg/m2 Estimated body mass index is 29.57 kg/(m^2) as calculated from the following:    Height as of this encounter: 1.753 m (5' 9.02\").    Weight as of this encounter: 90.9 kg (200 lb 4.8 oz). Body surface area is 2.1 meters squared.  No Pain (0) Comment: Data Unavailable   No LMP for male patient.  Allergies reviewed: Yes  Medications reviewed: Yes    Medications: Medication refills not needed today.  Pharmacy name entered into EPIC:    [facility] [place], [place] - [address]  [facility] [place], [place] - [address]    Clinical concerns: no new concerns     6 minutes for nursing intake (face to face time)     Patricia Bosch CMA                "

## 2017-08-09 NOTE — LETTER
"8/9/2017     RE: Meño Omalley  [address] [address] [address]     Dear Colleague,    Thank you for referring your patient, Meño Omalley, to the [facility]. Please see a copy of my visit note below.    This is a followup visit for a diagnosis of colon cancer, stage III adenocarcinoma of rectum.  Currently, he is getting adjuvant chemotherapy with FOLFOX.      HISTORY OF PRESENT ILLNESS:  Kindly refer to my previous notes for patient's presentation and treatment so far.  Briefly, from an oncological standpoint he underwent laparoscopic surgery for resection of the primary lesion, and was found to have stage IIIB disease; hence, he was initiated on FOLFOX adjuvant therapy.  He is status post 7 cycle.  He is coming in for followup for symptom management and next cycle and also discuss shorter course I.e 3 mths adjuvant vs 6 mths adjuvant chemo.      INTERVAL HISTORY:  He is tolerating the chemotherapy with expected side effects. His nausea is quite improved at this point with the current antinausea program. He has significant cold sensitivity in his mouth and his fingertips. He denies any neuropathic symptoms otherwise and has feet or hands. He is not having any diarrhea or constipation. No chest pain shortness of breath fevers chills. He sent me information on my chart regarding 3 months versus 6 months of adjuvant chemotherapy and clearly wants to pursue 3 months treatment but his wife wants him to finish the 6 month course and they're here to discuss that further.       PAST MEDICAL HISTORY:  Unchanged.      PAST SURGICAL HISTORY:  Unchanged.      LABORATORY DATA:  Reviewed.      MEDICATIONS:  Reviewed.      ALLERGIES:  Reviewed.      PHYSICAL EXAMINATION:   VITAL SIGNS: BP (!) 128/94 (BP Location: Right arm, Patient Position: Chair, Cuff Size: Adult Regular)  Pulse 69  Temp 98.4  F (36.9  C) (Oral)  Resp 16  Ht 1.753 m (5' 9.02\")  Wt 90.9 kg (200 lb 4.8 oz)  SpO2 98%  BMI " 29.57 kg/m2    GENERAL:  Alert and oriented x3, in no apparent distress.   HEENT:  No mucositis   SKIN:  No palmar or plantar dysesthesia.   CVS/RESPIRATORY:  Unremarkable.   ABDOMEN:  Nontender to palpation.   EXTREMITIES:  Lower extremities with no pedal edema.      LABORATORY DATA:       ASSESSMENT AND PLAN:  Patient with stage III rectal cancer, currently undergoing adjuvant chemotherapy with FOLFOX.  He tolerated chemotherapy with the expected side effects. He has completed 7 cycles so far and has mostly cold sensitivity in terms of neuropathic complications.  I had a very detailed discussion with the patient and his wife that indeed the data is out for 3 month versus 6 month adjuvant chemotherapy and the differences now that profound in terms of long-term recurrence rates but on the same amount he is 47 years old and at least per his wife even that one to 2% difference is a huge difference putting it in perspective.   The patient is agreeable to further chemotherapy but does not want to get any further oxaliplatin.  This was a compromised reach between the patient and his wife and I am agreeable to moving forward with this plan .   He'll continue rest of the chemotherapy but just 5-FU infusion and bolus along with leucovorin .   RTC in 14 days for next cycle .   Scan at the end of 12 cycles .     I spent 35 minutes in the care of this patient >50% of which was spent in coordinating and counseling.    Jeanette Mcarthur   of Medicine   Hematology and medical Oncology   [facility]

## 2017-08-09 NOTE — PROGRESS NOTES
"Paged IR NP on-call to review Meño's coiled port line.  He is due to have his chemotherapy pump attached next week.    Spoke to Suzie Peguero who states if the port is working ok then it's fine to leave as is.  If port is having difficulty with flushing, blood return, swollen or discomfort then we should book him with IR for a \"port revision\"    Left message with Meño to return call to ask about recent port use.    "

## 2017-08-09 NOTE — NURSING NOTE
"Chief Complaint   Patient presents with     Port Draw     port accessed and labs drawn by rn.  vs taken.     Port accessed with 20g 3/4\" power needle, port flushed with saline and heparin, vitals checked.  Emani Hogue RN    "

## 2017-08-09 NOTE — MR AVS SNAPSHOT
After Visit Summary   8/9/2017    Meño Omalley    MRN: [id number]           Patient Information     Date Of Birth          1970        Visit Information        Provider Department      8/9/2017 11:15 AM Jeanette Mcarthur MD [facility]        Today's Diagnoses     Adenocarcinoma of rectum (H)    -  1       Follow-ups after your visit        Your next 10 appointments already scheduled     Aug 16, 2017  9:00 AM CDT   [facility] Lab Draw with UC MASONIC LAB DRAW   [facility]    [address]  [address] [facility] [address]   [phone]            Aug 16, 2017  9:30 AM CDT   Infusion 240 with UC ONCOLOGY INFUSION, UC 27 ATC   [facility] ([facility])    [address] [place] [address]  [address] [facility] [address]   [phone]            Aug 30, 2017  9:00 AM CDT   Masonic Lab Draw with UC MASONIC LAB DRAW   [facility] Masonic Lab Draw ([facility])    [address]  [address] [facility] [address]   [phone]            Aug 30, 2017  9:30 AM CDT   Infusion 240 with UC ONCOLOGY INFUSION, UC 27 ATC   [facility] ([facility])    [address] [place] [address]  [address] [facility] [address]   [phone]            Sep 12, 2017 10:30 AM CDT   Flexible Sigmoidoscopy with Ignacio Rose MD   [facility] Colon and Rectal Surgery ([facility])    [address] [place] [address]  4th Floor  [facility] [address]   [phone]            Sep 13, 2017  8:15 AM CDT   Masonic Lab Draw with UC MASONIC LAB DRAW   [facility] Masonic Lab Draw ([facility])    [address]  [address] [facility] [address]   [phone]            Sep 13, 2017  8:40 AM CDT   (Arrive by 8:25 AM)   Return Visit with Zahira Christensen PA-C   [facility] ([facility]  "[facility])    [address]  [address] [facility] [address] [phone]            Sep 13, 2017  9:30 AM CDT   Infusion 240 with UC ONCOLOGY INFUSION   [facility] ([facility])    [address] [place] [address]  [address] [facility] [address] [phone]              Who to contact     If you have questions or need follow up information about today's clinic visit or your schedule please contact [facility] directly at [phone].  Normal or non-critical lab and imaging results will be communicated to you by [employer], letter or phone within 4 business days after the clinic has received the results. If you do not hear from us within 7 days, please contact the clinic through "[employer]t or phone. If you have a critical or abnormal lab result, we will notify you by phone as soon as possible.  Submit refill requests through [employer] or call your pharmacy and they will forward the refill request to us. Please allow 3 business days for your refill to be completed.          Additional Information About Your Visit        MyChart Information     [employer] gives you secure access to your electronic health record. If you see a primary care provider, you can also send messages to your care team and make appointments. If you have questions, please call your primary care clinic.  If you do not have a primary care provider, please call [phone] and they will assist you.        Care EveryWhere ID     This is your Care EveryWhere ID. This could be used by other organizations to access your [facility] medical records  [id number]        Your Vitals Were     Pulse Temperature Respirations Height Pulse Oximetry BMI (Body Mass Index)    69 98.4  F (36.9  C) (Oral) 16 1.753 m (5' 9.02\") 98% 29.57 kg/m2       Blood Pressure from Last 3 Encounters:   08/09/17 (!) 128/94   08/02/17 146/88   07/19/17 152/89    Weight from Last 3 Encounters:   08/09/17 " 90.9 kg (200 lb 4.8 oz)   08/02/17 91.5 kg (201 lb 11.2 oz)   07/19/17 93 kg (205 lb 1.6 oz)              Today, you had the following     No orders found for display       Primary Care Provider Office Phone # Fax #    Delio Alcala [phone]       [facility] [address] [place] [address]        Equal Access to Services     [facility] : Hadii aad ku hadasho Soomaali, waaxda luqadaha, qaybta kaalmada adeegyada, waxay idiin hayaan adeeg kharash la'kjn . So [facility] [phone].    ATENCIÓN: Si lannyla espkatie, tiene a valle disposición servicios gratuitos de asistencia lingüística. [facility] [phone].    We comply with applicable federal civil rights laws and [place] laws. We do not discriminate on the basis of race, color, national origin, age, disability sex, sexual orientation or gender identity.            Thank you!     Thank you for choosing [facility]  for your care. Our goal is always to provide you with excellent care. Hearing back from our patients is one way we can continue to improve our services. Please take a few minutes to complete the written survey that you may receive in the mail after your visit with us. Thank you!             Your Updated Medication List - Protect others around you: Learn how to safely use, store and throw away your medicines at www.disposemymeds.org.          This list is accurate as of: 8/9/17 11:59 PM.  Always use your most recent med list.                   Brand Name Dispense Instructions for use Diagnosis    escitalopram 20 MG tablet    LEXAPRO    30 tablet    Take 1 tablet (20 mg) by mouth every morning    Major depressive disorder, recurrent episode, mild (H)       FLOVENT  MCG/ACT Inhaler   Generic drug:  fluticasone      Take 2 puffs by mouth 2 times daily as needed        lidocaine-prilocaine cream    EMLA    30 g    Apply topically as needed for moderate pain    Rectal cancer (H)        LORazepam 0.5 MG tablet    ATIVAN    30 tablet    Take 1 tablet (0.5 mg) by mouth every 4 hours as needed (Anxiety, Nausea/Vomiting or Sleep)    Adenocarcinoma of rectum (H)       ondansetron 8 MG tablet    ZOFRAN    60 tablet    Take 1 tablet (8 mg) by mouth every 8 hours as needed for nausea    Adenocarcinoma of rectum (H)       prochlorperazine 10 MG tablet    COMPAZINE    30 tablet    Take 1 tablet (10 mg) by mouth every 6 hours as needed (Nausea/Vomiting)    Adenocarcinoma of rectum (H)

## 2017-08-10 NOTE — PROGRESS NOTES
This is a recent snapshot of the patient's [facility] medical record.  For current drug dose and complete information and questions, call [phone] or In Basket pool, [facility] ([id number])  CSN Number:  [id number]

## 2017-08-11 RX ORDER — ALBUTEROL SULFATE 90 UG/1
1-2 AEROSOL, METERED RESPIRATORY (INHALATION)
Status: CANCELLED
Start: 2017-08-16

## 2017-08-11 RX ORDER — EPINEPHRINE 0.3 MG/.3ML
0.3 INJECTION SUBCUTANEOUS EVERY 5 MIN PRN
Status: CANCELLED | OUTPATIENT
Start: 2017-08-16

## 2017-08-11 RX ORDER — ALBUTEROL SULFATE 0.83 MG/ML
2.5 SOLUTION RESPIRATORY (INHALATION)
Status: CANCELLED | OUTPATIENT
Start: 2017-08-16

## 2017-08-11 RX ORDER — DIPHENHYDRAMINE HYDROCHLORIDE 50 MG/ML
50 INJECTION INTRAMUSCULAR; INTRAVENOUS
Status: CANCELLED
Start: 2017-08-16

## 2017-08-11 RX ORDER — PALONOSETRON 0.05 MG/ML
0.25 INJECTION, SOLUTION INTRAVENOUS ONCE
Status: CANCELLED
Start: 2017-08-16 | End: 2017-08-16

## 2017-08-11 RX ORDER — FLUOROURACIL 50 MG/ML
400 INJECTION, SOLUTION INTRAVENOUS ONCE
Status: CANCELLED | OUTPATIENT
Start: 2017-08-16

## 2017-08-11 RX ORDER — MEPERIDINE HYDROCHLORIDE 25 MG/ML
25 INJECTION INTRAMUSCULAR; INTRAVENOUS; SUBCUTANEOUS EVERY 30 MIN PRN
Status: CANCELLED | OUTPATIENT
Start: 2017-08-16

## 2017-08-11 RX ORDER — METHYLPREDNISOLONE SODIUM SUCCINATE 125 MG/2ML
125 INJECTION, POWDER, LYOPHILIZED, FOR SOLUTION INTRAMUSCULAR; INTRAVENOUS
Status: CANCELLED
Start: 2017-08-16

## 2017-08-11 RX ORDER — LORAZEPAM 2 MG/ML
0.5 INJECTION INTRAMUSCULAR EVERY 4 HOURS PRN
Status: CANCELLED
Start: 2017-08-16

## 2017-08-11 RX ORDER — EPINEPHRINE 1 MG/ML
0.3 INJECTION INTRAMUSCULAR; INTRAVENOUS; SUBCUTANEOUS EVERY 5 MIN PRN
Status: CANCELLED | OUTPATIENT
Start: 2017-08-16

## 2017-08-11 RX ORDER — SODIUM CHLORIDE 9 MG/ML
1000 INJECTION, SOLUTION INTRAVENOUS CONTINUOUS PRN
Status: CANCELLED
Start: 2017-08-16

## 2017-08-11 NOTE — PROGRESS NOTES
Meño returned call to stated that he had no known problems when his port was accessed for the scan.    He said they were able to do everything necessary.     Reviewed with Dr. Mcarthur who agreed with reassessing the port if it becomes problematic.  Left message for Meño letting him know resolution and requested he call if he has questions.

## 2017-08-14 NOTE — PROGRESS NOTES
"This is a followup visit for a diagnosis of colon cancer, stage III adenocarcinoma of rectum.  Currently, he is getting adjuvant chemotherapy with FOLFOX.      HISTORY OF PRESENT ILLNESS:  Kindly refer to my previous notes for patient's presentation and treatment so far.  Briefly, from an oncological standpoint he underwent laparoscopic surgery for resection of the primary lesion, and was found to have stage IIIB disease; hence, he was initiated on FOLFOX adjuvant therapy.  He is status post 7 cycle.  He is coming in for followup for symptom management and next cycle and also discuss shorter course I.e 3 mths adjuvant vs 6 mths adjuvant chemo.      INTERVAL HISTORY:  He is tolerating the chemotherapy with expected side effects. His nausea is quite improved at this point with the current antinausea program. He has significant cold sensitivity in his mouth and his fingertips. He denies any neuropathic symptoms otherwise and has feet or hands. He is not having any diarrhea or constipation. No chest pain shortness of breath fevers chills. He sent me information on my chart regarding 3 months versus 6 months of adjuvant chemotherapy and clearly wants to pursue 3 months treatment but his wife wants him to finish the 6 month course and they're here to discuss that further.       PAST MEDICAL HISTORY:  Unchanged.      PAST SURGICAL HISTORY:  Unchanged.      LABORATORY DATA:  Reviewed.      MEDICATIONS:  Reviewed.      ALLERGIES:  Reviewed.      PHYSICAL EXAMINATION:   VITAL SIGNS: BP (!) 128/94 (BP Location: Right arm, Patient Position: Chair, Cuff Size: Adult Regular)  Pulse 69  Temp 98.4  F (36.9  C) (Oral)  Resp 16  Ht 1.753 m (5' 9.02\")  Wt 90.9 kg (200 lb 4.8 oz)  SpO2 98%  BMI 29.57 kg/m2    GENERAL:  Alert and oriented x3, in no apparent distress.   HEENT:  No mucositis   SKIN:  No palmar or plantar dysesthesia.   CVS/RESPIRATORY:  Unremarkable.   ABDOMEN:  Nontender to palpation.   EXTREMITIES:  Lower " extremities with no pedal edema.      LABORATORY DATA:       ASSESSMENT AND PLAN:  Patient with stage III rectal cancer, currently undergoing adjuvant chemotherapy with FOLFOX.  He tolerated chemotherapy with the expected side effects. He has completed 7 cycles so far and has mostly cold sensitivity in terms of neuropathic complications.  I had a very detailed discussion with the patient and his wife that indeed the data is out for 3 month versus 6 month adjuvant chemotherapy and the differences now that profound in terms of long-term recurrence rates but on the same amount he is 47 years old and at least per his wife even that one to 2% difference is a huge difference putting it in perspective.   The patient is agreeable to further chemotherapy but does not want to get any further oxaliplatin.  This was a compromised reach between the patient and his wife and I am agreeable to moving forward with this plan .   He'll continue rest of the chemotherapy but just 5-FU infusion and bolus along with leucovorin .   RTC in 14 days for next cycle .   Scan at the end of 12 cycles .     I spent 35 minutes in the care of this patient >50% of which was spent in coordinating and counseling.    Jeanette Mcarthur   of Medicine   Hematology and medical Oncology   [facility]

## 2017-08-16 ENCOUNTER — APPOINTMENT (OUTPATIENT)
Dept: LAB | Facility: CLINIC | Age: 47
End: 2017-08-16
Attending: PHYSICIAN ASSISTANT
Payer: COMMERCIAL

## 2017-08-16 ENCOUNTER — INFUSION THERAPY VISIT (OUTPATIENT)
Dept: ONCOLOGY | Facility: CLINIC | Age: 47
End: 2017-08-16
Attending: PHYSICIAN ASSISTANT
Payer: COMMERCIAL

## 2017-08-16 VITALS
HEART RATE: 78 BPM | TEMPERATURE: 97.6 F | OXYGEN SATURATION: 95 % | WEIGHT: 203.2 LBS | BODY MASS INDEX: 29.99 KG/M2 | RESPIRATION RATE: 16 BRPM | DIASTOLIC BLOOD PRESSURE: 87 MMHG | SYSTOLIC BLOOD PRESSURE: 123 MMHG

## 2017-08-16 DIAGNOSIS — C20 ADENOCARCINOMA OF RECTUM (H): Primary | ICD-10-CM

## 2017-08-16 LAB
ALBUMIN SERPL-MCNC: 3.4 G/DL (ref 3.4–5)
ALP SERPL-CCNC: 94 U/L (ref 40–150)
ALT SERPL W P-5'-P-CCNC: 80 U/L (ref 0–70)
ANION GAP SERPL CALCULATED.3IONS-SCNC: 8 MMOL/L (ref 3–14)
AST SERPL W P-5'-P-CCNC: 53 U/L (ref 0–45)
BASOPHILS # BLD AUTO: 0 10E9/L (ref 0–0.2)
BASOPHILS NFR BLD AUTO: 1.3 %
BILIRUB SERPL-MCNC: 0.6 MG/DL (ref 0.2–1.3)
BUN SERPL-MCNC: 15 MG/DL (ref 7–30)
CALCIUM SERPL-MCNC: 9 MG/DL (ref 8.5–10.1)
CHLORIDE SERPL-SCNC: 105 MMOL/L (ref 94–109)
CO2 SERPL-SCNC: 25 MMOL/L (ref 20–32)
CREAT SERPL-MCNC: 0.72 MG/DL (ref 0.66–1.25)
DIFFERENTIAL METHOD BLD: ABNORMAL
EOSINOPHIL # BLD AUTO: 0.1 10E9/L (ref 0–0.7)
EOSINOPHIL NFR BLD AUTO: 2.5 %
ERYTHROCYTE [DISTWIDTH] IN BLOOD BY AUTOMATED COUNT: 16.4 % (ref 10–15)
GFR SERPL CREATININE-BSD FRML MDRD: >90 ML/MIN/1.7M2
GLUCOSE SERPL-MCNC: 103 MG/DL (ref 70–99)
HCT VFR BLD AUTO: 41.3 % (ref 40–53)
HGB BLD-MCNC: 14 G/DL (ref 13.3–17.7)
IMM GRANULOCYTES # BLD: 0 10E9/L (ref 0–0.4)
IMM GRANULOCYTES NFR BLD: 0.6 %
LYMPHOCYTES # BLD AUTO: 1.1 10E9/L (ref 0.8–5.3)
LYMPHOCYTES NFR BLD AUTO: 35.5 %
MCH RBC QN AUTO: 32.1 PG (ref 26.5–33)
MCHC RBC AUTO-ENTMCNC: 33.9 G/DL (ref 31.5–36.5)
MCV RBC AUTO: 95 FL (ref 78–100)
MONOCYTES # BLD AUTO: 0.4 10E9/L (ref 0–1.3)
MONOCYTES NFR BLD AUTO: 13.8 %
NEUTROPHILS # BLD AUTO: 1.5 10E9/L (ref 1.6–8.3)
NEUTROPHILS NFR BLD AUTO: 46.3 %
NRBC # BLD AUTO: 0 10*3/UL
NRBC BLD AUTO-RTO: 0 /100
PLATELET # BLD AUTO: 172 10E9/L (ref 150–450)
POTASSIUM SERPL-SCNC: 4.2 MMOL/L (ref 3.4–5.3)
PROT SERPL-MCNC: 7.6 G/DL (ref 6.8–8.8)
RBC # BLD AUTO: 4.36 10E12/L (ref 4.4–5.9)
SODIUM SERPL-SCNC: 138 MMOL/L (ref 133–144)
WBC # BLD AUTO: 3.2 10E9/L (ref 4–11)

## 2017-08-16 PROCEDURE — 25000128 H RX IP 250 OP 636: Mod: ZF | Performed by: INTERNAL MEDICINE

## 2017-08-16 PROCEDURE — 96375 TX/PRO/DX INJ NEW DRUG ADDON: CPT

## 2017-08-16 PROCEDURE — 96409 CHEMO IV PUSH SNGL DRUG: CPT

## 2017-08-16 PROCEDURE — 80053 COMPREHEN METABOLIC PANEL: CPT | Performed by: INTERNAL MEDICINE

## 2017-08-16 PROCEDURE — 96416 CHEMO PROLONG INFUSE W/PUMP: CPT

## 2017-08-16 PROCEDURE — 96367 TX/PROPH/DG ADDL SEQ IV INF: CPT

## 2017-08-16 PROCEDURE — 85025 COMPLETE CBC W/AUTO DIFF WBC: CPT | Performed by: INTERNAL MEDICINE

## 2017-08-16 PROCEDURE — 25000128 H RX IP 250 OP 636: Mod: ZF | Performed by: PHYSICIAN ASSISTANT

## 2017-08-16 RX ORDER — FLUOROURACIL 50 MG/ML
400 INJECTION, SOLUTION INTRAVENOUS ONCE
Status: COMPLETED | OUTPATIENT
Start: 2017-08-16 | End: 2017-08-16

## 2017-08-16 RX ORDER — HEPARIN SODIUM (PORCINE) LOCK FLUSH IV SOLN 100 UNIT/ML 100 UNIT/ML
5 SOLUTION INTRAVENOUS ONCE
Status: COMPLETED | OUTPATIENT
Start: 2017-08-16 | End: 2017-08-16

## 2017-08-16 RX ORDER — PALONOSETRON 0.05 MG/ML
0.25 INJECTION, SOLUTION INTRAVENOUS ONCE
Status: COMPLETED | OUTPATIENT
Start: 2017-08-16 | End: 2017-08-16

## 2017-08-16 RX ADMIN — FLUOROURACIL 850 MG: 50 INJECTION, SOLUTION INTRAVENOUS at 10:59

## 2017-08-16 RX ADMIN — PALONOSETRON HYDROCHLORIDE 0.25 MG: 0.25 INJECTION INTRAVENOUS at 10:30

## 2017-08-16 RX ADMIN — SODIUM CHLORIDE, PRESERVATIVE FREE 5 ML: 5 INJECTION INTRAVENOUS at 09:17

## 2017-08-16 RX ADMIN — DEXAMETHASONE SODIUM PHOSPHATE: 10 INJECTION, SOLUTION INTRAMUSCULAR; INTRAVENOUS at 10:01

## 2017-08-16 RX ADMIN — LEUCOVORIN CALCIUM 750 MG: 50 INJECTION, POWDER, LYOPHILIZED, FOR SOLUTION INTRAMUSCULAR; INTRAVENOUS at 10:30

## 2017-08-16 RX ADMIN — SODIUM CHLORIDE 250 ML: 9 INJECTION, SOLUTION INTRAVENOUS at 10:00

## 2017-08-16 NOTE — PROGRESS NOTES
Infusion Nursing Note:  Meño Omalley presents today for Cycle 7 Day 1 Leucovorin, Fluorouracil bolus and pump connect.    Patient seen by provider today: No    Note: Pt reports feeling well and offers no concerns since seeing MD last week. Excited to just be getting the 5FU today and not Oxaliplatin.    Intravenous Access:  Implanted Port.    Treatment Conditions:  Lab Results   Component Value Date    HGB 14.0 08/16/2017     Lab Results   Component Value Date    WBC 3.2 08/16/2017      Lab Results   Component Value Date    ANEU 1.5 08/16/2017     Lab Results   Component Value Date     08/16/2017      Lab Results   Component Value Date     08/16/2017                   Lab Results   Component Value Date    POTASSIUM 4.2 08/16/2017           Lab Results   Component Value Date    MAG 2.1 04/13/2017            Lab Results   Component Value Date    CR 0.72 08/16/2017                   Lab Results   Component Value Date    CANDY 9.0 08/16/2017                Lab Results   Component Value Date    BILITOTAL 0.6 08/16/2017           Lab Results   Component Value Date    ALBUMIN 3.4 08/16/2017                    Lab Results   Component Value Date    ALT 80 08/16/2017           Lab Results   Component Value Date    AST 53 08/16/2017     Results reviewed, labs MET treatment parameters, ok to proceed with treatment.        Post Infusion Assessment:Patient tolerated infusion without incident.  Blood return noted pre and post infusion and prior to pump connect.  Site patent and intact, free from redness, edema or discomfort. No evidence of extravasations.  Fluorouracil pump connected at 1100 set to infuse over the next 46 hours.  [facility] aware to d/c pump at pt's home on 8/18 at 0900. All connections taped and double checked with Rosa Frias RN.    Discharge Plan:   Prescription refills given for Zofran.  Copy of AVS reviewed with patient and/or family.  Patient will return 8/30 for next appointment.  Patient  discharged in stable condition accompanied by: wife.  Departure Mode: Ambulatory.    Radhika Lay RN

## 2017-08-16 NOTE — MR AVS SNAPSHOT
After Visit Summary   8/16/2017    Meño Omalley    MRN: [id number]           Patient Information     Date Of Birth          1970        Visit Information        Provider Department      8/16/2017 9:30 AM  27 ATC;  ONCOLOGY INFUSION LTAC, located within [facility]        Today's Diagnoses     Adenocarcinoma of rectum (H)    -  1      Care Instructions    Contact Numbers  [facility] Nurse Triage: [phone]  After Hours Nurse Line:  [phone]    Please call the [facility] Nurse Triage line or after hours number if you experience a temperature greater than or equal to 100.5, shaking chills, have uncontrolled nausea, vomiting and/or diarrhea, dizziness, shortness of breath, chest pain, bleeding, unexplained bruising, or if you have any other new/concerning symptoms, questions or concerns.     If you are having any concerning symptoms or wish to speak to a provider before your next infusion visit, please call your care coordinator or triage to notify them so we can adequately serve you.     If you need a refill on a narcotic prescription or other medication, please call triage before your infusion appointment.         August 2017 Sunday Monday Tuesday Wednesday Thursday Friday Saturday             1     2     [facility]   (15 min.)   [facility]   [facility] Lab Draw     P RETURN    8:25 AM   (50 min.)   Jessica Lomas PA   [facility]     UMP ONC INFUSION 240    9:30 AM   (240 min.)    ONCOLOGY INFUSION   [facility] 3     4     5       6     7     8     9     [facility] LAB DRAW    7:30 AM   (15 min.)   [facility] LAB DRAW   [facility] Lab Draw     CT CHEST ABDOMEN PELVIS WWO    7:45 AM   (20 min.)   UCCT2   [facility] CT     UMP RETURN   11:00 AM   (30 min.)   Jeanette Mcarthur MD   [facility] 10     11     12       13     14     15     16     P MASONIC  LAB DRAW    9:00 AM   (15 min.)   [facility] LAB DRAW   [facility] Masonic Lab Draw     UMP ONC INFUSION 240    9:30 AM   (240 min.)   UC ONCOLOGY INFUSION   [facility] 17     18     19       20     21     22     23     24     25     26       27     28     29     30     [facility] LAB DRAW    9:00 AM   (15 min.)   [facility] LAB DRAW   [facility] Draw     UMP ONC INFUSION 240    9:30 AM   (240 min.)   UC ONCOLOGY INFUSION   [facility] 31 September 2017 Sunday Monday Tuesday Wednesday Thursday Friday Saturday                            1     2       3     4     5     6     7     8     9       10     11     12     UMP FLEXIBLE SIGMOIDOSCOPY   10:30 AM   (30 min.)   Ignacio Rose MD   [facility] Colon and Rectal Surgery 13     [facility] LAB DRAW    8:15 AM   (15 min.)    MASONIC LAB DRAW   [facility] Lab Draw     UMP RETURN    8:25 AM   (50 min.)   Zahira Christensen PA-C   [facility]     UMP ONC INFUSION 240    9:30 AM   (240 min.)   UC ONCOLOGY INFUSION   [facility] 14     15     16       17     18     19     20     21     22     23       24     25     26     27     [facility] LAB DRAW    9:00 AM   (15 min.)    MASONIC LAB DRAW   [facility] Mason[facility] Lab Draw     UMP ONC INFUSION 240    9:30 AM   (240 min.)    ONCOLOGY INFUSION   [facility] 28     29     30                  Lab Results:  Recent Results (from the past 12 hour(s))   CBC with platelets differential    Collection Time: 08/16/17  9:18 AM   Result Value Ref Range    WBC 3.2 (L) 4.0 - 11.0 10e9/L    RBC Count 4.36 (L) 4.4 - 5.9 10e12/L    Hemoglobin 14.0 13.3 - 17.7 g/dL    Hematocrit 41.3 40.0 - 53.0 %    MCV 95 78 - 100 fl    MCH 32.1 26.5 - 33.0 pg    MCHC 33.9 31.5 - 36.5 g/dL    RDW 16.4 (H) 10.0 - 15.0 %    Platelet Count 172 150 - 450 10e9/L    Diff Method Automated Method     % Neutrophils 46.3 %    %  Lymphocytes 35.5 %    % Monocytes 13.8 %    % Eosinophils 2.5 %    % Basophils 1.3 %    % Immature Granulocytes 0.6 %    Nucleated RBCs 0 0 /100    Absolute Neutrophil 1.5 (L) 1.6 - 8.3 10e9/L    Absolute Lymphocytes 1.1 0.8 - 5.3 10e9/L    Absolute Monocytes 0.4 0.0 - 1.3 10e9/L    Absolute Eosinophils 0.1 0.0 - 0.7 10e9/L    Absolute Basophils 0.0 0.0 - 0.2 10e9/L    Abs Immature Granulocytes 0.0 0 - 0.4 10e9/L    Absolute Nucleated RBC 0.0    Comprehensive metabolic panel    Collection Time: 08/16/17  9:18 AM   Result Value Ref Range    Sodium 138 133 - 144 mmol/L    Potassium 4.2 3.4 - 5.3 mmol/L    Chloride 105 94 - 109 mmol/L    Carbon Dioxide 25 20 - 32 mmol/L    Anion Gap 8 3 - 14 mmol/L    Glucose 103 (H) 70 - 99 mg/dL    Urea Nitrogen 15 7 - 30 mg/dL    Creatinine 0.72 0.66 - 1.25 mg/dL    GFR Estimate >90 >60 mL/min/1.7m2    GFR Estimate If Black >90 >60 mL/min/1.7m2    Calcium 9.0 8.5 - 10.1 mg/dL    Bilirubin Total 0.6 0.2 - 1.3 mg/dL    Albumin 3.4 3.4 - 5.0 g/dL    Protein Total 7.6 6.8 - 8.8 g/dL    Alkaline Phosphatase 94 40 - 150 U/L    ALT 80 (H) 0 - 70 U/L    AST 53 (H) 0 - 45 U/L               Follow-ups after your visit        Your next 10 appointments already scheduled     Aug 30, 2017  9:00 AM CDT   [facility] Lab Draw with [facility] LAB DRAW   [facility] Lab Draw ([facility])    [address] [place] [address] [phone]            Aug 30, 2017  9:30 AM CDT   Infusion 240 with UC ONCOLOGY INFUSION, UC 27 ATC   [facility] ([facility])    [address] [address] [phone]            Sep 12, 2017 10:30 AM CDT   Flexible Sigmoidoscopy with Ignacio Rose MD   [facility] Colon and Rectal Surgery ([facility])    [address] [facility]  [facility] [facility] [address]   [phone]            Sep 13, 2017  8:15 AM CDT   Precious  Lab Draw with  MASONIC LAB DRAW   [facility] ([facility])    [address] [address]   [phone]            Sep 13, 2017  8:40 AM CDT   (Arrive by 8:25 AM)   Return Visit with Zahira Christensen PA-C   [facility] ([facility])    [address] [place] [address]   [phone]            Sep 13, 2017  9:30 AM CDT   Infusion 240 with UC ONCOLOGY INFUSION, UC 27 ATC   [facility] ([facility])    [address] [place] [address]   [phone]            Sep 27, 2017  9:00 AM CDT   [facility]asonic Lab Draw with UC [facility] LAB DRAW   [facility] ([facility])    [address] [place] [address] [phone]            Sep 27, 2017  9:30 AM CDT   Infusion 240 with UC ONCOLOGY INFUSION   [facility] ([facility])    [address] [place] [address] [phone]              Who to contact     If you have questions or need follow up information about today's clinic visit or your schedule please contact [facility] directly at [phone].  Normal or non-critical lab and imaging results will be communicated to you by MyChart, letter or phone within 4 business days after the clinic has received the results. If you do not hear from us within 7 days, please contact the clinic through [employer] or phone. If you have a critical or abnormal lab result, we will notify you by phone as soon as possible.  Submit refill requests through [employer] or call your pharmacy and they will forward the refill request to us. Please allow 3 business days for your refill to be completed.          Additional Information About Your Visit        [employer] Information     [employer] gives you  secure access to your electronic health record. If you see a primary care provider, you can also send messages to your care team and make appointments. If you have questions, please call your primary care clinic.  If you do not have a primary care provider, please call [phone] and they will assist you.        Care EveryWhere ID     This is your Care EveryWhere ID. This could be used by other organizations to access your [place] medical records  [id number]        Your Vitals Were     Pulse Temperature Respirations Pulse Oximetry BMI (Body Mass Index)       78 97.6  F (36.4  C) (Oral) 16 95% 29.99 kg/m2        Blood Pressure from Last 3 Encounters:   08/16/17 123/87   08/09/17 (!) 128/94   08/02/17 146/88    Weight from Last 3 Encounters:   08/16/17 92.2 kg (203 lb 3.2 oz)   08/09/17 90.9 kg (200 lb 4.8 oz)   08/02/17 91.5 kg (201 lb 11.2 oz)              We Performed the Following     CBC with platelets differential     Comprehensive metabolic panel        Primary Care Provider Office Phone # Fax #    Delio Alcala [phone]       [facility] [address] [place] [address]        Equal Access to Services     LUCIO BOGGS : Hadii aad ku hadasho Soomaali, waaxda luqadaha, qaybta kaalmada adeegyada, waxay idiin haykjn jacqui novak ladelfino . So [facility] [phone].    ATENCIÓN: Si habla español, tiene a valle disposición servicios gratuitos de asistencia lingüística. Llame al [phone].    We comply with applicable federal civil rights laws and [place] laws. We do not discriminate on the basis of race, color, national origin, age, disability sex, sexual orientation or gender identity.            Thank you!     Thank you for choosing [facility]  for your care. Our goal is always to provide you with excellent care. Hearing back from our patients is one way we can continue to improve our services. Please take a few minutes to complete the written  survey that you may receive in the mail after your visit with us. Thank you!             Your Updated Medication List - Protect others around you: Learn how to safely use, store and throw away your medicines at www.disposemymeds.org.          This list is accurate as of: 8/16/17  9:59 AM.  Always use your most recent med list.                   Brand Name Dispense Instructions for use Diagnosis    escitalopram 20 MG tablet    LEXAPRO    30 tablet    Take 1 tablet (20 mg) by mouth every morning    Major depressive disorder, recurrent episode, mild (H)       FLOVENT  MCG/ACT Inhaler   Generic drug:  fluticasone      Take 2 puffs by mouth 2 times daily as needed        lidocaine-prilocaine cream    EMLA    30 g    Apply topically as needed for moderate pain    Rectal cancer (H)       LORazepam 0.5 MG tablet    ATIVAN    30 tablet    Take 1 tablet (0.5 mg) by mouth every 4 hours as needed (Anxiety, Nausea/Vomiting or Sleep)    Adenocarcinoma of rectum (H)       ondansetron 8 MG tablet    ZOFRAN    60 tablet    Take 1 tablet (8 mg) by mouth every 8 hours as needed for nausea    Adenocarcinoma of rectum (H)       prochlorperazine 10 MG tablet    COMPAZINE    30 tablet    Take 1 tablet (10 mg) by mouth every 6 hours as needed (Nausea/Vomiting)    Adenocarcinoma of rectum (H)

## 2017-08-16 NOTE — NURSING NOTE
Chief Complaint   Patient presents with     Port Draw     labs draw     Port accessed using aseptic technique. Blood aspirated without difficulty. Labs drawn. Port flushed with 20cc 0.9% NS. Locked with 100U/ ml heparin (5 ml). Port left accessed for infusion.  Pt tolerated well.     GEOVANNA Ludwig RN, BSN

## 2017-08-16 NOTE — PATIENT INSTRUCTIONS
Contact Numbers  [facility] Nurse Triage: [phone]  After Hours Nurse Line:  [phone]    Please call the [facility] Nurse Triage line or after hours number if you experience a temperature greater than or equal to 100.5, shaking chills, have uncontrolled nausea, vomiting and/or diarrhea, dizziness, shortness of breath, chest pain, bleeding, unexplained bruising, or if you have any other new/concerning symptoms, questions or concerns.     If you are having any concerning symptoms or wish to speak to a provider before your next infusion visit, please call your care coordinator or triage to notify them so we can adequately serve you.     If you need a refill on a narcotic prescription or other medication, please call triage before your infusion appointment.         August 2017 Sunday Monday Tuesday Wednesday Thursday Friday Saturday             1     2     [facility] LAB DRAW    8:15 AM   (15 min.)    MASONIC LAB DRAW   [facility] Lab Draw     UMP RETURN    8:25 AM   (50 min.)   Jessica Lomas PA   [facility]     UMP ONC INFUSION 240    9:30 AM   (240 min.)    ONCOLOGY INFUSION   [facility] 3     4     5       6     7     8     9     P MASONIC LAB DRAW    7:30 AM   (15 min.)    MASONIC LAB DRAW   [facility] Lab Draw     CT CHEST ABDOMEN PELVIS WWO    7:45 AM   (20 min.)   UCCT2   [facility] CT     UMP RETURN   11:00 AM   (30 min.)   Jeanette Mcarthur MD   [facility] 10     11     12       13     14     15     16     UMP [facility] LAB DRAW    9:00 AM   (15 min.)    [facility]     UMP ONC INFUSION 240    9:30 AM   (240 min.)    ONCOLOGY INFUSION   [facility] 17     18     19       20     21     22     23     24     25     26       27     28     29     30     [facility] LAB DRAW    9:00 AM   (15 min.)   [facility] LAB DRAW   [facility] Lab  Draw     UMP ONC INFUSION 240    9:30 AM   (240 min.)   UC ONCOLOGY INFUSION   [facility] 31 September 2017 Sunday Monday Tuesday Wednesday Thursday Friday Saturday                            1     2       3     4     5     6     7     8     9       10     11     12     UMP FLEXIBLE SIGMOIDOSCOPY   10:30 AM   (30 min.)   Ignacio Rose MD   [facility] Colon and Rectal Surgery 13     UMP MASONIC LAB DRAW    8:15 AM   (15 min.)   [facility] LAB DRAW   [facility] Mason[facility] Lab Draw     UMP RETURN    8:25 AM   (50 min.)   Zahira Christensen PA-C   [facility]     UMP ONC INFUSION 240    9:30 AM   (240 min.)   UC ONCOLOGY INFUSION   [facility] 14     15     16       17     18     19     20     21     22     23       24     25     26     27     [facility] LAB DRAW    9:00 AM   (15 min.)   [facility]   [facility]     UMP ONC INFUSION 240    9:30 AM   (240 min.)   UC ONCOLOGY INFUSION   [facility] 28     29     30                  Lab Results:  Recent Results (from the past 12 hour(s))   CBC with platelets differential    Collection Time: 08/16/17  9:18 AM   Result Value Ref Range    WBC 3.2 (L) 4.0 - 11.0 10e9/L    RBC Count 4.36 (L) 4.4 - 5.9 10e12/L    Hemoglobin 14.0 13.3 - 17.7 g/dL    Hematocrit 41.3 40.0 - 53.0 %    MCV 95 78 - 100 fl    MCH 32.1 26.5 - 33.0 pg    MCHC 33.9 31.5 - 36.5 g/dL    RDW 16.4 (H) 10.0 - 15.0 %    Platelet Count 172 150 - 450 10e9/L    Diff Method Automated Method     % Neutrophils 46.3 %    % Lymphocytes 35.5 %    % Monocytes 13.8 %    % Eosinophils 2.5 %    % Basophils 1.3 %    % Immature Granulocytes 0.6 %    Nucleated RBCs 0 0 /100    Absolute Neutrophil 1.5 (L) 1.6 - 8.3 10e9/L    Absolute Lymphocytes 1.1 0.8 - 5.3 10e9/L    Absolute Monocytes 0.4 0.0 - 1.3 10e9/L    Absolute Eosinophils 0.1 0.0 - 0.7 10e9/L    Absolute Basophils 0.0 0.0 - 0.2 10e9/L    Abs  Immature Granulocytes 0.0 0 - 0.4 10e9/L    Absolute Nucleated RBC 0.0    Comprehensive metabolic panel    Collection Time: 08/16/17  9:18 AM   Result Value Ref Range    Sodium 138 133 - 144 mmol/L    Potassium 4.2 3.4 - 5.3 mmol/L    Chloride 105 94 - 109 mmol/L    Carbon Dioxide 25 20 - 32 mmol/L    Anion Gap 8 3 - 14 mmol/L    Glucose 103 (H) 70 - 99 mg/dL    Urea Nitrogen 15 7 - 30 mg/dL    Creatinine 0.72 0.66 - 1.25 mg/dL    GFR Estimate >90 >60 mL/min/1.7m2    GFR Estimate If Black >90 >60 mL/min/1.7m2    Calcium 9.0 8.5 - 10.1 mg/dL    Bilirubin Total 0.6 0.2 - 1.3 mg/dL    Albumin 3.4 3.4 - 5.0 g/dL    Protein Total 7.6 6.8 - 8.8 g/dL    Alkaline Phosphatase 94 40 - 150 U/L    ALT 80 (H) 0 - 70 U/L    AST 53 (H) 0 - 45 U/L

## 2017-08-24 DIAGNOSIS — C20 ADENOCARCINOMA OF RECTUM (H): Primary | ICD-10-CM

## 2017-08-24 RX ORDER — PALONOSETRON 0.05 MG/ML
0.25 INJECTION, SOLUTION INTRAVENOUS ONCE
Status: CANCELLED
Start: 2017-08-30 | End: 2017-08-30

## 2017-08-24 RX ORDER — MEPERIDINE HYDROCHLORIDE 25 MG/ML
25 INJECTION INTRAMUSCULAR; INTRAVENOUS; SUBCUTANEOUS EVERY 30 MIN PRN
Status: CANCELLED | OUTPATIENT
Start: 2017-08-30

## 2017-08-24 RX ORDER — EPINEPHRINE 1 MG/ML
0.3 INJECTION INTRAMUSCULAR; INTRAVENOUS; SUBCUTANEOUS EVERY 5 MIN PRN
Status: CANCELLED | OUTPATIENT
Start: 2017-08-30

## 2017-08-24 RX ORDER — METHYLPREDNISOLONE SODIUM SUCCINATE 125 MG/2ML
125 INJECTION, POWDER, LYOPHILIZED, FOR SOLUTION INTRAMUSCULAR; INTRAVENOUS
Status: CANCELLED
Start: 2017-08-30

## 2017-08-24 RX ORDER — DIPHENHYDRAMINE HYDROCHLORIDE 50 MG/ML
50 INJECTION INTRAMUSCULAR; INTRAVENOUS
Status: CANCELLED
Start: 2017-08-30

## 2017-08-24 RX ORDER — ALBUTEROL SULFATE 0.83 MG/ML
2.5 SOLUTION RESPIRATORY (INHALATION)
Status: CANCELLED | OUTPATIENT
Start: 2017-08-30

## 2017-08-24 RX ORDER — ALBUTEROL SULFATE 90 UG/1
1-2 AEROSOL, METERED RESPIRATORY (INHALATION)
Status: CANCELLED
Start: 2017-08-30

## 2017-08-24 RX ORDER — SODIUM CHLORIDE 9 MG/ML
1000 INJECTION, SOLUTION INTRAVENOUS CONTINUOUS PRN
Status: CANCELLED
Start: 2017-08-30

## 2017-08-24 RX ORDER — FLUOROURACIL 50 MG/ML
400 INJECTION, SOLUTION INTRAVENOUS ONCE
Status: CANCELLED | OUTPATIENT
Start: 2017-08-30

## 2017-08-24 RX ORDER — LORAZEPAM 2 MG/ML
0.5 INJECTION INTRAMUSCULAR EVERY 4 HOURS PRN
Status: CANCELLED
Start: 2017-08-30

## 2017-08-24 RX ORDER — EPINEPHRINE 0.3 MG/.3ML
0.3 INJECTION SUBCUTANEOUS EVERY 5 MIN PRN
Status: CANCELLED | OUTPATIENT
Start: 2017-08-30

## 2017-08-29 ENCOUNTER — TELEPHONE (OUTPATIENT)
Dept: SURGERY | Facility: CLINIC | Age: 47
End: 2017-08-29

## 2017-08-29 NOTE — TELEPHONE ENCOUNTER
Due to provider unavailability, patients appointment on 09/12/17 needs to be rescheduled.  Called and spoke with patient.  Patient is rescheduled to 09/19/17 at 1:00 pm.

## 2017-08-30 ENCOUNTER — INFUSION THERAPY VISIT (OUTPATIENT)
Dept: ONCOLOGY | Facility: CLINIC | Age: 47
End: 2017-08-30
Attending: PHYSICIAN ASSISTANT
Payer: COMMERCIAL

## 2017-08-30 ENCOUNTER — APPOINTMENT (OUTPATIENT)
Dept: LAB | Facility: CLINIC | Age: 47
End: 2017-08-30
Attending: PHYSICIAN ASSISTANT
Payer: COMMERCIAL

## 2017-08-30 VITALS
DIASTOLIC BLOOD PRESSURE: 72 MMHG | HEART RATE: 83 BPM | SYSTOLIC BLOOD PRESSURE: 126 MMHG | TEMPERATURE: 97.8 F | OXYGEN SATURATION: 95 % | WEIGHT: 207.9 LBS | BODY MASS INDEX: 30.69 KG/M2

## 2017-08-30 DIAGNOSIS — C20 ADENOCARCINOMA OF RECTUM (H): Primary | ICD-10-CM

## 2017-08-30 LAB
ALBUMIN SERPL-MCNC: 3.4 G/DL (ref 3.4–5)
ALP SERPL-CCNC: 80 U/L (ref 40–150)
ALT SERPL W P-5'-P-CCNC: 54 U/L (ref 0–70)
ANION GAP SERPL CALCULATED.3IONS-SCNC: 9 MMOL/L (ref 3–14)
AST SERPL W P-5'-P-CCNC: 33 U/L (ref 0–45)
BASOPHILS # BLD AUTO: 0.1 10E9/L (ref 0–0.2)
BASOPHILS NFR BLD AUTO: 1.4 %
BILIRUB SERPL-MCNC: 0.8 MG/DL (ref 0.2–1.3)
BUN SERPL-MCNC: 16 MG/DL (ref 7–30)
CALCIUM SERPL-MCNC: 8.9 MG/DL (ref 8.5–10.1)
CHLORIDE SERPL-SCNC: 106 MMOL/L (ref 94–109)
CO2 SERPL-SCNC: 24 MMOL/L (ref 20–32)
CREAT SERPL-MCNC: 0.79 MG/DL (ref 0.66–1.25)
DIFFERENTIAL METHOD BLD: ABNORMAL
EOSINOPHIL # BLD AUTO: 0.1 10E9/L (ref 0–0.7)
EOSINOPHIL NFR BLD AUTO: 2.8 %
ERYTHROCYTE [DISTWIDTH] IN BLOOD BY AUTOMATED COUNT: 15.9 % (ref 10–15)
GFR SERPL CREATININE-BSD FRML MDRD: >90 ML/MIN/1.7M2
GLUCOSE SERPL-MCNC: 97 MG/DL (ref 70–99)
HCT VFR BLD AUTO: 39.5 % (ref 40–53)
HGB BLD-MCNC: 13.6 G/DL (ref 13.3–17.7)
IMM GRANULOCYTES # BLD: 0 10E9/L (ref 0–0.4)
IMM GRANULOCYTES NFR BLD: 0.3 %
LYMPHOCYTES # BLD AUTO: 1.3 10E9/L (ref 0.8–5.3)
LYMPHOCYTES NFR BLD AUTO: 34.6 %
MCH RBC QN AUTO: 32.8 PG (ref 26.5–33)
MCHC RBC AUTO-ENTMCNC: 34.4 G/DL (ref 31.5–36.5)
MCV RBC AUTO: 95 FL (ref 78–100)
MONOCYTES # BLD AUTO: 0.4 10E9/L (ref 0–1.3)
MONOCYTES NFR BLD AUTO: 12.2 %
NEUTROPHILS # BLD AUTO: 1.8 10E9/L (ref 1.6–8.3)
NEUTROPHILS NFR BLD AUTO: 48.7 %
NRBC # BLD AUTO: 0 10*3/UL
NRBC BLD AUTO-RTO: 0 /100
PLATELET # BLD AUTO: 219 10E9/L (ref 150–450)
POTASSIUM SERPL-SCNC: 3.8 MMOL/L (ref 3.4–5.3)
PROT SERPL-MCNC: 7.4 G/DL (ref 6.8–8.8)
RBC # BLD AUTO: 4.15 10E12/L (ref 4.4–5.9)
SODIUM SERPL-SCNC: 140 MMOL/L (ref 133–144)
WBC # BLD AUTO: 3.6 10E9/L (ref 4–11)

## 2017-08-30 PROCEDURE — 96416 CHEMO PROLONG INFUSE W/PUMP: CPT

## 2017-08-30 PROCEDURE — 96375 TX/PRO/DX INJ NEW DRUG ADDON: CPT

## 2017-08-30 PROCEDURE — 25000128 H RX IP 250 OP 636: Mod: ZF | Performed by: INTERNAL MEDICINE

## 2017-08-30 PROCEDURE — 85025 COMPLETE CBC W/AUTO DIFF WBC: CPT | Performed by: INTERNAL MEDICINE

## 2017-08-30 PROCEDURE — 80053 COMPREHEN METABOLIC PANEL: CPT | Performed by: INTERNAL MEDICINE

## 2017-08-30 PROCEDURE — 96367 TX/PROPH/DG ADDL SEQ IV INF: CPT

## 2017-08-30 PROCEDURE — 25000128 H RX IP 250 OP 636: Mod: ZF | Performed by: PHYSICIAN ASSISTANT

## 2017-08-30 PROCEDURE — 96409 CHEMO IV PUSH SNGL DRUG: CPT

## 2017-08-30 RX ORDER — PALONOSETRON 0.05 MG/ML
0.25 INJECTION, SOLUTION INTRAVENOUS ONCE
Status: COMPLETED | OUTPATIENT
Start: 2017-08-30 | End: 2017-08-30

## 2017-08-30 RX ORDER — HEPARIN SODIUM (PORCINE) LOCK FLUSH IV SOLN 100 UNIT/ML 100 UNIT/ML
5 SOLUTION INTRAVENOUS EVERY 8 HOURS
Status: DISCONTINUED | OUTPATIENT
Start: 2017-08-30 | End: 2017-08-30 | Stop reason: HOSPADM

## 2017-08-30 RX ORDER — FLUOROURACIL 50 MG/ML
400 INJECTION, SOLUTION INTRAVENOUS ONCE
Status: COMPLETED | OUTPATIENT
Start: 2017-08-30 | End: 2017-08-30

## 2017-08-30 RX ADMIN — SODIUM CHLORIDE, PRESERVATIVE FREE 5 ML: 5 INJECTION INTRAVENOUS at 09:20

## 2017-08-30 RX ADMIN — SODIUM CHLORIDE 250 ML: 9 INJECTION, SOLUTION INTRAVENOUS at 10:01

## 2017-08-30 RX ADMIN — PALONOSETRON HYDROCHLORIDE 0.25 MG: 0.25 INJECTION INTRAVENOUS at 10:04

## 2017-08-30 RX ADMIN — DEXAMETHASONE SODIUM PHOSPHATE: 10 INJECTION, SOLUTION INTRAMUSCULAR; INTRAVENOUS at 10:01

## 2017-08-30 RX ADMIN — FLUOROURACIL 850 MG: 50 INJECTION, SOLUTION INTRAVENOUS at 11:32

## 2017-08-30 RX ADMIN — LEUCOVORIN CALCIUM 750 MG: 500 INJECTION, POWDER, LYOPHILIZED, FOR SOLUTION INTRAMUSCULAR; INTRAVENOUS at 10:19

## 2017-08-30 NOTE — MR AVS SNAPSHOT
After Visit Summary   8/30/2017    Meño Omalley    MRN: [id number]           Patient Information     Date Of Birth          1970        Visit Information        Provider Department      8/30/2017 9:30 AM UC 27 ATC; UC ONCOLOGY INFUSION [facility]        Today's Diagnoses     Adenocarcinoma of rectum (H)    -  1      Care Instructions    Contact Numbers    [facility] Main Line: [phone]  [facility] Triage:  [phone]    Call triage with chills and/or temperature greater than or equal to 100.5, uncontrolled nausea/vomiting, diarrhea, constipation, dizziness, shortness of breath, chest pain, bleeding, unexplained bruising, or any new/concerning symptoms, questions/concerns.     If you are having any concerning symptoms or wish to speak to a provider before your next infusion visit, please call your care coordinator or triage to notify them so we can adequately serve you.       After Hours: [phone]    If after hours, weekends, or holidays, call main hospital  and ask for Oncology doctor on call.           August 2017 Sunday Monday Tuesday Wednesday Thursday Friday Saturday             1     2     [facility] LAB DRAW    8:15 AM   (15 min.)    [facility] LAB DRAW   [facility] Lab Draw     UMP RETURN    8:25 AM   (50 min.)   Jessica Lomas PA   [facility]     UMP ONC INFUSION 240    9:30 AM   (240 min.)   UC ONCOLOGY INFUSION   [facility] 3     4     5       6     7     8     9     [facility] LAB DRAW    7:30 AM   (15 min.)    [facility] LAB DRAW   [facility] Lab Draw     CT CHEST ABDOMEN PELVIS WWO    7:45 AM   (20 min.)   UCCT2   [facility] CT     UMP RETURN   11:00 AM   (30 min.)   Jeanette Mcarthur MD   [facility] 10     11     12       13     14     15     16     [facility] DRAW    9:00 AM   (15 min.)    [facility] LAB DRAW   [facility] Lab Draw     UMP ONC INFUSION  240    9:30 AM   (240 min.)   UC ONCOLOGY INFUSION   [facility] 17     18     19       20     21     22     23     24     25     26       27     28     29     30     [facility] LAB DRAW    9:00 AM   (15 min.)    [facility] LAB DRAW   [facility] Lab Draw     UMP ONC INFUSION 240    9:30 AM   (240 min.)   UC ONCOLOGY INFUSION   [facility] 31 September 2017 Sunday Monday Tuesday Wednesday Thursday Friday Saturday                            1     2       3     4     5     6     7     8     9       10     11     12     13     [facility] LAB DRAW    8:15 AM   (15 min.)    [facility] LAB DRAW   [facility] Lab Draw     UMP RETURN    8:30 AM   (30 min.)   Jeanette Mcarthur MD   [facility]     UMP ONC INFUSION 240    9:30 AM   (240 min.)    ONCOLOGY INFUSION   [facility] 14     15     16       17     18     19     UMP FLEXIBLE SIGMOIDOSCOPY    1:00 PM   (30 min.)   Ignacio Rose MD   [facility] Colon and Rectal Surgery 20     21     22     23       24     25     26     27     [facility] LAB DRAW    9:00 AM   (15 min.)    [facility] LAB DRAW   [facility] Lab Draw     UMP ONC INFUSION 240    9:30 AM   (240 min.)    ONCOLOGY INFUSION   [facility] 28     29     30                 Recent Results (from the past 24 hour(s))   CBC with platelets differential    Collection Time: 08/30/17  9:41 AM   Result Value Ref Range    WBC 3.6 (L) 4.0 - 11.0 10e9/L    RBC Count 4.15 (L) 4.4 - 5.9 10e12/L    Hemoglobin 13.6 13.3 - 17.7 g/dL    Hematocrit 39.5 (L) 40.0 - 53.0 %    MCV 95 78 - 100 fl    MCH 32.8 26.5 - 33.0 pg    MCHC 34.4 31.5 - 36.5 g/dL    RDW 15.9 (H) 10.0 - 15.0 %    Platelet Count 219 150 - 450 10e9/L    Diff Method Automated Method     % Neutrophils 48.7 %    % Lymphocytes 34.6 %    % Monocytes 12.2 %    % Eosinophils 2.8 %    % Basophils 1.4 %    % Immature Granulocytes 0.3 %     Nucleated RBCs 0 0 /100    Absolute Neutrophil 1.8 1.6 - 8.3 10e9/L    Absolute Lymphocytes 1.3 0.8 - 5.3 10e9/L    Absolute Monocytes 0.4 0.0 - 1.3 10e9/L    Absolute Eosinophils 0.1 0.0 - 0.7 10e9/L    Absolute Basophils 0.1 0.0 - 0.2 10e9/L    Abs Immature Granulocytes 0.0 0 - 0.4 10e9/L    Absolute Nucleated RBC 0.0    Comprehensive metabolic panel    Collection Time: 08/30/17  9:41 AM   Result Value Ref Range    Sodium 140 133 - 144 mmol/L    Potassium 3.8 3.4 - 5.3 mmol/L    Chloride 106 94 - 109 mmol/L    Carbon Dioxide 24 20 - 32 mmol/L    Anion Gap 9 3 - 14 mmol/L    Glucose 97 70 - 99 mg/dL    Urea Nitrogen 16 7 - 30 mg/dL    Creatinine 0.79 0.66 - 1.25 mg/dL    GFR Estimate >90 >60 mL/min/1.7m2    GFR Estimate If Black >90 >60 mL/min/1.7m2    Calcium 8.9 8.5 - 10.1 mg/dL    Bilirubin Total 0.8 0.2 - 1.3 mg/dL    Albumin 3.4 3.4 - 5.0 g/dL    Protein Total 7.4 6.8 - 8.8 g/dL    Alkaline Phosphatase 80 40 - 150 U/L    ALT 54 0 - 70 U/L    AST 33 0 - 45 U/L                 Follow-ups after your visit        Your next 10 appointments already scheduled     Sep 13, 2017  8:15 AM CDT   [facility] Lab Draw with [facility] LAB DRAW   [facility] Lab Draw ([facility])    [address] [place] [address] [phone]            Sep 13, 2017  8:45 AM CDT   (Arrive by 8:30 AM)   Return Visit with Jeanette Mcarthur MD   [facility])    [address] [place] [address] [phone]            Sep 13, 2017  9:30 AM CDT   Infusion 240 with  ONCOLOGY INFUSION, UC 27 ATC   [facility] ([facility])    [address] [place] [address] [phone]            Sep 19, 2017  1:00 PM CDT   Flexible Sigmoidoscopy with Ignacio Rose MD   [facility] Colon and Rectal Surgery ([facility])    [address]  [facility]  4th [facility] [address]   [phone]            Sep 27, 2017  9:00 AM CDT   [facility] Lab Draw with [facility] LAB DRAW   [facility] Lab Draw ([facility])    [address] [place] [address]   [phone]            Sep 27, 2017  9:30 AM CDT   Infusion 240 with UC ONCOLOGY INFUSION, UC 27 ATC   [facility] ([facility])    [address] [place] [address]   [phone]            Oct 11, 2017  9:00 AM CDT   [facility] Lab Draw with [facility] LAB DRAW   [facility] Lab Draw ([facility])    [address] [place] [address]   [phone]            Oct 11, 2017  9:30 AM CDT   (Arrive by 9:15 AM)   Return Visit with Zahira Christensen PA-C   [facility] ([facility])    [address] [place] [address]   [phone]              Who to contact     If you have questions or need follow up information about today's clinic visit or your schedule please contact [facility] CANCER [facility] directly at [phone].  Normal or non-critical lab and imaging results will be communicated to you by [employer], letter or phone within 4 business days after the clinic has received the results. If you do not hear from us within 7 days, please contact the clinic through [employer] or phone. If you have a critical or abnormal lab result, we will notify you by phone as soon as possible.  Submit refill requests through [employer] or call your pharmacy and they will forward the refill request to us. Please allow 3 business days for your refill to be completed.          Additional Information About Your Visit        [employer] Information     [employer] gives you secure access to your electronic health record. If you see a primary care provider, you can also send messages to your  care team and make appointments. If you have questions, please call your primary care clinic.  If you do not have a primary care provider, please call [phone] and they will assist you.        Care EveryWhere ID     This is your Care EveryWhere ID. This could be used by other organizations to access your [place] medical records  [id number]        Your Vitals Were     Pulse Temperature Pulse Oximetry BMI (Body Mass Index)          83 97.8  F (36.6  C) (Oral) 95% 30.69 kg/m2         Blood Pressure from Last 3 Encounters:   08/30/17 126/72   08/16/17 123/87   08/09/17 (!) 128/94    Weight from Last 3 Encounters:   08/30/17 94.3 kg (207 lb 14.4 oz)   08/16/17 92.2 kg (203 lb 3.2 oz)   08/09/17 90.9 kg (200 lb 4.8 oz)              We Performed the Following     CBC with platelets differential     Comprehensive metabolic panel        Primary Care Provider Office Phone # Fax #    Delio Alcala [phone]       [facility] [address] [place] [address]        Equal Access to Services     [facility]: Hadii aad ku hadasho Soteresaali, waaxda luqadaha, qaybta kaalmada adeegyada, lynda morales . So [facility] [phone].    ATENCIÓN: Si habla español, tiene a valle disposición servicios gratuitos de asistencia lingüística. [facility] [phone].    We comply with applicable federal civil rights laws and [place] laws. We do not discriminate on the basis of race, color, national origin, age, disability sex, sexual orientation or gender identity.            Thank you!     Thank you for choosing [facility] CANCER [facility]  for your care. Our goal is always to provide you with excellent care. Hearing back from our patients is one way we can continue to improve our services. Please take a few minutes to complete the written survey that you may receive in the mail after your visit with us. Thank you!             Your Updated Medication List - Protect  others around you: Learn how to safely use, store and throw away your medicines at www.disposemymeds.org.          This list is accurate as of: 8/30/17  1:11 PM.  Always use your most recent med list.                   Brand Name Dispense Instructions for use Diagnosis    escitalopram 20 MG tablet    LEXAPRO    30 tablet    Take 1 tablet (20 mg) by mouth every morning    Major depressive disorder, recurrent episode, mild (H)       FLOVENT  MCG/ACT Inhaler   Generic drug:  fluticasone      Take 2 puffs by mouth 2 times daily as needed        lidocaine-prilocaine cream    EMLA    30 g    Apply topically as needed for moderate pain    Rectal cancer (H)       LORazepam 0.5 MG tablet    ATIVAN    30 tablet    Take 1 tablet (0.5 mg) by mouth every 4 hours as needed (Anxiety, Nausea/Vomiting or Sleep)    Adenocarcinoma of rectum (H)       ondansetron 8 MG tablet    ZOFRAN    60 tablet    Take 1 tablet (8 mg) by mouth every 8 hours as needed for nausea    Adenocarcinoma of rectum (H)       prochlorperazine 10 MG tablet    COMPAZINE    30 tablet    Take 1 tablet (10 mg) by mouth every 6 hours as needed (Nausea/Vomiting)    Adenocarcinoma of rectum (H)

## 2017-08-30 NOTE — PROGRESS NOTES
Infusion Nursing Note:  Meño Omalley presents today for Cycle 8 Day 1 Leucovorin, Fluorouracil bolus and pump hook-up.    Patient seen by provider today: No   present during visit today: Not Applicable.    Note: C-Series pump and thermoregulator intact upon patient's discharge. [facility] is aware of patient's pump hook-up time and will disconnect patient's pump on 9/1/17 at 0930.    Intravenous Access:  Implanted Port.    Treatment Conditions:  Lab Results   Component Value Date    HGB 13.6 08/30/2017     Lab Results   Component Value Date    WBC 3.6 08/30/2017      Lab Results   Component Value Date    ANEU 1.8 08/30/2017     Lab Results   Component Value Date     08/30/2017      Lab Results   Component Value Date     08/30/2017                   Lab Results   Component Value Date    POTASSIUM 3.8 08/30/2017           Lab Results   Component Value Date    MAG 2.1 04/13/2017            Lab Results   Component Value Date    CR 0.79 08/30/2017                   Lab Results   Component Value Date    CANDY 8.9 08/30/2017                Lab Results   Component Value Date    BILITOTAL 0.8 08/30/2017           Lab Results   Component Value Date    ALBUMIN 3.4 08/30/2017                    Lab Results   Component Value Date    ALT 54 08/30/2017           Lab Results   Component Value Date    AST 33 08/30/2017     Results reviewed, labs MET treatment parameters, ok to proceed with treatment.    Post Infusion Assessment:  Patient tolerated infusion without incident.  Blood return noted pre and post infusion.  Site patent and intact, free from redness, edema or discomfort.  No evidence of extravasations.    Discharge Plan:   Patient declined prescription refills.  AVS to patient via [employer].  Patient will return 9/13/17 for next appointment.   Patient discharged in stable condition accompanied by: wife.  Departure Mode: Ambulatory.    Carly Saucedo RN

## 2017-08-30 NOTE — PATIENT INSTRUCTIONS
Contact Numbers    [facility] Main Line: [phone]  [facility] Triage:  [phone]    Call triage with chills and/or temperature greater than or equal to 100.5, uncontrolled nausea/vomiting, diarrhea, constipation, dizziness, shortness of breath, chest pain, bleeding, unexplained bruising, or any new/concerning symptoms, questions/concerns.     If you are having any concerning symptoms or wish to speak to a provider before your next infusion visit, please call your care coordinator or triage to notify them so we can adequately serve you.       After Hours: [phone]    If after hours, weekends, or holidays, call main hospital  and ask for Oncology doctor on call.           August 2017 Sunday Monday Tuesday Wednesday Thursday Friday Saturday             1     2     [facility] LAB DRAW    8:15 AM   (15 min.)    [facility] LAB DRAW   [facility]     UMP RETURN    8:25 AM   (50 min.)   Jessica Lomas PA   [facility]     UMP ONC INFUSION 240    9:30 AM   (240 min.)    ONCOLOGY INFUSION   [facility] 3     4     5       6     7     8     9     [facility] LAB DRAW    7:30 AM   (15 min.)    [facility] LAB DRAW   [facility] Lab Draw     CT CHEST ABDOMEN PELVIS WWO    7:45 AM   (20 min.)   UCCT2   [facility] CT     UMP RETURN   11:00 AM   (30 min.)   Jeanette Mcarthur MD   [facility] 10     11     12       13     14     15     16     [facility] LAB DRAW    9:00 AM   (15 min.)    [facility] LAB DRAW   [facility] Lab Draw     UMP ONC INFUSION 240    9:30 AM   (240 min.)    ONCOLOGY INFUSION   [facility] 17     18     19       20     21     22     23     24     25     26       27     28     29     30     [facility] LAB DRAW    9:00 AM   (15 min.)    [facility] LAB DRAW   [facility] Draw     UMP ONC INFUSION 240    9:30 AM   (240 min.)    ONCOLOGY INFUSION   [facility]  31 September 2017 Sunday Monday Tuesday Wednesday Thursday Friday Saturday                            1     2       3     4     5     6     7     8     9       10     11     12     13     [facility] LAB DRAW    8:15 AM   (15 min.)    [facility] LAB DRAW   [facility] Lab Draw     UMP RETURN    8:30 AM   (30 min.)   Jeanette Mcarthur MD   [facility]     UMP ONC INFUSION 240    9:30 AM   (240 min.)    ONCOLOGY INFUSION   [facility] 14     15     16       17     18     19     UMP FLEXIBLE SIGMOIDOSCOPY    1:00 PM   (30 min.)   Ignacio Rose MD   [facility] Colon and Rectal Surgery 20     21     22     23       24     25     26     27     [facility] LAB DRAW    9:00 AM   (15 min.)    [facility] LAB DRAW   [facility] Lab Draw     UMP ONC INFUSION 240    9:30 AM   (240 min.)    ONCOLOGY INFUSION   [facility] 28     29     30                 Recent Results (from the past 24 hour(s))   CBC with platelets differential    Collection Time: 08/30/17  9:41 AM   Result Value Ref Range    WBC 3.6 (L) 4.0 - 11.0 10e9/L    RBC Count 4.15 (L) 4.4 - 5.9 10e12/L    Hemoglobin 13.6 13.3 - 17.7 g/dL    Hematocrit 39.5 (L) 40.0 - 53.0 %    MCV 95 78 - 100 fl    MCH 32.8 26.5 - 33.0 pg    MCHC 34.4 31.5 - 36.5 g/dL    RDW 15.9 (H) 10.0 - 15.0 %    Platelet Count 219 150 - 450 10e9/L    Diff Method Automated Method     % Neutrophils 48.7 %    % Lymphocytes 34.6 %    % Monocytes 12.2 %    % Eosinophils 2.8 %    % Basophils 1.4 %    % Immature Granulocytes 0.3 %    Nucleated RBCs 0 0 /100    Absolute Neutrophil 1.8 1.6 - 8.3 10e9/L    Absolute Lymphocytes 1.3 0.8 - 5.3 10e9/L    Absolute Monocytes 0.4 0.0 - 1.3 10e9/L    Absolute Eosinophils 0.1 0.0 - 0.7 10e9/L    Absolute Basophils 0.1 0.0 - 0.2 10e9/L    Abs Immature Granulocytes 0.0 0 - 0.4 10e9/L    Absolute Nucleated RBC 0.0    Comprehensive metabolic panel    Collection Time: 08/30/17   9:41 AM   Result Value Ref Range    Sodium 140 133 - 144 mmol/L    Potassium 3.8 3.4 - 5.3 mmol/L    Chloride 106 94 - 109 mmol/L    Carbon Dioxide 24 20 - 32 mmol/L    Anion Gap 9 3 - 14 mmol/L    Glucose 97 70 - 99 mg/dL    Urea Nitrogen 16 7 - 30 mg/dL    Creatinine 0.79 0.66 - 1.25 mg/dL    GFR Estimate >90 >60 mL/min/1.7m2    GFR Estimate If Black >90 >60 mL/min/1.7m2    Calcium 8.9 8.5 - 10.1 mg/dL    Bilirubin Total 0.8 0.2 - 1.3 mg/dL    Albumin 3.4 3.4 - 5.0 g/dL    Protein Total 7.4 6.8 - 8.8 g/dL    Alkaline Phosphatase 80 40 - 150 U/L    ALT 54 0 - 70 U/L    AST 33 0 - 45 U/L

## 2017-09-13 ENCOUNTER — ONCOLOGY VISIT (OUTPATIENT)
Dept: ONCOLOGY | Facility: CLINIC | Age: 47
End: 2017-09-13
Attending: INTERNAL MEDICINE
Payer: COMMERCIAL

## 2017-09-13 ENCOUNTER — APPOINTMENT (OUTPATIENT)
Dept: LAB | Facility: CLINIC | Age: 47
End: 2017-09-13
Attending: PHYSICIAN ASSISTANT
Payer: COMMERCIAL

## 2017-09-13 ENCOUNTER — INFUSION THERAPY VISIT (OUTPATIENT)
Dept: ONCOLOGY | Facility: CLINIC | Age: 47
End: 2017-09-13
Attending: PHYSICIAN ASSISTANT
Payer: COMMERCIAL

## 2017-09-13 VITALS
HEART RATE: 81 BPM | DIASTOLIC BLOOD PRESSURE: 82 MMHG | RESPIRATION RATE: 18 BRPM | BODY MASS INDEX: 31 KG/M2 | OXYGEN SATURATION: 98 % | SYSTOLIC BLOOD PRESSURE: 128 MMHG | WEIGHT: 209.3 LBS | HEIGHT: 69 IN | TEMPERATURE: 97.9 F

## 2017-09-13 DIAGNOSIS — C20 ADENOCARCINOMA OF RECTUM (H): Primary | ICD-10-CM

## 2017-09-13 LAB
ALBUMIN SERPL-MCNC: 3.3 G/DL (ref 3.4–5)
ALP SERPL-CCNC: 77 U/L (ref 40–150)
ALT SERPL W P-5'-P-CCNC: 51 U/L (ref 0–70)
ANION GAP SERPL CALCULATED.3IONS-SCNC: 8 MMOL/L (ref 3–14)
AST SERPL W P-5'-P-CCNC: 28 U/L (ref 0–45)
BASOPHILS # BLD AUTO: 0 10E9/L (ref 0–0.2)
BASOPHILS NFR BLD AUTO: 1.1 %
BILIRUB SERPL-MCNC: 1.2 MG/DL (ref 0.2–1.3)
BUN SERPL-MCNC: 18 MG/DL (ref 7–30)
CALCIUM SERPL-MCNC: 8.7 MG/DL (ref 8.5–10.1)
CHLORIDE SERPL-SCNC: 106 MMOL/L (ref 94–109)
CO2 SERPL-SCNC: 27 MMOL/L (ref 20–32)
CREAT SERPL-MCNC: 0.78 MG/DL (ref 0.66–1.25)
DIFFERENTIAL METHOD BLD: ABNORMAL
EOSINOPHIL # BLD AUTO: 0.1 10E9/L (ref 0–0.7)
EOSINOPHIL NFR BLD AUTO: 4 %
ERYTHROCYTE [DISTWIDTH] IN BLOOD BY AUTOMATED COUNT: 14.9 % (ref 10–15)
GFR SERPL CREATININE-BSD FRML MDRD: >90 ML/MIN/1.7M2
GLUCOSE SERPL-MCNC: 93 MG/DL (ref 70–99)
HCT VFR BLD AUTO: 38.4 % (ref 40–53)
HGB BLD-MCNC: 13.3 G/DL (ref 13.3–17.7)
IMM GRANULOCYTES # BLD: 0 10E9/L (ref 0–0.4)
IMM GRANULOCYTES NFR BLD: 0.3 %
LYMPHOCYTES # BLD AUTO: 1.2 10E9/L (ref 0.8–5.3)
LYMPHOCYTES NFR BLD AUTO: 32.7 %
MCH RBC QN AUTO: 33.4 PG (ref 26.5–33)
MCHC RBC AUTO-ENTMCNC: 34.6 G/DL (ref 31.5–36.5)
MCV RBC AUTO: 97 FL (ref 78–100)
MONOCYTES # BLD AUTO: 0.4 10E9/L (ref 0–1.3)
MONOCYTES NFR BLD AUTO: 10.2 %
NEUTROPHILS # BLD AUTO: 1.8 10E9/L (ref 1.6–8.3)
NEUTROPHILS NFR BLD AUTO: 51.7 %
NRBC # BLD AUTO: 0 10*3/UL
NRBC BLD AUTO-RTO: 0 /100
PLATELET # BLD AUTO: 195 10E9/L (ref 150–450)
POTASSIUM SERPL-SCNC: 4.1 MMOL/L (ref 3.4–5.3)
PROT SERPL-MCNC: 7.3 G/DL (ref 6.8–8.8)
RBC # BLD AUTO: 3.98 10E12/L (ref 4.4–5.9)
SODIUM SERPL-SCNC: 141 MMOL/L (ref 133–144)
WBC # BLD AUTO: 3.5 10E9/L (ref 4–11)

## 2017-09-13 PROCEDURE — 25000128 H RX IP 250 OP 636: Mod: ZF | Performed by: INTERNAL MEDICINE

## 2017-09-13 PROCEDURE — 96409 CHEMO IV PUSH SNGL DRUG: CPT

## 2017-09-13 PROCEDURE — 85025 COMPLETE CBC W/AUTO DIFF WBC: CPT | Performed by: INTERNAL MEDICINE

## 2017-09-13 PROCEDURE — 96375 TX/PRO/DX INJ NEW DRUG ADDON: CPT

## 2017-09-13 PROCEDURE — 96367 TX/PROPH/DG ADDL SEQ IV INF: CPT

## 2017-09-13 PROCEDURE — 80053 COMPREHEN METABOLIC PANEL: CPT | Performed by: INTERNAL MEDICINE

## 2017-09-13 PROCEDURE — 96416 CHEMO PROLONG INFUSE W/PUMP: CPT

## 2017-09-13 PROCEDURE — 99214 OFFICE O/P EST MOD 30 MIN: CPT | Mod: ZP | Performed by: INTERNAL MEDICINE

## 2017-09-13 PROCEDURE — 99212 OFFICE O/P EST SF 10 MIN: CPT | Mod: ZF

## 2017-09-13 RX ORDER — METHYLPREDNISOLONE SODIUM SUCCINATE 125 MG/2ML
125 INJECTION, POWDER, LYOPHILIZED, FOR SOLUTION INTRAMUSCULAR; INTRAVENOUS
Status: CANCELLED
Start: 2017-09-13

## 2017-09-13 RX ORDER — HEPARIN SODIUM (PORCINE) LOCK FLUSH IV SOLN 100 UNIT/ML 100 UNIT/ML
5 SOLUTION INTRAVENOUS ONCE
Status: COMPLETED | OUTPATIENT
Start: 2017-09-13 | End: 2017-09-13

## 2017-09-13 RX ORDER — ALBUTEROL SULFATE 90 UG/1
1-2 AEROSOL, METERED RESPIRATORY (INHALATION)
Status: CANCELLED
Start: 2017-09-13

## 2017-09-13 RX ORDER — EPINEPHRINE 0.3 MG/.3ML
0.3 INJECTION SUBCUTANEOUS EVERY 5 MIN PRN
Status: CANCELLED | OUTPATIENT
Start: 2017-09-13

## 2017-09-13 RX ORDER — PALONOSETRON 0.05 MG/ML
0.25 INJECTION, SOLUTION INTRAVENOUS ONCE
Status: CANCELLED
Start: 2017-09-13 | End: 2017-09-13

## 2017-09-13 RX ORDER — ALBUTEROL SULFATE 0.83 MG/ML
2.5 SOLUTION RESPIRATORY (INHALATION)
Status: CANCELLED | OUTPATIENT
Start: 2017-09-13

## 2017-09-13 RX ORDER — LORAZEPAM 2 MG/ML
0.5 INJECTION INTRAMUSCULAR EVERY 4 HOURS PRN
Status: CANCELLED
Start: 2017-09-13

## 2017-09-13 RX ORDER — PALONOSETRON 0.05 MG/ML
0.25 INJECTION, SOLUTION INTRAVENOUS ONCE
Status: COMPLETED | OUTPATIENT
Start: 2017-09-13 | End: 2017-09-13

## 2017-09-13 RX ORDER — FLUOROURACIL 50 MG/ML
400 INJECTION, SOLUTION INTRAVENOUS ONCE
Status: CANCELLED | OUTPATIENT
Start: 2017-09-13

## 2017-09-13 RX ORDER — MEPERIDINE HYDROCHLORIDE 25 MG/ML
25 INJECTION INTRAMUSCULAR; INTRAVENOUS; SUBCUTANEOUS EVERY 30 MIN PRN
Status: CANCELLED | OUTPATIENT
Start: 2017-09-13

## 2017-09-13 RX ORDER — FLUOROURACIL 50 MG/ML
400 INJECTION, SOLUTION INTRAVENOUS ONCE
Status: COMPLETED | OUTPATIENT
Start: 2017-09-13 | End: 2017-09-13

## 2017-09-13 RX ORDER — EPINEPHRINE 1 MG/ML
0.3 INJECTION INTRAMUSCULAR; INTRAVENOUS; SUBCUTANEOUS EVERY 5 MIN PRN
Status: CANCELLED | OUTPATIENT
Start: 2017-09-13

## 2017-09-13 RX ORDER — DIPHENHYDRAMINE HYDROCHLORIDE 50 MG/ML
50 INJECTION INTRAMUSCULAR; INTRAVENOUS
Status: CANCELLED
Start: 2017-09-13

## 2017-09-13 RX ORDER — SODIUM CHLORIDE 9 MG/ML
1000 INJECTION, SOLUTION INTRAVENOUS CONTINUOUS PRN
Status: CANCELLED
Start: 2017-09-13

## 2017-09-13 RX ADMIN — FLUOROURACIL 850 MG: 50 INJECTION, SOLUTION INTRAVENOUS at 10:54

## 2017-09-13 RX ADMIN — PALONOSETRON HYDROCHLORIDE 0.25 MG: 0.25 INJECTION INTRAVENOUS at 09:59

## 2017-09-13 RX ADMIN — SODIUM CHLORIDE, PRESERVATIVE FREE 5 ML: 5 INJECTION INTRAVENOUS at 08:46

## 2017-09-13 RX ADMIN — DEXAMETHASONE SODIUM PHOSPHATE: 10 INJECTION, SOLUTION INTRAMUSCULAR; INTRAVENOUS at 09:55

## 2017-09-13 RX ADMIN — LEUCOVORIN CALCIUM 750 MG: 500 INJECTION, POWDER, LYOPHILIZED, FOR SOLUTION INTRAMUSCULAR; INTRAVENOUS at 10:25

## 2017-09-13 RX ADMIN — SODIUM CHLORIDE 250 ML: 9 INJECTION, SOLUTION INTRAVENOUS at 09:54

## 2017-09-13 ASSESSMENT — PAIN SCALES - GENERAL: PAINLEVEL: NO PAIN (0)

## 2017-09-13 NOTE — NURSING NOTE
"Oncology Rooming Note    September 13, 2017 9:00 AM   Meño Omalley is a 47 year old male who presents for:    Chief Complaint   Patient presents with     Port Draw     labs drawn     Oncology Clinic Visit     Return: Rectal ca     Initial Vitals: /82  Pulse 81  Temp 97.9  F (36.6  C) (Oral)  Resp 18  Ht 1.753 m (5' 9.02\")  Wt 94.9 kg (209 lb 4.8 oz)  SpO2 98%  PF 94 L/min  BMI 30.89 kg/m2 Estimated body mass index is 30.89 kg/(m^2) as calculated from the following:    Height as of this encounter: 1.753 m (5' 9.02\").    Weight as of this encounter: 94.9 kg (209 lb 4.8 oz). Body surface area is 2.15 meters squared.  No Pain (0) Comment: Data Unavailable   No LMP for male patient.  Allergies reviewed: Yes  Medications reviewed: Yes    Medications: Medication refills not needed today.  Pharmacy name entered into EPIC:    [facility] [address] [place] [address] - [address]  [facility] [place], [place] - [address]     Clinical concerns: no new concerns     6 minutes for nursing intake (face to face time)     Patricia Bosch CMA                "

## 2017-09-13 NOTE — PROGRESS NOTES
Infusion Nursing Note:  Meño Omalley presents today for Cycle 9 Day 1 Leucovorin, Fluorouracil bolus and pump hook-up.    Patient seen by provider today: Yes: Dr. Mcarthur   present during visit today: Not Applicable.    Note: C-Series pump and thermoregulator intact upon patient's discharge. Connections double checked by Lana RUVALCABA RN. [facility] notified ([facility]) of patient's pump hook-up time and will disconnect patient's pump on 9/15/17 at 0900.    Intravenous Access:  Implanted Port.    Treatment Conditions:  Lab Results   Component Value Date    HGB 13.3 09/13/2017     Lab Results   Component Value Date    WBC 3.5 09/13/2017      Lab Results   Component Value Date    ANEU 1.8 09/13/2017     Lab Results   Component Value Date     09/13/2017      Lab Results   Component Value Date     09/13/2017                   Lab Results   Component Value Date    POTASSIUM 4.1 09/13/2017           Lab Results   Component Value Date    MAG 2.1 04/13/2017            Lab Results   Component Value Date    CR 0.78 09/13/2017                   Lab Results   Component Value Date    CANDY 8.7 09/13/2017                Lab Results   Component Value Date    BILITOTAL 1.2 09/13/2017           Lab Results   Component Value Date    ALBUMIN 3.3 09/13/2017                    Lab Results   Component Value Date    ALT 51 09/13/2017           Lab Results   Component Value Date    AST 28 09/13/2017     Results reviewed, labs MET treatment parameters, ok to proceed with treatment.    Post Infusion Assessment:  Patient tolerated infusion without incident.  Blood return noted pre and post infusion.  Site patent and intact, free from redness, edema or discomfort.  No evidence of extravasations.    Discharge Plan:   Patient declined prescription refills.  Copy of AVS reviewed with patient and/or family. Patient will return 9/27/17 for next appointment.  Patient discharged in stable condition accompanied by: wife.  Departure Mode:  Ambulatory.    Carly Saucedo RN

## 2017-09-13 NOTE — MR AVS SNAPSHOT
After Visit Summary   9/13/2017    Meño Omalley    MRN: [id number]           Patient Information     Date Of Birth          1970        Visit Information        Provider Department      9/13/2017 9:30 AM UC 27 ATC; UC ONCOLOGY INFUSION [facility]        Today's Diagnoses     Adenocarcinoma of rectum (H)    -  1      Care Instructions    Contact Numbers    [facility] [place]: [phone]  [facility] [place]:  [phone]    Call triage with chills and/or temperature greater than or equal to 100.5, uncontrolled nausea/vomiting, diarrhea, constipation, dizziness, shortness of breath, chest pain, bleeding, unexplained bruising, or any new/concerning symptoms, questions/concerns.     If you are having any concerning symptoms or wish to speak to a provider before your next infusion visit, please call your care coordinator or triage to notify them so we can adequately serve you.       After Hours: [phone]    If after hours, weekends, or holidays, call main hospital  and ask for Oncology doctor on call.         September 2017 Sunday Monday Tuesday Wednesday Thursday Friday Saturday                            1     2       3     4     5     6     7     8     9       10     11     12     13     [facility]    8:15 AM   (15 min.)    [facility] LAB DRAW   [facility] Lab Draw     [facility] RETURN    8:30 AM   (30 min.)   Jeanette Mcarthur MD   [facility]     [facility] ONC INFUSION 240    9:30 AM   (240 min.)    ONCOLOGY INFUSION   [facility] 14     15     16       17     18     19     [facility] FLEXIBLE SIGMOIDOSCOPY    1:00 PM   (30 min.)   Ignacio Rose MD   [facility] Colon and Rectal Surgery 20     21     22     23       24     25     26     27     [facility] DRAW    9:00 AM   (15 min.)    [facility] LAB DRAW   [facility] Lab Draw     [facility] ONC INFUSION 240    9:30 AM   (240 min.)    ONCOLOGY INFUSION   [facility]  28 29 30 October 2017 Sunday Monday Tuesday Wednesday Thursday Friday Saturday   1     2     3     4     5     6     7       8     9     10     11     12     13     14       15     16     17     18     [facility] LAB DRAW   10:45 AM   (15 min.)    [facility]ON[facility] LAB DRAW   [facility] Lab Draw     [facility] RETURN   10:55 AM   (50 min.)   Zahira Christensen PA-C [facility] 19     20     21       22     23     24     25     26     27     28       29     30     31                                     Recent Results (from the past 24 hour(s))   CBC with platelets differential    Collection Time: 09/13/17  8:52 AM   Result Value Ref Range    WBC 3.5 (L) 4.0 - 11.0 10e9/L    RBC Count 3.98 (L) 4.4 - 5.9 10e12/L    Hemoglobin 13.3 13.3 - 17.7 g/dL    Hematocrit 38.4 (L) 40.0 - 53.0 %    MCV 97 78 - 100 fl    MCH 33.4 (H) 26.5 - 33.0 pg    MCHC 34.6 31.5 - 36.5 g/dL    RDW 14.9 10.0 - 15.0 %    Platelet Count 195 150 - 450 10e9/L    Diff Method Automated Method     % Neutrophils 51.7 %    % Lymphocytes 32.7 %    % Monocytes 10.2 %    % Eosinophils 4.0 %    % Basophils 1.1 %    % Immature Granulocytes 0.3 %    Nucleated RBCs 0 0 /100    Absolute Neutrophil 1.8 1.6 - 8.3 10e9/L    Absolute Lymphocytes 1.2 0.8 - 5.3 10e9/L    Absolute Monocytes 0.4 0.0 - 1.3 10e9/L    Absolute Eosinophils 0.1 0.0 - 0.7 10e9/L    Absolute Basophils 0.0 0.0 - 0.2 10e9/L    Abs Immature Granulocytes 0.0 0 - 0.4 10e9/L    Absolute Nucleated RBC 0.0    Comprehensive metabolic panel    Collection Time: 09/13/17  8:52 AM   Result Value Ref Range    Sodium 141 133 - 144 mmol/L    Potassium 4.1 3.4 - 5.3 mmol/L    Chloride 106 94 - 109 mmol/L    Carbon Dioxide 27 20 - 32 mmol/L    Anion Gap 8 3 - 14 mmol/L    Glucose 93 70 - 99 mg/dL    Urea Nitrogen 18 7 - 30 mg/dL    Creatinine 0.78 0.66 - 1.25 mg/dL    GFR Estimate >90 >60 mL/min/1.7m2    GFR Estimate If Black >90 >60 mL/min/1.7m2    Calcium 8.7 8.5 - 10.1  mg/dL    Bilirubin Total 1.2 0.2 - 1.3 mg/dL    Albumin 3.3 (L) 3.4 - 5.0 g/dL    Protein Total 7.3 6.8 - 8.8 g/dL    Alkaline Phosphatase 77 40 - 150 U/L    ALT 51 0 - 70 U/L    AST 28 0 - 45 U/L                 Follow-ups after your visit        Your next 10 appointments already scheduled     Sep 19, 2017  1:00 PM CDT   Flexible Sigmoidoscopy with Ignacio Rose MD   [facility]al [facility] ([facility])    [address] [place] [address]  4th [facility] [address]   [phone]            Sep 27, 2017  9:00 AM CDT   [facility] Lab Draw with UC [facility] DRAW   [facility] Lab Draw ([facility])    [address] [place] [address]   [phone]            Sep 27, 2017  9:30 AM CDT   Infusion 240 with UC ONCOLOGY INFUSION, UC 27 ATC   [facility] ([facility])    [address] [place] [address]   [phone]            Oct 18, 2017 10:45 AM CDT   [facility] Lab Draw with UC [facility] LAB DRAW   [facility] Lab Draw ([facility])    [address] [place] [address]   [phone]            Oct 18, 2017 11:10 AM CDT   (Arrive by 10:55 AM)   Return Visit with Zahira Christensen PA-C   [facility] ([facility])    [address] [place] [address]   [phone]            Nov 01, 2017  9:30 AM CDT   [facility] Lab Draw with UC [facility] LAB DRAW   [facility] Lab Draw ([facility])    [address] [place] [address]   [phone]            Nov 01, 2017 10:00 AM CDT   Infusion 240 with UC ONCOLOGY INFUSION, UC 17 ATC   [facility] ([facility])    [address] [place] [address] [phone]              Who to contact      If you have questions or need follow up information about today's clinic visit or your schedule please contact [facility] directly at [phone].  Normal or non-critical lab and imaging results will be communicated to you by MyChart, letter or phone within 4 business days after the clinic has received the results. If you do not hear from us within 7 days, please contact the clinic through [facility] or phone. If you have a critical or abnormal lab result, we will notify you by phone as soon as possible.  Submit refill requests through [employer] or call your pharmacy and they will forward the refill request to us. Please allow 3 business days for your refill to be completed.          Additional Information About Your Visit        [facility] Information     [employer] gives you secure access to your electronic health record. If you see a primary care provider, you can also send messages to your care team and make appointments. If you have questions, please call your primary care clinic.  If you do not have a primary care provider, please call [phone] and they will assist you.        Care EveryWhere ID     This is your Care EveryWhere ID. This could be used by other organizations to access your [place] medical records  [id number]         Blood Pressure from Last 3 Encounters:   09/13/17 128/82   08/30/17 126/72   08/16/17 123/87    Weight from Last 3 Encounters:   09/13/17 94.9 kg (209 lb 4.8 oz)   08/30/17 94.3 kg (207 lb 14.4 oz)   08/16/17 92.2 kg (203 lb 3.2 oz)              We Performed the Following     CBC with platelets differential     Comprehensive metabolic panel        Primary Care Provider Office Phone # Fax #    Delio Alcala [phone]       [place] [address] [place] [address]        Equal Access to Services     LUCIO BOGGS : Kevin Anders, marlo anderson, josie sloan, lynda osman  kishore morales ah. So [facility] [phone].    ATENCIÓN: Si erlinda villegas, tiene a valle disposición servicios gratuitos de asistencia lingüística. Jose al [phone].    We comply with applicable federal civil rights laws and [place] laws. We do not discriminate on the basis of race, color, national origin, age, disability sex, sexual orientation or gender identity.            Thank you!     Thank you for choosing [facility]  for your care. Our goal is always to provide you with excellent care. Hearing back from our patients is one way we can continue to improve our services. Please take a few minutes to complete the written survey that you may receive in the mail after your visit with us. Thank you!             Your Updated Medication List - Protect others around you: Learn how to safely use, store and throw away your medicines at www.disposemymeds.org.          This list is accurate as of: 9/13/17 10:22 AM.  Always use your most recent med list.                   Brand Name Dispense Instructions for use Diagnosis    escitalopram 20 MG tablet    LEXAPRO    30 tablet    Take 1 tablet (20 mg) by mouth every morning    Major depressive disorder, recurrent episode, mild (H)       FLOVENT  MCG/ACT Inhaler   Generic drug:  fluticasone      Take 2 puffs by mouth 2 times daily as needed        lidocaine-prilocaine cream    EMLA    30 g    Apply topically as needed for moderate pain    Rectal cancer (H)       LORazepam 0.5 MG tablet    ATIVAN    30 tablet    Take 1 tablet (0.5 mg) by mouth every 4 hours as needed (Anxiety, Nausea/Vomiting or Sleep)    Adenocarcinoma of rectum (H)       ondansetron 8 MG tablet    ZOFRAN    60 tablet    Take 1 tablet (8 mg) by mouth every 8 hours as needed for nausea    Adenocarcinoma of rectum (H)       prochlorperazine 10 MG tablet    COMPAZINE    30 tablet    Take 1 tablet (10 mg) by mouth every 6 hours as needed (Nausea/Vomiting)    Adenocarcinoma of rectum (H)

## 2017-09-13 NOTE — LETTER
"9/13/2017       RE: Danii Angulo  [address] [address] [address]     Dear Colleague,    Thank you for referring your patient, Danii Angulo, to the [facility]. Please see a copy of my visit note below.    This is a followup visit for a diagnosis of colon cancer, stage III adenocarcinoma of rectum.  Currently, he is getting adjuvant chemotherapy with 5fu ONLY AFTER A LENGTHY DISCUSSION LAST VISIT after 7 cycles of FOLFOX  Sep 13, 2017     HISTORY OF PRESENT ILLNESS:  Kindly refer to my previous notes for patient's presentation and treatment so far.  Briefly, from an oncological standpoint he underwent laparoscopic surgery for resection of the primary lesion, and was found to have stage IIIB disease; hence, he was initiated on FOLFOX adjuvant therapy.  After 7 cycles he chose to pursue only 5FU after a lengthy discussion about shorter course I.e 3 mths adjuvant vs 6 mths adjuvant chemo.      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   PAST MEDICAL HISTORY:  Unchanged.      PAST SURGICAL HISTORY:  Unchanged.      LABORATORY DATA:  Reviewed.      MEDICATIONS:  Reviewed.      ALLERGIES:  Reviewed.      PHYSICAL EXAMINATION:   VITAL SIGNS: /82  Pulse 81  Temp 97.9  F (36.6  C) (Oral)  Resp 18  Ht 1.753 m (5' 9.02\")  Wt 94.9 kg (209 lb 4.8 oz)  SpO2 98%  PF 94 L/min  BMI 30.89 kg/m2    GENERAL:  Alert and oriented x3, in no apparent distress.   HEENT:  No mucositis   SKIN:  No palmar or plantar dysesthesia.   CVS/RESPIRATORY:  Unremarkable.   ABDOMEN:  Nontender to palpation.   EXTREMITIES:  Lower extremities with no pedal edema.      LABORATORY DATA:    Results for DANII ANGULO (MRN [id number]) as of 9/18/2017 14:39   Ref. Range " 9/13/2017 08:52   Sodium Latest Ref Range: 133 - 144 mmol/L 141   Potassium Latest Ref Range: 3.4 - 5.3 mmol/L 4.1   Chloride Latest Ref Range: 94 - 109 mmol/L 106   Carbon Dioxide Latest Ref Range: 20 - 32 mmol/L 27   Urea Nitrogen Latest Ref Range: 7 - 30 mg/dL 18   Creatinine Latest Ref Range: 0.66 - 1.25 mg/dL 0.78   GFR Estimate Latest Ref Range: >60 mL/min/1.7m2 >90   GFR Estimate If Black Latest Ref Range: >60 mL/min/1.7m2 >90   Calcium Latest Ref Range: 8.5 - 10.1 mg/dL 8.7   Anion Gap Latest Ref Range: 3 - 14 mmol/L 8   Albumin Latest Ref Range: 3.4 - 5.0 g/dL 3.3 (L)   Protein Total Latest Ref Range: 6.8 - 8.8 g/dL 7.3   Bilirubin Total Latest Ref Range: 0.2 - 1.3 mg/dL 1.2   Alkaline Phosphatase Latest Ref Range: 40 - 150 U/L 77   ALT Latest Ref Range: 0 - 70 U/L 51   AST Latest Ref Range: 0 - 45 U/L 28   Glucose Latest Ref Range: 70 - 99 mg/dL 93   WBC Latest Ref Range: 4.0 - 11.0 10e9/L 3.5 (L)   Hemoglobin Latest Ref Range: 13.3 - 17.7 g/dL 13.3   Hematocrit Latest Ref Range: 40.0 - 53.0 % 38.4 (L)   Platelet Count Latest Ref Range: 150 - 450 10e9/L 195   RBC Count Latest Ref Range: 4.4 - 5.9 10e12/L 3.98 (L)   MCV Latest Ref Range: 78 - 100 fl 97   MCH Latest Ref Range: 26.5 - 33.0 pg 33.4 (H)   MCHC Latest Ref Range: 31.5 - 36.5 g/dL 34.6   RDW Latest Ref Range: 10.0 - 15.0 % 14.9   Diff Method Unknown Automated Method   % Neutrophils Latest Units: % 51.7   % Lymphocytes Latest Units: % 32.7   % Monocytes Latest Units: % 10.2   % Eosinophils Latest Units: % 4.0   % Basophils Latest Units: % 1.1   % Immature Granulocytes Latest Units: % 0.3   Nucleated RBCs Latest Ref Range: 0 /100 0   Absolute Neutrophil Latest Ref Range: 1.6 - 8.3 10e9/L 1.8   Absolute Lymphocytes Latest Ref Range: 0.8 - 5.3 10e9/L 1.2   Absolute Monocytes Latest Ref Range: 0.0 - 1.3 10e9/L 0.4   Absolute Eosinophils Latest Ref Range: 0.0 - 0.7 10e9/L 0.1   Absolute Basophils Latest Ref Range: 0.0 - 0.2 10e9/L 0.0   Abs Immature  Granulocytes Latest Ref Range: 0 - 0.4 10e9/L 0.0   Absolute Nucleated RBC Unknown 0.0        ASSESSMENT AND PLAN:  Patient with stage III rectal cancer, currently undergoing adjuvant chemotherapy with FOLFOX.  He tolerated chemotherapy with the expected side effects. After 7 cycles of FOLFOX he chose to pursue only 5FU and in his neuropathy is already starting to improve a little bit.      He'll continue rest of the chemotherapy but just 5-FU infusion and bolus along with leucovorin .   RTC in 14 days for next cycle .   Scan at the end of 12 cycles .     I spent 35 minutes in the care of this patient >50% of which was spent in coordinating and counseling.    Jeanette Mcarthur   of Medicine   Hematology and medical Oncology   [facility]

## 2017-09-13 NOTE — NURSING NOTE
Chief Complaint   Patient presents with     Port Draw     labs drawn     Port accessed using aseptic technique. Blood aspirated without difficulty. Labs drawn. Port flushed with 20cc 0.9% NS. Locked with 100U/ ml heparin (5 ml). Port left accessed for infusion. Dressing applied. Pt tolerated well.     GEOVANNA Ludwig RN, BSN

## 2017-09-13 NOTE — PATIENT INSTRUCTIONS
Contact Numbers    [facility] [place]: [phone]  [facility] Triage:  [phone]    Call triage with chills and/or temperature greater than or equal to 100.5, uncontrolled nausea/vomiting, diarrhea, constipation, dizziness, shortness of breath, chest pain, bleeding, unexplained bruising, or any new/concerning symptoms, questions/concerns.     If you are having any concerning symptoms or wish to speak to a provider before your next infusion visit, please call your care coordinator or triage to notify them so we can adequately serve you.       After Hours: [phone]    If after hours, weekends, or holidays, call main hospital  and ask for Oncology doctor on call.         September 2017 Sunday Monday Tuesday Wednesday Thursday Friday Saturday                            1     2       3     4     5     6     7     8     9       10     11     12     13     [facility] DRAW    8:15 AM   (15 min.)   [facility] DRAW   [facility] Lab Draw     [facility] RETURN    8:30 AM   (30 min.)   Jeanette Mcarthur MD   [facility]     [facility] ONC INFUSION 240    9:30 AM   (240 min.)    ONCOLOGY INFUSION   [facility] 14     15     16       17     18     19     UMP FLEXIBLE SIGMOIDOSCOPY    1:00 PM   (30 min.)   Ignacio Rose MD   [facility] Colon [facility] Rectal Surgery 20     21     22     23       24     25     26     27     [facility] DRAW    9:00 AM   (15 min.)   [facility] DRAW   [facility] Lab Draw     UMP ONC INFUSION 240    9:30 AM   (240 min.)    ONCOLOGY INFUSION   [facility] 28 29 30 October 2017 Sunday Monday Tuesday Wednesday Thursday Friday Saturday   1     2     3     4     5     6     7       8     9     10     11     12     13     14       15     16     17     18     [facility] DRAW   10:45 AM   (15 min.)   [facility] DRAW   [facility] Lab Draw     [facility] RETURN   10:55 AM   (50 min.)   Zahira Christensen,  ALFONSO   [facility] 19     20     21       22     23     24     25     26     27     28       29     30     31                                     Recent Results (from the past 24 hour(s))   CBC with platelets differential    Collection Time: 09/13/17  8:52 AM   Result Value Ref Range    WBC 3.5 (L) 4.0 - 11.0 10e9/L    RBC Count 3.98 (L) 4.4 - 5.9 10e12/L    Hemoglobin 13.3 13.3 - 17.7 g/dL    Hematocrit 38.4 (L) 40.0 - 53.0 %    MCV 97 78 - 100 fl    MCH 33.4 (H) 26.5 - 33.0 pg    MCHC 34.6 31.5 - 36.5 g/dL    RDW 14.9 10.0 - 15.0 %    Platelet Count 195 150 - 450 10e9/L    Diff Method Automated Method     % Neutrophils 51.7 %    % Lymphocytes 32.7 %    % Monocytes 10.2 %    % Eosinophils 4.0 %    % Basophils 1.1 %    % Immature Granulocytes 0.3 %    Nucleated RBCs 0 0 /100    Absolute Neutrophil 1.8 1.6 - 8.3 10e9/L    Absolute Lymphocytes 1.2 0.8 - 5.3 10e9/L    Absolute Monocytes 0.4 0.0 - 1.3 10e9/L    Absolute Eosinophils 0.1 0.0 - 0.7 10e9/L    Absolute Basophils 0.0 0.0 - 0.2 10e9/L    Abs Immature Granulocytes 0.0 0 - 0.4 10e9/L    Absolute Nucleated RBC 0.0    Comprehensive metabolic panel    Collection Time: 09/13/17  8:52 AM   Result Value Ref Range    Sodium 141 133 - 144 mmol/L    Potassium 4.1 3.4 - 5.3 mmol/L    Chloride 106 94 - 109 mmol/L    Carbon Dioxide 27 20 - 32 mmol/L    Anion Gap 8 3 - 14 mmol/L    Glucose 93 70 - 99 mg/dL    Urea Nitrogen 18 7 - 30 mg/dL    Creatinine 0.78 0.66 - 1.25 mg/dL    GFR Estimate >90 >60 mL/min/1.7m2    GFR Estimate If Black >90 >60 mL/min/1.7m2    Calcium 8.7 8.5 - 10.1 mg/dL    Bilirubin Total 1.2 0.2 - 1.3 mg/dL    Albumin 3.3 (L) 3.4 - 5.0 g/dL    Protein Total 7.3 6.8 - 8.8 g/dL    Alkaline Phosphatase 77 40 - 150 U/L    ALT 51 0 - 70 U/L    AST 28 0 - 45 U/L

## 2017-09-13 NOTE — MR AVS SNAPSHOT
After Visit Summary   9/13/2017    Meño Omalley    MRN: [id number]           Patient Information     Date Of Birth          1970        Visit Information        Provider Department      9/13/2017 8:45 AM Jeanette Mcarthur MD [facility]        Today's Diagnoses     Adenocarcinoma of rectum (H)    -  1       Follow-ups after your visit        Your next 10 appointments already scheduled     Sep 19, 2017  1:00 PM CDT   Flexible Sigmoidoscopy with Ignacio Rose MD   [facility] Colon and Rectal Surgery ([facility])    [address] [place] [address]  4th [facility] [address]   [phone]            Sep 27, 2017  9:00 AM CDT   Masonic Lab Draw with UC MASONIC LAB DRAW   [facility] Masonic Lab Draw ([facility])    [address] [place] [address]   [phone]            Sep 27, 2017  9:30 AM CDT   Infusion 240 with UC ONCOLOGY INFUSION, UC 27 ATC   [facility] ([facility])    [address] [place] [address]   [phone]            Oct 18, 2017 10:45 AM CDT   Masonic Lab Draw with UC MASONIC LAB DRAW   [facility] Masonic Lab Draw ([facility])    [address] [place] [address]   [phone]            Oct 18, 2017 11:10 AM CDT   (Arrive by 10:55 AM)   Return Visit with Zahira Christensen PA-C   [facility] ([facility])    [address] [place] [address]   [phone]            Nov 01, 2017  9:30 AM CDT   Masonic Lab Draw with UC MASONIC LAB DRAW   [facility] Masonic Lab Draw ([facility])    [address] [place] [address]   [phone]            Nov 01, 2017 10:00 AM CDT   Infusion 240 with UC ONCOLOGY INFUSION, UC 17 ATC   [facility]  "Clinics and Surgery Center)    [address]  2nd Floor  [facility] [address] [phone]              Who to contact     If you have questions or need follow up information about today's clinic visit or your schedule please contact [facility] directly at [phone].  Normal or non-critical lab and imaging results will be communicated to you by MyChart, letter or phone within 4 business days after the clinic has received the results. If you do not hear from us within 7 days, please contact the clinic through [employer] or phone. If you have a critical or abnormal lab result, we will notify you by phone as soon as possible.  Submit refill requests through [employer] or call your pharmacy and they will forward the refill request to us. Please allow 3 business days for your refill to be completed.          Additional Information About Your Visit        [employer] Information     [employer] gives you secure access to your electronic health record. If you see a primary care provider, you can also send messages to your care team and make appointments. If you have questions, please call your primary care clinic.  If you do not have a primary care provider, please call [phone] and they will assist you.        Care EveryWhere ID     This is your Care EveryWhere ID. This could be used by other organizations to access your [place] medical records  [id number]        Your Vitals Were     Pulse Temperature Respirations Height Pulse Oximetry Peak Flow    81 97.9  F (36.6  C) (Oral) 18 1.753 m (5' 9.02\") 98% 94 L/min    BMI (Body Mass Index)                   30.89 kg/m2            Blood Pressure from Last 3 Encounters:   09/13/17 128/82   08/30/17 126/72   08/16/17 123/87    Weight from Last 3 Encounters:   09/13/17 94.9 kg (209 lb 4.8 oz)   08/30/17 94.3 kg (207 lb 14.4 oz)   08/16/17 92.2 kg (203 lb 3.2 oz)              Today, you had the following     No orders found for display       Primary " Care Provider Office Phone # Fax #    Delio Alcala [phone]       [facility] [address] [place] [address]        Equal Access to Services     LUCIO BOGGS : Hadmarc snow white newtono Soteresaali, waaxda luqadaha, qaybta kaalmada adekatrinada, lynda feliz teodoroshantal novak laKarieleanne hines. So [facility] [phone].    ATENCIÓN: Si habla español, tiene a valle disposición servicios gratuitos de asistencia lingüística. Llame al [phone].    We comply with applicable federal civil rights laws and [place] laws. We do not discriminate on the basis of race, color, national origin, age, disability sex, sexual orientation or gender identity.            Thank you!     Thank you for choosing [facility]  for your care. Our goal is always to provide you with excellent care. Hearing back from our patients is one way we can continue to improve our services. Please take a few minutes to complete the written survey that you may receive in the mail after your visit with us. Thank you!             Your Updated Medication List - Protect others around you: Learn how to safely use, store and throw away your medicines at www.disposemymeds.org.          This list is accurate as of: 9/13/17 11:59 PM.  Always use your most recent med list.                   Brand Name Dispense Instructions for use Diagnosis    escitalopram 20 MG tablet    LEXAPRO    30 tablet    Take 1 tablet (20 mg) by mouth every morning    Major depressive disorder, recurrent episode, mild (H)       FLOVENT  MCG/ACT Inhaler   Generic drug:  fluticasone      Take 2 puffs by mouth 2 times daily as needed        lidocaine-prilocaine cream    EMLA    30 g    Apply topically as needed for moderate pain    Rectal cancer (H)       LORazepam 0.5 MG tablet    ATIVAN    30 tablet    Take 1 tablet (0.5 mg) by mouth every 4 hours as needed (Anxiety, Nausea/Vomiting or Sleep)    Adenocarcinoma of rectum (H)        ondansetron 8 MG tablet    ZOFRAN    60 tablet    Take 1 tablet (8 mg) by mouth every 8 hours as needed for nausea    Adenocarcinoma of rectum (H)       prochlorperazine 10 MG tablet    COMPAZINE    30 tablet    Take 1 tablet (10 mg) by mouth every 6 hours as needed (Nausea/Vomiting)    Adenocarcinoma of rectum (H)

## 2017-09-18 NOTE — PROGRESS NOTES
"This is a followup visit for a diagnosis of colon cancer, stage III adenocarcinoma of rectum.  Currently, he is getting adjuvant chemotherapy with 5fu ONLY AFTER A LENGTHY DISCUSSION LAST VISIT after 7 cycles of FOLFOX  Sep 13, 2017     HISTORY OF PRESENT ILLNESS:  Kindly refer to my previous notes for patient's presentation and treatment so far.  Briefly, from an oncological standpoint he underwent laparoscopic surgery for resection of the primary lesion, and was found to have stage IIIB disease; hence, he was initiated on FOLFOX adjuvant therapy.  After 7 cycles he chose to pursue only 5FU after a lengthy discussion about shorter course I.e 3 mths adjuvant vs 6 mths adjuvant chemo.      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   PAST MEDICAL HISTORY:  Unchanged.      PAST SURGICAL HISTORY:  Unchanged.      LABORATORY DATA:  Reviewed.      MEDICATIONS:  Reviewed.      ALLERGIES:  Reviewed.      PHYSICAL EXAMINATION:   VITAL SIGNS: /82  Pulse 81  Temp 97.9  F (36.6  C) (Oral)  Resp 18  Ht 1.753 m (5' 9.02\")  Wt 94.9 kg (209 lb 4.8 oz)  SpO2 98%  PF 94 L/min  BMI 30.89 kg/m2    GENERAL:  Alert and oriented x3, in no apparent distress.   HEENT:  No mucositis   SKIN:  No palmar or plantar dysesthesia.   CVS/RESPIRATORY:  Unremarkable.   ABDOMEN:  Nontender to palpation.   EXTREMITIES:  Lower extremities with no pedal edema.      LABORATORY DATA:    Results for DANII ANGULO (MRN [id number]) as of 9/18/2017 14:39   Ref. Range 9/13/2017 08:52   Sodium Latest Ref Range: 133 - 144 mmol/L 141   Potassium Latest Ref Range: 3.4 - 5.3 mmol/L 4.1   Chloride Latest Ref Range: 94 - 109 mmol/L 106   Carbon Dioxide Latest Ref Range: 20 - 32 mmol/L 27   Urea Nitrogen Latest Ref " Range: 7 - 30 mg/dL 18   Creatinine Latest Ref Range: 0.66 - 1.25 mg/dL 0.78   GFR Estimate Latest Ref Range: >60 mL/min/1.7m2 >90   GFR Estimate If Black Latest Ref Range: >60 mL/min/1.7m2 >90   Calcium Latest Ref Range: 8.5 - 10.1 mg/dL 8.7   Anion Gap Latest Ref Range: 3 - 14 mmol/L 8   Albumin Latest Ref Range: 3.4 - 5.0 g/dL 3.3 (L)   Protein Total Latest Ref Range: 6.8 - 8.8 g/dL 7.3   Bilirubin Total Latest Ref Range: 0.2 - 1.3 mg/dL 1.2   Alkaline Phosphatase Latest Ref Range: 40 - 150 U/L 77   ALT Latest Ref Range: 0 - 70 U/L 51   AST Latest Ref Range: 0 - 45 U/L 28   Glucose Latest Ref Range: 70 - 99 mg/dL 93   WBC Latest Ref Range: 4.0 - 11.0 10e9/L 3.5 (L)   Hemoglobin Latest Ref Range: 13.3 - 17.7 g/dL 13.3   Hematocrit Latest Ref Range: 40.0 - 53.0 % 38.4 (L)   Platelet Count Latest Ref Range: 150 - 450 10e9/L 195   RBC Count Latest Ref Range: 4.4 - 5.9 10e12/L 3.98 (L)   MCV Latest Ref Range: 78 - 100 fl 97   MCH Latest Ref Range: 26.5 - 33.0 pg 33.4 (H)   MCHC Latest Ref Range: 31.5 - 36.5 g/dL 34.6   RDW Latest Ref Range: 10.0 - 15.0 % 14.9   Diff Method Unknown Automated Method   % Neutrophils Latest Units: % 51.7   % Lymphocytes Latest Units: % 32.7   % Monocytes Latest Units: % 10.2   % Eosinophils Latest Units: % 4.0   % Basophils Latest Units: % 1.1   % Immature Granulocytes Latest Units: % 0.3   Nucleated RBCs Latest Ref Range: 0 /100 0   Absolute Neutrophil Latest Ref Range: 1.6 - 8.3 10e9/L 1.8   Absolute Lymphocytes Latest Ref Range: 0.8 - 5.3 10e9/L 1.2   Absolute Monocytes Latest Ref Range: 0.0 - 1.3 10e9/L 0.4   Absolute Eosinophils Latest Ref Range: 0.0 - 0.7 10e9/L 0.1   Absolute Basophils Latest Ref Range: 0.0 - 0.2 10e9/L 0.0   Abs Immature Granulocytes Latest Ref Range: 0 - 0.4 10e9/L 0.0   Absolute Nucleated RBC Unknown 0.0        ASSESSMENT AND PLAN:  Patient with stage III rectal cancer, currently undergoing adjuvant chemotherapy with FOLFOX.  He tolerated chemotherapy with the  expected side effects. After 7 cycles of FOLFOX he chose to pursue only 5FU and in his neuropathy is already starting to improve a little bit.      He'll continue rest of the chemotherapy but just 5-FU infusion and bolus along with leucovorin .   RTC in 14 days for next cycle .   Scan at the end of 12 cycles .     I spent 35 minutes in the care of this patient >50% of which was spent in coordinating and counseling.    Jeanette Mcarthur   of Medicine   Hematology and medical Oncology   [facility]

## 2017-09-19 ENCOUNTER — OFFICE VISIT (OUTPATIENT)
Dept: SURGERY | Facility: CLINIC | Age: 47
End: 2017-09-19

## 2017-09-19 VITALS
BODY MASS INDEX: 30.87 KG/M2 | HEIGHT: 69 IN | OXYGEN SATURATION: 98 % | WEIGHT: 208.4 LBS | HEART RATE: 70 BPM | TEMPERATURE: 98.2 F | SYSTOLIC BLOOD PRESSURE: 130 MMHG | DIASTOLIC BLOOD PRESSURE: 76 MMHG

## 2017-09-19 DIAGNOSIS — C20 MALIGNANT NEOPLASM OF RECTUM (H): Primary | ICD-10-CM

## 2017-09-19 ASSESSMENT — PAIN SCALES - GENERAL: PAINLEVEL: NO PAIN (0)

## 2017-09-19 NOTE — NURSING NOTE
"Chief Complaint   Patient presents with     Clinic Care Coordination - Follow-up     Flexible sigmoidoscopy.       Vitals:    09/19/17 1332   BP: 130/76   BP Location: Left arm   Patient Position: Chair   Cuff Size: Adult Large   Pulse: 70   Temp: 98.2  F (36.8  C)   TempSrc: Oral   SpO2: 98%   Weight: 208 lb 6.4 oz   Height: 5' 9.02\"       Body mass index is 30.76 kg/(m^2).    Jair LYONS LPN                        "

## 2017-09-19 NOTE — PROGRESS NOTES
"Colon and Rectal Surgery Clinic Note      RE: Meño Omalley  : 1970  JENNY: 2017      Meño returns for follow-up sigmoidoscopy, now approximately 5 months following his anterior resection for a T2 N1 C upper rectal cancer. He has been taking adjuvant chemotherapy, but developed significant neuropathy so the oxaliplatin component has been stopped. He has 2 cycles left ago. He otherwise is feeling well.    Physical examination: Digital rectal examination is normal. Flexible sigmoidoscopy shows a well-healed anastomosis with no evidence of recurrence. The anastomosis was slightly stenotic. The large bowel proximal and distal to the anastomosis is normal.    Impression: Dominic is doing generally well apart from anticipated chemotherapy-related morbidity. I advised him to schedule a colonoscopy roughly 1 year from the time of his surgery.    Total time 15 minutes, with an additional 10 minutes flexible sigmoidoscopy time. Greater than half of the 15 minutes was spent counseling.      For details of past medical history, surgical history, family history, medications, allergies, and review of systems, please see details below.    Medical history:  Past Medical History:   Diagnosis Date     Colon cancer (H)      Depressive disorder      Family history of malignant hyperthermia     UNCONFIRMED BUT FATHER HAD \"FEVER WITH ANESTHESIA\"     Nephrolithiasis     asymptomatic     Obese      Uncomplicated asthma     exercise induced       Surgical history:  Past Surgical History:   Procedure Laterality Date     LAPAROSCOPIC COLECTOMY LOW ANTERIOR N/A 2017    Procedure: LAPAROSCOPIC COLECTOMY LOW ANTERIOR;  Surgeon: Ignacio Rose MD;  Location: [facility] OR     NO HISTORY OF SURGERY       SIGMOIDOSCOPY FLEXIBLE N/A 2017    Procedure: SIGMOIDOSCOPY FLEXIBLE;  Surgeon: Ignacio Rose MD;  Location: [facility] OR       Family history:  Family History   Problem Relation Age of Onset     Cancer - colorectal Father 62     " "HITESHAHOMER Father 59     Hypertension Father      Neurologic Disorder Mother 70     ALS     Cancer - colorectal Sister 54       Medications:  Current Outpatient Prescriptions   Medication Sig Dispense Refill     ondansetron (ZOFRAN) 8 MG tablet Take 1 tablet (8 mg) by mouth every 8 hours as needed for nausea 60 tablet 1     escitalopram (LEXAPRO) 20 MG tablet Take 1 tablet (20 mg) by mouth every morning 30 tablet 3     lidocaine-prilocaine (EMLA) cream Apply topically as needed for moderate pain 30 g 1     prochlorperazine (COMPAZINE) 10 MG tablet Take 1 tablet (10 mg) by mouth every 6 hours as needed (Nausea/Vomiting) 30 tablet 2     LORazepam (ATIVAN) 0.5 MG tablet Take 1 tablet (0.5 mg) by mouth every 4 hours as needed (Anxiety, Nausea/Vomiting or Sleep) 30 tablet 2     fluticasone (FLOVENT HFA) 110 MCG/ACT inhaler Take 2 puffs by mouth 2 times daily as needed        Allergies:  The patienthas No Known Allergies.    Social history:  Social History   Substance Use Topics     Smoking status: Never Smoker     Smokeless tobacco: Never Used     Alcohol use Yes      Comment: beer couple times per months     Marital status: .    Review of Systems:  Nursing Notes:   Heron Argueta LPN  9/19/2017  1:33 PM  Signed  Chief Complaint   Patient presents with     Clinic Care Coordination - Follow-up     Flexible sigmoidoscopy.       Vitals:    09/19/17 1332   BP: 130/76   BP Location: Left arm   Patient Position: Chair   Cuff Size: Adult Large   Pulse: 70   Temp: 98.2  F (36.8  C)   TempSrc: Oral   SpO2: 98%   Weight: 208 lb 6.4 oz   Height: 5' 9.02\"       Body mass index is 30.76 kg/(m^2).    JONI Peoples MD   Professor and Chief  Division of Colon and Rectal Surgery  [facility]      Referring Provider:  Delio Alcala MD  [address] [place] [address]     Primary Care Provider:  Delio Alcala"

## 2017-09-19 NOTE — MR AVS SNAPSHOT
After Visit Summary   9/19/2017    Meño Omalley    MRN: [id number]           Patient Information     Date Of Birth          1970        Visit Information        Provider Department      9/19/2017 1:00 PM Ignacio Rose MD [facility] Colon and Rectal Surgery        Today's Diagnoses     Malignant neoplasm of rectum (H)    -  1       Follow-ups after your visit        Your next 10 appointments already scheduled     Sep 27, 2017  9:00 AM CDT   Masonic Lab Draw with UC MASONIC LAB DRAW   [facility]    [address] [place] [address]   [phone]            Sep 27, 2017  9:30 AM CDT   Infusion 240 with UC ONCOLOGY INFUSION, UC 27 ATC   [facility] ([facility])    [address] [place] [address]   [phone]            Oct 18, 2017 10:45 AM CDT   Masonic Lab Draw with UC MASONIC LAB DRAW   [facility] Masonic Lab Draw ([facility])    [address] [place] [address]   [phone]            Oct 18, 2017 11:10 AM CDT   (Arrive by 10:55 AM)   Return Visit with Zahira Christensen PA-C   [facility] ([facility])    [address] [place] [address]   [phone]            Nov 01, 2017  9:30 AM CDT   Masonic Lab Draw with UC MASONIC LAB DRAW   [facility]    [address] [place] [address]   [phone]            Nov 01, 2017 10:00 AM CDT   Infusion 240 with UC ONCOLOGY INFUSION, UC 17 ATC   [facility] ([facility])    [address] [place] [address]   [phone]              Future tests that were ordered for you today     Open Future Orders        Priority Expected Expires Ordered  "   FLEXIBLE SIGMOIDOSCOPY Routine  11/3/2017 9/19/2017            Who to contact     Please call your clinic at [phone] to:    Ask questions about your health    Make or cancel appointments    Discuss your medicines    Learn about your test results    Speak to your doctor   If you have compliments or concerns about an experience at your clinic, or if you wish to file a complaint, please contact [facility] Patient Relations at [phone] or email us at [email or web address]         Additional Information About Your Visit        [employer] Information     [employer] gives you secure access to your electronic health record. If you see a primary care provider, you can also send messages to your care team and make appointments. If you have questions, please call your primary care clinic.  If you do not have a primary care provider, please call [phone] and they will assist you.      [employer] is an electronic gateway that provides easy, online access to your medical records. With [employer], you can request a clinic appointment, read your test results, renew a prescription or communicate with your care team.     To access your existing account, please contact your [facility] Clinic or call [phone] for assistance.        Care EveryWhere ID     This is your Care EveryWhere ID. This could be used by other organizations to access your [place] medical records  [id number]        Your Vitals Were     Pulse Temperature Height Pulse Oximetry BMI (Body Mass Index)       70 98.2  F (36.8  C) (Oral) 5' 9.02\" 98% 30.76 kg/m2        Blood Pressure from Last 3 Encounters:   09/19/17 130/76   09/13/17 128/82   08/30/17 126/72    Weight from Last 3 Encounters:   09/19/17 208 lb 6.4 oz   09/13/17 209 lb 4.8 oz   08/30/17 207 lb 14.4 oz               Primary Care Provider Office Phone # Fax #    Delio Alcala [phone]       [facility] " CLINIC [address] [place] [address]        Equal Access to Services     ORVILLEABBIE FLAKITA : Hadii aad ku hadanshulshalom Anders, wadida justin, qakristenta lynnebrigittealicia sloan, lynda vargasryleeyoana hines. So [facility] [phone].    ATENCIÓN: Si habla español, tiene a valle disposición servicios gratuitos de asistencia lingüística. Llame al [phone].    We comply with applicable federal civil rights laws and [place] laws. We do not discriminate on the basis of race, color, national origin, age, disability sex, sexual orientation or gender identity.            Thank you!     Thank you for choosing [facility] COLON AND RECTAL SURGERY  for your care. Our goal is always to provide you with excellent care. Hearing back from our patients is one way we can continue to improve our services. Please take a few minutes to complete the written survey that you may receive in the mail after your visit with us. Thank you!             Your Updated Medication List - Protect others around you: Learn how to safely use, store and throw away your medicines at www.disposemymeds.org.          This list is accurate as of: 9/19/17  5:40 PM.  Always use your most recent med list.                   Brand Name Dispense Instructions for use Diagnosis    escitalopram 20 MG tablet    LEXAPRO    30 tablet    Take 1 tablet (20 mg) by mouth every morning    Major depressive disorder, recurrent episode, mild (H)       FLOVENT  MCG/ACT Inhaler   Generic drug:  fluticasone      Take 2 puffs by mouth 2 times daily as needed        lidocaine-prilocaine cream    EMLA    30 g    Apply topically as needed for moderate pain    Rectal cancer (H)       LORazepam 0.5 MG tablet    ATIVAN    30 tablet    Take 1 tablet (0.5 mg) by mouth every 4 hours as needed (Anxiety, Nausea/Vomiting or Sleep)    Adenocarcinoma of rectum (H)       ondansetron 8 MG tablet    ZOFRAN    60 tablet    Take 1 tablet (8 mg) by mouth every 8 hours as needed for nausea     Adenocarcinoma of rectum (H)       prochlorperazine 10 MG tablet    COMPAZINE    30 tablet    Take 1 tablet (10 mg) by mouth every 6 hours as needed (Nausea/Vomiting)    Adenocarcinoma of rectum (H)

## 2017-09-19 NOTE — LETTER
"2017       RE: Meño Omalley  [address] [facility] [address]     Dear Colleague,    Thank you for referring your patient, Meño Omalley, to the [facility] COLON AND RECTAL SURGERY at [facility]. Please see a copy of my visit note below.    Colon and Rectal Surgery Clinic Note      RE: Meño Omalley  : 1970  JENNY: 2017      Meño returns for follow-up sigmoidoscopy, now approximately 5 months following his anterior resection for a T2 N1 C upper rectal cancer. He has been taking adjuvant chemotherapy, but developed significant neuropathy so the oxaliplatin component has been stopped. He has 2 cycles left ago. He otherwise is feeling well.    Physical examination: Digital rectal examination is normal. Flexible sigmoidoscopy shows a well-healed anastomosis with no evidence of recurrence. The anastomosis was slightly stenotic. The large bowel proximal and distal to the anastomosis is normal.    Impression: Dominic is doing generally well apart from anticipated chemotherapy-related morbidity. I advised him to schedule a colonoscopy roughly 1 year from the time of his surgery.    Total time 15 minutes, with an additional 10 minutes flexible sigmoidoscopy time. Greater than half of the 15 minutes was spent counseling.      For details of past medical history, surgical history, family history, medications, allergies, and review of systems, please see details below.    Medical history:  Past Medical History:   Diagnosis Date     Colon cancer (H)      Depressive disorder      Family history of malignant hyperthermia     UNCONFIRMED BUT FATHER HAD \"FEVER WITH ANESTHESIA\"     Nephrolithiasis     asymptomatic     Obese      Uncomplicated asthma     exercise induced       Surgical history:  Past Surgical History:   Procedure Laterality Date     LAPAROSCOPIC COLECTOMY LOW ANTERIOR N/A 2017    Procedure: LAPAROSCOPIC COLECTOMY LOW ANTERIOR;  Surgeon: " "Ignacio Rose MD;  Location:  OR     NO HISTORY OF SURGERY       SIGMOIDOSCOPY FLEXIBLE N/A 4/12/2017    Procedure: SIGMOIDOSCOPY FLEXIBLE;  Surgeon: Ignacio Rose MD;  Location:  OR       Family history:  Family History   Problem Relation Age of Onset     Cancer - colorectal Father 62     C.A.D. Father 59     Hypertension Father      Neurologic Disorder Mother 70     ALS     Cancer - colorectal Sister 54       Medications:  Current Outpatient Prescriptions   Medication Sig Dispense Refill     ondansetron (ZOFRAN) 8 MG tablet Take 1 tablet (8 mg) by mouth every 8 hours as needed for nausea 60 tablet 1     escitalopram (LEXAPRO) 20 MG tablet Take 1 tablet (20 mg) by mouth every morning 30 tablet 3     lidocaine-prilocaine (EMLA) cream Apply topically as needed for moderate pain 30 g 1     prochlorperazine (COMPAZINE) 10 MG tablet Take 1 tablet (10 mg) by mouth every 6 hours as needed (Nausea/Vomiting) 30 tablet 2     LORazepam (ATIVAN) 0.5 MG tablet Take 1 tablet (0.5 mg) by mouth every 4 hours as needed (Anxiety, Nausea/Vomiting or Sleep) 30 tablet 2     fluticasone (FLOVENT HFA) 110 MCG/ACT inhaler Take 2 puffs by mouth 2 times daily as needed        Allergies:  The patienthas No Known Allergies.    Social history:  Social History   Substance Use Topics     Smoking status: Never Smoker     Smokeless tobacco: Never Used     Alcohol use Yes      Comment: beer couple times per months     Marital status: .    Review of Systems:  Nursing Notes:   Heron Argueta LPN  9/19/2017  1:33 PM  Signed  Chief Complaint   Patient presents with     Clinic Care Coordination - Follow-up     Flexible sigmoidoscopy.       Vitals:    09/19/17 1332   BP: 130/76   BP Location: Left arm   Patient Position: Chair   Cuff Size: Adult Large   Pulse: 70   Temp: 98.2  F (36.8  C)   TempSrc: Oral   SpO2: 98%   Weight: 208 lb 6.4 oz   Height: 5' 9.02\"       Body mass index is 30.76 kg/(m^2).    JONI Peoples" MD [place]   Professor and Chief  Division of Colon and Rectal Surgery  [facility]      Referring Provider:  Delio Alcala MD  [place] [address] [place] [address]     Primary Care Provider:  Delio Alcala

## 2017-09-23 ENCOUNTER — MEDICAL CORRESPONDENCE (OUTPATIENT)
Dept: HEALTH INFORMATION MANAGEMENT | Facility: CLINIC | Age: 47
End: 2017-09-23

## 2017-09-26 NOTE — PROGRESS NOTES
This is a recent snapshot of the patient's [facility] medical record.  For current drug dose and complete information and questions, call [phone] or In Basket pool, fv home infusion ([id number])  CSN Number:  [id number]

## 2017-09-27 ENCOUNTER — APPOINTMENT (OUTPATIENT)
Dept: LAB | Facility: CLINIC | Age: 47
End: 2017-09-27
Attending: INTERNAL MEDICINE
Payer: COMMERCIAL

## 2017-09-27 ENCOUNTER — INFUSION THERAPY VISIT (OUTPATIENT)
Dept: ONCOLOGY | Facility: CLINIC | Age: 47
End: 2017-09-27
Attending: INTERNAL MEDICINE
Payer: COMMERCIAL

## 2017-09-27 VITALS
HEART RATE: 74 BPM | OXYGEN SATURATION: 95 % | DIASTOLIC BLOOD PRESSURE: 83 MMHG | SYSTOLIC BLOOD PRESSURE: 132 MMHG | TEMPERATURE: 97.9 F | RESPIRATION RATE: 16 BRPM | BODY MASS INDEX: 31.23 KG/M2 | WEIGHT: 211.6 LBS

## 2017-09-27 DIAGNOSIS — C20 ADENOCARCINOMA OF RECTUM (H): Primary | ICD-10-CM

## 2017-09-27 LAB
ALBUMIN SERPL-MCNC: 3.5 G/DL (ref 3.4–5)
ALP SERPL-CCNC: 78 U/L (ref 40–150)
ALT SERPL W P-5'-P-CCNC: 51 U/L (ref 0–70)
ANION GAP SERPL CALCULATED.3IONS-SCNC: 7 MMOL/L (ref 3–14)
AST SERPL W P-5'-P-CCNC: 33 U/L (ref 0–45)
BASOPHILS # BLD AUTO: 0 10E9/L (ref 0–0.2)
BASOPHILS NFR BLD AUTO: 1 %
BILIRUB SERPL-MCNC: 0.9 MG/DL (ref 0.2–1.3)
BUN SERPL-MCNC: 14 MG/DL (ref 7–30)
CALCIUM SERPL-MCNC: 9 MG/DL (ref 8.5–10.1)
CHLORIDE SERPL-SCNC: 106 MMOL/L (ref 94–109)
CO2 SERPL-SCNC: 27 MMOL/L (ref 20–32)
CREAT SERPL-MCNC: 0.82 MG/DL (ref 0.66–1.25)
DIFFERENTIAL METHOD BLD: ABNORMAL
EOSINOPHIL # BLD AUTO: 0.2 10E9/L (ref 0–0.7)
EOSINOPHIL NFR BLD AUTO: 4.5 %
ERYTHROCYTE [DISTWIDTH] IN BLOOD BY AUTOMATED COUNT: 13.8 % (ref 10–15)
GFR SERPL CREATININE-BSD FRML MDRD: >90 ML/MIN/1.7M2
GLUCOSE SERPL-MCNC: 89 MG/DL (ref 70–99)
HCT VFR BLD AUTO: 39.6 % (ref 40–53)
HGB BLD-MCNC: 13.7 G/DL (ref 13.3–17.7)
IMM GRANULOCYTES # BLD: 0 10E9/L (ref 0–0.4)
IMM GRANULOCYTES NFR BLD: 0.5 %
LYMPHOCYTES # BLD AUTO: 1.4 10E9/L (ref 0.8–5.3)
LYMPHOCYTES NFR BLD AUTO: 35.6 %
MCH RBC QN AUTO: 33.7 PG (ref 26.5–33)
MCHC RBC AUTO-ENTMCNC: 34.6 G/DL (ref 31.5–36.5)
MCV RBC AUTO: 98 FL (ref 78–100)
MONOCYTES # BLD AUTO: 0.4 10E9/L (ref 0–1.3)
MONOCYTES NFR BLD AUTO: 10.1 %
NEUTROPHILS # BLD AUTO: 1.9 10E9/L (ref 1.6–8.3)
NEUTROPHILS NFR BLD AUTO: 48.3 %
NRBC # BLD AUTO: 0 10*3/UL
NRBC BLD AUTO-RTO: 0 /100
PLATELET # BLD AUTO: 207 10E9/L (ref 150–450)
POTASSIUM SERPL-SCNC: 4.2 MMOL/L (ref 3.4–5.3)
PROT SERPL-MCNC: 7.6 G/DL (ref 6.8–8.8)
RBC # BLD AUTO: 4.06 10E12/L (ref 4.4–5.9)
SODIUM SERPL-SCNC: 139 MMOL/L (ref 133–144)
WBC # BLD AUTO: 4 10E9/L (ref 4–11)

## 2017-09-27 PROCEDURE — 96367 TX/PROPH/DG ADDL SEQ IV INF: CPT

## 2017-09-27 PROCEDURE — 80053 COMPREHEN METABOLIC PANEL: CPT | Performed by: INTERNAL MEDICINE

## 2017-09-27 PROCEDURE — 96409 CHEMO IV PUSH SNGL DRUG: CPT

## 2017-09-27 PROCEDURE — 96416 CHEMO PROLONG INFUSE W/PUMP: CPT

## 2017-09-27 PROCEDURE — 25000128 H RX IP 250 OP 636: Mod: ZF | Performed by: INTERNAL MEDICINE

## 2017-09-27 PROCEDURE — 85025 COMPLETE CBC W/AUTO DIFF WBC: CPT | Performed by: INTERNAL MEDICINE

## 2017-09-27 PROCEDURE — 96375 TX/PRO/DX INJ NEW DRUG ADDON: CPT

## 2017-09-27 RX ORDER — FLUOROURACIL 50 MG/ML
400 INJECTION, SOLUTION INTRAVENOUS ONCE
Status: CANCELLED | OUTPATIENT
Start: 2017-09-27

## 2017-09-27 RX ORDER — DIPHENHYDRAMINE HYDROCHLORIDE 50 MG/ML
50 INJECTION INTRAMUSCULAR; INTRAVENOUS
Status: CANCELLED
Start: 2017-09-27

## 2017-09-27 RX ORDER — EPINEPHRINE 1 MG/ML
0.3 INJECTION INTRAMUSCULAR; INTRAVENOUS; SUBCUTANEOUS EVERY 5 MIN PRN
Status: CANCELLED | OUTPATIENT
Start: 2017-09-27

## 2017-09-27 RX ORDER — ALBUTEROL SULFATE 90 UG/1
1-2 AEROSOL, METERED RESPIRATORY (INHALATION)
Status: CANCELLED
Start: 2017-09-27

## 2017-09-27 RX ORDER — METHYLPREDNISOLONE SODIUM SUCCINATE 125 MG/2ML
125 INJECTION, POWDER, LYOPHILIZED, FOR SOLUTION INTRAMUSCULAR; INTRAVENOUS
Status: CANCELLED
Start: 2017-09-27

## 2017-09-27 RX ORDER — HEPARIN SODIUM (PORCINE) LOCK FLUSH IV SOLN 100 UNIT/ML 100 UNIT/ML
5 SOLUTION INTRAVENOUS ONCE
Status: COMPLETED | OUTPATIENT
Start: 2017-09-27 | End: 2017-09-27

## 2017-09-27 RX ORDER — ALBUTEROL SULFATE 0.83 MG/ML
2.5 SOLUTION RESPIRATORY (INHALATION)
Status: CANCELLED | OUTPATIENT
Start: 2017-09-27

## 2017-09-27 RX ORDER — MEPERIDINE HYDROCHLORIDE 25 MG/ML
25 INJECTION INTRAMUSCULAR; INTRAVENOUS; SUBCUTANEOUS EVERY 30 MIN PRN
Status: CANCELLED | OUTPATIENT
Start: 2017-09-27

## 2017-09-27 RX ORDER — FLUOROURACIL 50 MG/ML
400 INJECTION, SOLUTION INTRAVENOUS ONCE
Status: COMPLETED | OUTPATIENT
Start: 2017-09-27 | End: 2017-09-27

## 2017-09-27 RX ORDER — PALONOSETRON 0.05 MG/ML
0.25 INJECTION, SOLUTION INTRAVENOUS ONCE
Status: CANCELLED
Start: 2017-09-27 | End: 2017-09-27

## 2017-09-27 RX ORDER — LORAZEPAM 2 MG/ML
0.5 INJECTION INTRAMUSCULAR EVERY 4 HOURS PRN
Status: CANCELLED
Start: 2017-09-27

## 2017-09-27 RX ORDER — SODIUM CHLORIDE 9 MG/ML
1000 INJECTION, SOLUTION INTRAVENOUS CONTINUOUS PRN
Status: CANCELLED
Start: 2017-09-27

## 2017-09-27 RX ORDER — EPINEPHRINE 0.3 MG/.3ML
0.3 INJECTION SUBCUTANEOUS EVERY 5 MIN PRN
Status: CANCELLED | OUTPATIENT
Start: 2017-09-27

## 2017-09-27 RX ORDER — PALONOSETRON 0.05 MG/ML
0.25 INJECTION, SOLUTION INTRAVENOUS ONCE
Status: COMPLETED | OUTPATIENT
Start: 2017-09-27 | End: 2017-09-27

## 2017-09-27 RX ADMIN — PALONOSETRON HYDROCHLORIDE 0.25 MG: 0.25 INJECTION INTRAVENOUS at 10:19

## 2017-09-27 RX ADMIN — SODIUM CHLORIDE, PRESERVATIVE FREE 5 ML: 5 INJECTION INTRAVENOUS at 09:14

## 2017-09-27 RX ADMIN — FLUOROURACIL 850 MG: 50 INJECTION, SOLUTION INTRAVENOUS at 11:52

## 2017-09-27 RX ADMIN — DEXAMETHASONE SODIUM PHOSPHATE: 10 INJECTION, SOLUTION INTRAMUSCULAR; INTRAVENOUS at 10:20

## 2017-09-27 RX ADMIN — SODIUM CHLORIDE 250 ML: 9 INJECTION, SOLUTION INTRAVENOUS at 10:18

## 2017-09-27 RX ADMIN — LEUCOVORIN CALCIUM 750 MG: 350 INJECTION, POWDER, LYOPHILIZED, FOR SOLUTION INTRAMUSCULAR; INTRAVENOUS at 10:43

## 2017-09-27 ASSESSMENT — PAIN SCALES - GENERAL: PAINLEVEL: NO PAIN (0)

## 2017-09-27 NOTE — PROGRESS NOTES
Infusion Nursing Note:  Meño Omalley presents today for D1 C10 Leucovorin, Fluorouracil push and pump connect.    Patient seen by provider today: No    Intravenous Access:  Implanted Port.    Treatment Conditions:  Lab Results   Component Value Date    HGB 13.7 09/27/2017     Lab Results   Component Value Date    WBC 4.0 09/27/2017      Lab Results   Component Value Date    ANEU 1.9 09/27/2017     Lab Results   Component Value Date     09/27/2017      Lab Results   Component Value Date     09/27/2017                   Lab Results   Component Value Date    POTASSIUM 4.2 09/27/2017           Lab Results   Component Value Date    MAG 2.1 04/13/2017            Lab Results   Component Value Date    CR 0.82 09/27/2017                   Lab Results   Component Value Date    CANDY 9.0 09/27/2017                Lab Results   Component Value Date    BILITOTAL 0.9 09/27/2017           Lab Results   Component Value Date    ALBUMIN 3.5 09/27/2017                    Lab Results   Component Value Date    ALT 51 09/27/2017           Lab Results   Component Value Date    AST 33 09/27/2017     Results reviewed, labs MET treatment parameters, ok to proceed with treatment.    Note: Patient reports feeling decent. Had some diarrhea last week that has mostly resolved. Otherwise, no new issues or concerns. Reports feeling ready for next cycle.    Fluorouracil Cseries infusion pump connected at 1200.  Positive blood return from port at time of pump hook up. Heat sensor attached to patient, connections open and taped; verified by Kelly FREY RN. Fluorouracil to infuse over 46 hours at 5cc/hour. Pump will be disconnected on Friday 9/29/17 at 0900 by [facility]; verified with Coleman at [facility]. Patient and wife aware of pump disconnect date and time.    Post Infusion Assessment:  Patient tolerated infusion without incident.  Blood return noted pre and post infusion.  Blood return noted during Fluororuacil administration every 2 cc.  Site  patent and intact, free from redness, edema or discomfort.  No evidence of extravasations.    Discharge Plan:   Patient declined prescription refills.  Discharge instructions reviewed with: Patient and Family.  Patient and/or family verbalized understanding of discharge instructions and all questions answered.  AVS to patient via [employer].  Patient will return 10/18/17 for next appointment.   Patient discharged in stable condition accompanied by: self and wife.  Departure Mode: Ambulatory.    Catherine Weiss RN

## 2017-09-27 NOTE — MR AVS SNAPSHOT
After Visit Summary   9/27/2017    Meño Omalley    MRN: [id number]           Patient Information     Date Of Birth          1970        Visit Information        Provider Department      9/27/2017 9:30 AM  27 ATC;  ONCOLOGY INFUSION [facility]        Today's Diagnoses     Adenocarcinoma of rectum (H)    -  1      Care Instructions    Contact Numbers  [facility] Nurse Triage: [phone]  After Hours Nurse Line:  [phone]    Please call the [facility] Triage line if you experience a temperature greater than or equal to 100.5, shaking chills, have uncontrolled nausea, vomiting and/or diarrhea, dizziness, shortness of breath, chest pain, bleeding, unexplained bruising, or if you have any other new/concerning symptoms, questions or concerns.     If it is after hours, weekends, or holidays, please call either the after hours nurse line listed above.    If you are having any concerning symptoms or wish to speak to a provider before your next infusion visit, please call your care coordinator or triage to notify them so we can adequately serve you.     If you need a refill on a narcotic prescription or other medication, please call triage before your infusion appointment.         September 2017 Sunday Monday Tuesday Wednesday Thursday Friday Saturday                            1     2       3     4     5     6     7     8     9       10     11     12     13     [facility]    8[facility]15 AM   (15 min.)   [facility] LAB DRAW   [facility] Lab Draw     P RETURN    8:30 AM   (30 min.)   Jeanette Mcarthur MD   [facility] ONC INFUSION 240    9:30 AM   (240 min.)    ONCOLOGY INFUSION   [facility] 14     15     16       17     18     19     [facility] FLEXIBLE SIGMOIDOSCOPY    1:00 PM   (30 min.)   Ignacio Rose MD   [facility] Colon and Rectal Surgery 20     21     22     23       24     25     26     27     [facility]  LAB DRAW    9:00 AM   (15 min.)   [facility] LAB DRAW   [facility] Lab Draw     [facility] ONC INFUSION 240    9:30 AM   (240 min.)   [facility] ONCOLOGY INFUSION   [facility] 28 29 30 October 2017 Sunday Monday Tuesday Wednesday Thursday Friday Saturday   1     2     3     4     5     6     7       8     9     10     11     12     13     14       15     16     17     18     [facility]   10:45 AM   (15 min.)   [facility]   [facility] Lab Draw     [facility] RETURN   10:55 AM   (50 min.)   Zahira Christensen PA-C   [facility] 19     20     21       22     23     24     25     26     27     28       29     30     31                                      Lab Results:  Recent Results (from the past 12 hour(s))   CBC with platelets differential    Collection Time: 09/27/17  9:20 AM   Result Value Ref Range    WBC 4.0 4.0 - 11.0 10e9/L    RBC Count 4.06 (L) 4.4 - 5.9 10e12/L    Hemoglobin 13.7 13.3 - 17.7 g/dL    Hematocrit 39.6 (L) 40.0 - 53.0 %    MCV 98 78 - 100 fl    MCH 33.7 (H) 26.5 - 33.0 pg    MCHC 34.6 31.5 - 36.5 g/dL    RDW 13.8 10.0 - 15.0 %    Platelet Count 207 150 - 450 10e9/L    Diff Method Automated Method     % Neutrophils 48.3 %    % Lymphocytes 35.6 %    % Monocytes 10.1 %    % Eosinophils 4.5 %    % Basophils 1.0 %    % Immature Granulocytes 0.5 %    Nucleated RBCs 0 0 /100    Absolute Neutrophil 1.9 1.6 - 8.3 10e9/L    Absolute Lymphocytes 1.4 0.8 - 5.3 10e9/L    Absolute Monocytes 0.4 0.0 - 1.3 10e9/L    Absolute Eosinophils 0.2 0.0 - 0.7 10e9/L    Absolute Basophils 0.0 0.0 - 0.2 10e9/L    Abs Immature Granulocytes 0.0 0 - 0.4 10e9/L    Absolute Nucleated RBC 0.0    Comprehensive metabolic panel    Collection Time: 09/27/17  9:20 AM   Result Value Ref Range    Sodium 139 133 - 144 mmol/L    Potassium 4.2 3.4 - 5.3 mmol/L    Chloride 106 94 - 109 mmol/L    Carbon Dioxide 27 20 - 32 mmol/L    Anion Gap 7 3 - 14 mmol/L     Glucose 89 70 - 99 mg/dL    Urea Nitrogen 14 7 - 30 mg/dL    Creatinine 0.82 0.66 - 1.25 mg/dL    GFR Estimate >90 >60 mL/min/1.7m2    GFR Estimate If Black >90 >60 mL/min/1.7m2    Calcium 9.0 8.5 - 10.1 mg/dL    Bilirubin Total 0.9 0.2 - 1.3 mg/dL    Albumin 3.5 3.4 - 5.0 g/dL    Protein Total 7.6 6.8 - 8.8 g/dL    Alkaline Phosphatase 78 40 - 150 U/L    ALT 51 0 - 70 U/L    AST 33 0 - 45 U/L               Follow-ups after your visit        Your next 10 appointments already scheduled     Oct 18, 2017 10:45 AM CDT   [facility] Lab Draw with  [facility] LAB DRAW   [facility] Lab Draw ([facility])    [address] [place] [phone] [phone]            Oct 18, 2017 11:10 AM CDT   (Arrive by 10:55 AM)   Return Visit with Zahira Christensen PA-C   [facility] ([facility])    [address] [place] [id number] [phone]            Nov 01, 2017  9:30 AM CDT   [facility] Lab Draw with  [facility] LAB DRAW   [facility]    [address] [place] [id number] [phone]            Nov 01, 2017 10:00 AM CDT   Infusion 240 with  ONCOLOGY INFUSION, [facility] 17 ATC   [facility] ([facility])    [address] [place] [id number] [phone]              Who to contact     If you have questions or need follow up information about today's clinic visit or your schedule please contact [facility] directly at [phone].  Normal or non-critical lab and imaging results will be communicated to you by MyChart, letter or phone within 4 business days after the clinic has received the results. If you do not hear from us within 7 days, please contact the clinic through MyChart or phone. If you have a critical or abnormal lab result, we will notify  you by phone as soon as possible.  Submit refill requests through [employer] or call your pharmacy and they will forward the refill request to us. Please allow 3 business days for your refill to be completed.          Additional Information About Your Visit        [employer] Information     [employer] gives you secure access to your electronic health record. If you see a primary care provider, you can also send messages to your care team and make appointments. If you have questions, please call your primary care clinic.  If you do not have a primary care provider, please call [phone] and they will assist you.        Care EveryWhere ID     This is your Care EveryWhere ID. This could be used by other organizations to access your [place] medical records  [id number]        Your Vitals Were     Pulse Temperature Respirations Pulse Oximetry BMI (Body Mass Index)       74 97.9  F (36.6  C) (Oral) 16 95% 31.23 kg/m2        Blood Pressure from Last 3 Encounters:   09/27/17 132/83   09/19/17 130/76   09/13/17 128/82    Weight from Last 3 Encounters:   09/27/17 96 kg (211 lb 9.6 oz)   09/19/17 94.5 kg (208 lb 6.4 oz)   09/13/17 94.9 kg (209 lb 4.8 oz)              We Performed the Following     CBC with platelets differential     Comprehensive metabolic panel        Primary Care Provider Office Phone # Fax #    Delio Alcala [phone]       [place] [id number] [place] [address]        Equal Access to Services     LUCIO BOGGS : Hadii aad ku hadasho Soomaali, waaxda luqadaha, qaybta kaalmada adeegyada, waxay sergei morales . So [facility] [phone].    ATENCIÓN: Si habla español, tiene a valle disposición servicios gratuitos de asistencia lingüística. Llame al [phone].    We comply with applicable federal civil rights laws and [place] laws. We do not discriminate on the basis of race, color, national origin, age, disability sex, sexual orientation or  gender identity.            Thank you!     Thank you for choosing [facility]  for your care. Our goal is always to provide you with excellent care. Hearing back from our patients is one way we can continue to improve our services. Please take a few minutes to complete the written survey that you may receive in the mail after your visit with us. Thank you!             Your Updated Medication List - Protect others around you: Learn how to safely use, store and throw away your medicines at www.disposemymeds.org.          This list is accurate as of: 9/27/17 12:10 PM.  Always use your most recent med list.                   Brand Name Dispense Instructions for use Diagnosis    escitalopram 20 MG tablet    LEXAPRO    30 tablet    Take 1 tablet (20 mg) by mouth every morning    Major depressive disorder, recurrent episode, mild (H)       FLOVENT  MCG/ACT Inhaler   Generic drug:  fluticasone      Take 2 puffs by mouth 2 times daily as needed        lidocaine-prilocaine cream    EMLA    30 g    Apply topically as needed for moderate pain    Rectal cancer (H)       LORazepam 0.5 MG tablet    ATIVAN    30 tablet    Take 1 tablet (0.5 mg) by mouth every 4 hours as needed (Anxiety, Nausea/Vomiting or Sleep)    Adenocarcinoma of rectum (H)       ondansetron 8 MG tablet    ZOFRAN    60 tablet    Take 1 tablet (8 mg) by mouth every 8 hours as needed for nausea    Adenocarcinoma of rectum (H)       prochlorperazine 10 MG tablet    COMPAZINE    30 tablet    Take 1 tablet (10 mg) by mouth every 6 hours as needed (Nausea/Vomiting)    Adenocarcinoma of rectum (H)

## 2017-09-27 NOTE — PATIENT INSTRUCTIONS
Contact Numbers  [facility] Nurse Triage: [phone]  After Hours Nurse Line:  [phone]    Please call the [facility] Triage line if you experience a temperature greater than or equal to 100.5, shaking chills, have uncontrolled nausea, vomiting and/or diarrhea, dizziness, shortness of breath, chest pain, bleeding, unexplained bruising, or if you have any other new/concerning symptoms, questions or concerns.     If it is after hours, weekends, or holidays, please call either the after hours nurse line listed above.    If you are having any concerning symptoms or wish to speak to a provider before your next infusion visit, please call your care coordinator or triage to notify them so we can adequately serve you.     If you need a refill on a narcotic prescription or other medication, please call triage before your infusion appointment.         September 2017 Sunday Monday Tuesday Wednesday Thursday Friday Saturday                            1     2       3     4     5     6     7     8     9       10     11     12     13     [facility] LAB DRAW    8:15 AM   (15 min.)    [facility] LAB DRAW   [facility] Lab Draw     [facility] RETURN    8:30 AM   (30 min.)   Jeanette Mcarthur MD   [facility]     [facility] ONC INFUSION 240    9:30 AM   (240 min.)    ONCOLOGY INFUSION   [facility] 14     15     16       17     18     19     [facility] FLEXIBLE SIGMOIDOSCOPY    1:00 PM   (30 min.)   Ignacio Rose MD   [facility] Colon and Rectal Surgery 20     21     22     23       24     25     26     27     [facility] LAB DRAW    9:00 AM   (15 min.)    [facility] LAB DRAW   [facility] Lab Draw     [facility] ONC INFUSION 240    9:30 AM   (240 min.)    ONCOLOGY INFUSION   [facility] 28 29 30 October 2017 Sunday Monday Tuesday Wednesday Thursday Friday Saturday   1     2     3     4     5     6     7       8     9     10     11     12     13     14        15     16     17     18     [facility] LAB DRAW   10:45 AM   (15 min.)    [facility]   [facility] Lab Draw     [facility] RETURN   10:55 AM   (50 min.)   Zahira Christensen PA-C   [facility] 19     20     21       22     23     24     25     26     27     28       29     30     31                                      Lab Results:  Recent Results (from the past 12 hour(s))   CBC with platelets differential    Collection Time: 09/27/17  9:20 AM   Result Value Ref Range    WBC 4.0 4.0 - 11.0 10e9/L    RBC Count 4.06 (L) 4.4 - 5.9 10e12/L    Hemoglobin 13.7 13.3 - 17.7 g/dL    Hematocrit 39.6 (L) 40.0 - 53.0 %    MCV 98 78 - 100 fl    MCH 33.7 (H) 26.5 - 33.0 pg    MCHC 34.6 31.5 - 36.5 g/dL    RDW 13.8 10.0 - 15.0 %    Platelet Count 207 150 - 450 10e9/L    Diff Method Automated Method     % Neutrophils 48.3 %    % Lymphocytes 35.6 %    % Monocytes 10.1 %    % Eosinophils 4.5 %    % Basophils 1.0 %    % Immature Granulocytes 0.5 %    Nucleated RBCs 0 0 /100    Absolute Neutrophil 1.9 1.6 - 8.3 10e9/L    Absolute Lymphocytes 1.4 0.8 - 5.3 10e9/L    Absolute Monocytes 0.4 0.0 - 1.3 10e9/L    Absolute Eosinophils 0.2 0.0 - 0.7 10e9/L    Absolute Basophils 0.0 0.0 - 0.2 10e9/L    Abs Immature Granulocytes 0.0 0 - 0.4 10e9/L    Absolute Nucleated RBC 0.0    Comprehensive metabolic panel    Collection Time: 09/27/17  9:20 AM   Result Value Ref Range    Sodium 139 133 - 144 mmol/L    Potassium 4.2 3.4 - 5.3 mmol/L    Chloride 106 94 - 109 mmol/L    Carbon Dioxide 27 20 - 32 mmol/L    Anion Gap 7 3 - 14 mmol/L    Glucose 89 70 - 99 mg/dL    Urea Nitrogen 14 7 - 30 mg/dL    Creatinine 0.82 0.66 - 1.25 mg/dL    GFR Estimate >90 >60 mL/min/1.7m2    GFR Estimate If Black >90 >60 mL/min/1.7m2    Calcium 9.0 8.5 - 10.1 mg/dL    Bilirubin Total 0.9 0.2 - 1.3 mg/dL    Albumin 3.5 3.4 - 5.0 g/dL    Protein Total 7.6 6.8 - 8.8 g/dL    Alkaline Phosphatase 78 40 - 150 U/L    ALT 51 0 - 70 U/L    AST 33 0 -  45 U/L

## 2017-09-27 NOTE — NURSING NOTE
"Chief Complaint   Patient presents with     Port Draw     Labs drawn from port with power needle. Line flushed with saline and heparin. Vitals taken and pt checked in for appt     Port accessed with 20g 3/4\" power needle by RN, labs collected, line flushed with saline and heparin.  Vitals taken. Pt checked in for appointment(s).    Tenisha Fontana RN  "

## 2017-10-18 ENCOUNTER — ONCOLOGY VISIT (OUTPATIENT)
Dept: ONCOLOGY | Facility: CLINIC | Age: 47
End: 2017-10-18
Attending: INTERNAL MEDICINE
Payer: COMMERCIAL

## 2017-10-18 VITALS
DIASTOLIC BLOOD PRESSURE: 82 MMHG | OXYGEN SATURATION: 95 % | RESPIRATION RATE: 16 BRPM | BODY MASS INDEX: 32.27 KG/M2 | HEART RATE: 93 BPM | HEIGHT: 69 IN | SYSTOLIC BLOOD PRESSURE: 132 MMHG | WEIGHT: 217.9 LBS | TEMPERATURE: 98.1 F

## 2017-10-18 DIAGNOSIS — C20 ADENOCARCINOMA OF RECTUM (H): Primary | ICD-10-CM

## 2017-10-18 LAB
ALBUMIN SERPL-MCNC: 3.2 G/DL (ref 3.4–5)
ALP SERPL-CCNC: 94 U/L (ref 40–150)
ALT SERPL W P-5'-P-CCNC: 91 U/L (ref 0–70)
ANION GAP SERPL CALCULATED.3IONS-SCNC: 6 MMOL/L (ref 3–14)
AST SERPL W P-5'-P-CCNC: 47 U/L (ref 0–45)
BASOPHILS # BLD AUTO: 0 10E9/L (ref 0–0.2)
BASOPHILS NFR BLD AUTO: 1.2 %
BILIRUB SERPL-MCNC: 0.6 MG/DL (ref 0.2–1.3)
BUN SERPL-MCNC: 17 MG/DL (ref 7–30)
CALCIUM SERPL-MCNC: 8.8 MG/DL (ref 8.5–10.1)
CHLORIDE SERPL-SCNC: 105 MMOL/L (ref 94–109)
CO2 SERPL-SCNC: 28 MMOL/L (ref 20–32)
CREAT SERPL-MCNC: 0.76 MG/DL (ref 0.66–1.25)
DIFFERENTIAL METHOD BLD: ABNORMAL
EOSINOPHIL # BLD AUTO: 0.1 10E9/L (ref 0–0.7)
EOSINOPHIL NFR BLD AUTO: 4 %
ERYTHROCYTE [DISTWIDTH] IN BLOOD BY AUTOMATED COUNT: 13 % (ref 10–15)
GFR SERPL CREATININE-BSD FRML MDRD: >90 ML/MIN/1.7M2
GLUCOSE SERPL-MCNC: 99 MG/DL (ref 70–99)
HCT VFR BLD AUTO: 40 % (ref 40–53)
HGB BLD-MCNC: 13.7 G/DL (ref 13.3–17.7)
IMM GRANULOCYTES # BLD: 0 10E9/L (ref 0–0.4)
IMM GRANULOCYTES NFR BLD: 0.6 %
LYMPHOCYTES # BLD AUTO: 1.4 10E9/L (ref 0.8–5.3)
LYMPHOCYTES NFR BLD AUTO: 39.5 %
MCH RBC QN AUTO: 34.2 PG (ref 26.5–33)
MCHC RBC AUTO-ENTMCNC: 34.3 G/DL (ref 31.5–36.5)
MCV RBC AUTO: 100 FL (ref 78–100)
MONOCYTES # BLD AUTO: 0.4 10E9/L (ref 0–1.3)
MONOCYTES NFR BLD AUTO: 12.4 %
NEUTROPHILS # BLD AUTO: 1.5 10E9/L (ref 1.6–8.3)
NEUTROPHILS NFR BLD AUTO: 42.3 %
NRBC # BLD AUTO: 0 10*3/UL
NRBC BLD AUTO-RTO: 0 /100
PLATELET # BLD AUTO: 231 10E9/L (ref 150–450)
POTASSIUM SERPL-SCNC: 3.9 MMOL/L (ref 3.4–5.3)
PROT SERPL-MCNC: 7.1 G/DL (ref 6.8–8.8)
RBC # BLD AUTO: 4.01 10E12/L (ref 4.4–5.9)
SODIUM SERPL-SCNC: 139 MMOL/L (ref 133–144)
WBC # BLD AUTO: 3.5 10E9/L (ref 4–11)

## 2017-10-18 PROCEDURE — 99213 OFFICE O/P EST LOW 20 MIN: CPT | Mod: ZF

## 2017-10-18 PROCEDURE — 96375 TX/PRO/DX INJ NEW DRUG ADDON: CPT

## 2017-10-18 PROCEDURE — 96416 CHEMO PROLONG INFUSE W/PUMP: CPT

## 2017-10-18 PROCEDURE — 25000128 H RX IP 250 OP 636: Mod: ZF | Performed by: INTERNAL MEDICINE

## 2017-10-18 PROCEDURE — 85025 COMPLETE CBC W/AUTO DIFF WBC: CPT | Performed by: INTERNAL MEDICINE

## 2017-10-18 PROCEDURE — 80053 COMPREHEN METABOLIC PANEL: CPT | Performed by: INTERNAL MEDICINE

## 2017-10-18 PROCEDURE — 96409 CHEMO IV PUSH SNGL DRUG: CPT

## 2017-10-18 PROCEDURE — 96367 TX/PROPH/DG ADDL SEQ IV INF: CPT

## 2017-10-18 PROCEDURE — 99213 OFFICE O/P EST LOW 20 MIN: CPT | Mod: GC | Performed by: INTERNAL MEDICINE

## 2017-10-18 RX ORDER — DIPHENHYDRAMINE HYDROCHLORIDE 50 MG/ML
50 INJECTION INTRAMUSCULAR; INTRAVENOUS
Status: CANCELLED
Start: 2017-10-18

## 2017-10-18 RX ORDER — METHYLPREDNISOLONE SODIUM SUCCINATE 125 MG/2ML
125 INJECTION, POWDER, LYOPHILIZED, FOR SOLUTION INTRAMUSCULAR; INTRAVENOUS
Status: CANCELLED
Start: 2017-10-18

## 2017-10-18 RX ORDER — ALBUTEROL SULFATE 90 UG/1
1-2 AEROSOL, METERED RESPIRATORY (INHALATION)
Status: CANCELLED
Start: 2017-10-18

## 2017-10-18 RX ORDER — EPINEPHRINE 1 MG/ML
0.3 INJECTION, SOLUTION, CONCENTRATE INTRAVENOUS EVERY 5 MIN PRN
Status: CANCELLED | OUTPATIENT
Start: 2017-10-18

## 2017-10-18 RX ORDER — PALONOSETRON 0.05 MG/ML
0.25 INJECTION, SOLUTION INTRAVENOUS ONCE
Status: COMPLETED | OUTPATIENT
Start: 2017-10-18 | End: 2017-10-18

## 2017-10-18 RX ORDER — MEPERIDINE HYDROCHLORIDE 25 MG/ML
25 INJECTION INTRAMUSCULAR; INTRAVENOUS; SUBCUTANEOUS EVERY 30 MIN PRN
Status: CANCELLED | OUTPATIENT
Start: 2017-10-18

## 2017-10-18 RX ORDER — FLUOROURACIL 50 MG/ML
400 INJECTION, SOLUTION INTRAVENOUS ONCE
Status: COMPLETED | OUTPATIENT
Start: 2017-10-18 | End: 2017-10-18

## 2017-10-18 RX ORDER — SODIUM CHLORIDE 9 MG/ML
1000 INJECTION, SOLUTION INTRAVENOUS CONTINUOUS PRN
Status: CANCELLED
Start: 2017-10-18

## 2017-10-18 RX ORDER — EPINEPHRINE 0.3 MG/.3ML
0.3 INJECTION SUBCUTANEOUS EVERY 5 MIN PRN
Status: CANCELLED | OUTPATIENT
Start: 2017-10-18

## 2017-10-18 RX ORDER — PALONOSETRON 0.05 MG/ML
0.25 INJECTION, SOLUTION INTRAVENOUS ONCE
Status: CANCELLED
Start: 2017-10-18 | End: 2017-10-18

## 2017-10-18 RX ORDER — FLUOROURACIL 50 MG/ML
400 INJECTION, SOLUTION INTRAVENOUS ONCE
Status: CANCELLED | OUTPATIENT
Start: 2017-10-18

## 2017-10-18 RX ORDER — LORAZEPAM 2 MG/ML
0.5 INJECTION INTRAMUSCULAR EVERY 4 HOURS PRN
Status: CANCELLED
Start: 2017-10-18

## 2017-10-18 RX ORDER — HEPARIN SODIUM (PORCINE) LOCK FLUSH IV SOLN 100 UNIT/ML 100 UNIT/ML
500 SOLUTION INTRAVENOUS DAILY PRN
Status: DISCONTINUED | OUTPATIENT
Start: 2017-10-18 | End: 2017-10-30 | Stop reason: HOSPADM

## 2017-10-18 RX ORDER — ALBUTEROL SULFATE 0.83 MG/ML
2.5 SOLUTION RESPIRATORY (INHALATION)
Status: CANCELLED | OUTPATIENT
Start: 2017-10-18

## 2017-10-18 RX ADMIN — PALONOSETRON HYDROCHLORIDE 0.25 MG: 0.25 INJECTION INTRAVENOUS at 13:27

## 2017-10-18 RX ADMIN — LEUCOVORIN CALCIUM 750 MG: 50 INJECTION, POWDER, LYOPHILIZED, FOR SOLUTION INTRAMUSCULAR; INTRAVENOUS at 13:45

## 2017-10-18 RX ADMIN — SODIUM CHLORIDE 250 ML: 9 INJECTION, SOLUTION INTRAVENOUS at 13:27

## 2017-10-18 RX ADMIN — SODIUM CHLORIDE, PRESERVATIVE FREE 500 UNITS: 5 INJECTION INTRAVENOUS at 10:41

## 2017-10-18 RX ADMIN — FLUOROURACIL 850 MG: 50 INJECTION, SOLUTION INTRAVENOUS at 14:15

## 2017-10-18 RX ADMIN — DEXAMETHASONE SODIUM PHOSPHATE: 10 INJECTION, SOLUTION INTRAMUSCULAR; INTRAVENOUS at 13:29

## 2017-10-18 ASSESSMENT — PAIN SCALES - GENERAL: PAINLEVEL: NO PAIN (0)

## 2017-10-18 NOTE — PROGRESS NOTES
Infusion Nursing Note:    Patient presents today for Cycle 11 Day 1 Leucovorin, Fluorouracil bolus/pump.  Arrived with spouse.  Patient met with Dr. Mcarthur prior to infusion.    Lab Results   Component Value Date    HGB 13.7 10/18/2017     Lab Results   Component Value Date    WBC 3.5 10/18/2017      Lab Results   Component Value Date    ANEU 1.5 10/18/2017     Lab Results   Component Value Date     10/18/2017      Lab Results   Component Value Date     10/18/2017                   Lab Results   Component Value Date    POTASSIUM 3.9 10/18/2017           Lab Results   Component Value Date    MAG 2.1 04/13/2017            Lab Results   Component Value Date    CR 0.76 10/18/2017                   Lab Results   Component Value Date    CANDY 8.8 10/18/2017                Lab Results   Component Value Date    BILITOTAL 0.6 10/18/2017           Lab Results   Component Value Date    ALBUMIN 3.2 10/18/2017                    Lab Results   Component Value Date    ALT 91 10/18/2017           Lab Results   Component Value Date    AST 47 10/18/2017     Results reviewed, labs MET treatment parameters, ok to proceed with treatment.        Note: N/A.    Intravenous Access:  Implanted Port.    Post Infusion Assessment:  Patient tolerated infusion without incident.  Blood return noted pre and post infusion.  Blood return noted during administration every 2 cc.  Fluorouracil C series pump hooked up at 1400 to run at 5.2cc/hr for 46 hours.  Arranged home pump disconnect on Friday 10/20 at 1200.    Discharge Plan:   Patient declined prescription refills.  Copy of AVS reviewed with patient and/or family.  Patient will return 11/1 for next appointment.  Patient discharged in stable condition accompanied by: wife.  Departure Mode: Ambulatory.

## 2017-10-18 NOTE — PATIENT INSTRUCTIONS
Contact Numbers    [facility] Main Line: [phone]  [facility] Triage:  [phone]    Call triage with chills and/or temperature greater than or equal to 100.5, uncontrolled nausea/vomiting, diarrhea, constipation, dizziness, shortness of breath, chest pain, bleeding, unexplained bruising, or any new/concerning symptoms, questions/concerns.     If you are having any concerning symptoms or wish to speak to a provider before your next infusion visit, please call your care coordinator or triage to notify them so we can adequately serve you.       After Hours: [phone]    If after hours, weekends, or holidays, call main hospital  and ask for Oncology doctor on call.           October 2017 Sunday Monday Tuesday Wednesday Thursday Friday Saturday   1     2     3     4     5     6     7       8     9     10     11     12     13     14       15     16     17     18     [facility] D[facility]   10:45 AM   (15 min.)    [facility]   [facility]     [facility] RETURN   11:00 AM   (30 min.)   Jeanette Mcarthur MD   [facility]     [facility] ONC INFUSION 240    1:00 PM   (240 min.)    ONCOLOGY INFUSION   [facility] 19     20     21       22     23     24     25     26     27     28       29     30     31 November 2017 Sunday Monday Tuesday Wednesday Thursday Friday Saturday                  1     [facility] D[facility]    9:30 AM   (15 min.)    [facility] DRAW   [facility] Draw     [facility] ON[facility] INFUSION 240   10:00 AM   (240 min.)    ONCOLOGY INFUSION   [facility] 2     3     4       5     6     7     8     IR PORT REMOVAL RIGHT   11:00 AM   (60 min.)   [email or web address]   [facility]     REMOVE PORT VASCULAR ACCESS   12:30 PM   Chuy Steiner PA-C   [facility] OR     Outpatient Visit   12:30 PM   [facility] 9     10     11       12     13     14     15     16     17     18       19      20     21     22     23     24     25       26     27     28     29     30                           Recent Results (from the past 24 hour(s))   Comprehensive metabolic panel    Collection Time: 10/18/17 10:43 AM   Result Value Ref Range    Sodium 139 133 - 144 mmol/L    Potassium 3.9 3.4 - 5.3 mmol/L    Chloride 105 94 - 109 mmol/L    Carbon Dioxide 28 20 - 32 mmol/L    Anion Gap 6 3 - 14 mmol/L    Glucose 99 70 - 99 mg/dL    Urea Nitrogen 17 7 - 30 mg/dL    Creatinine 0.76 0.66 - 1.25 mg/dL    GFR Estimate >90 >60 mL/min/1.7m2    GFR Estimate If Black >90 >60 mL/min/1.7m2    Calcium 8.8 8.5 - 10.1 mg/dL    Bilirubin Total 0.6 0.2 - 1.3 mg/dL    Albumin 3.2 (L) 3.4 - 5.0 g/dL    Protein Total 7.1 6.8 - 8.8 g/dL    Alkaline Phosphatase 94 40 - 150 U/L    ALT 91 (H) 0 - 70 U/L    AST 47 (H) 0 - 45 U/L   *CBC with platelets differential    Collection Time: 10/18/17 10:43 AM   Result Value Ref Range    WBC 3.5 (L) 4.0 - 11.0 10e9/L    RBC Count 4.01 (L) 4.4 - 5.9 10e12/L    Hemoglobin 13.7 13.3 - 17.7 g/dL    Hematocrit 40.0 40.0 - 53.0 %     78 - 100 fl    MCH 34.2 (H) 26.5 - 33.0 pg    MCHC 34.3 31.5 - 36.5 g/dL    RDW 13.0 10.0 - 15.0 %    Platelet Count 231 150 - 450 10e9/L    Diff Method Automated Method     % Neutrophils 42.3 %    % Lymphocytes 39.5 %    % Monocytes 12.4 %    % Eosinophils 4.0 %    % Basophils 1.2 %    % Immature Granulocytes 0.6 %    Nucleated RBCs 0 0 /100    Absolute Neutrophil 1.5 (L) 1.6 - 8.3 10e9/L    Absolute Lymphocytes 1.4 0.8 - 5.3 10e9/L    Absolute Monocytes 0.4 0.0 - 1.3 10e9/L    Absolute Eosinophils 0.1 0.0 - 0.7 10e9/L    Absolute Basophils 0.0 0.0 - 0.2 10e9/L    Abs Immature Granulocytes 0.0 0 - 0.4 10e9/L    Absolute Nucleated RBC 0.0

## 2017-10-18 NOTE — NURSING NOTE
Chief Complaint   Patient presents with     Port Draw     Vitals taken, port accessed with PowerPort.  Labs drawn, line flushed with NS and heparin.  Pt checked in for next appt.      Charissa Murillo RN

## 2017-10-18 NOTE — LETTER
"10/18/2017       RE: Meño Omalley  [address] [id number]     Dear Colleague,    Thank you for referring your patient, Meño Omalley, to the [facility]. Please see a copy of my visit note below.    This is a followup visit for a diagnosis of colon cancer, stage III adenocarcinoma of rectum.  Currently, he is getting adjuvant chemotherapy with 5fu ONLY AFTER A LENGTHY DISCUSSION LAST VISIT after 7 cycles of FOLFOX  Sep 13, 2017     HISTORY OF PRESENT ILLNESS: Please refer to previous notes for patient's presentation and treatment so far.  Briefly, from an oncological standpoint he underwent laparoscopic surgery for resection of the primary lesion, and was found to have stage IIIB disease; hence, he was initiated on FOLFOX adjuvant therapy.  After 7 cycles he chose to pursue only 5FU. He has now completed 10 cycles of 5-FU and presents for his 11th cycle      INTERVAL HISTORY: He reports having some fatigue and diarrhea following 5FU infusions. His neuropathy has significantly improved       PAST MEDICAL HISTORY:  Unchanged.      PAST SURGICAL HISTORY:  Unchanged.      LABORATORY DATA:  Reviewed.      MEDICATIONS:  Reviewed.      ALLERGIES:  Reviewed.      PHYSICAL EXAMINATION:   VITAL SIGNS: /82 (BP Location: Right arm, Patient Position: Sitting, Cuff Size: Adult Regular)  Pulse 93  Temp 98.1  F (36.7  C) (Oral)  Resp 16  Ht 1.753 m (5' 9.02\")  Wt 98.8 kg (217 lb 14.4 oz)  SpO2 95%  BMI 32.16 kg/m2    GENERAL:  Alert and oriented x3, in no apparent distress.   HEENT:  No mucositis   SKIN:  No palmar or plantar dysesthesia.   CVS/RESPIRATORY:  Unremarkable.   ABDOMEN:  Nontender to palpation.   EXTREMITIES:  Lower extremities with no pedal edema.      LABORATORY DATA:    Recent Labs   Lab Test  10/18/17   1043   WBC  3.5*   RBC  4.01*   HGB  13.7   HCT  40.0   MCV  100   MCH  34.2*   MCHC  34.3   RDW  13.0   PLT  231     Recent Labs   Lab Test  10/18/17   1043  " 09/27/17   0920   NA  139  139   POTASSIUM  3.9  4.2   CHLORIDE  105  106   CO2  28  27   ANIONGAP  6  7   GLC  99  89   BUN  17  14   CR  0.76  0.82   CANDY  8.8  9.0          ASSESSMENT AND PLAN:  Patient with stage III rectal cancer, currently undergoing adjuvant chemotherapy.  He tolerated chemotherapy with the expected side effects. After 7 cycles of FOLFOX he chose to pursue only 5FU     He'll continue rest of the chemotherapy but just 5-FU infusion and bolus along with leucovorin . .      Evan Hope MD  Resident, Radiation Oncology     Pt was discussed with the Resident/Fellow/Midlevel Provider and seen by me independently. Their note reflects our  joint evaluation and the highlights of the discussion for today are mentioned in this note:     He will continue with the current chemo plan and RTC 6-8 weeks after the last round of chemo with a CT CAP.  Also he wanted his port taken out after he finished chemo - we discussed that some patients would like to keep it for a few months after but his is kinked so wants it out - ordered for removal.     Jeanette Mcarthur   of Medicine   Hematology and medical Oncology   [facility]  [address] [place] [address]  Phone: [phone]  Fax: [phone]      Again, thank you for allowing me to participate in the care of your patient.      Sincerely,    Jeanette Mcarthur MD

## 2017-10-18 NOTE — MR AVS SNAPSHOT
After Visit Summary   10/18/2017    Meño Omalley    MRN: [id number]           Patient Information     Date Of Birth          1970        Visit Information        Provider Department      10/18/2017 11:15 AM Jeanette Mcarthur MD [facility]        Today's Diagnoses     Adenocarcinoma of rectum (H)    -  1       Follow-ups after your visit        Your next 10 appointments already scheduled     Nov 08, 2017 12:30 PM CST   (Arrive by 11:00 AM)   IR PORT REMOVAL RIGHT with [email or web address]   [facility] ([facility])    [address]  5th Floor  [facility] [address]              1. Your doctor will need to do a history and physical within 7 days before this procedure. 2. Your doctor decide will which medications should not be taken the morning of the exam. 3. Laboratory tests are to be obtained by your doctor prior to the exam (Basic Metabolic Panel, CBCP, PTT and INR) (No labs needed if you are having a tunneled catheter exchange or removal) 4. If you have allergies to x-ray contrast or iodine, contact your doctor or a Radiology nurse prior to the exam day for instructions. 5. Someone will need to drive you to and from the hospital. 6. If you are or may be pregnant, contact your doctor or a Radiology nurse prior to the day of the exam. 7. If you have diabetes, check with your doctor or a Radiology nurse to see if your insulin needs to be adjusted for the exam. 8. If you are taking a medication called Glucophage or Glucovance; these medications need to be held the day of the exam and for approximately 48 hours following. A blood sample must be drawn so your creatinine level can be checked before resuming this medication. 9. If you are taking Coumadin (to thin you blood) please contact your doctor or a Radiology nurse at least 3 days before the exam for special instructions. 10. You should not have received contrast within 48 hours of this exam.  11. The day before your exam you may eat your regular diet and are encouraged to drink at least 2 quarts of clear liquids. Drink no alcoholic beverages for 24 hours prior to the exam. 12. If you have a colostomy you will need to irrigate it with tap water at 8PM the evening before and again at 6AM the morning of the exam. 13. Do not smoke for 24 hours prior to the procedure. 14. Birth to 4 years: - Breast feeding must be stopped 4 hours prior to exam - Solid food or formula must be stopped 6 hours prior to exam - Tube feedings must be stopped 6 hours prior to exam 15. 4-10 years old: - Nothing to eat or drink 6 hours prior to exam 16. 10+ years old: - Nothing to eat or drink 8 hours prior to exam 17. The morning of the exam you may brush your teeth and take medications as directed with a sip of water. 18. When discharged, you cannot drive until morning, and an adult must be with you until then. You should stay in the [facility] overnight. 19. Bring a list of all drugs you are taking; include supplements and over-the-counter medications. Wear comfortable clothes and leave your valuables at home.            Nov 08, 2017   Procedure with Chuy Steiner PA-C   [facility] ([facility])    [address] [place] [address]  5th [facility] [address] [phone]           Located in the Clinics and Surgery Center at [address] [place] [address].   parking is very convenient and highly recommended.  is a $6 flat rate fee.  Both  and self parkers should enter the main arrival plaza from [facility]; parking attendants will direct you based on your parking preference.            Dec 15, 2017  8:30 AM CST   LAB with NL LAB [facility] ([facility])    [address] [place] [address] [phone]           Please do not eat 10-12 hours before your appointment if you are coming in fasting  for labs on lipids, cholesterol, or glucose (sugar). This does not apply to pregnant women. Water, hot tea and black coffee (with nothing added) are okay. Do not drink other fluids, diet soda or chew gum.            Dec 15, 2017  9:00 AM CST   CT CHEST ABDOMEN PELVIS W/O & W CONTRAST with PHCT1   [facility] CT Scan ([facility])    [address] [place] [address] [phone]           Please bring any scans or X-rays taken at other hospitals, if similar tests were done. Also bring a list of your medicines, including vitamins, minerals and over-the-counter drugs. It is safest to leave personal items at home.  Be sure to tell your doctor:   If you have any allergies.   If there s any chance you are pregnant.   If you are breastfeeding.   If you have any special needs.  You may have contrast for this exam. To prepare:   Do not eat or drink for 2 hours before your exam. If you need to take medicine, you may take it with small sips of water. (We may ask you to take liquid medicine as well.)   The day before your exam, drink extra fluids at least six 8-ounce glasses (unless your doctor tells you to restrict your fluids).  Patients over 70 or patients with diabetes or kidney problems:   If you haven t had a blood test (creatinine test) within the last 30 days, go to your clinic or Diagnostic Imaging Department for this test.  If you have diabetes:   If your kidney function is normal, continue taking your metformin (Avandamet, Glucophage, Glucovance, Metaglip) on the day of your exam.   If your kidney function is abnormal, wait 48 hours before restarting this medicine.  You will have oral contrast for this exam:   You will drink the contrast at home. Get this from your clinic or Diagnostic Imaging Department. Please follow the directions given.  Please wear loose clothing, such as a sweat suit or jogging clothes. Avoid snaps, zippers and other metal. We may ask you to undress and put  "on a hospital gown.  If you have any questions, please call the Imaging Department where you will have your exam.            Dec 20, 2017  9:45 AM CST   (Arrive by 9:30 AM)   Return Visit with Jeanette Mcarthur MD   [facility] ([facility])    [address]  2nd [facility] [address] [phone]              Who to contact     If you have questions or need follow up information about today's clinic visit or your schedule please contact [facility] directly at [phone].  Normal or non-critical lab and imaging results will be communicated to you by [employer], letter or phone within 4 business days after the clinic has received the results. If you do not hear from us within 7 days, please contact the clinic through [employer] or phone. If you have a critical or abnormal lab result, we will notify you by phone as soon as possible.  Submit refill requests through [employer] or call your pharmacy and they will forward the refill request to us. Please allow 3 business days for your refill to be completed.          Additional Information About Your Visit        MyChart Information     [employer] gives you secure access to your electronic health record. If you see a primary care provider, you can also send messages to your care team and make appointments. If you have questions, please call your primary care clinic.  If you do not have a primary care provider, please call [phone] and they will assist you.        Care EveryWhere ID     This is your Care EveryWhere ID. This could be used by other organizations to access your [place] medical records  [id number]        Your Vitals Were     Pulse Temperature Respirations Height Pulse Oximetry BMI (Body Mass Index)    93 98.1  F (36.7  C) (Oral) 16 1.753 m (5' 9.02\") 95% 32.16 kg/m2       Blood Pressure from Last 3 Encounters:   11/01/17 127/85   10/18/17 132/82   09/27/17 132/83    Weight from Last 3 " Encounters:   11/01/17 98.4 kg (216 lb 14.4 oz)   10/18/17 98.8 kg (217 lb 14.4 oz)   09/27/17 96 kg (211 lb 9.6 oz)               Primary Care Provider Office Phone # Fax #    Delio Alcala [phone]       [facility] [address] [place] [address]        Equal Access to Services     LUCIO BOGGS : Hadii aad ku hadasho Soomaali, waaxda luqadaha, qaybta kaalmada adeegyada, waxay idiin hayaan adeeg kharash ladelfino ah. So [facility] [phone].    ATENCIÓN: Si erlinda villegas, tiene a valle disposición servicios gratuitos de asistencia lingüística. [facility] [phone].    We comply with applicable federal civil rights laws and [place] laws. We do not discriminate on the basis of race, color, national origin, age, disability, sex, sexual orientation, or gender identity.            Thank you!     Thank you for choosing [facility]  for your care. Our goal is always to provide you with excellent care. Hearing back from our patients is one way we can continue to improve our services. Please take a few minutes to complete the written survey that you may receive in the mail after your visit with us. Thank you!             Your Updated Medication List - Protect others around you: Learn how to safely use, store and throw away your medicines at www.disposemymeds.org.          This list is accurate as of: 10/18/17 11:59 PM.  Always use your most recent med list.                   Brand Name Dispense Instructions for use Diagnosis    escitalopram 20 MG tablet    LEXAPRO    30 tablet    Take 1 tablet (20 mg) by mouth every morning    Major depressive disorder, recurrent episode, mild (H)       FLOVENT  MCG/ACT Inhaler   Generic drug:  fluticasone      Take 2 puffs by mouth 2 times daily as needed        lidocaine-prilocaine cream    EMLA    30 g    Apply topically as needed for moderate pain    Rectal cancer (H)       LORazepam 0.5 MG tablet    ATIVAN    30 tablet     Take 1 tablet (0.5 mg) by mouth every 4 hours as needed (Anxiety, Nausea/Vomiting or Sleep)    Adenocarcinoma of rectum (H)       ondansetron 8 MG tablet    ZOFRAN    60 tablet    Take 1 tablet (8 mg) by mouth every 8 hours as needed for nausea    Adenocarcinoma of rectum (H)       prochlorperazine 10 MG tablet    COMPAZINE    30 tablet    Take 1 tablet (10 mg) by mouth every 6 hours as needed (Nausea/Vomiting)    Adenocarcinoma of rectum (H)

## 2017-10-18 NOTE — NURSING NOTE
"Oncology Rooming Note    October 18, 2017 10:51 AM   Meño Omalley is a 47 year old male who presents for:    Chief Complaint   Patient presents with     Port Draw     Oncology Clinic Visit     Return visit related to Rectal Cancer     Initial Vitals: /82 (BP Location: Right arm, Patient Position: Sitting, Cuff Size: Adult Regular)  Pulse 93  Temp 98.1  F (36.7  C) (Oral)  Resp 16  Ht 1.753 m (5' 9.02\")  Wt 98.8 kg (217 lb 14.4 oz)  SpO2 95%  BMI 32.16 kg/m2 Estimated body mass index is 32.16 kg/(m^2) as calculated from the following:    Height as of this encounter: 1.753 m (5' 9.02\").    Weight as of this encounter: 98.8 kg (217 lb 14.4 oz). Body surface area is 2.19 meters squared.  No Pain (0) Comment: Data Unavailable   No LMP for male patient.  Allergies reviewed: Yes  Medications reviewed: Yes    Medications: Medication refills not needed today.  Pharmacy name entered into EPIC:    [facility] [address] [place] [address] - [address]  [facility] [place], [place] - [address]     Clinical concerns: No new concerns. Provider was notified.    10 minutes for nursing intake (face to face time)     Luanne Ramirez LPN            "

## 2017-10-18 NOTE — MR AVS SNAPSHOT
After Visit Summary   10/18/2017    Meño Omalley    MRN: [id number]           Patient Information     Date Of Birth          1970        Visit Information        Provider Department      10/18/2017 1:00 PM UC 10 ATC;  ONCOLOGY INFUSION [facility]        Today's Diagnoses     Adenocarcinoma of rectum (H)    -  1      Care Instructions    Contact Numbers    [facility] Main Line: [phone]  [facility] Triage:  [phone]    Call triage with chills and/or temperature greater than or equal to 100.5, uncontrolled nausea/vomiting, diarrhea, constipation, dizziness, shortness of breath, chest pain, bleeding, unexplained bruising, or any new/concerning symptoms, questions/concerns.     If you are having any concerning symptoms or wish to speak to a provider before your next infusion visit, please call your care coordinator or triage to notify them so we can adequately serve you.       After Hours: [phone]    If after hours, weekends, or holidays, call main hospital  and ask for Oncology doctor on call.           October 2017 Sunday Monday Tuesday Wednesday Thursday Friday Saturday   1     2     3     4     5     6     7       8     9     10     11     12     13     14       15     16     17     18     [facility]   10:45 AM   (15 min.)    [facility] DRAW   [facility] Lab Draw     [facility] RETURN   11:00 AM   (30 min.)   Jeanette Mcarthur MD   [facility]     [facility] ONC INFUSION 240    1:00 PM   (240 min.)    ONCOLOGY INFUSION   [facility] 19     20     21       22     23     24     25     26     27     28       29     30     31 November 2017 Sunday Monday Tuesday Wednesday Thursday Friday Saturday                  1     [facility]    9:30 AM   (15 min.)    [facility] DRAW   [facility] Lab Draw     [facility] ONC INFUSION 240   10:00 AM   (240 min.)    ONCOLOGY INFUSION     [facility] 2     3     4       5     6     7     8     IR PORT REMOVAL RIGHT   11:00 AM   (60 min.)   [email or web address]   [facility]     REMOVE PORT VASCULAR ACCESS   12:30 PM   Chuy Steiner PA-C    OR     Outpatient Visit   12:30 PM   [facility] 9     10     11       12     13     14     15     16     17     18       19     20     21     22     23     24     25       26     27     28     29     30                           Recent Results (from the past 24 hour(s))   Comprehensive metabolic panel    Collection Time: 10/18/17 10:43 AM   Result Value Ref Range    Sodium 139 133 - 144 mmol/L    Potassium 3.9 3.4 - 5.3 mmol/L    Chloride 105 94 - 109 mmol/L    Carbon Dioxide 28 20 - 32 mmol/L    Anion Gap 6 3 - 14 mmol/L    Glucose 99 70 - 99 mg/dL    Urea Nitrogen 17 7 - 30 mg/dL    Creatinine 0.76 0.66 - 1.25 mg/dL    GFR Estimate >90 >60 mL/min/1.7m2    GFR Estimate If Black >90 >60 mL/min/1.7m2    Calcium 8.8 8.5 - 10.1 mg/dL    Bilirubin Total 0.6 0.2 - 1.3 mg/dL    Albumin 3.2 (L) 3.4 - 5.0 g/dL    Protein Total 7.1 6.8 - 8.8 g/dL    Alkaline Phosphatase 94 40 - 150 U/L    ALT 91 (H) 0 - 70 U/L    AST 47 (H) 0 - 45 U/L   *CBC with platelets differential    Collection Time: 10/18/17 10:43 AM   Result Value Ref Range    WBC 3.5 (L) 4.0 - 11.0 10e9/L    RBC Count 4.01 (L) 4.4 - 5.9 10e12/L    Hemoglobin 13.7 13.3 - 17.7 g/dL    Hematocrit 40.0 40.0 - 53.0 %     78 - 100 fl    MCH 34.2 (H) 26.5 - 33.0 pg    MCHC 34.3 31.5 - 36.5 g/dL    RDW 13.0 10.0 - 15.0 %    Platelet Count 231 150 - 450 10e9/L    Diff Method Automated Method     % Neutrophils 42.3 %    % Lymphocytes 39.5 %    % Monocytes 12.4 %    % Eosinophils 4.0 %    % Basophils 1.2 %    % Immature Granulocytes 0.6 %    Nucleated RBCs 0 0 /100    Absolute Neutrophil 1.5 (L) 1.6 - 8.3 10e9/L    Absolute Lymphocytes 1.4 0.8 - 5.3 10e9/L    Absolute Monocytes 0.4 0.0 - 1.3 10e9/L    Absolute  Eosinophils 0.1 0.0 - 0.7 10e9/L    Absolute Basophils 0.0 0.0 - 0.2 10e9/L    Abs Immature Granulocytes 0.0 0 - 0.4 10e9/L    Absolute Nucleated RBC 0.0                Follow-ups after your visit        Your next 10 appointments already scheduled     Nov 01, 2017  9:30 AM CDT   [facility] with [facility]   [facility] ([facility])    [address]  2nd Floor  [facility] [address]   [phone]            Nov 01, 2017 10:00 AM CDT   Infusion 240 with UC ONCOLOGY INFUSION, UC 17 ATC   [facility] ([facility])    [address]  2nd Floor  [facility] [address]   [phone]            Nov 08, 2017 12:30 PM CST   (Arrive by 11:00 AM)   IR PORT REMOVAL RIGHT with [email or web address]   [facility] ([facility])    [address]  5th Floor  [facility] [address]              1. Your doctor will need to do a history and physical within 7 days before this procedure. 2. Your doctor decide will which medications should not be taken the morning of the exam. 3. Laboratory tests are to be obtained by your doctor prior to the exam (Basic Metabolic Panel, CBCP, PTT and INR) (No labs needed if you are having a tunneled catheter exchange or removal) 4. If you have allergies to x-ray contrast or iodine, contact your doctor or a Radiology nurse prior to the exam day for instructions. 5. Someone will need to drive you to and from the hospital. 6. If you are or may be pregnant, contact your doctor or a Radiology nurse prior to the day of the exam. 7. If you have diabetes, check with your doctor or a Radiology nurse to see if your insulin needs to be adjusted for the exam. 8. If you are taking a medication called Glucophage or Glucovance; these medications need to be held the day of the exam and for approximately 48 hours following. A blood sample must be drawn so your creatinine  level can be checked before resuming this medication. 9. If you are taking Coumadin (to thin you blood) please contact your doctor or a Radiology nurse at least 3 days before the exam for special instructions. 10. You should not have received contrast within 48 hours of this exam. 11. The day before your exam you may eat your regular diet and are encouraged to drink at least 2 quarts of clear liquids. Drink no alcoholic beverages for 24 hours prior to the exam. 12. If you have a colostomy you will need to irrigate it with tap water at 8PM the evening before and again at 6AM the morning of the exam. 13. Do not smoke for 24 hours prior to the procedure. 14. Birth to 4 years: - Breast feeding must be stopped 4 hours prior to exam - Solid food or formula must be stopped 6 hours prior to exam - Tube feedings must be stopped 6 hours prior to exam 15. 4-10 years old: - Nothing to eat or drink 6 hours prior to exam 16. 10+ years old: - Nothing to eat or drink 8 hours prior to exam 17. The morning of the exam you may brush your teeth and take medications as directed with a sip of water. 18. When discharged, you cannot drive until morning, and an adult must be with you until then. You should stay in the [facility] overnight. 19. Bring a list of all drugs you are taking; include supplements and over-the-counter medications. Wear comfortable clothes and leave your valuables at home.            Nov 08, 2017   Procedure with Chuy Steiner PA-C   [facility] ([facility])    [address] [place] [address]  [address] [place] [id number] [phone]           Located in the Clinics and Surgery Center at [address] [place] [address].   parking is very convenient and highly recommended.  is a $6 flat rate fee.  Both  and self parkers should enter the main arrival plaza from [facility]; parking attendants will direct you based on your parking  preference.              Future tests that were ordered for you today     Open Future Orders        Priority Expected Expires Ordered    IR Port Removal Right Routine 11/22/2017 10/18/2018 10/18/2017            Who to contact     If you have questions or need follow up information about today's clinic visit or your schedule please contact [facility] directly at [phone].  Normal or non-critical lab and imaging results will be communicated to you by MyChart, letter or phone within 4 business days after the clinic has received the results. If you do not hear from us within 7 days, please contact the clinic through [employer] or phone. If you have a critical or abnormal lab result, we will notify you by phone as soon as possible.  Submit refill requests through [employer] or call your pharmacy and they will forward the refill request to us. Please allow 3 business days for your refill to be completed.          Additional Information About Your Visit        [employer] Information     [employer] gives you secure access to your electronic health record. If you see a primary care provider, you can also send messages to your care team and make appointments. If you have questions, please call your primary care clinic.  If you do not have a primary care provider, please call [phone] and they will assist you.        Care EveryWhere ID     This is your Care EveryWhere ID. This could be used by other organizations to access your [place] medical records  [id number]         Blood Pressure from Last 3 Encounters:   10/18/17 132/82   09/27/17 132/83   09/19/17 130/76    Weight from Last 3 Encounters:   10/18/17 98.8 kg (217 lb 14.4 oz)   09/27/17 96 kg (211 lb 9.6 oz)   09/19/17 94.5 kg (208 lb 6.4 oz)              We Performed the Following     *CBC with platelets differential     Comprehensive metabolic panel        Primary Care Provider Office Phone # Fax #    Delio Alcala [phone]        [facility] [address] [place] [address]        Equal Access to Services     ORVILLEABBIE FLAKITA : Hadii aad ku hadanshulshalom Anders, wadida ludaniadaha, qaybta kaalmaalicia sloan, lynda vargasryleeyoana hines. So [facility] [phone].    ATENCIÓN: Si habla español, tiene a valle disposición servicios gratuitos de asistencia lingüística. Llame al [phone].    We comply with applicable federal civil rights laws and [place] laws. We do not discriminate on the basis of race, color, national origin, age, disability, sex, sexual orientation, or gender identity.            Thank you!     Thank you for choosing [facility]  for your care. Our goal is always to provide you with excellent care. Hearing back from our patients is one way we can continue to improve our services. Please take a few minutes to complete the written survey that you may receive in the mail after your visit with us. Thank you!             Your Updated Medication List - Protect others around you: Learn how to safely use, store and throw away your medicines at www.disposemymeds.org.          This list is accurate as of: 10/18/17  1:34 PM.  Always use your most recent med list.                   Brand Name Dispense Instructions for use Diagnosis    escitalopram 20 MG tablet    LEXAPRO    30 tablet    Take 1 tablet (20 mg) by mouth every morning    Major depressive disorder, recurrent episode, mild (H)       FLOVENT  MCG/ACT Inhaler   Generic drug:  fluticasone      Take 2 puffs by mouth 2 times daily as needed        lidocaine-prilocaine cream    EMLA    30 g    Apply topically as needed for moderate pain    Rectal cancer (H)       LORazepam 0.5 MG tablet    ATIVAN    30 tablet    Take 1 tablet (0.5 mg) by mouth every 4 hours as needed (Anxiety, Nausea/Vomiting or Sleep)    Adenocarcinoma of rectum (H)       ondansetron 8 MG tablet    ZOFRAN    60 tablet    Take 1 tablet (8 mg) by mouth every 8 hours  as needed for nausea    Adenocarcinoma of rectum (H)       prochlorperazine 10 MG tablet    COMPAZINE    30 tablet    Take 1 tablet (10 mg) by mouth every 6 hours as needed (Nausea/Vomiting)    Adenocarcinoma of rectum (H)

## 2017-10-20 NOTE — PROGRESS NOTES
"This is a followup visit for a diagnosis of colon cancer, stage III adenocarcinoma of rectum.  Currently, he is getting adjuvant chemotherapy with 5fu ONLY AFTER A LENGTHY DISCUSSION LAST VISIT after 7 cycles of FOLFOX  Sep 13, 2017     HISTORY OF PRESENT ILLNESS: Please refer to previous notes for patient's presentation and treatment so far.  Briefly, from an oncological standpoint he underwent laparoscopic surgery for resection of the primary lesion, and was found to have stage IIIB disease; hence, he was initiated on FOLFOX adjuvant therapy.  After 7 cycles he chose to pursue only 5FU. He has now completed 10 cycles of 5-FU and presents for his 11th cycle      INTERVAL HISTORY: He reports having some fatigue and diarrhea following 5FU infusions. His neuropathy has significantly improved       PAST MEDICAL HISTORY:  Unchanged.      PAST SURGICAL HISTORY:  Unchanged.      LABORATORY DATA:  Reviewed.      MEDICATIONS:  Reviewed.      ALLERGIES:  Reviewed.      PHYSICAL EXAMINATION:   VITAL SIGNS: /82 (BP Location: Right arm, Patient Position: Sitting, Cuff Size: Adult Regular)  Pulse 93  Temp 98.1  F (36.7  C) (Oral)  Resp 16  Ht 1.753 m (5' 9.02\")  Wt 98.8 kg (217 lb 14.4 oz)  SpO2 95%  BMI 32.16 kg/m2    GENERAL:  Alert and oriented x3, in no apparent distress.   HEENT:  No mucositis   SKIN:  No palmar or plantar dysesthesia.   CVS/RESPIRATORY:  Unremarkable.   ABDOMEN:  Nontender to palpation.   EXTREMITIES:  Lower extremities with no pedal edema.      LABORATORY DATA:    Recent Labs   Lab Test  10/18/17   1043   WBC  3.5*   RBC  4.01*   HGB  13.7   HCT  40.0   MCV  100   MCH  34.2*   MCHC  34.3   RDW  13.0   PLT  231     Recent Labs   Lab Test  10/18/17   1043  09/27/17   0920   NA  139  139   POTASSIUM  3.9  4.2   CHLORIDE  105  106   CO2  28  27   ANIONGAP  6  7   GLC  99  89   BUN  17  14   CR  0.76  0.82   CANDY  8.8  9.0          ASSESSMENT AND PLAN:  Patient with stage III rectal cancer, " currently undergoing adjuvant chemotherapy.  He tolerated chemotherapy with the expected side effects. After 7 cycles of FOLFOX he chose to pursue only 5FU     He'll continue rest of the chemotherapy but just 5-FU infusion and bolus along with leucovorin . .      Evan Hope MD  Resident, Radiation Oncology     Pt was discussed with the Resident/Fellow/Midlevel Provider and seen by me independently. Their note reflects our  joint evaluation and the highlights of the discussion for today are mentioned in this note:     He will continue with the current chemo plan and RTC 6-8 weeks after the last round of chemo with a CT CAP.  Also he wanted his port taken out after he finished chemo - we discussed that some patients would like to keep it for a few months after but his is kinked so wants it out - ordered for removal.     Jeanette Mcarthur   of Medicine   Hematology and medical Oncology   [facility]  [address] [address]  Phone: [phone]  Fax: [phone]

## 2017-10-25 RX ORDER — METHYLPREDNISOLONE SODIUM SUCCINATE 125 MG/2ML
125 INJECTION, POWDER, LYOPHILIZED, FOR SOLUTION INTRAMUSCULAR; INTRAVENOUS
Status: CANCELLED
Start: 2017-11-01

## 2017-10-25 RX ORDER — EPINEPHRINE 0.3 MG/.3ML
0.3 INJECTION SUBCUTANEOUS EVERY 5 MIN PRN
Status: CANCELLED | OUTPATIENT
Start: 2017-11-01

## 2017-10-25 RX ORDER — ALBUTEROL SULFATE 0.83 MG/ML
2.5 SOLUTION RESPIRATORY (INHALATION)
Status: CANCELLED | OUTPATIENT
Start: 2017-11-01

## 2017-10-25 RX ORDER — ALBUTEROL SULFATE 90 UG/1
1-2 AEROSOL, METERED RESPIRATORY (INHALATION)
Status: CANCELLED
Start: 2017-11-01

## 2017-10-25 RX ORDER — EPINEPHRINE 1 MG/ML
0.3 INJECTION, SOLUTION, CONCENTRATE INTRAVENOUS EVERY 5 MIN PRN
Status: CANCELLED | OUTPATIENT
Start: 2017-11-01

## 2017-10-25 RX ORDER — MEPERIDINE HYDROCHLORIDE 25 MG/ML
25 INJECTION INTRAMUSCULAR; INTRAVENOUS; SUBCUTANEOUS EVERY 30 MIN PRN
Status: CANCELLED | OUTPATIENT
Start: 2017-11-01

## 2017-10-25 RX ORDER — DIPHENHYDRAMINE HYDROCHLORIDE 50 MG/ML
50 INJECTION INTRAMUSCULAR; INTRAVENOUS
Status: CANCELLED
Start: 2017-11-01

## 2017-10-25 RX ORDER — PALONOSETRON 0.05 MG/ML
0.25 INJECTION, SOLUTION INTRAVENOUS ONCE
Status: CANCELLED
Start: 2017-11-01 | End: 2017-11-01

## 2017-10-25 RX ORDER — LORAZEPAM 2 MG/ML
0.5 INJECTION INTRAMUSCULAR EVERY 4 HOURS PRN
Status: CANCELLED
Start: 2017-11-01

## 2017-10-25 RX ORDER — FLUOROURACIL 50 MG/ML
400 INJECTION, SOLUTION INTRAVENOUS ONCE
Status: CANCELLED | OUTPATIENT
Start: 2017-11-01

## 2017-10-25 RX ORDER — SODIUM CHLORIDE 9 MG/ML
1000 INJECTION, SOLUTION INTRAVENOUS CONTINUOUS PRN
Status: CANCELLED
Start: 2017-11-01

## 2017-11-01 ENCOUNTER — INFUSION THERAPY VISIT (OUTPATIENT)
Dept: ONCOLOGY | Facility: CLINIC | Age: 47
End: 2017-11-01
Attending: INTERNAL MEDICINE
Payer: COMMERCIAL

## 2017-11-01 ENCOUNTER — HOME INFUSION (PRE-WILLOW HOME INFUSION) (OUTPATIENT)
Dept: PHARMACY | Facility: CLINIC | Age: 47
End: 2017-11-01

## 2017-11-01 ENCOUNTER — APPOINTMENT (OUTPATIENT)
Dept: LAB | Facility: CLINIC | Age: 47
End: 2017-11-01
Attending: INTERNAL MEDICINE
Payer: COMMERCIAL

## 2017-11-01 VITALS
SYSTOLIC BLOOD PRESSURE: 127 MMHG | RESPIRATION RATE: 18 BRPM | DIASTOLIC BLOOD PRESSURE: 85 MMHG | BODY MASS INDEX: 32.02 KG/M2 | OXYGEN SATURATION: 94 % | HEART RATE: 79 BPM | WEIGHT: 216.9 LBS | TEMPERATURE: 98.3 F

## 2017-11-01 DIAGNOSIS — C20 ADENOCARCINOMA OF RECTUM (H): Primary | ICD-10-CM

## 2017-11-01 LAB
ALBUMIN SERPL-MCNC: 3.8 G/DL (ref 3.4–5)
ALP SERPL-CCNC: 70 U/L (ref 40–150)
ALT SERPL W P-5'-P-CCNC: 41 U/L (ref 0–70)
ANION GAP SERPL CALCULATED.3IONS-SCNC: 5 MMOL/L (ref 3–14)
AST SERPL W P-5'-P-CCNC: 24 U/L (ref 0–45)
BASOPHILS # BLD AUTO: 0.1 10E9/L (ref 0–0.2)
BASOPHILS NFR BLD AUTO: 1.2 %
BILIRUB SERPL-MCNC: 1.1 MG/DL (ref 0.2–1.3)
BUN SERPL-MCNC: 15 MG/DL (ref 7–30)
CALCIUM SERPL-MCNC: 8.9 MG/DL (ref 8.5–10.1)
CHLORIDE SERPL-SCNC: 105 MMOL/L (ref 94–109)
CO2 SERPL-SCNC: 28 MMOL/L (ref 20–32)
CREAT SERPL-MCNC: 0.74 MG/DL (ref 0.66–1.25)
DIFFERENTIAL METHOD BLD: ABNORMAL
EOSINOPHIL # BLD AUTO: 0.1 10E9/L (ref 0–0.7)
EOSINOPHIL NFR BLD AUTO: 3 %
ERYTHROCYTE [DISTWIDTH] IN BLOOD BY AUTOMATED COUNT: 12.6 % (ref 10–15)
GFR SERPL CREATININE-BSD FRML MDRD: >90 ML/MIN/1.7M2
GLUCOSE SERPL-MCNC: 94 MG/DL (ref 70–99)
HCT VFR BLD AUTO: 42.1 % (ref 40–53)
HGB BLD-MCNC: 14.3 G/DL (ref 13.3–17.7)
IMM GRANULOCYTES # BLD: 0 10E9/L (ref 0–0.4)
IMM GRANULOCYTES NFR BLD: 0 %
LYMPHOCYTES # BLD AUTO: 1.6 10E9/L (ref 0.8–5.3)
LYMPHOCYTES NFR BLD AUTO: 38.5 %
MCH RBC QN AUTO: 33.3 PG (ref 26.5–33)
MCHC RBC AUTO-ENTMCNC: 34 G/DL (ref 31.5–36.5)
MCV RBC AUTO: 98 FL (ref 78–100)
MONOCYTES # BLD AUTO: 0.4 10E9/L (ref 0–1.3)
MONOCYTES NFR BLD AUTO: 9.4 %
NEUTROPHILS # BLD AUTO: 1.9 10E9/L (ref 1.6–8.3)
NEUTROPHILS NFR BLD AUTO: 47.9 %
NRBC # BLD AUTO: 0 10*3/UL
NRBC BLD AUTO-RTO: 0 /100
PLATELET # BLD AUTO: 237 10E9/L (ref 150–450)
POTASSIUM SERPL-SCNC: 4.1 MMOL/L (ref 3.4–5.3)
PROT SERPL-MCNC: 7.4 G/DL (ref 6.8–8.8)
RBC # BLD AUTO: 4.3 10E12/L (ref 4.4–5.9)
SODIUM SERPL-SCNC: 138 MMOL/L (ref 133–144)
WBC # BLD AUTO: 4.1 10E9/L (ref 4–11)

## 2017-11-01 PROCEDURE — 80053 COMPREHEN METABOLIC PANEL: CPT | Performed by: INTERNAL MEDICINE

## 2017-11-01 PROCEDURE — 25000128 H RX IP 250 OP 636: Mod: ZF | Performed by: INTERNAL MEDICINE

## 2017-11-01 PROCEDURE — 96409 CHEMO IV PUSH SNGL DRUG: CPT

## 2017-11-01 PROCEDURE — 96367 TX/PROPH/DG ADDL SEQ IV INF: CPT

## 2017-11-01 PROCEDURE — 96416 CHEMO PROLONG INFUSE W/PUMP: CPT

## 2017-11-01 PROCEDURE — 96375 TX/PRO/DX INJ NEW DRUG ADDON: CPT

## 2017-11-01 PROCEDURE — 85025 COMPLETE CBC W/AUTO DIFF WBC: CPT | Performed by: INTERNAL MEDICINE

## 2017-11-01 RX ORDER — FLUOROURACIL 50 MG/ML
400 INJECTION, SOLUTION INTRAVENOUS ONCE
Status: COMPLETED | OUTPATIENT
Start: 2017-11-01 | End: 2017-11-01

## 2017-11-01 RX ORDER — PALONOSETRON 0.05 MG/ML
0.25 INJECTION, SOLUTION INTRAVENOUS ONCE
Status: COMPLETED | OUTPATIENT
Start: 2017-11-01 | End: 2017-11-01

## 2017-11-01 RX ORDER — HEPARIN SODIUM (PORCINE) LOCK FLUSH IV SOLN 100 UNIT/ML 100 UNIT/ML
5 SOLUTION INTRAVENOUS EVERY 8 HOURS PRN
Status: DISCONTINUED | OUTPATIENT
Start: 2017-11-01 | End: 2017-11-01 | Stop reason: HOSPADM

## 2017-11-01 RX ADMIN — DEXAMETHASONE SODIUM PHOSPHATE: 10 INJECTION, SOLUTION INTRAMUSCULAR; INTRAVENOUS at 11:11

## 2017-11-01 RX ADMIN — FLUOROURACIL 850 MG: 50 INJECTION, SOLUTION INTRAVENOUS at 12:06

## 2017-11-01 RX ADMIN — LEUCOVORIN CALCIUM 750 MG: 50 INJECTION, POWDER, LYOPHILIZED, FOR SOLUTION INTRAMUSCULAR; INTRAVENOUS at 11:34

## 2017-11-01 RX ADMIN — SODIUM CHLORIDE, PRESERVATIVE FREE 5 ML: 5 INJECTION INTRAVENOUS at 10:11

## 2017-11-01 RX ADMIN — DEXTROSE 250 ML: 5 SOLUTION INTRAVENOUS at 11:10

## 2017-11-01 RX ADMIN — PALONOSETRON HYDROCHLORIDE 0.25 MG: 0.25 INJECTION INTRAVENOUS at 11:10

## 2017-11-01 ASSESSMENT — PAIN SCALES - GENERAL: PAINLEVEL: NO PAIN (0)

## 2017-11-01 NOTE — PATIENT INSTRUCTIONS
Contact Numbers    [facility] Main Line: [phone]  [facility] Triage:  [phone]    Call triage with chills and/or temperature greater than or equal to 100.5, uncontrolled nausea/vomiting, diarrhea, constipation, dizziness, shortness of breath, chest pain, bleeding, unexplained bruising, or any new/concerning symptoms, questions/concerns.     If you are having any concerning symptoms or wish to speak to a provider before your next infusion visit, please call your care coordinator or triage to notify them so we can adequately serve you.       After Hours: [phone]    If after hours, weekends, or holidays, call main hospital  and ask for Oncology doctor on call.           November 2017 Sunday Monday Tuesday Wednesday Thursday Friday Saturday                  1     [facility] DRAW    9:30 AM   (15 min.)    [facility] DRAW   [facility] Lab Draw     [facility] ONC INFUSION 240   10:00 AM   (240 min.)    ONCOLOGY INFUSION   [facility] 2     3     4       5     6     7     8     IR PORT REMOVAL RIGHT   11:00 AM   (60 min.)   [email or web address]   [facility]     REMOVE PORT VASCULAR ACCESS   12:30 PM   Chuy Steiner PA-C   UC OR     Outpatient Visit   12:30 PM   [facility] 9     10     11       12     13     14     15     16     17     18       19     20     21     22     23     24     25       26     27     28     29     30                          December 2017 Sunday Monday Tuesday Wednesday Thursday Friday Saturday                            1     2       3     4     5     6     7     8     9       10     11     12     13     14     15     16       17     18     19     20     21     22     23       24     25     26     27     28     29     30       31                                               Recent Results (from the past 24 hour(s))   CBC with platelets differential    Collection Time: 11/01/17 10:19 AM   Result Value Ref Range    WBC 4.1 4.0 -  11.0 10e9/L    RBC Count 4.30 (L) 4.4 - 5.9 10e12/L    Hemoglobin 14.3 13.3 - 17.7 g/dL    Hematocrit 42.1 40.0 - 53.0 %    MCV 98 78 - 100 fl    MCH 33.3 (H) 26.5 - 33.0 pg    MCHC 34.0 31.5 - 36.5 g/dL    RDW 12.6 10.0 - 15.0 %    Platelet Count 237 150 - 450 10e9/L    Diff Method Automated Method     % Neutrophils 47.9 %    % Lymphocytes 38.5 %    % Monocytes 9.4 %    % Eosinophils 3.0 %    % Basophils 1.2 %    % Immature Granulocytes 0.0 %    Nucleated RBCs 0 0 /100    Absolute Neutrophil 1.9 1.6 - 8.3 10e9/L    Absolute Lymphocytes 1.6 0.8 - 5.3 10e9/L    Absolute Monocytes 0.4 0.0 - 1.3 10e9/L    Absolute Eosinophils 0.1 0.0 - 0.7 10e9/L    Absolute Basophils 0.1 0.0 - 0.2 10e9/L    Abs Immature Granulocytes 0.0 0 - 0.4 10e9/L    Absolute Nucleated RBC 0.0    Comprehensive metabolic panel    Collection Time: 11/01/17 10:19 AM   Result Value Ref Range    Sodium 138 133 - 144 mmol/L    Potassium 4.1 3.4 - 5.3 mmol/L    Chloride 105 94 - 109 mmol/L    Carbon Dioxide 28 20 - 32 mmol/L    Anion Gap 5 3 - 14 mmol/L    Glucose 94 70 - 99 mg/dL    Urea Nitrogen 15 7 - 30 mg/dL    Creatinine 0.74 0.66 - 1.25 mg/dL    GFR Estimate >90 >60 mL/min/1.7m2    GFR Estimate If Black >90 >60 mL/min/1.7m2    Calcium 8.9 8.5 - 10.1 mg/dL    Bilirubin Total 1.1 0.2 - 1.3 mg/dL    Albumin 3.8 3.4 - 5.0 g/dL    Protein Total 7.4 6.8 - 8.8 g/dL    Alkaline Phosphatase 70 40 - 150 U/L    ALT 41 0 - 70 U/L    AST 24 0 - 45 U/L

## 2017-11-01 NOTE — MR AVS SNAPSHOT
After Visit Summary   11/1/2017    Meño Omalley    MRN: [id number]           Patient Information     Date Of Birth          1970        Visit Information        Provider Department      11/1/2017 10:00 AM UC 17 ATC; UC ONCOLOGY INFUSION [facility]        Today's Diagnoses     Adenocarcinoma of rectum (H)    -  1      Care Instructions    Contact Numbers    [facility] Main Line: [phone]  [facility] Triage:  [phone]    Call triage with chills and/or temperature greater than or equal to 100.5, uncontrolled nausea/vomiting, diarrhea, constipation, dizziness, shortness of breath, chest pain, bleeding, unexplained bruising, or any new/concerning symptoms, questions/concerns.     If you are having any concerning symptoms or wish to speak to a provider before your next infusion visit, please call your care coordinator or triage to notify them so we can adequately serve you.       After Hours: [phone]    If after hours, weekends, or holidays, call main hospital  and ask for Oncology doctor on call.           November 2017 Sunday Monday Tuesday Wednesday Thursday Friday Saturday                  1     [facility]    9:30 AM   (15 min.)    [facility]   [facility] Draw     [facility] ONC INFUSION 240   10:00 AM   (240 min.)    ONCOLOGY INFUSION   [facility] 2     3     4       5     6     7     8     IR PORT REMOVAL RIGHT   11:00 AM   (60 min.)   [id number]   [facility]     REMOVE PORT VASCULAR ACCESS   12:30 PM   Chuy Steiner PA-C   UC OR     Outpatient Visit   12:30 PM   [facility] 9     10     11       12     13     14     15     16     17     18       19     20     21     22     23     24     25       26     27     28     29     30 December 2017 Sunday Monday Tuesday Wednesday Thursday Friday Saturday                            1     2       3     4     5      6     7     8     9       10     11     12     13     14     15     16       17     18     19     20     21     22     23       24     25     26     27     28     29     30       31                                               Recent Results (from the past 24 hour(s))   CBC with platelets differential    Collection Time: 11/01/17 10:19 AM   Result Value Ref Range    WBC 4.1 4.0 - 11.0 10e9/L    RBC Count 4.30 (L) 4.4 - 5.9 10e12/L    Hemoglobin 14.3 13.3 - 17.7 g/dL    Hematocrit 42.1 40.0 - 53.0 %    MCV 98 78 - 100 fl    MCH 33.3 (H) 26.5 - 33.0 pg    MCHC 34.0 31.5 - 36.5 g/dL    RDW 12.6 10.0 - 15.0 %    Platelet Count 237 150 - 450 10e9/L    Diff Method Automated Method     % Neutrophils 47.9 %    % Lymphocytes 38.5 %    % Monocytes 9.4 %    % Eosinophils 3.0 %    % Basophils 1.2 %    % Immature Granulocytes 0.0 %    Nucleated RBCs 0 0 /100    Absolute Neutrophil 1.9 1.6 - 8.3 10e9/L    Absolute Lymphocytes 1.6 0.8 - 5.3 10e9/L    Absolute Monocytes 0.4 0.0 - 1.3 10e9/L    Absolute Eosinophils 0.1 0.0 - 0.7 10e9/L    Absolute Basophils 0.1 0.0 - 0.2 10e9/L    Abs Immature Granulocytes 0.0 0 - 0.4 10e9/L    Absolute Nucleated RBC 0.0    Comprehensive metabolic panel    Collection Time: 11/01/17 10:19 AM   Result Value Ref Range    Sodium 138 133 - 144 mmol/L    Potassium 4.1 3.4 - 5.3 mmol/L    Chloride 105 94 - 109 mmol/L    Carbon Dioxide 28 20 - 32 mmol/L    Anion Gap 5 3 - 14 mmol/L    Glucose 94 70 - 99 mg/dL    Urea Nitrogen 15 7 - 30 mg/dL    Creatinine 0.74 0.66 - 1.25 mg/dL    GFR Estimate >90 >60 mL/min/1.7m2    GFR Estimate If Black >90 >60 mL/min/1.7m2    Calcium 8.9 8.5 - 10.1 mg/dL    Bilirubin Total 1.1 0.2 - 1.3 mg/dL    Albumin 3.8 3.4 - 5.0 g/dL    Protein Total 7.4 6.8 - 8.8 g/dL    Alkaline Phosphatase 70 40 - 150 U/L    ALT 41 0 - 70 U/L    AST 24 0 - 45 U/L               Follow-ups after your visit        Your next 10 appointments already scheduled     Nov 08, 2017 12:30 PM CST   (Arrive  by 11:00 AM)   IR PORT REMOVAL RIGHT with [id number]   [facility] ([facility])    [address]  5th Floor  [facility] [address]              1. Your doctor will need to do a history and physical within 7 days before this procedure. 2. Your doctor decide will which medications should not be taken the morning of the exam. 3. Laboratory tests are to be obtained by your doctor prior to the exam (Basic Metabolic Panel, CBCP, PTT and INR) (No labs needed if you are having a tunneled catheter exchange or removal) 4. If you have allergies to x-ray contrast or iodine, contact your doctor or a Radiology nurse prior to the exam day for instructions. 5. Someone will need to drive you to and from the hospital. 6. If you are or may be pregnant, contact your doctor or a Radiology nurse prior to the day of the exam. 7. If you have diabetes, check with your doctor or a Radiology nurse to see if your insulin needs to be adjusted for the exam. 8. If you are taking a medication called Glucophage or Glucovance; these medications need to be held the day of the exam and for approximately 48 hours following. A blood sample must be drawn so your creatinine level can be checked before resuming this medication. 9. If you are taking Coumadin (to thin you blood) please contact your doctor or a Radiology nurse at least 3 days before the exam for special instructions. 10. You should not have received contrast within 48 hours of this exam. 11. The day before your exam you may eat your regular diet and are encouraged to drink at least 2 quarts of clear liquids. Drink no alcoholic beverages for 24 hours prior to the exam. 12. If you have a colostomy you will need to irrigate it with tap water at 8PM the evening before and again at 6AM the morning of the exam. 13. Do not smoke for 24 hours prior to the procedure. 14. Birth to 4 years: - Breast feeding must be stopped 4 hours prior to exam - Solid food  or formula must be stopped 6 hours prior to exam - Tube feedings must be stopped 6 hours prior to exam 15. 4-10 years old: - Nothing to eat or drink 6 hours prior to exam 16. 10+ years old: - Nothing to eat or drink 8 hours prior to exam 17. The morning of the exam you may brush your teeth and take medications as directed with a sip of water. 18. When discharged, you cannot drive until morning, and an adult must be with you until then. You should stay in the [facility] overnight. 19. Bring a list of all drugs you are taking; include supplements and over-the-counter medications. Wear comfortable clothes and leave your valuables at home.            Nov 08, 2017   Procedure with Chuy Steiner PA-C   [facility] ([facility])    [address] [place] [address]  [address]th [facility] [address] [phone]           Located in the Clinics and Surgery Center at [address] [place] [address].   parking is very convenient and highly recommended.  is a $6 flat rate fee.  Both  and self parkers should enter the main arrival plaza from [facility]; parking attendants will direct you based on your parking preference.              Who to contact     If you have questions or need follow up information about today's clinic visit or your schedule please contact [facility] directly at [phone].  Normal or non-critical lab and imaging results will be communicated to you by MyChart, letter or phone within 4 business days after the clinic has received the results. If you do not hear from us within 7 days, please contact the clinic through [employer] or phone. If you have a critical or abnormal lab result, we will notify you by phone as soon as possible.  Submit refill requests through [employer] or call your pharmacy and they will forward the refill request to us. Please allow 3 business days for your refill to be completed.           Additional Information About Your Visit        MyChart Information     [employer] gives you secure access to your electronic health record. If you see a primary care provider, you can also send messages to your care team and make appointments. If you have questions, please call your primary care clinic.  If you do not have a primary care provider, please call [phone] and they will assist you.        Care EveryWhere ID     This is your Care EveryWhere ID. This could be used by other organizations to access your [facility] medical records  [id number]        Your Vitals Were     Pulse Temperature Respirations Pulse Oximetry BMI (Body Mass Index)       79 98.3  F (36.8  C) (Oral) 18 94% 32.02 kg/m2        Blood Pressure from Last 3 Encounters:   11/01/17 127/85   10/18/17 132/82   09/27/17 132/83    Weight from Last 3 Encounters:   11/01/17 98.4 kg (216 lb 14.4 oz)   10/18/17 98.8 kg (217 lb 14.4 oz)   09/27/17 96 kg (211 lb 9.6 oz)              We Performed the Following     CBC with platelets differential     Comprehensive metabolic panel        Primary Care Provider Office Phone # Fax #    Delio Alcala [phone]       [facility] [address] [place] [address]        Equal Access to Services     LUCIO BOGGS : Hadii aad ku hadasho Soomaali, waaxda luqadaha, qaybta kaalmada adeegyada, waxay idiin hayaan jacqui hines. So [facility] [phone].    ATENCIÓN: Si habla español, tiene a valle disposición servicios gratuitos de asistencia lingüística. Llame al [phone].    We comply with applicable federal civil rights laws and [place] laws. We do not discriminate on the basis of race, color, national origin, age, disability, sex, sexual orientation, or gender identity.            Thank you!     Thank you for choosing [facility]  for your care. Our goal is always to provide you with excellent care. Hearing back from our patients is one way we can  continue to improve our services. Please take a few minutes to complete the written survey that you may receive in the mail after your visit with us. Thank you!             Your Updated Medication List - Protect others around you: Learn how to safely use, store and throw away your medicines at www.disposemymeds.org.          This list is accurate as of: 11/1/17 11:38 AM.  Always use your most recent med list.                   Brand Name Dispense Instructions for use Diagnosis    escitalopram 20 MG tablet    LEXAPRO    30 tablet    Take 1 tablet (20 mg) by mouth every morning    Major depressive disorder, recurrent episode, mild (H)       FLOVENT  MCG/ACT Inhaler   Generic drug:  fluticasone      Take 2 puffs by mouth 2 times daily as needed        lidocaine-prilocaine cream    EMLA    30 g    Apply topically as needed for moderate pain    Rectal cancer (H)       LORazepam 0.5 MG tablet    ATIVAN    30 tablet    Take 1 tablet (0.5 mg) by mouth every 4 hours as needed (Anxiety, Nausea/Vomiting or Sleep)    Adenocarcinoma of rectum (H)       ondansetron 8 MG tablet    ZOFRAN    60 tablet    Take 1 tablet (8 mg) by mouth every 8 hours as needed for nausea    Adenocarcinoma of rectum (H)       prochlorperazine 10 MG tablet    COMPAZINE    30 tablet    Take 1 tablet (10 mg) by mouth every 6 hours as needed (Nausea/Vomiting)    Adenocarcinoma of rectum (H)

## 2017-11-01 NOTE — NURSING NOTE
Chief Complaint   Patient presents with     Port Draw     Labs drawn via port by RN. Line flushed and hep locked. VS taken.     Mariah Shearre RN

## 2017-11-01 NOTE — PROGRESS NOTES
Infusion Nursing Note:  Meño Omalley presents today for Cycle 12 Day 1 Leucovorin, Fluorouracil bolus and pump.    Patient seen by provider today: No    Treatment Conditions:  Lab Results   Component Value Date    HGB 14.3 11/01/2017     Lab Results   Component Value Date    WBC 4.1 11/01/2017      Lab Results   Component Value Date    ANEU 1.9 11/01/2017     Lab Results   Component Value Date     11/01/2017      Lab Results   Component Value Date     11/01/2017                   Lab Results   Component Value Date    POTASSIUM 4.1 11/01/2017           Lab Results   Component Value Date    MAG 2.1 04/13/2017            Lab Results   Component Value Date    CR 0.74 11/01/2017                   Lab Results   Component Value Date    CANDY 8.9 11/01/2017                Lab Results   Component Value Date    BILITOTAL 1.1 11/01/2017           Lab Results   Component Value Date    ALBUMIN 3.8 11/01/2017                    Lab Results   Component Value Date    ALT 41 11/01/2017           Lab Results   Component Value Date    AST 24 11/01/2017     Results reviewed, labs MET treatment parameters, ok to proceed with treatment.        Intravenous Access:  Implanted Port.    Note: Prior to discharge: Port is secured in place with tegaderm and flushed with 10cc NS with positive blood return noted.  Fluorouracil C-Series pump connected at 1215, to infuse at 5.2 ml/hr for 46 hours.  Capillary element taped to pt's skin per protocol.  All connectors secured in place and clamps taped open.  Pt will be disconnected at home on Friday, 11/3/17 at 1015.  [facility] notified of pump connect time.     Pt stated this is his last scheduled chemotherapy.  Plan for port removal on 11/8/17.  No future appts scheduled for scan or provider.  Pt instructed to follow-up with Zahira Ruiz RNCC regarding future appt plan.      Post Infusion Assessment:  Patient tolerated infusion without incident.  Port flushed with + BR  and left intact with Fluororuacil pump infusing at dischage.     Discharge Plan:   Patient declined prescription refills.  Discharge instructions reviewed with: Patient.  Patient and/or family verbalized understanding of discharge instructions and all questions answered.  Copy of AVS reviewed with patient and/or family.  Patient will return for port removal on 11/8/17.  Patient discharged in stable condition accompanied by: wife.  Departure Mode: Ambulatory.  Face to Face time: 5 min.    Mavis Elias RN

## 2017-11-02 DIAGNOSIS — C20 ADENOCARCINOMA OF RECTUM (H): Primary | ICD-10-CM

## 2017-11-07 ENCOUNTER — ANESTHESIA EVENT (OUTPATIENT)
Dept: SURGERY | Facility: AMBULATORY SURGERY CENTER | Age: 47
End: 2017-11-07

## 2017-11-08 ENCOUNTER — SURGERY (OUTPATIENT)
Age: 47
End: 2017-11-08

## 2017-11-08 ENCOUNTER — HOSPITAL ENCOUNTER (OUTPATIENT)
Facility: AMBULATORY SURGERY CENTER | Age: 47
End: 2017-11-08
Attending: PHYSICIAN ASSISTANT

## 2017-11-08 ENCOUNTER — ANESTHESIA (OUTPATIENT)
Dept: SURGERY | Facility: AMBULATORY SURGERY CENTER | Age: 47
End: 2017-11-08

## 2017-11-08 VITALS
WEIGHT: 210 LBS | SYSTOLIC BLOOD PRESSURE: 129 MMHG | RESPIRATION RATE: 15 BRPM | DIASTOLIC BLOOD PRESSURE: 57 MMHG | OXYGEN SATURATION: 98 % | HEIGHT: 69 IN | TEMPERATURE: 98.1 F | BODY MASS INDEX: 31.1 KG/M2

## 2017-11-08 LAB — INR PPP: 1 (ref 0.86–1.14)

## 2017-11-08 RX ORDER — ONDANSETRON 2 MG/ML
INJECTION INTRAMUSCULAR; INTRAVENOUS PRN
Status: DISCONTINUED | OUTPATIENT
Start: 2017-11-08 | End: 2017-11-08

## 2017-11-08 RX ORDER — SODIUM CHLORIDE, SODIUM LACTATE, POTASSIUM CHLORIDE, CALCIUM CHLORIDE 600; 310; 30; 20 MG/100ML; MG/100ML; MG/100ML; MG/100ML
INJECTION, SOLUTION INTRAVENOUS CONTINUOUS PRN
Status: DISCONTINUED | OUTPATIENT
Start: 2017-11-08 | End: 2017-11-08

## 2017-11-08 RX ORDER — KETOROLAC TROMETHAMINE 30 MG/ML
30 INJECTION, SOLUTION INTRAMUSCULAR; INTRAVENOUS EVERY 6 HOURS PRN
Status: DISCONTINUED | OUTPATIENT
Start: 2017-11-08 | End: 2017-11-09 | Stop reason: HOSPADM

## 2017-11-08 RX ORDER — LIDOCAINE HYDROCHLORIDE 20 MG/ML
INJECTION, SOLUTION INFILTRATION; PERINEURAL PRN
Status: DISCONTINUED | OUTPATIENT
Start: 2017-11-08 | End: 2017-11-08

## 2017-11-08 RX ORDER — SODIUM CHLORIDE, SODIUM LACTATE, POTASSIUM CHLORIDE, CALCIUM CHLORIDE 600; 310; 30; 20 MG/100ML; MG/100ML; MG/100ML; MG/100ML
INJECTION, SOLUTION INTRAVENOUS CONTINUOUS
Status: DISCONTINUED | OUTPATIENT
Start: 2017-11-08 | End: 2017-11-09 | Stop reason: HOSPADM

## 2017-11-08 RX ORDER — DEXAMETHASONE SODIUM PHOSPHATE 4 MG/ML
INJECTION, SOLUTION INTRA-ARTICULAR; INTRALESIONAL; INTRAMUSCULAR; INTRAVENOUS; SOFT TISSUE PRN
Status: DISCONTINUED | OUTPATIENT
Start: 2017-11-08 | End: 2017-11-08

## 2017-11-08 RX ORDER — MEPERIDINE HYDROCHLORIDE 25 MG/ML
12.5 INJECTION INTRAMUSCULAR; INTRAVENOUS; SUBCUTANEOUS
Status: DISCONTINUED | OUTPATIENT
Start: 2017-11-08 | End: 2017-11-09 | Stop reason: HOSPADM

## 2017-11-08 RX ORDER — ACETAMINOPHEN 325 MG/1
975 TABLET ORAL ONCE
Status: DISCONTINUED | OUTPATIENT
Start: 2017-11-08 | End: 2017-11-09 | Stop reason: HOSPADM

## 2017-11-08 RX ORDER — FENTANYL CITRATE 50 UG/ML
25-50 INJECTION, SOLUTION INTRAMUSCULAR; INTRAVENOUS
Status: DISCONTINUED | OUTPATIENT
Start: 2017-11-08 | End: 2017-11-09 | Stop reason: HOSPADM

## 2017-11-08 RX ORDER — ONDANSETRON 2 MG/ML
4 INJECTION INTRAMUSCULAR; INTRAVENOUS EVERY 30 MIN PRN
Status: DISCONTINUED | OUTPATIENT
Start: 2017-11-08 | End: 2017-11-09 | Stop reason: HOSPADM

## 2017-11-08 RX ORDER — PROPOFOL 10 MG/ML
INJECTION, EMULSION INTRAVENOUS PRN
Status: DISCONTINUED | OUTPATIENT
Start: 2017-11-08 | End: 2017-11-08

## 2017-11-08 RX ORDER — ONDANSETRON 4 MG/1
4 TABLET, ORALLY DISINTEGRATING ORAL EVERY 30 MIN PRN
Status: DISCONTINUED | OUTPATIENT
Start: 2017-11-08 | End: 2017-11-09 | Stop reason: HOSPADM

## 2017-11-08 RX ORDER — SODIUM CHLORIDE 9 MG/ML
INJECTION, SOLUTION INTRAVENOUS CONTINUOUS
Status: DISCONTINUED | OUTPATIENT
Start: 2017-11-08 | End: 2017-11-09 | Stop reason: HOSPADM

## 2017-11-08 RX ORDER — LIDOCAINE 40 MG/G
CREAM TOPICAL
Status: DISCONTINUED | OUTPATIENT
Start: 2017-11-08 | End: 2017-11-09 | Stop reason: HOSPADM

## 2017-11-08 RX ORDER — NALOXONE HYDROCHLORIDE 0.4 MG/ML
.1-.4 INJECTION, SOLUTION INTRAMUSCULAR; INTRAVENOUS; SUBCUTANEOUS
Status: DISCONTINUED | OUTPATIENT
Start: 2017-11-08 | End: 2017-11-09 | Stop reason: HOSPADM

## 2017-11-08 RX ADMIN — ONDANSETRON 4 MG: 2 INJECTION INTRAMUSCULAR; INTRAVENOUS at 11:40

## 2017-11-08 RX ADMIN — LIDOCAINE HYDROCHLORIDE 40 MG: 20 INJECTION, SOLUTION INFILTRATION; PERINEURAL at 11:44

## 2017-11-08 RX ADMIN — Medication 5 ML: at 12:13

## 2017-11-08 RX ADMIN — PROPOFOL 50 MG: 10 INJECTION, EMULSION INTRAVENOUS at 11:44

## 2017-11-08 RX ADMIN — DEXAMETHASONE SODIUM PHOSPHATE 4 MG: 4 INJECTION, SOLUTION INTRA-ARTICULAR; INTRALESIONAL; INTRAMUSCULAR; INTRAVENOUS; SOFT TISSUE at 11:40

## 2017-11-08 RX ADMIN — SODIUM CHLORIDE, SODIUM LACTATE, POTASSIUM CHLORIDE, CALCIUM CHLORIDE: 600; 310; 30; 20 INJECTION, SOLUTION INTRAVENOUS at 11:37

## 2017-11-08 NOTE — ANESTHESIA CARE TRANSFER NOTE
Patient: Meño Omalley    Procedure(s):  Right chest Port Removal - Wound Class: I-Clean    Diagnosis: Adenocarcinoma of Rectum  Diagnosis Additional Information: No value filed.    Anesthesia Type:   MAC     Note:  Airway :Room Air  Patient transferred to:Phase II  Comments: Pt. Alert report given to RN.Handoff Report: Identifed the Patient, Identified the Reponsible Provider, Reviewed the pertinent medical history, Discussed the surgical course, Reviewed Intra-OP anesthesia mangement and issues during anesthesia, Set expectations for post-procedure period and Allowed opportunity for questions and acknowledgement of understanding      Vitals: (Last set prior to Anesthesia Care Transfer)    CRNA VITALS  11/8/2017 1146 - 11/8/2017 1219      11/8/2017             Pulse: 73    Ht Rate: 72    SpO2: 97 %    Resp Rate (observed): (!)  1    Resp Rate (set): 10                Electronically Signed By: SARWAT Preston CRNA  November 8, 2017  12:19 PM

## 2017-11-08 NOTE — PROCEDURES
Interventional Radiology Brief Post Procedure Note    Procedure: IR Port Removal Right    Proceduralist: Chuy Steiner PA-C    Assistant: None    Time Out: Prior to the start of the procedure and with procedural staff participation, I verbally confirmed the patient s identity using two indicators, relevant allergies, that the procedure was appropriate and matched the consent or emergent situation, and that the correct equipment/implants were available. Immediately prior to starting the procedure I conducted the Time Out with the procedural staff and re-confirmed the patient s name, procedure, and site/side. (The Joint Commission universal protocol was followed.)  Yes        Sedation: Monitored Anesthesia Care (MAC) administered and documented by Anesthesia Care Provider    Findings: Completed removal of 6 Welsh 20.5 cm single lumen power-injectable central venous port via right IJ. Port removed in its entirety. Patient tolerated the procedure well.    Fluoroscopy Time:  minute(s)    SPECIMENS: None    Complications: 1. None     Condition: Stable    Plan: Follow-up per primary team.     Comments: See dictated procedure note for full details.    Chuy Steiner PA-C

## 2017-11-08 NOTE — IP AVS SNAPSHOT
MRN:[id number]                      After Visit Summary   11/8/2017    Meño Omalley    MRN: [id number]           Thank you!     Thank you for choosing [facility] for your care. Our goal is always to provide you with excellent care. Hearing back from our patients is one way we can continue to improve our services. Please take a few minutes to complete the written survey that you may receive in the mail after you visit with us. Thank you!        Patient Information     Date Of Birth          1970        About your hospital stay     You were admitted on:  November 8, 2017 You last received care in the:  [facility]    You were discharged on:  November 8, 2017       Who to Call     For medical emergencies, please call 911.  For non-urgent questions about your medical care, please call your primary care provider or clinic, [phone]  For questions related to your surgery, please call your surgery clinic        Attending Provider     Provider Chuy Woodruff PA-C Physician Assistant       Primary Care Provider Office Phone # Fax #    Delio Alcala [phone]      Your next 10 appointments already scheduled     Nov 08, 2017 12:30 PM CST   (Arrive by 11:00 AM)   IR PORT REMOVAL RIGHT with [id number]   [facility] ([facility])    [address] [address]              1. Your doctor will need to do a history and physical within 7 days before this procedure. 2. Your doctor decide will which medications should not be taken the morning of the exam. 3. Laboratory tests are to be obtained by your doctor prior to the exam (Basic Metabolic Panel, CBCP, PTT and INR) (No labs needed if you are having a tunneled catheter exchange or removal) 4. If you have allergies to x-ray contrast or iodine, contact your doctor or a Radiology nurse prior to the exam day for instructions. 5.  Someone will need to drive you to and from the hospital. 6. If you are or may be pregnant, contact your doctor or a Radiology nurse prior to the day of the exam. 7. If you have diabetes, check with your doctor or a Radiology nurse to see if your insulin needs to be adjusted for the exam. 8. If you are taking a medication called Glucophage or Glucovance; these medications need to be held the day of the exam and for approximately 48 hours following. A blood sample must be drawn so your creatinine level can be checked before resuming this medication. 9. If you are taking Coumadin (to thin you blood) please contact your doctor or a Radiology nurse at least 3 days before the exam for special instructions. 10. You should not have received contrast within 48 hours of this exam. 11. The day before your exam you may eat your regular diet and are encouraged to drink at least 2 quarts of clear liquids. Drink no alcoholic beverages for 24 hours prior to the exam. 12. If you have a colostomy you will need to irrigate it with tap water at 8PM the evening before and again at 6AM the morning of the exam. 13. Do not smoke for 24 hours prior to the procedure. 14. Birth to 4 years: - Breast feeding must be stopped 4 hours prior to exam - Solid food or formula must be stopped 6 hours prior to exam - Tube feedings must be stopped 6 hours prior to exam 15. 4-10 years old: - Nothing to eat or drink 6 hours prior to exam 16. 10+ years old: - Nothing to eat or drink 8 hours prior to exam 17. The morning of the exam you may brush your teeth and take medications as directed with a sip of water. 18. When discharged, you cannot drive until morning, and an adult must be with you until then. You should stay in the [facility] overnight. 19. Bring a list of all drugs you are taking; include supplements and over-the-counter medications. Wear comfortable clothes and leave your valuables at home.            Nov 08, 2017   Procedure with Chuy  Salvatore tSeiner PA-C   [facility] ([facility])    [address]  5th Floor  [facility] [address] [phone]           Located in the Clinics and Surgery Center at [address] [place] [address], [facility] [address].   parking is very convenient and highly recommended.  is a $6 flat rate fee.  Both  and self parkers should enter the main arrival plaza from [facility]; parking attendants will direct you based on your parking preference.            Dec 15, 2017  8:30 AM CST   LAB with NL LAB PMC   [facility] ([facility])    [address] [place] [id number] [phone]           Please do not eat 10-12 hours before your appointment if you are coming in fasting for labs on lipids, cholesterol, or glucose (sugar). This does not apply to pregnant women. Water, hot tea and black coffee (with nothing added) are okay. Do not drink other fluids, diet soda or chew gum.            Dec 15, 2017  9:00 AM CST   CT CHEST ABDOMEN PELVIS W/O & W CONTRAST with PHCT1   [facility] CT Scan ([facility])    [address] [facility] [id number] [phone]           Please bring any scans or X-rays taken at other hospitals, if similar tests were done. Also bring a list of your medicines, including vitamins, minerals and over-the-counter drugs. It is safest to leave personal items at home.  Be sure to tell your doctor:   If you have any allergies.   If there s any chance you are pregnant.   If you are breastfeeding.   If you have any special needs.  You may have contrast for this exam. To prepare:   Do not eat or drink for 2 hours before your exam. If you need to take medicine, you may take it with small sips of water. (We may ask you to take liquid medicine as well.)   The day before your exam, drink extra fluids at least six 8-ounce glasses (unless your doctor tells you to restrict your  fluids).  Patients over 70 or patients with diabetes or kidney problems:   If you haven t had a blood test (creatinine test) within the last 30 days, go to your clinic or Diagnostic Imaging Department for this test.  If you have diabetes:   If your kidney function is normal, continue taking your metformin (Avandamet, Glucophage, Glucovance, Metaglip) on the day of your exam.   If your kidney function is abnormal, wait 48 hours before restarting this medicine.  You will have oral contrast for this exam:   You will drink the contrast at home. Get this from your clinic or Diagnostic Imaging Department. Please follow the directions given.  Please wear loose clothing, such as a sweat suit or jogging clothes. Avoid snaps, zippers and other metal. We may ask you to undress and put on a hospital gown.  If you have any questions, please call the Imaging Department where you will have your exam.            Dec 20, 2017  9:45 AM CST   (Arrive by 9:30 AM)   Return Visit with Jeanette Mcarthur MD   [facility] ([facility])    [address] [place] [id number] [phone]              Further instructions from your care team         A collaboration between [facility] and [facility]  Experts in minimally invasive, targeted treatments performed using imaging guidance    Venous Access Device, Port Catheter or Tunneled Central Line Removal    Today you had your existing venous access device removed, either because it was no longer needed or because there was malfunction or infection issues.    One of our Radiology PAs performed this procedure for you today:    ? Chuy Steiner PA-C    After you go home:  - Drink plenty of fluids.  Generally 6-8 (8 ounce) glasses a day is recommended.  - Resume your regular diet unless otherwise ordered by a medical provider.  - Keep any applied tape/gauze dressings clean and dry.   Change tape/gauze dressings if they get wet or soiled.  - You may shower the following day after procedure, however cover and protect from moisture any tape/gauze dressings.  You may let water hit and run over dried skin glue, but do not scrub.  Pat the area dry after showering.  - Port removal incisions are closed with absorbable suture, meaning they do not need to be removed at a later date, and a topical skin adhesive (skin glue).  This glue will wear off in 7-14 days.  Do not remove before this time.  If 14 days have passed and residual glue is present, you may gently remove it.  - You may remove tape/gauze dressings after 5 days if the site looks closed and in the process of healing.  - Do not apply gels, lotions, or ointments to the glue site for the first 10 days as this may cause the glue to prematurely soften and fail.  - Do not perform strenuous activities or lift greater than 10 pounds for the next three days.  - If there is bleeding or oozing from the procedure site, apply firm pressure to the area for 5-10 minutes.  If the bleeding continues seek medical advice at the numbers below.  - Mild procedure site discomfort can be treated with an ice pack and over-the-counter pain relievers.              For 24 hours after any sedation used:  - Relax and take it easy.  No strenuous activities.  - Do not drive or operate machines at home or at work.  - No alcohol consumption.  - Do not make any important or legal decisions.    Call our Interventional Radiology (IR) service if:  - If you start bleeding from the procedure site.  If you do start to bleed from the site, lie down and hold some pressure on the site.  Our radiology provider can help you decide if you need to return to the hospital.  - If you have new or worsening pain related to the procedure.  - If you have concerning swelling at the procedure site.  - If you develop persistent nausea or vomiting.  - If you develop hives or a rash or any unexplained  itching.  - If you have a fever of greater than 100.5  F and chills in the first 5 days after procedure.  - Any other concerns related to your procedure.      [facility]  Interventional Radiology (IR)  [address]  2nd Floor, [facility] [place] [address]    Contact Number:  [phone]  (IR control desk)  - Monday - Friday 8:00 am - 4:30 pm    After hours for urgent concerns:  [phone]  - After 4:30 pm Monday - Friday, Weekends and Holidays.   - Ask for Interventional Radiology on-call.  Someone is available 24 hours a day.  - [facility] toll free number:  [phone]    [facility]  Home Care Following Anesthesia  For 24 hours after surgery:  1. Get plenty of rest.  A responsible adult must stay with you for at least 24 hours after you leave the surgery center.  2. Do not drive or use heavy equipment.  If you have weakness or tingling, don't drive or use heavy equipment until this feeling goes away.   3. Do not drink alcohol.   4. Avoid strenuous or risky activities.  Ask for help when climbing stairs.  5. You may feel lightheaded.  IF so, sit for a few minutes before standing.  Have someone help you get up.   6. If you have nausea (feel sick to your stomach): Drink only clear liquids such as apple juice, ginger ale, broth or 7-Up.  Rest may also help.  Be sure to drink enough fluids.  Move to a regular diet as you feel able.   7. You may have a slight fever.  Call the doctor if your fever is over 100 F (37.7 C) (taken under the tongue) or lasts longer than 24 hours.  8. You may have a dry mouth, a sore throat, muscle aches or trouble sleeping. These should go away after 24 hours.  9. Do not make important or legal decisions.               Tips for taking pain medications  To get the best pain relief possible, remember these points:    Take pain medications as directed, before pain becomes severe.    Pain medication can upset your  stomach: taking it with food may help.    Constipation is a common side effect of pain medication. Drink plenty of  fluids.    Eat foods high in fiber. Take a stool softener if recommended by your doctor or pharmacist.    Do not drink alcohol, drive or operate machinery while taking pain medications.    Ask about other ways to control pain, such as with heat, ice or relaxation.    Tylenol/Acetaminophen Consumption  To help encourage the safe use of acetaminophen, the makers of TYLENOL  have lowered the maximum daily dose for single-ingredient Extra Strength TYLENOL  (acetaminophen) products sold in the [place] from 8 pills per day (4,000 mg) to 6 pills per day (3,000 mg). The dosing interval has also changed from 2 pills every 4-6 hours to 2 pills every 6 hours.    If you feel your pain relief is insufficient, you may take Tylenol/Acetaminophen in addition to your narcotic pain medication.     Be careful not to exceed 3,000 mg of Tylenol/Acetaminophen in a 24 hour period from all sources.    If you are taking extra strength Tylenol/acetaminophen (500 mg), the maximum dose is 6 tablets in 24 hours.    If you are taking regular strength acetaminophen (325 mg), the maximum dose is 9 tablets in 24 hours.    Call a doctor for any of the followin. Signs of infection (fever, growing tenderness at the surgery site, a large amount of drainage or bleeding, severe pain, foul-smelling drainage, redness, swelling).  2. It has been over 8 to 10 hours since surgery and you are still not able to urinate (pass water).  3. Headache for over 24 hours.                  Pending Results     Date and Time Order Name Status Description    2017 1100 INR In process             Admission Information     Date & Time Provider Department Dept. Phone    2017 Chuy Steiner PA-C [facility] [phone]      Your Vitals Were     Blood Pressure Temperature Respirations Height Weight Pulse Oximetry     "133/85 98.1  F (36.7  C) (Temporal) 14 1.753 m (5' 9\") 95.3 kg (210 lb) 97%    BMI (Body Mass Index)                   31.01 kg/m2           [employer] Information     [employer] gives you secure access to your electronic health record. If you see a primary care provider, you can also send messages to your care team and make appointments. If you have questions, please call your primary care clinic.  If you do not have a primary care provider, please call [phone] and they will assist you.      [employer] is an electronic gateway that provides easy, online access to your medical records. With [employer], you can request a clinic appointment, read your test results, renew a prescription or communicate with your care team.     To access your existing account, please contact your [facility] or call [phone] for assistance.        Care EveryWhere ID     This is your Care EveryWhere ID. This could be used by other organizations to access your [facility] medical records  [id number]        Equal Access to Services     LUCIO BOGGS AH: Hadii snow white hadasho Soomaali, waaxda luqadaha, qaybta kaalmada adeegyaalicia, lynda morales . So [facility] [phone].    ATENCIÓN: Si habla español, tiene a valle disposición servicios gratuitos de asistencia lingüística. Llame al [phone].    We comply with applicable federal civil rights laws and [place] laws. We do not discriminate on the basis of race, color, national origin, age, disability, sex, sexual orientation, or gender identity.               Review of your medicines      UNREVIEWED medicines. Ask your doctor about these medicines        Dose / Directions    escitalopram 20 MG tablet   Commonly known as:  LEXAPRO   Used for:  Major depressive disorder, recurrent episode, mild (H)        Dose:  20 mg   Take 1 tablet (20 mg) by mouth every morning   Quantity:  30 tablet   Refills:  3       FLOVENT  MCG/ACT Inhaler   Generic " drug:  fluticasone        Dose:  2 puff   Take 2 puffs by mouth 2 times daily as needed   Refills:  0       lidocaine-prilocaine cream   Commonly known as:  EMLA   Used for:  Rectal cancer (H)        Apply topically as needed for moderate pain   Quantity:  30 g   Refills:  1       LORazepam 0.5 MG tablet   Commonly known as:  ATIVAN   Used for:  Adenocarcinoma of rectum (H)        Dose:  0.5 mg   Take 1 tablet (0.5 mg) by mouth every 4 hours as needed (Anxiety, Nausea/Vomiting or Sleep)   Quantity:  30 tablet   Refills:  2       ondansetron 8 MG tablet   Commonly known as:  ZOFRAN   Used for:  Adenocarcinoma of rectum (H)        Dose:  8 mg   Take 1 tablet (8 mg) by mouth every 8 hours as needed for nausea   Quantity:  60 tablet   Refills:  1       prochlorperazine 10 MG tablet   Commonly known as:  COMPAZINE   Used for:  Adenocarcinoma of rectum (H)        Dose:  10 mg   Take 1 tablet (10 mg) by mouth every 6 hours as needed (Nausea/Vomiting)   Quantity:  30 tablet   Refills:  2                Protect others around you: Learn how to safely use, store and throw away your medicines at www.disposemymeds.org.             Medication List: This is a list of all your medications and when to take them. Check marks below indicate your daily home schedule. Keep this list as a reference.      Medications           Morning Afternoon Evening Bedtime As Needed    escitalopram 20 MG tablet   Commonly known as:  LEXAPRO   Take 1 tablet (20 mg) by mouth every morning                                FLOVENT  MCG/ACT Inhaler   Take 2 puffs by mouth 2 times daily as needed   Generic drug:  fluticasone                                lidocaine-prilocaine cream   Commonly known as:  EMLA   Apply topically as needed for moderate pain                                LORazepam 0.5 MG tablet   Commonly known as:  ATIVAN   Take 1 tablet (0.5 mg) by mouth every 4 hours as needed (Anxiety, Nausea/Vomiting or Sleep)                                 ondansetron 8 MG tablet   Commonly known as:  ZOFRAN   Take 1 tablet (8 mg) by mouth every 8 hours as needed for nausea                                prochlorperazine 10 MG tablet   Commonly known as:  COMPAZINE   Take 1 tablet (10 mg) by mouth every 6 hours as needed (Nausea/Vomiting)

## 2017-11-08 NOTE — IP AVS SNAPSHOT
[facility]    [address] [place] [address]    Phone:  [phone]    Fax:  [phone]                                       After Visit Summary   11/8/2017    Meño Omalley    MRN: [id number]           After Visit Summary Signature Page     I have received my discharge instructions, and my questions have been answered. I have discussed any challenges I see with this plan with the nurse or doctor.    ..........................................................................................................................................  Patient/Patient Representative Signature      ..........................................................................................................................................  Patient Representative Print Name and Relationship to Patient    ..................................................               ................................................  Date                                            Time    ..........................................................................................................................................  Reviewed by Signature/Title    ...................................................              ..............................................  Date                                                            Time

## 2017-11-08 NOTE — DISCHARGE INSTRUCTIONS
A collaboration between [facility] and [facility]  Experts in minimally invasive, targeted treatments performed using imaging guidance    Venous Access Device, Port Catheter or Tunneled Central Line Removal    Today you had your existing venous access device removed, either because it was no longer needed or because there was malfunction or infection issues.    One of our Radiology PAs performed this procedure for you today:    ? Chuy Steiner PA-C    After you go home:  - Drink plenty of fluids.  Generally 6-8 (8 ounce) glasses a day is recommended.  - Resume your regular diet unless otherwise ordered by a medical provider.  - Keep any applied tape/gauze dressings clean and dry.  Change tape/gauze dressings if they get wet or soiled.  - You may shower the following day after procedure, however cover and protect from moisture any tape/gauze dressings.  You may let water hit and run over dried skin glue, but do not scrub.  Pat the area dry after showering.  - Port removal incisions are closed with absorbable suture, meaning they do not need to be removed at a later date, and a topical skin adhesive (skin glue).  This glue will wear off in 7-14 days.  Do not remove before this time.  If 14 days have passed and residual glue is present, you may gently remove it.  - You may remove tape/gauze dressings after 5 days if the site looks closed and in the process of healing.  - Do not apply gels, lotions, or ointments to the glue site for the first 10 days as this may cause the glue to prematurely soften and fail.  - Do not perform strenuous activities or lift greater than 10 pounds for the next three days.  - If there is bleeding or oozing from the procedure site, apply firm pressure to the area for 5-10 minutes.  If the bleeding continues seek medical advice at the numbers below.  - Mild procedure site discomfort can be treated with an ice pack and over-the-counter pain  relievers.              For 24 hours after any sedation used:  - Relax and take it easy.  No strenuous activities.  - Do not drive or operate machines at home or at work.  - No alcohol consumption.  - Do not make any important or legal decisions.    Call our Interventional Radiology (IR) service if:  - If you start bleeding from the procedure site.  If you do start to bleed from the site, lie down and hold some pressure on the site.  Our radiology provider can help you decide if you need to return to the hospital.  - If you have new or worsening pain related to the procedure.  - If you have concerning swelling at the procedure site.  - If you develop persistent nausea or vomiting.  - If you develop hives or a rash or any unexplained itching.  - If you have a fever of greater than 100.5  F and chills in the first 5 days after procedure.  - Any other concerns related to your procedure.      [facility]  Interventional Radiology (IR)  [address] [place] [address]    Contact Number:  [phone]  (IR control desk)  - Monday - Friday 8:00 am - 4:30 pm    After hours for urgent concerns:  [phone]  - After 4:30 pm Monday - Friday, Weekends and Holidays.   - Ask for Interventional Radiology on-call.  Someone is available 24 hours a day.  - [facility] toll free number:  [phone]    [facility]  Home Care Following Anesthesia  For 24 hours after surgery:  1. Get plenty of rest.  A responsible adult must stay with you for at least 24 hours after you leave the surgery center.  2. Do not drive or use heavy equipment.  If you have weakness or tingling, don't drive or use heavy equipment until this feeling goes away.   3. Do not drink alcohol.   4. Avoid strenuous or risky activities.  Ask for help when climbing stairs.  5. You may feel lightheaded.  IF so, sit for a few minutes before standing.  Have someone help you  get up.   6. If you have nausea (feel sick to your stomach): Drink only clear liquids such as apple juice, ginger ale, broth or 7-Up.  Rest may also help.  Be sure to drink enough fluids.  Move to a regular diet as you feel able.   7. You may have a slight fever.  Call the doctor if your fever is over 100 F (37.7 C) (taken under the tongue) or lasts longer than 24 hours.  8. You may have a dry mouth, a sore throat, muscle aches or trouble sleeping. These should go away after 24 hours.  9. Do not make important or legal decisions.               Tips for taking pain medications  To get the best pain relief possible, remember these points:    Take pain medications as directed, before pain becomes severe.    Pain medication can upset your stomach: taking it with food may help.    Constipation is a common side effect of pain medication. Drink plenty of  fluids.    Eat foods high in fiber. Take a stool softener if recommended by your doctor or pharmacist.    Do not drink alcohol, drive or operate machinery while taking pain medications.    Ask about other ways to control pain, such as with heat, ice or relaxation.    Tylenol/Acetaminophen Consumption  To help encourage the safe use of acetaminophen, the makers of TYLENOL  have lowered the maximum daily dose for single-ingredient Extra Strength TYLENOL  (acetaminophen) products sold in the [place] from 8 pills per day (4,000 mg) to 6 pills per day (3,000 mg). The dosing interval has also changed from 2 pills every 4-6 hours to 2 pills every 6 hours.    If you feel your pain relief is insufficient, you may take Tylenol/Acetaminophen in addition to your narcotic pain medication.     Be careful not to exceed 3,000 mg of Tylenol/Acetaminophen in a 24 hour period from all sources.    If you are taking extra strength Tylenol/acetaminophen (500 mg), the maximum dose is 6 tablets in 24 hours.    If you are taking regular strength acetaminophen (325 mg), the maximum dose is 9 tablets  in 24 hours.    Call a doctor for any of the followin. Signs of infection (fever, growing tenderness at the surgery site, a large amount of drainage or bleeding, severe pain, foul-smelling drainage, redness, swelling).  2. It has been over 8 to 10 hours since surgery and you are still not able to urinate (pass water).  3. Headache for over 24 hours.

## 2017-11-08 NOTE — ANESTHESIA POSTPROCEDURE EVALUATION
Patient: Meño Omalley    Procedure(s):  Right chest Port Removal - Wound Class: I-Clean    Diagnosis:Adenocarcinoma of Rectum  Diagnosis Additional Information: No value filed.    Anesthesia Type:  MAC    Note:  Anesthesia Post Evaluation    Patient location during evaluation: Phase 2  Patient participation: Able to fully participate in evaluation  Level of consciousness: awake and alert  Pain management: adequate  Airway patency: patent  Cardiovascular status: acceptable and hemodynamically stable  Respiratory status: acceptable  Hydration status: acceptable  PONV: none     Anesthetic complications: None          Last vitals:  Vitals:    11/08/17 1055 11/08/17 1220   BP: 122/81 133/85   Resp: 16 14   Temp: 36.7  C (98  F) 36.7  C (98.1  F)   SpO2: 96% 97%         Electronically Signed By: Daniel Manzanares MD  November 8, 2017  12:39 PM

## 2017-11-08 NOTE — ANESTHESIA PREPROCEDURE EVALUATION
Anesthesia Evaluation     . Pt has had prior anesthetic. Type: General (Patient underwent a non-triggering general anesthetic in April for colectomy due to the fact that the patient's father had a fever during general anesthesia in the past.  )    No history of anesthetic complications          ROS/MED HX    ENT/Pulmonary:     (+)Intermittent asthma Treatment: Inhaler prn,  , . .    Neurologic:  - neg neurologic ROS     Cardiovascular:  - neg cardiovascular ROS   (+) ----. : . . . :. . No previous cardiac testing       METS/Exercise Tolerance:  >4 METS   Hematologic:  - neg hematologic  ROS       Musculoskeletal:  - neg musculoskeletal ROS       GI/Hepatic:  - neg GI/hepatic ROS       Renal/Genitourinary:  - ROS Renal section negative       Endo:  - neg endo ROS       Psychiatric:  - neg psychiatric ROS       Infectious Disease:  - neg infectious disease ROS       Malignancy:      - no malignancy   Other:    - neg other ROS                 Physical Exam  Normal systems: pulmonary and dental    Airway   Mallampati: II  TM distance: >3 FB  Neck ROM: full    Dental     Cardiovascular   Rhythm and rate: regular and normal      Pulmonary                        Lab / Radiology Results:   Reviewed current labs when avail, see EMR for details.      BMP:  Recent Labs   Lab Test  11/01/17   1019   NA  138   POTASSIUM  4.1   CHLORIDE  105   CO2  28   BUN  15   CR  0.74   GLC  94   CANDY  8.9       LFTs:   Recent Labs   Lab Test  11/01/17   1019   PROTTOTAL  7.4   ALBUMIN  3.8   BILITOTAL  1.1   ALKPHOS  70   AST  24   ALT  41       CBC:   Recent Labs   Lab Test  11/01/17   1019   WBC  4.1   HGB  14.3   PLT  237       Coags:  Recent Labs   Lab Test  05/22/17   1030   INR  1.01       Blood Bank:  Lab Results   Component Value Date    ABO A 04/10/2017    RH  Pos 04/10/2017    AS Neg 04/10/2017       Studies:  See EMR for current studies, reviewed when available.      Anesthesia Plan      History & Physical Review  History and  physical reviewed and following examination; no interval change.    ASA Status:  3 .    NPO Status:  > 8 hours    Plan for MAC with Intravenous induction. Maintenance will be TIVA.  Reason for MAC:  Deep or markedly invasive procedure (G8)  PONV prophylaxis:  Ondansetron (or other 5HT-3)      Plan for MAC with PIV x 1, routine analgesia and antiemetics.  Will avoid triggering agents even though the patient is unlikely to be MH susceptible based on history.      Daniel Manzanares MD  Anesthesiologist  11:38 AM  November 8, 2017        Postoperative Care  Postoperative pain management:  Multi-modal analgesia.      Consents  Anesthetic plan, risks, benefits and alternatives discussed with:  Patient.  Use of blood products discussed: No .   .      Daniel Manzanares MD  Anesthesiologist  11:32 AM  November 8, 2017                        .

## 2017-12-15 ENCOUNTER — HOSPITAL ENCOUNTER (OUTPATIENT)
Dept: CT IMAGING | Facility: CLINIC | Age: 47
Discharge: HOME | End: 2017-12-15
Attending: INTERNAL MEDICINE | Admitting: INTERNAL MEDICINE
Payer: COMMERCIAL

## 2017-12-15 DIAGNOSIS — C20 ADENOCARCINOMA OF RECTUM (H): ICD-10-CM

## 2017-12-15 LAB
ALBUMIN SERPL-MCNC: 3.8 G/DL (ref 3.4–5)
ALP SERPL-CCNC: 73 U/L (ref 40–150)
ALT SERPL W P-5'-P-CCNC: 48 U/L (ref 0–70)
ANION GAP SERPL CALCULATED.3IONS-SCNC: 7 MMOL/L (ref 3–14)
AST SERPL W P-5'-P-CCNC: 28 U/L (ref 0–45)
BASOPHILS # BLD AUTO: 0 10E9/L (ref 0–0.2)
BASOPHILS NFR BLD AUTO: 0.8 %
BILIRUB SERPL-MCNC: 1 MG/DL (ref 0.2–1.3)
BUN SERPL-MCNC: 13 MG/DL (ref 7–30)
CALCIUM SERPL-MCNC: 9 MG/DL (ref 8.5–10.1)
CHLORIDE SERPL-SCNC: 107 MMOL/L (ref 94–109)
CO2 SERPL-SCNC: 27 MMOL/L (ref 20–32)
CREAT SERPL-MCNC: 0.84 MG/DL (ref 0.66–1.25)
DIFFERENTIAL METHOD BLD: NORMAL
EOSINOPHIL # BLD AUTO: 0.1 10E9/L (ref 0–0.7)
EOSINOPHIL NFR BLD AUTO: 2.1 %
ERYTHROCYTE [DISTWIDTH] IN BLOOD BY AUTOMATED COUNT: 12.4 % (ref 10–15)
GFR SERPL CREATININE-BSD FRML MDRD: >90 ML/MIN/1.7M2
GLUCOSE SERPL-MCNC: 102 MG/DL (ref 70–99)
HCT VFR BLD AUTO: 45.2 % (ref 40–53)
HGB BLD-MCNC: 15.4 G/DL (ref 13.3–17.7)
IMM GRANULOCYTES # BLD: 0 10E9/L (ref 0–0.4)
IMM GRANULOCYTES NFR BLD: 0.2 %
LYMPHOCYTES # BLD AUTO: 1.7 10E9/L (ref 0.8–5.3)
LYMPHOCYTES NFR BLD AUTO: 34.6 %
MCH RBC QN AUTO: 32.6 PG (ref 26.5–33)
MCHC RBC AUTO-ENTMCNC: 34.1 G/DL (ref 31.5–36.5)
MCV RBC AUTO: 96 FL (ref 78–100)
MONOCYTES # BLD AUTO: 0.4 10E9/L (ref 0–1.3)
MONOCYTES NFR BLD AUTO: 8.8 %
NEUTROPHILS # BLD AUTO: 2.6 10E9/L (ref 1.6–8.3)
NEUTROPHILS NFR BLD AUTO: 53.5 %
PLATELET # BLD AUTO: 270 10E9/L (ref 150–450)
POTASSIUM SERPL-SCNC: 4.3 MMOL/L (ref 3.4–5.3)
PROT SERPL-MCNC: 7.6 G/DL (ref 6.8–8.8)
RBC # BLD AUTO: 4.72 10E12/L (ref 4.4–5.9)
SODIUM SERPL-SCNC: 141 MMOL/L (ref 133–144)
WBC # BLD AUTO: 4.8 10E9/L (ref 4–11)

## 2017-12-15 PROCEDURE — 25000128 H RX IP 250 OP 636: Performed by: RADIOLOGY

## 2017-12-15 PROCEDURE — 25000125 ZZHC RX 250: Performed by: RADIOLOGY

## 2017-12-15 PROCEDURE — 74177 CT ABD & PELVIS W/CONTRAST: CPT

## 2017-12-15 PROCEDURE — 36415 COLL VENOUS BLD VENIPUNCTURE: CPT | Performed by: INTERNAL MEDICINE

## 2017-12-15 PROCEDURE — 80053 COMPREHEN METABOLIC PANEL: CPT | Performed by: INTERNAL MEDICINE

## 2017-12-15 PROCEDURE — 85025 COMPLETE CBC W/AUTO DIFF WBC: CPT | Performed by: INTERNAL MEDICINE

## 2017-12-15 PROCEDURE — 71260 CT THORAX DX C+: CPT

## 2017-12-15 RX ORDER — IOPAMIDOL 755 MG/ML
500 INJECTION, SOLUTION INTRAVASCULAR ONCE
Status: COMPLETED | OUTPATIENT
Start: 2017-12-15 | End: 2017-12-15

## 2017-12-15 RX ADMIN — SODIUM CHLORIDE 60 ML: 9 INJECTION, SOLUTION INTRAVENOUS at 13:57

## 2017-12-15 RX ADMIN — IOPAMIDOL 100 ML: 755 INJECTION, SOLUTION INTRAVENOUS at 13:57

## 2017-12-20 ENCOUNTER — ONCOLOGY VISIT (OUTPATIENT)
Dept: ONCOLOGY | Facility: CLINIC | Age: 47
End: 2017-12-20
Attending: INTERNAL MEDICINE
Payer: COMMERCIAL

## 2017-12-20 VITALS
HEART RATE: 80 BPM | OXYGEN SATURATION: 94 % | HEIGHT: 69 IN | WEIGHT: 223 LBS | TEMPERATURE: 98.4 F | DIASTOLIC BLOOD PRESSURE: 84 MMHG | SYSTOLIC BLOOD PRESSURE: 122 MMHG | RESPIRATION RATE: 16 BRPM | BODY MASS INDEX: 33.03 KG/M2

## 2017-12-20 DIAGNOSIS — C20 ADENOCARCINOMA OF RECTUM (H): Primary | ICD-10-CM

## 2017-12-20 PROCEDURE — 99214 OFFICE O/P EST MOD 30 MIN: CPT | Mod: ZP | Performed by: INTERNAL MEDICINE

## 2017-12-20 PROCEDURE — 99212 OFFICE O/P EST SF 10 MIN: CPT | Mod: ZF

## 2017-12-20 ASSESSMENT — PAIN SCALES - GENERAL: PAINLEVEL: NO PAIN (0)

## 2017-12-20 NOTE — MR AVS SNAPSHOT
"              After Visit Summary   12/20/2017    Meño Omalley    MRN: [id number]           Patient Information     Date Of Birth          1970        Visit Information        Provider Department      12/20/2017 9:45 AM Jeanette Mcarthur MD [facility]        Today's Diagnoses     Adenocarcinoma of rectum (H)    -  1       Follow-ups after your visit        Follow-up notes from your care team     Return in about 3 months (around 3/20/2018).      Who to contact     If you have questions or need follow up information about today's clinic visit or your schedule please contact [facility] directly at [phone].  Normal or non-critical lab and imaging results will be communicated to you by MyChart, letter or phone within 4 business days after the clinic has received the results. If you do not hear from us within 7 days, please contact the clinic through [employer] or phone. If you have a critical or abnormal lab result, we will notify you by phone as soon as possible.  Submit refill requests through [employer] or call your pharmacy and they will forward the refill request to us. Please allow 3 business days for your refill to be completed.          Additional Information About Your Visit        MyChart Information     [employer] gives you secure access to your electronic health record. If you see a primary care provider, you can also send messages to your care team and make appointments. If you have questions, please call your primary care clinic.  If you do not have a primary care provider, please call [phone] and they will assist you.        Care EveryWhere ID     This is your Care EveryWhere ID. This could be used by other organizations to access your [place] medical records  [id number]        Your Vitals Were     Pulse Temperature Respirations Height Pulse Oximetry BMI (Body Mass Index)    80 98.4  F (36.9  C) (Oral) 16 1.753 m (5' 9\") 94% 32.93 kg/m2       Blood Pressure " from Last 3 Encounters:   12/20/17 122/84   11/08/17 129/57   11/01/17 127/85    Weight from Last 3 Encounters:   12/20/17 101.2 kg (223 lb)   11/08/17 95.3 kg (210 lb)   11/01/17 98.4 kg (216 lb 14.4 oz)              Today, you had the following     No orders found for display       Primary Care Provider Office Phone # Fax #    Delio Alcala [phone]       [facility] [address] [place] [address]        Equal Access to Services     [facility]: Hadii snow white hadasho Sostephen, waaxda luqadaha, qaybta kaalmada adeshantalyaalicia, lynda morales . So [facility] [phone].    ATENCIÓN: Si habla español, tiene a valle disposición servicios gratuitos de asistencia lingüística. [facility] [phone].    We comply with applicable federal civil rights laws and [place] laws. We do not discriminate on the basis of race, color, national origin, age, disability, sex, sexual orientation, or gender identity.            Thank you!     Thank you for choosing [facility]  for your care. Our goal is always to provide you with excellent care. Hearing back from our patients is one way we can continue to improve our services. Please take a few minutes to complete the written survey that you may receive in the mail after your visit with us. Thank you!             Your Updated Medication List - Protect others around you: Learn how to safely use, store and throw away your medicines at www.disposemymeds.org.          This list is accurate as of: 12/20/17 11:59 PM.  Always use your most recent med list.                   Brand Name Dispense Instructions for use Diagnosis    escitalopram 20 MG tablet    LEXAPRO    30 tablet    Take 1 tablet (20 mg) by mouth every morning    Major depressive disorder, recurrent episode, mild (H)       FLOVENT  MCG/ACT Inhaler   Generic drug:  fluticasone      Take 2 puffs by mouth 2 times daily as needed         lidocaine-prilocaine cream    EMLA    30 g    Apply topically as needed for moderate pain    Rectal cancer (H)       LORazepam 0.5 MG tablet    ATIVAN    30 tablet    Take 1 tablet (0.5 mg) by mouth every 4 hours as needed (Anxiety, Nausea/Vomiting or Sleep)    Adenocarcinoma of rectum (H)       ondansetron 8 MG tablet    ZOFRAN    60 tablet    Take 1 tablet (8 mg) by mouth every 8 hours as needed for nausea    Adenocarcinoma of rectum (H)       prochlorperazine 10 MG tablet    COMPAZINE    30 tablet    Take 1 tablet (10 mg) by mouth every 6 hours as needed (Nausea/Vomiting)    Adenocarcinoma of rectum (H)

## 2017-12-20 NOTE — LETTER
"12/20/2017       RE: Danii Angulo  [address] [address] [address]     Dear Colleague,    Thank you for referring your patient, Danii Angulo, to the [facility]. Please see a copy of my visit note below.    This is a followup visit for a diagnosis of colon cancer, stage III adenocarcinoma of rectum. status post adjuvant chemotherapy   Here for restaging scan   Will  HISTORY OF PRESENT ILLNESS: Please refer to previous notes for patient's presentation and treatment so far.  Briefly, from an oncological standpoint he underwent laparoscopic surgery for resection of the primary lesion, and was found to have stage IIIB disease; hence, he was initiated on FOLFOX adjuvant therapy.  After 7 cycles he chose to pursue only 5FU. He has now completed that he is doing much better now his adjuvant therapy       INTERVAL HISTORY: He reports his neuropathy is getting better and so is his energy denies any diarrhea at this point although his abdomen.  Is still unsettled and he is finding his new normal denies any fevers chills coughing breathing trouble shortness of breath abdominal pain.  No swelling on lower extremities he does complain about chemo brain.  He had his port taken out    PAST MEDICAL HISTORY:  Unchanged.      PAST SURGICAL HISTORY: He had his port taken out     LABORATORY DATA:  Reviewed.      MEDICATIONS:  Reviewed.      ALLERGIES:  Reviewed.      PHYSICAL EXAMINATION:   VITAL SIGNS: /84  Pulse 80  Temp 98.4  F (36.9  C) (Oral)  Resp 16  Ht 1.753 m (5' 9\")  Wt 101.2 kg (223 lb)  SpO2 94%  BMI 32.93 kg/m2    GENERAL:  Alert and oriented x3, in no apparent distress.   HEENT:  No mucositis   SKIN:  No palmar or plantar dysesthesia.   CVS/RESPIRATORY:  Unremarkable.   ABDOMEN:  Nontender to palpation.   EXTREMITIES:  Lower extremities with no pedal edema.      LABORATORY DATA:    Results for DANII ANGULO (MRN [id number]) as of 12/27/2017 12:58   Ref. Range 12/15/2017 " 08:50   Sodium Latest Ref Range: 133 - 144 mmol/L 141   Potassium Latest Ref Range: 3.4 - 5.3 mmol/L 4.3   Chloride Latest Ref Range: 94 - 109 mmol/L 107   Carbon Dioxide Latest Ref Range: 20 - 32 mmol/L 27   Urea Nitrogen Latest Ref Range: 7 - 30 mg/dL 13   Creatinine Latest Ref Range: 0.66 - 1.25 mg/dL 0.84   GFR Estimate Latest Ref Range: >60 mL/min/1.7m2 >90   GFR Estimate If Black Latest Ref Range: >60 mL/min/1.7m2 >90   Calcium Latest Ref Range: 8.5 - 10.1 mg/dL 9.0   Anion Gap Latest Ref Range: 3 - 14 mmol/L 7   Albumin Latest Ref Range: 3.4 - 5.0 g/dL 3.8   Protein Total Latest Ref Range: 6.8 - 8.8 g/dL 7.6   Bilirubin Total Latest Ref Range: 0.2 - 1.3 mg/dL 1.0   Alkaline Phosphatase Latest Ref Range: 40 - 150 U/L 73   ALT Latest Ref Range: 0 - 70 U/L 48   AST Latest Ref Range: 0 - 45 U/L 28   Glucose Latest Ref Range: 70 - 99 mg/dL 102 (H)   WBC Latest Ref Range: 4.0 - 11.0 10e9/L 4.8   Hemoglobin Latest Ref Range: 13.3 - 17.7 g/dL 15.4   Hematocrit Latest Ref Range: 40.0 - 53.0 % 45.2   Platelet Count Latest Ref Range: 150 - 450 10e9/L 270   RBC Count Latest Ref Range: 4.4 - 5.9 10e12/L 4.72   MCV Latest Ref Range: 78 - 100 fl 96   MCH Latest Ref Range: 26.5 - 33.0 pg 32.6   MCHC Latest Ref Range: 31.5 - 36.5 g/dL 34.1   RDW Latest Ref Range: 10.0 - 15.0 % 12.4   Diff Method Unknown Automated Method   % Neutrophils Latest Units: % 53.5   % Lymphocytes Latest Units: % 34.6   % Monocytes Latest Units: % 8.8   % Eosinophils Latest Units: % 2.1   % Basophils Latest Units: % 0.8   % Immature Granulocytes Latest Units: % 0.2   Absolute Neutrophil Latest Ref Range: 1.6 - 8.3 10e9/L 2.6   Absolute Lymphocytes Latest Ref Range: 0.8 - 5.3 10e9/L 1.7   Absolute Monocytes Latest Ref Range: 0.0 - 1.3 10e9/L 0.4   Absolute Eosinophils Latest Ref Range: 0.0 - 0.7 10e9/L 0.1   Absolute Basophils Latest Ref Range: 0.0 - 0.2 10e9/L 0.0   Abs Immature Granulocytes Latest Ref Range: 0 - 0.4 10e9/L 0.0       Imaging   CT  CHEST/ABDOMEN/PELVIS WITH CONTRAST 12/15/2017 2:10 PM     TECHNIQUE: Images from thoracic inlet to pubic symphysis 100mL, Isovue  370 IV contrast and oral contrast  Radiation dose for this scan was  reduced using automated exposure control, adjustment of the mA and/or  kV according to patient size, or iterative reconstruction technique.     HISTORY: Adenocarcinoma of the rectum. Laparoscopic low anterior  colectomy 4/12/2017.     COMPARISON: 8/9/2017 CT chest abdomen pelvis     FINDINGS:   Chest:  No mediastinal, hilar or axillary adenopathy. Couple very tiny  0.2 centimeter subpleural lung nodules are stable. The lungs are  otherwise clear.     Abdomen and Pelvis: No focal liver lesion identified. Mild fatty  liver. Normal-appearing spleen, gallbladder, pancreas, adrenal glands.  0.2 centimeters nonobstructing right intrarenal kidney stone. The  kidneys otherwise appear normal. No periaortic or pelvic adenopathy.  No ascites. Postop changes with anastomosis at the rectum. No evidence  for local tumor recurrence or acute bowel abnormality. No aggressive  bone lesions.         IMPRESSION:   1. No evidence for metastatic disease. Postop changes at the rectum.    2. Tiny nonobstructing right kidney stone.  3. Stable tiny subpleural lung nodules of doubtful significance.     ASSESSMENT AND PLAN:  Patient with stage III rectal cancer,he underwent adjuvant chemotherapy.  He tolerated chemotherapy with the expected side effects. After 7 cycles of FOLFOX he chose to pursue only 5FU  and completed 12 cycles .  His follow-up scan looks good with no concern for recurrence he will return to see me in 3 months.  With labs and at that time we would discuss potential for further imaging and subsequent 3 months.     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  Also we discussed about chemo brain.     I spent 35 minutes in the care of this patient >50% of which was spent in coordinating and counseling.    Jeanette  Lyubov   of Medicine   Hematology and medical Oncology   [facility]    December 20, 2017

## 2017-12-20 NOTE — NURSING NOTE
"Oncology Rooming Note    December 20, 2017 9:38 AM   Meño Omalley is a 47 year old male who presents for:    Chief Complaint   Patient presents with     Oncology Clinic Visit     Return: Rectal [place]     Initial Vitals: /84  Pulse 80  Temp 98.4  F (36.9  C) (Oral)  Resp 16  Ht 1.753 m (5' 9\")  Wt 101.2 kg (223 lb)  SpO2 94%  BMI 32.93 kg/m2 Estimated body mass index is 32.93 kg/(m^2) as calculated from the following:    Height as of this encounter: 1.753 m (5' 9\").    Weight as of this encounter: 101.2 kg (223 lb). Body surface area is 2.22 meters squared.  No Pain (0) Comment: Data Unavailable   No LMP for male patient.  Allergies reviewed: Yes  Medications reviewed: Yes    Medications: Medication refills not needed today.  Pharmacy name entered into EPIC:    [facility] [address] [place] [address] - [address]  [facility] [place], [place] - [address]     Clinical concerns: Unable to offer Pt. Flu Shot due to Research.    5 minutes for nursing intake (face to face time)     MARTHA Sears      "

## 2017-12-20 NOTE — PROGRESS NOTES
"This is a followup visit for a diagnosis of colon cancer, stage III adenocarcinoma of rectum. status post adjuvant chemotherapy   Here for restaging scan   Will  HISTORY OF PRESENT ILLNESS: Please refer to previous notes for patient's presentation and treatment so far.  Briefly, from an oncological standpoint he underwent laparoscopic surgery for resection of the primary lesion, and was found to have stage IIIB disease; hence, he was initiated on FOLFOX adjuvant therapy.  After 7 cycles he chose to pursue only 5FU. He has now completed that he is doing much better now his adjuvant therapy       INTERVAL HISTORY: He reports his neuropathy is getting better and so is his energy denies any diarrhea at this point although his abdomen.  Is still unsettled and he is finding his new normal denies any fevers chills coughing breathing trouble shortness of breath abdominal pain.  No swelling on lower extremities he does complain about chemo brain.  He had his port taken out    PAST MEDICAL HISTORY:  Unchanged.      PAST SURGICAL HISTORY: He had his port taken out     LABORATORY DATA:  Reviewed.      MEDICATIONS:  Reviewed.      ALLERGIES:  Reviewed.      PHYSICAL EXAMINATION:   VITAL SIGNS: /84  Pulse 80  Temp 98.4  F (36.9  C) (Oral)  Resp 16  Ht 1.753 m (5' 9\")  Wt 101.2 kg (223 lb)  SpO2 94%  BMI 32.93 kg/m2    GENERAL:  Alert and oriented x3, in no apparent distress.   HEENT:  No mucositis   SKIN:  No palmar or plantar dysesthesia.   CVS/RESPIRATORY:  Unremarkable.   ABDOMEN:  Nontender to palpation.   EXTREMITIES:  Lower extremities with no pedal edema.      LABORATORY DATA:    Results for DANII ANGULO (MRN [id number]) as of 12/27/2017 12:58   Ref. Range 12/15/2017 08:50   Sodium Latest Ref Range: 133 - 144 mmol/L 141   Potassium Latest Ref Range: 3.4 - 5.3 mmol/L 4.3   Chloride Latest Ref Range: 94 - 109 mmol/L 107   Carbon Dioxide Latest Ref Range: 20 - 32 mmol/L 27   Urea Nitrogen Latest Ref Range: " 7 - 30 mg/dL 13   Creatinine Latest Ref Range: 0.66 - 1.25 mg/dL 0.84   GFR Estimate Latest Ref Range: >60 mL/min/1.7m2 >90   GFR Estimate If Black Latest Ref Range: >60 mL/min/1.7m2 >90   Calcium Latest Ref Range: 8.5 - 10.1 mg/dL 9.0   Anion Gap Latest Ref Range: 3 - 14 mmol/L 7   Albumin Latest Ref Range: 3.4 - 5.0 g/dL 3.8   Protein Total Latest Ref Range: 6.8 - 8.8 g/dL 7.6   Bilirubin Total Latest Ref Range: 0.2 - 1.3 mg/dL 1.0   Alkaline Phosphatase Latest Ref Range: 40 - 150 U/L 73   ALT Latest Ref Range: 0 - 70 U/L 48   AST Latest Ref Range: 0 - 45 U/L 28   Glucose Latest Ref Range: 70 - 99 mg/dL 102 (H)   WBC Latest Ref Range: 4.0 - 11.0 10e9/L 4.8   Hemoglobin Latest Ref Range: 13.3 - 17.7 g/dL 15.4   Hematocrit Latest Ref Range: 40.0 - 53.0 % 45.2   Platelet Count Latest Ref Range: 150 - 450 10e9/L 270   RBC Count Latest Ref Range: 4.4 - 5.9 10e12/L 4.72   MCV Latest Ref Range: 78 - 100 fl 96   MCH Latest Ref Range: 26.5 - 33.0 pg 32.6   MCHC Latest Ref Range: 31.5 - 36.5 g/dL 34.1   RDW Latest Ref Range: 10.0 - 15.0 % 12.4   Diff Method Unknown Automated Method   % Neutrophils Latest Units: % 53.5   % Lymphocytes Latest Units: % 34.6   % Monocytes Latest Units: % 8.8   % Eosinophils Latest Units: % 2.1   % Basophils Latest Units: % 0.8   % Immature Granulocytes Latest Units: % 0.2   Absolute Neutrophil Latest Ref Range: 1.6 - 8.3 10e9/L 2.6   Absolute Lymphocytes Latest Ref Range: 0.8 - 5.3 10e9/L 1.7   Absolute Monocytes Latest Ref Range: 0.0 - 1.3 10e9/L 0.4   Absolute Eosinophils Latest Ref Range: 0.0 - 0.7 10e9/L 0.1   Absolute Basophils Latest Ref Range: 0.0 - 0.2 10e9/L 0.0   Abs Immature Granulocytes Latest Ref Range: 0 - 0.4 10e9/L 0.0       Imaging   CT CHEST/ABDOMEN/PELVIS WITH CONTRAST 12/15/2017 2:10 PM     TECHNIQUE: Images from thoracic inlet to pubic symphysis 100mL, Isovue  370 IV contrast and oral contrast  Radiation dose for this scan was  reduced using automated exposure control,  adjustment of the mA and/or  kV according to patient size, or iterative reconstruction technique.     HISTORY: Adenocarcinoma of the rectum. Laparoscopic low anterior  colectomy 4/12/2017.     COMPARISON: 8/9/2017 CT chest abdomen pelvis     FINDINGS:   Chest:  No mediastinal, hilar or axillary adenopathy. Couple very tiny  0.2 centimeter subpleural lung nodules are stable. The lungs are  otherwise clear.     Abdomen and Pelvis: No focal liver lesion identified. Mild fatty  liver. Normal-appearing spleen, gallbladder, pancreas, adrenal glands.  0.2 centimeters nonobstructing right intrarenal kidney stone. The  kidneys otherwise appear normal. No periaortic or pelvic adenopathy.  No ascites. Postop changes with anastomosis at the rectum. No evidence  for local tumor recurrence or acute bowel abnormality. No aggressive  bone lesions.         IMPRESSION:   1. No evidence for metastatic disease. Postop changes at the rectum.    2. Tiny nonobstructing right kidney stone.  3. Stable tiny subpleural lung nodules of doubtful significance.     ASSESSMENT AND PLAN:  Patient with stage III rectal cancer,he underwent adjuvant chemotherapy.  He tolerated chemotherapy with the expected side effects. After 7 cycles of FOLFOX he chose to pursue only 5FU  and completed 12 cycles .  His follow-up scan looks good with no concern for recurrence he will return to see me in 3 months.  With labs and at that time we would discuss potential for further imaging and subsequent 3 months.     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  Also we discussed about chemo brain.     I spent 35 minutes in the care of this patient >50% of which was spent in coordinating and counseling.    Jeanette Mcarthur   of Medicine   Hematology and medical Oncology   [facility]    December 20, 2017

## 2018-01-04 DIAGNOSIS — F33.0 MAJOR DEPRESSIVE DISORDER, RECURRENT EPISODE, MILD (H): ICD-10-CM

## 2018-01-04 NOTE — TELEPHONE ENCOUNTER
Requested Prescriptions   Pending Prescriptions Disp Refills     escitalopram (LEXAPRO) 20 MG tablet [Pharmacy Med Name: ESCITALOPRAM OXALATE 20MG TABS] 30 tablet 1     Sig: TAKE ONE TABLET BY MOUTH EVERY MORNING    SSRIs Protocol Failed    1/4/2018 10:16 AM       Failed - PHQ-9 score less than 5 in past 6 months    The PHQ-9 criteria is meant to fail. It requires a PHQ-9 score review         Failed - Recent (6 mo) or future visit with authorizing provider's specialty    Patient had office visit in the last 6 months or has a visit in the next 30 days with authorizing provider.  See chart review.            Passed - Patient is age 18 or older        Last Written Prescription Date:  5/31/17  Last Fill Quantity: 30,  # refills: 3   Last Office Visit with [facility], [facility] or [facility] prescribing provider:  11/25/14   Future Office Visit:

## 2018-01-05 RX ORDER — ESCITALOPRAM OXALATE 20 MG/1
TABLET ORAL
Qty: 30 TABLET | Refills: 1 | Status: SHIPPED | OUTPATIENT
Start: 2018-01-05 | End: 2018-01-10

## 2018-01-05 NOTE — TELEPHONE ENCOUNTER
Lexapro  Routing refill request to provider for review/approval because:  A break in medication  Patient needs to be seen because:  Needs PHQ-9 and appt every 6 months per protocol--routing to schedulers also  No PHQ-9 on file    Addison Murray RN, BSN

## 2018-01-05 NOTE — TELEPHONE ENCOUNTER
Left message for patient to call back and speak with any . Will route back to team so a letter can be sent.    Thank you,  Tessa Hernandez   for [facility]

## 2018-01-10 ENCOUNTER — OFFICE VISIT (OUTPATIENT)
Dept: FAMILY MEDICINE | Facility: CLINIC | Age: 48
End: 2018-01-10
Payer: COMMERCIAL

## 2018-01-10 VITALS
BODY MASS INDEX: 33.46 KG/M2 | WEIGHT: 225.9 LBS | HEART RATE: 78 BPM | HEIGHT: 69 IN | DIASTOLIC BLOOD PRESSURE: 62 MMHG | TEMPERATURE: 98.3 F | SYSTOLIC BLOOD PRESSURE: 104 MMHG

## 2018-01-10 DIAGNOSIS — J45.30 MILD PERSISTENT ASTHMA WITHOUT COMPLICATION: ICD-10-CM

## 2018-01-10 DIAGNOSIS — C20 ADENOCARCINOMA OF RECTUM (H): Primary | ICD-10-CM

## 2018-01-10 DIAGNOSIS — F33.0 MAJOR DEPRESSIVE DISORDER, RECURRENT EPISODE, MILD (H): ICD-10-CM

## 2018-01-10 PROCEDURE — 99214 OFFICE O/P EST MOD 30 MIN: CPT | Performed by: FAMILY MEDICINE

## 2018-01-10 RX ORDER — ESCITALOPRAM OXALATE 20 MG/1
20 TABLET ORAL EVERY MORNING
Qty: 30 TABLET | Refills: 5 | Status: SHIPPED | OUTPATIENT
Start: 2018-01-10 | End: 2019-01-19

## 2018-01-10 ASSESSMENT — PATIENT HEALTH QUESTIONNAIRE - PHQ9: SUM OF ALL RESPONSES TO PHQ QUESTIONS 1-9: 0

## 2018-01-10 NOTE — MR AVS SNAPSHOT
After Visit Summary   1/10/2018    Meño Omalley    MRN: [id number]           Patient Information     Date Of Birth          1970        Visit Information        Provider Department      1/10/2018 1:00 PM Delio Alcala MD [facility]        Today's Diagnoses     Adenocarcinoma of rectum (H)    -  1    Major depressive disorder, recurrent episode, mild (H)        Mild persistent asthma without complication           Follow-ups after your visit        Your next 10 appointments already scheduled     Mar 21, 2018 10:45 AM CDT   [facility] with  [facility]   [facility] ([facility])    [address] [place] [address]  [address]  [facility] [address]   [phone]            Mar 21, 2018 11:15 AM CDT   (Arrive by 11:00 AM)   Return Visit with Jeanette Mcarthur MD   [facility] ([facility])    [address] [place] [address]  [address]  [facility] [id number] [phone]              Who to contact     If you have questions or need follow up information about today's clinic visit or your schedule please contact [facility] directly at [phone].  Normal or non-critical lab and imaging results will be communicated to you by MyChart, letter or phone within 4 business days after the clinic has received the results. If you do not hear from us within 7 days, please contact the clinic through [employer] or phone. If you have a critical or abnormal lab result, we will notify you by phone as soon as possible.  Submit refill requests through [employer] or call your pharmacy and they will forward the refill request to us. Please allow 3 business days for your refill to be completed.          Additional Information About Your Visit        MyChart Information     [employer] gives you secure access to your electronic health record. If you see a primary care provider, you can also send messages to your  "care team and make appointments. If you have questions, please call your primary care clinic.  If you do not have a primary care provider, please call [phone] and they will assist you.        Care EveryWhere ID     This is your Care EveryWhere ID. This could be used by other organizations to access your [facility] medical records  [id number]        Your Vitals Were     Pulse Temperature Height BMI (Body Mass Index)          78 98.3  F (36.8  C) (Temporal) 5' 9\" (1.753 m) 33.36 kg/m2         Blood Pressure from Last 3 Encounters:   01/10/18 104/62   12/20/17 122/84   11/08/17 129/57    Weight from Last 3 Encounters:   01/10/18 225 lb 14.4 oz (102.5 kg)   12/20/17 223 lb (101.2 kg)   11/08/17 210 lb (95.3 kg)              Today, you had the following     No orders found for display         Today's Medication Changes          These changes are accurate as of: 1/10/18  1:28 PM.  If you have any questions, ask your nurse or doctor.               These medicines have changed or have updated prescriptions.        Dose/Directions    escitalopram 20 MG tablet   Commonly known as:  LEXAPRO   This may have changed:  See the new instructions.   Used for:  Major depressive disorder, recurrent episode, mild (H)   Changed by:  Delio Alcala MD        Dose:  20 mg   Take 1 tablet (20 mg) by mouth every morning   Quantity:  30 tablet   Refills:  5            Where to get your medicines      These medications were sent to [facility] [place] [address]   [address] [facility] [address]     Phone:  [phone]     escitalopram 20 MG tablet                Primary Care Provider Office Phone # Fax #    Delio Alcala [phone]       [place] [id number] [place] [address]        Equal Access to Services     LUCIO BOGGS AH: Hadii aad ku hadasho Soomaali, waaxda luqadaha, qaybta kaalmada adeegyada, waxay sergei hines. So [facility] " [phone].    ATENCIÓN: Si erlinda villegas, tiene a valle disposición servicios gratuitos de asistencia lingüística. Jose leary [phone].    We comply with applicable federal civil rights laws and [place] laws. We do not discriminate on the basis of race, color, national origin, age, disability, sex, sexual orientation, or gender identity.            Thank you!     Thank you for choosing [facility]  for your care. Our goal is always to provide you with excellent care. Hearing back from our patients is one way we can continue to improve our services. Please take a few minutes to complete the written survey that you may receive in the mail after your visit with us. Thank you!             Your Updated Medication List - Protect others around you: Learn how to safely use, store and throw away your medicines at www.disposemymeds.org.          This list is accurate as of: 1/10/18  1:28 PM.  Always use your most recent med list.                   Brand Name Dispense Instructions for use Diagnosis    escitalopram 20 MG tablet    LEXAPRO    30 tablet    Take 1 tablet (20 mg) by mouth every morning    Major depressive disorder, recurrent episode, mild (H)       FLOVENT  MCG/ACT Inhaler   Generic drug:  fluticasone      Take 2 puffs by mouth 2 times daily as needed        lidocaine-prilocaine cream    EMLA    30 g    Apply topically as needed for moderate pain    Rectal cancer (H)       LORazepam 0.5 MG tablet    ATIVAN    30 tablet    Take 1 tablet (0.5 mg) by mouth every 4 hours as needed (Anxiety, Nausea/Vomiting or Sleep)    Adenocarcinoma of rectum (H)       ondansetron 8 MG tablet    ZOFRAN    60 tablet    Take 1 tablet (8 mg) by mouth every 8 hours as needed for nausea    Adenocarcinoma of rectum (H)       prochlorperazine 10 MG tablet    COMPAZINE    30 tablet    Take 1 tablet (10 mg) by mouth every 6 hours as needed (Nausea/Vomiting)    Adenocarcinoma of rectum (H)

## 2018-01-10 NOTE — PROGRESS NOTES
"  SUBJECTIVE:   Meño Omalley is a 47 year old male who presents to clinic today for the following health issues:  Patient is not having any problems with this medication.     Medication Followup of Lexapro     Taking Medication as prescribed: yes    Side Effects:  None    Medication Helping Symptoms:  yes             Problem list and histories reviewed & adjusted, as indicated.  Additional history: as documented        Reviewed and updated as needed this visit by clinical staff     Reviewed and updated as needed this visit by Provider        SUBJECTIVE:  Meño  is a 47 year old male who presents for: Follow-up of his depression.  He has had a tough year being diagnosed about 10 months ago with adenocarcinoma of the rectum.  There is gone through resection and chemotherapy.  Outcome is been favorable no evidence of metastases and not having any troubles.  Follows with oncology.  This is contributed to his depression.  Although he is doing very well on the Lexapro.  Also needs a refill on Flovent which she does not use all the time for intermittent exercise-induced asthma.    Past Medical History:   Diagnosis Date     Colon cancer (H)      Depressive disorder      Family history of malignant hyperthermia     UNCONFIRMED BUT FATHER HAD \"FEVER WITH ANESTHESIA\"     Nephrolithiasis     asymptomatic     Obese      Uncomplicated asthma     exercise induced     Past Surgical History:   Procedure Laterality Date     LAPAROSCOPIC COLECTOMY LOW ANTERIOR N/A 4/12/2017    Procedure: LAPAROSCOPIC COLECTOMY LOW ANTERIOR;  Surgeon: Ignacio Rose MD;  Location: [facility] OR     NO HISTORY OF SURGERY       REMOVE PORT VASCULAR ACCESS Right 11/8/2017    Procedure: REMOVE PORT VASCULAR ACCESS;  Right chest Port Removal;  Surgeon: Chuy Steiner PA-C;  Location: [facility] OR     SIGMOIDOSCOPY FLEXIBLE N/A 4/12/2017    Procedure: SIGMOIDOSCOPY FLEXIBLE;  Surgeon: Ignacio Rose MD;  Location: [facility] OR     Social History   Substance Use " "Topics     Smoking status: Never Smoker     Smokeless tobacco: Never Used     Alcohol use Yes      Comment: beer couple times per months     Current Outpatient Prescriptions   Medication Sig Dispense Refill     escitalopram (LEXAPRO) 20 MG tablet Take 1 tablet (20 mg) by mouth every morning 30 tablet 5     fluticasone (FLOVENT HFA) 110 MCG/ACT inhaler Take 2 puffs by mouth 2 times daily as needed        [DISCONTINUED] escitalopram (LEXAPRO) 20 MG tablet TAKE ONE TABLET BY MOUTH EVERY MORNING 30 tablet 1     ondansetron (ZOFRAN) 8 MG tablet Take 1 tablet (8 mg) by mouth every 8 hours as needed for nausea (Patient not taking: Reported on 12/20/2017) 60 tablet 1     lidocaine-prilocaine (EMLA) cream Apply topically as needed for moderate pain (Patient not taking: Reported on 12/20/2017) 30 g 1     prochlorperazine (COMPAZINE) 10 MG tablet Take 1 tablet (10 mg) by mouth every 6 hours as needed (Nausea/Vomiting) (Patient not taking: Reported on 12/20/2017) 30 tablet 2     LORazepam (ATIVAN) 0.5 MG tablet Take 1 tablet (0.5 mg) by mouth every 4 hours as needed (Anxiety, Nausea/Vomiting or Sleep) (Patient not taking: Reported on 12/20/2017) 30 tablet 2       REVIEW OF SYSTEMS:   5 point ROS negative except as noted above in HPI, including Gen., Resp, CV, GI &  system review.     OBJECTIVE:  Vitals: /62 (BP Location: Right arm, Patient Position: Chair, Cuff Size: Adult Large)  Pulse 78  Temp 98.3  F (36.8  C) (Temporal)  Ht 5' 9\" (1.753 m)  Wt 225 lb 14.4 oz (102.5 kg)  BMI 33.36 kg/m2  BMI= Body mass index is 33.36 kg/(m^2).  He is alert and oriented.  In no distress.  Bright affect.  PHQ 9 score is 0.  Throat clear.  Lungs are clear to auscultation.  Heart regular rhythm no murmur.  Abdomen bowel sounds present no tenderness.    ASSESSMENT:  #1 depression #2 adenocarcinoma of the colon in remission #3  asthma    PLAN:  Renew his Lexapro 20 mg a day doing very well with this.  He did an asthma action plan " today.  We will refill his Flovent as needed.  He will continue to follow-up with oncology.      Weight management plan: Discussed healthy diet and exercise guidelines and patient will follow up in 6 months in clinic to re-evaluate.    Delio Alcala MD  [facility]

## 2018-01-10 NOTE — NURSING NOTE
"Chief Complaint   Patient presents with     Recheck Medication     lexapro        Initial /62 (BP Location: Right arm, Patient Position: Chair, Cuff Size: Adult Large)  Pulse 78  Temp 98.3  F (36.8  C) (Temporal)  Ht 5' 9\" (1.753 m)  Wt 225 lb 14.4 oz (102.5 kg)  BMI 33.36 kg/m2 Estimated body mass index is 33.36 kg/(m^2) as calculated from the following:    Height as of this encounter: 5' 9\" (1.753 m).    Weight as of this encounter: 225 lb 14.4 oz (102.5 kg).  Medication Reconciliation: complete     "

## 2018-01-11 ASSESSMENT — ASTHMA QUESTIONNAIRES: ACT_TOTALSCORE: 25

## 2018-01-29 NOTE — MR AVS SNAPSHOT
After Visit Summary   3/28/2017    Meño Omalley    MRN: [id number]           Patient Information     Date Of Birth          1970        Visit Information        Provider Department      3/28/2017 12:00 PM Ignacio Rose MD [facility]        Today's Diagnoses     Colon cancer (H)    -  1       Follow-ups after your visit        Additional Services     CANCER RISK MGMT/CANCER GENETIC COUNSELING REFERRAL       Your provider has referred you to the Cancer Risk Management Program - Cancer Genetic Counseling.    Reason for Referral: Probable Rodriguez; personal history of colon cancer    We have a sent a notice to a staff member of the Cancer Risk Management Program to give you a call to assist with scheduling your appointment.  You may also call  [phone] ([phone]) to initiate scheduling.    Please be aware that coverage of these services is subject to the terms and limitations of your health insurance plan.  Call member services at your health plan with any benefit or coverage questions.      Please bring the completed family history sheet to your appointment in addition to any available outside medical records documenting your cancer diagnosis.                  Your next 10 appointments already scheduled     Mar 28, 2017  2:30 PM CDT   LAB with [facility] ([facility])    [address] [place] [id number] [phone]           Patient must bring picture ID.  Patient should be prepared to give a urine specimen  Please do not eat 10-12 hours before your appointment if you are coming in fasting for labs on lipids, cholesterol, or glucose (sugar).  Pregnant women should follow their Care Team instructions. Water with medications is okay. Do not drink coffee or other fluids.   If you have concerns about taking  your medications, please ask at office or if scheduling via [facility], send a message by  January 30, 2018      Benjamín Hyman  [address] [place] [address] [place] [id number]        Dear Denny,    We are writing to inform you of your test results.    Your test results fall within the expected range(s) or remain unchanged from previous results.  Please continue with current treatment plan.    Resulted Orders   Fecal colorectal cancer screen (FIT)   Result Value Ref Range    Occult Blood Scn FIT Negative NEG^Negative       If you have any questions or concerns, please call the clinic at the number listed above.       Sincerely,        Mk Babin MD                 clicking on Secure Messaging, Message Your Care Team.            Mar 31, 2017 12:00 PM CDT   MR PELVIS W/O & W CONTRAST with UUMR2   [facility] [place] MRI ([facility])    [address] [id number] [phone]           Take your medicines as usual, unless your doctor tells you not to. Bring a list of your current medicines to your exam (including vitamins, minerals and over-the-counter drugs).  You will be given intravenous contrast for this exam. To prepare:   The day before your exam, drink extra fluids at least six 8-ounce glasses (unless your doctor tells you to restrict your fluids).   Have a blood test (creatinine test) within 30 days of your exam. Go to your clinic or Diagnostic Imaging Department for this test.  The MRI machine uses a strong magnet. Please wear clothes without metal (snaps, zippers). A sweatsuit works well, or we may give you a hospital gown.  Please remove any body piercings and hair extensions before you arrive. You will also remove watches, jewelry, hairpins, wallets, dentures, partial dental plates and hearing aids. You may wear contact lenses, and you may be able to wear your rings. We have a safe place to keep your personal items, but it is safer to leave them at home.   **IMPORTANT** THE INSTRUCTIONS BELOW ARE ONLY FOR THOSE PATIENTS WHO HAVE BEEN TOLD THEY WILL RECEIVE SEDATION OR GENERAL ANESTHESIA DURING THEIR MRI PROCEDURE:  IF YOU WILL RECEIVE SEDATION (take medicine to help you relax during your exam):   You must get the medicine from your doctor before you arrive. Bring the medicine to the exam. Do not take it at home.   Arrive one hour early. Bring someone who can take you home after the test. Your medicine will make you sleepy. After the exam, you may not drive, take a bus or take a taxi by yourself.   No eating 8 hours before your exam. You may have clear liquids up until 4 hours before your exam.  (Clear liquids include water, clear tea, black coffee and fruit juice without pulp.)  IF YOU WILL RECEIVE ANESTHESIA (be asleep for your exam):   Arrive 1 1/2 hours early. Bring someone who can take you home after the test. You may not drive, take a bus or take a taxi by yourself.   No eating 8 hours before your exam. You may have clear liquids up until 4 hours before your exam. (Clear liquids include water, clear tea, black coffee and fruit juice without pulp.)  Please call the Imaging Department at your exam site with any questions.              Future tests that were ordered for you today     Open Future Orders        Priority Expected Expires Ordered    MRI Pelvis - 3T Rectal Protocol Routine  3/28/2018 3/28/2017    CEA Routine  6/26/2017 3/28/2017    CEA Routine 3/28/2017 6/25/2017 3/27/2017    Comprehensive metabolic panel Routine 3/28/2017 6/25/2017 3/27/2017    CT Chest/Abdomen/Pelvis w Contrast Routine  3/27/2018 3/27/2017            Who to contact     Please call your clinic at [phone] to:    Ask questions about your health    Make or cancel appointments    Discuss your medicines    Learn about your test results    Speak to your doctor   If you have compliments or concerns about an experience at your clinic, or if you wish to file a complaint, please contact [facility] Patient Relations at [phone] or email us at [email or web address]         Additional Information About Your Visit        Stor[employer] Up Information     Subtext gives you secure access to your electronic health record. If you see a primary care provider, you can also send messages to your care team and make appointments. If you have questions, please call your primary care clinic.  If you do not have a primary care provider, please call [phone] and they will assist you.      Subtext is an electronic gateway that provides easy, online access to your medical records. With Subtext, you can request a  "clinic appointment, read your test results, renew a prescription or communicate with your care team.     To access your existing account, please contact your [facility] or call [phone] for assistance.        Care EveryWhere ID     This is your Care EveryWhere ID. This could be used by other organizations to access your [place] medical records  [id number]        Your Vitals Were     Pulse Temperature Height Pulse Oximetry BMI (Body Mass Index)       65 98.2  F (36.8  C) 5' 8\" 96% 31.76 kg/m2        Blood Pressure from Last 3 Encounters:   03/28/17 123/64   03/24/17 122/90   03/20/17 120/76    Weight from Last 3 Encounters:   03/28/17 208 lb 14.4 oz   03/20/17 203 lb 6.4 oz   11/25/14 206 lb              We Performed the Following     CANCER RISK MGMT/CANCER GENETIC COUNSELING REFERRAL     FLEX SIGMOIDOSCOPY W/WO ELVA SPEC BY BRUSH/WASH        Primary Care Provider Office Phone # Fax #    Delio Alcala [phone]       [facility] [address] [place] [address]        Thank you!     Thank you for choosing [facility] COLON AND RECTAL SURGERY  for your care. Our goal is always to provide you with excellent care. Hearing back from our patients is one way we can continue to improve our services. Please take a few minutes to complete the written survey that you may receive in the mail after your visit with us. Thank you!             Your Updated Medication List - Protect others around you: Learn how to safely use, store and throw away your medicines at www.disposemymeds.org.          This list is accurate as of: 3/28/17  2:04 PM.  Always use your most recent med list.                   Brand Name Dispense Instructions for use    escitalopram 20 MG tablet    LEXAPRO     Take 20 mg by mouth daily       FLOVENT  MCG/ACT Inhaler   Generic drug:  fluticasone      Take 2 puffs by mouth 2 times daily as needed         "

## 2018-03-21 ENCOUNTER — ONCOLOGY VISIT (OUTPATIENT)
Dept: ONCOLOGY | Facility: CLINIC | Age: 48
End: 2018-03-21
Attending: INTERNAL MEDICINE
Payer: COMMERCIAL

## 2018-03-21 ENCOUNTER — APPOINTMENT (OUTPATIENT)
Dept: LAB | Facility: CLINIC | Age: 48
End: 2018-03-21
Attending: INTERNAL MEDICINE
Payer: COMMERCIAL

## 2018-03-21 VITALS
HEIGHT: 69 IN | DIASTOLIC BLOOD PRESSURE: 75 MMHG | HEART RATE: 76 BPM | BODY MASS INDEX: 33.8 KG/M2 | WEIGHT: 228.2 LBS | OXYGEN SATURATION: 94 % | SYSTOLIC BLOOD PRESSURE: 130 MMHG | TEMPERATURE: 98 F | RESPIRATION RATE: 16 BRPM

## 2018-03-21 DIAGNOSIS — C20 ADENOCARCINOMA OF RECTUM (H): Primary | ICD-10-CM

## 2018-03-21 LAB
ALBUMIN SERPL-MCNC: 3.6 G/DL (ref 3.4–5)
ALP SERPL-CCNC: 69 U/L (ref 40–150)
ALT SERPL W P-5'-P-CCNC: 32 U/L (ref 0–70)
ANION GAP SERPL CALCULATED.3IONS-SCNC: 6 MMOL/L (ref 3–14)
AST SERPL W P-5'-P-CCNC: 18 U/L (ref 0–45)
BASOPHILS # BLD AUTO: 0.1 10E9/L (ref 0–0.2)
BASOPHILS NFR BLD AUTO: 1.4 %
BILIRUB SERPL-MCNC: 0.5 MG/DL (ref 0.2–1.3)
BUN SERPL-MCNC: 18 MG/DL (ref 7–30)
CALCIUM SERPL-MCNC: 8.9 MG/DL (ref 8.5–10.1)
CHLORIDE SERPL-SCNC: 104 MMOL/L (ref 94–109)
CO2 SERPL-SCNC: 29 MMOL/L (ref 20–32)
CREAT SERPL-MCNC: 0.79 MG/DL (ref 0.66–1.25)
DIFFERENTIAL METHOD BLD: NORMAL
EOSINOPHIL # BLD AUTO: 0.2 10E9/L (ref 0–0.7)
EOSINOPHIL NFR BLD AUTO: 3.8 %
ERYTHROCYTE [DISTWIDTH] IN BLOOD BY AUTOMATED COUNT: 12 % (ref 10–15)
GFR SERPL CREATININE-BSD FRML MDRD: >90 ML/MIN/1.7M2
GLUCOSE SERPL-MCNC: 96 MG/DL (ref 70–99)
HCT VFR BLD AUTO: 42.7 % (ref 40–53)
HGB BLD-MCNC: 14.7 G/DL (ref 13.3–17.7)
IMM GRANULOCYTES # BLD: 0 10E9/L (ref 0–0.4)
IMM GRANULOCYTES NFR BLD: 0.2 %
LYMPHOCYTES # BLD AUTO: 1.4 10E9/L (ref 0.8–5.3)
LYMPHOCYTES NFR BLD AUTO: 30.4 %
MCH RBC QN AUTO: 31.9 PG (ref 26.5–33)
MCHC RBC AUTO-ENTMCNC: 34.4 G/DL (ref 31.5–36.5)
MCV RBC AUTO: 93 FL (ref 78–100)
MONOCYTES # BLD AUTO: 0.4 10E9/L (ref 0–1.3)
MONOCYTES NFR BLD AUTO: 8.6 %
NEUTROPHILS # BLD AUTO: 2.5 10E9/L (ref 1.6–8.3)
NEUTROPHILS NFR BLD AUTO: 55.6 %
NRBC # BLD AUTO: 0 10*3/UL
NRBC BLD AUTO-RTO: 0 /100
PLATELET # BLD AUTO: 266 10E9/L (ref 150–450)
POTASSIUM SERPL-SCNC: 4.1 MMOL/L (ref 3.4–5.3)
PROT SERPL-MCNC: 7.4 G/DL (ref 6.8–8.8)
RBC # BLD AUTO: 4.61 10E12/L (ref 4.4–5.9)
SODIUM SERPL-SCNC: 140 MMOL/L (ref 133–144)
WBC # BLD AUTO: 4.4 10E9/L (ref 4–11)

## 2018-03-21 PROCEDURE — 36415 COLL VENOUS BLD VENIPUNCTURE: CPT

## 2018-03-21 PROCEDURE — 99213 OFFICE O/P EST LOW 20 MIN: CPT | Mod: ZP | Performed by: INTERNAL MEDICINE

## 2018-03-21 PROCEDURE — G0463 HOSPITAL OUTPT CLINIC VISIT: HCPCS | Mod: ZF

## 2018-03-21 PROCEDURE — 85025 COMPLETE CBC W/AUTO DIFF WBC: CPT | Performed by: INTERNAL MEDICINE

## 2018-03-21 PROCEDURE — 80053 COMPREHEN METABOLIC PANEL: CPT | Performed by: INTERNAL MEDICINE

## 2018-03-21 ASSESSMENT — PAIN SCALES - GENERAL: PAINLEVEL: NO PAIN (0)

## 2018-03-21 NOTE — NURSING NOTE
"Oncology Rooming Note    March 21, 2018 11:35 AM   Meño Omalley is a 47 year old male who presents for:    Chief Complaint   Patient presents with     Blood Draw     Labs drawn via  by RN, VS taken     RECHECK     Rectal cancer      Initial Vitals: /75 (BP Location: Right arm, Patient Position: Sitting, Cuff Size: Adult Regular)  Pulse 76  Temp 98  F (36.7  C) (Oral)  Resp 16  Ht 1.753 m (5' 9.02\")  Wt 103.5 kg (228 lb 3.2 oz)  SpO2 94%  BMI 33.68 kg/m2 Estimated body mass index is 33.68 kg/(m^2) as calculated from the following:    Height as of this encounter: 1.753 m (5' 9.02\").    Weight as of this encounter: 103.5 kg (228 lb 3.2 oz). Body surface area is 2.24 meters squared.  No Pain (0) Comment: Data Unavailable   No LMP for male patient.  Allergies reviewed: Yes  Medications reviewed: Yes    Medications: Medication refills not needed today.  Pharmacy name entered into EPIC:    [facility] - [place], [place] - [address]  [facility] [place], [place] - [address]     Clinical concerns:  No new concerns  Provider was notified.    5 minutes for nursing intake (face to face time)     Violetta Maurice MA              "

## 2018-03-21 NOTE — MR AVS SNAPSHOT
"              After Visit Summary   3/21/2018    Meño Omalley    MRN: [id number]           Patient Information     Date Of Birth          1970        Visit Information        Provider Department      3/21/2018 11:15 AM Jeanette Mcarthur MD [facility]        Today's Diagnoses     Adenocarcinoma of rectum (H)    -  1       Follow-ups after your visit        Follow-up notes from your care team     Return in about 3 months (around 6/21/2018) for Physical Exam, Lab Work.      Who to contact     If you have questions or need follow up information about today's clinic visit or your schedule please contact [facility] directly at [phone].  Normal or non-critical lab and imaging results will be communicated to you by MyChart, letter or phone within 4 business days after the clinic has received the results. If you do not hear from us within 7 days, please contact the clinic through Frequent Browserhart or phone. If you have a critical or abnormal lab result, we will notify you by phone as soon as possible.  Submit refill requests through Pramana or call your pharmacy and they will forward the refill request to us. Please allow 3 business days for your refill to be completed.          Additional Information About Your Visit        MyChart Information     Pramana gives you secure access to your electronic health record. If you see a primary care provider, you can also send messages to your care team and make appointments. If you have questions, please call your primary care clinic.  If you do not have a primary care provider, please call [phone] and they will assist you.        Care EveryWhere ID     This is your Care EveryWhere ID. This could be used by other organizations to access your [place] medical records  [id number]        Your Vitals Were     Pulse Temperature Respirations Height Pulse Oximetry BMI (Body Mass Index)    76 98  F (36.7  C) (Oral) 16 1.753 m (5' 9.02\") 94% " 33.68 kg/m2       Blood Pressure from Last 3 Encounters:   03/21/18 130/75   01/10/18 104/62   12/20/17 122/84    Weight from Last 3 Encounters:   03/21/18 103.5 kg (228 lb 3.2 oz)   01/10/18 102.5 kg (225 lb 14.4 oz)   12/20/17 101.2 kg (223 lb)              We Performed the Following     CBC with platelets differential     Comprehensive metabolic panel        Primary Care Provider Office Phone # Fax #    Delio Alcala [phone]       [address] [address]        Equal Access to Services     [facility]: Hadii snow Anders, waaxda justin, qaybta kaalmada nathalia, lynda morales . So [facility] [phone].    ATENCIÓN: Si habla español, tiene a valle disposición servicios gratuitos de asistencia lingüística. [facility] [phone].    We comply with applicable federal civil rights laws and [place] laws. We do not discriminate on the basis of race, color, national origin, age, disability, sex, sexual orientation, or gender identity.            Thank you!     Thank you for choosing [facility]  for your care. Our goal is always to provide you with excellent care. Hearing back from our patients is one way we can continue to improve our services. Please take a few minutes to complete the written survey that you may receive in the mail after your visit with us. Thank you!             Your Updated Medication List - Protect others around you: Learn how to safely use, store and throw away your medicines at www.disposemymeds.org.          This list is accurate as of 3/21/18 11:59 PM.  Always use your most recent med list.                   Brand Name Dispense Instructions for use Diagnosis    escitalopram 20 MG tablet    LEXAPRO    30 tablet    Take 1 tablet (20 mg) by mouth every morning    Major depressive disorder, recurrent episode, mild (H)       FLOVENT  MCG/ACT Inhaler   Generic drug:  fluticasone      Take 2  puffs by mouth 2 times daily as needed        lidocaine-prilocaine cream    EMLA    30 g    Apply topically as needed for moderate pain    Rectal cancer (H)       LORazepam 0.5 MG tablet    ATIVAN    30 tablet    Take 1 tablet (0.5 mg) by mouth every 4 hours as needed (Anxiety, Nausea/Vomiting or Sleep)    Adenocarcinoma of rectum (H)       ondansetron 8 MG tablet    ZOFRAN    60 tablet    Take 1 tablet (8 mg) by mouth every 8 hours as needed for nausea    Adenocarcinoma of rectum (H)       prochlorperazine 10 MG tablet    COMPAZINE    30 tablet    Take 1 tablet (10 mg) by mouth every 6 hours as needed (Nausea/Vomiting)    Adenocarcinoma of rectum (H)

## 2018-03-21 NOTE — LETTER
"3/21/2018       RE: Meño Omalley  [address] [address] [address]     Dear Colleague,    Thank you for referring your patient, Meño Omalley, to the [facility]. Please see a copy of my visit note below.    REASON FOR VISIT:  The patient is a 47-year-old male here for followup for rectal cancer, status post surgery and adjuvant therapy, now here for 3-month followup.     Background : refer to treatment history.     INTERVAL HISTORY:  Meño say that he is doing really well.  He has no complaints except for significant weight gain that he is not able to manage with his previous level of physical activity and diet.  He states his bowel and bladder are fine.  He does not have any residual neuropathy at this point.  He has no nausea, vomiting, abdominal pain, chest pain, shortness of breath or fever. Rest 14 point ROS negative   PMH, PSH, Fam and social history unchanged      PHYSICAL EXAMINATION:   VITAL SIGNS:   /75 (BP Location: Right arm, Patient Position: Sitting, Cuff Size: Adult Regular)  Pulse 76  Temp 98  F (36.7  C) (Oral)  Resp 16  Ht 1.753 m (5' 9.02\")  Wt 103.5 kg (228 lb 3.2 oz)  SpO2 94%  BMI 33.68 kg/m2    GENERAL:  Alert and oriented x3.  No apparent distress.   HEENT:  Moist mucous membranes.   CARDIOVASCULAR:  S1, S2 clear.   RESPIRATORY:  Clear to auscultation.   ABDOMEN:  Nontender to palpation.   LOWER EXTREMITIES:  No pedal edema.      LABORATORY EVALUATION:  Stable to improved CBC and BMP.     No new imaging on this visit.        ASSESSMENT AND PLAN:  This is a patient with a history of rectal cancer, status post surgery and adjuvant chemotherapy which he finished in 01/2018, here for followup.  At this point, he does not have any signs or symptoms concerning for recurrent disease.  When he returns in 3 months with labs , he would have a repeat CT scan at that time.  In terms of his colonoscopy, he is due for a repeat colonoscopy in May.  I have " sent a message to Dr. Rose's office, who would organize this for him and if for some reason he is unable to, I would set him up with Gastroenterology.     Jeanette Mcarthur   of Medicine   Hematology and medical Oncology   [facility]

## 2018-03-23 ENCOUNTER — TELEPHONE (OUTPATIENT)
Dept: SURGERY | Facility: CLINIC | Age: 48
End: 2018-03-23

## 2018-03-23 DIAGNOSIS — Z12.12 SCREENING FOR MALIGNANT NEOPLASM OF THE RECTUM: Primary | ICD-10-CM

## 2018-03-23 NOTE — TELEPHONE ENCOUNTER
Patient is due for colonoscopy with Dr. Rose in approximately two months.  Called patient and left a message to schedule.  Provided my direct number.

## 2018-03-26 NOTE — PROGRESS NOTES
"REASON FOR VISIT:  The patient is a 47-year-old male here for followup for rectal cancer, status post surgery and adjuvant therapy, now here for 3-month followup.     Background : refer to treatment history.     INTERVAL HISTORY:  Meño say that he is doing really well.  He has no complaints except for significant weight gain that he is not able to manage with his previous level of physical activity and diet.  He states his bowel and bladder are fine.  He does not have any residual neuropathy at this point.  He has no nausea, vomiting, abdominal pain, chest pain, shortness of breath or fever. Rest 14 point ROS negative   PMH, PSH, Fam and social history unchanged      PHYSICAL EXAMINATION:   VITAL SIGNS:   /75 (BP Location: Right arm, Patient Position: Sitting, Cuff Size: Adult Regular)  Pulse 76  Temp 98  F (36.7  C) (Oral)  Resp 16  Ht 1.753 m (5' 9.02\")  Wt 103.5 kg (228 lb 3.2 oz)  SpO2 94%  BMI 33.68 kg/m2    GENERAL:  Alert and oriented x3.  No apparent distress.   HEENT:  Moist mucous membranes.   CARDIOVASCULAR:  S1, S2 clear.   RESPIRATORY:  Clear to auscultation.   ABDOMEN:  Nontender to palpation.   LOWER EXTREMITIES:  No pedal edema.      LABORATORY EVALUATION:  Stable to improved CBC and BMP.     No new imaging on this visit.        ASSESSMENT AND PLAN:  This is a patient with a history of rectal cancer, status post surgery and adjuvant chemotherapy which he finished in 01/2018, here for followup.  At this point, he does not have any signs or symptoms concerning for recurrent disease.  When he returns in 3 months with labs , he would have a repeat CT scan at that time.  In terms of his colonoscopy, he is due for a repeat colonoscopy in May.  I have sent a message to Dr. Rose's office, who would organize this for him and if for some reason he is unable to, I would set him up with Gastroenterology.     Jeanette Mcarthur   of Medicine   Hematology and medical Oncology "   [facility]    Jeanette Mcarthur   of Medicine   Hematology and medical Oncology   [facility]

## 2018-05-07 ENCOUNTER — TELEPHONE (OUTPATIENT)
Dept: GASTROENTEROLOGY | Facility: OUTPATIENT CENTER | Age: 48
End: 2018-05-07

## 2018-05-07 NOTE — TELEPHONE ENCOUNTER
Patient taking any blood thinners ? No    Heart disease ? denies    Lung disease ? Asthma.. Advised patient to bring inhaler      Sleep apnea ? denies    Diabetic ? n/a    Kidney disease ? denies    Dialysis ? n/a    Electronic implanted medical devices ? denies    Are you taking any narcotic pain medication ? no  What is your daily dosage ?    PTSD ? n/a    Prep instructions reviewed with patient ? Patient declined review.  policy, MAC sedation plan reviewed. Advised patient to have someone stay with him post exam    Pharmacy : n/a    Indication for procedure : Screening for malignant neoplasm of the rectum [Z12.12    Referring provider : Self     Arrival Time : Patient will arrive at 12:30 PM

## 2018-05-11 ENCOUNTER — TELEPHONE (OUTPATIENT)
Dept: GASTROENTEROLOGY | Facility: CLINIC | Age: 48
End: 2018-05-11

## 2018-05-11 ENCOUNTER — HOSPITAL ENCOUNTER (EMERGENCY)
Facility: CLINIC | Age: 48
Discharge: HOME | End: 2018-05-11
Attending: FAMILY MEDICINE | Admitting: FAMILY MEDICINE
Payer: COMMERCIAL

## 2018-05-11 ENCOUNTER — APPOINTMENT (OUTPATIENT)
Dept: CT IMAGING | Facility: CLINIC | Age: 48
End: 2018-05-11
Attending: FAMILY MEDICINE
Payer: COMMERCIAL

## 2018-05-11 VITALS
RESPIRATION RATE: 16 BRPM | BODY MASS INDEX: 31.1 KG/M2 | DIASTOLIC BLOOD PRESSURE: 83 MMHG | TEMPERATURE: 97 F | WEIGHT: 210 LBS | OXYGEN SATURATION: 96 % | HEART RATE: 78 BPM | HEIGHT: 69 IN | SYSTOLIC BLOOD PRESSURE: 130 MMHG

## 2018-05-11 DIAGNOSIS — N20.1 URETERAL STONE: ICD-10-CM

## 2018-05-11 LAB
ALBUMIN UR-MCNC: 30 MG/DL
ANION GAP SERPL CALCULATED.3IONS-SCNC: 9 MMOL/L (ref 3–14)
APPEARANCE UR: ABNORMAL
BASOPHILS # BLD AUTO: 0.1 10E9/L (ref 0–0.2)
BASOPHILS NFR BLD AUTO: 0.9 %
BILIRUB UR QL STRIP: NEGATIVE
BUN SERPL-MCNC: 18 MG/DL (ref 7–30)
CALCIUM SERPL-MCNC: 9 MG/DL (ref 8.5–10.1)
CHLORIDE SERPL-SCNC: 109 MMOL/L (ref 94–109)
CO2 SERPL-SCNC: 24 MMOL/L (ref 20–32)
COLOR UR AUTO: YELLOW
CREAT SERPL-MCNC: 0.93 MG/DL (ref 0.66–1.25)
DIFFERENTIAL METHOD BLD: NORMAL
EOSINOPHIL # BLD AUTO: 0.1 10E9/L (ref 0–0.7)
EOSINOPHIL NFR BLD AUTO: 1.7 %
ERYTHROCYTE [DISTWIDTH] IN BLOOD BY AUTOMATED COUNT: 12.8 % (ref 10–15)
GFR SERPL CREATININE-BSD FRML MDRD: 87 ML/MIN/1.7M2
GLUCOSE SERPL-MCNC: 150 MG/DL (ref 70–99)
GLUCOSE UR STRIP-MCNC: NEGATIVE MG/DL
HCT VFR BLD AUTO: 44.4 % (ref 40–53)
HGB BLD-MCNC: 15.4 G/DL (ref 13.3–17.7)
HGB UR QL STRIP: ABNORMAL
IMM GRANULOCYTES # BLD: 0 10E9/L (ref 0–0.4)
IMM GRANULOCYTES NFR BLD: 0.2 %
KETONES UR STRIP-MCNC: 5 MG/DL
LEUKOCYTE ESTERASE UR QL STRIP: NEGATIVE
LYMPHOCYTES # BLD AUTO: 1.4 10E9/L (ref 0.8–5.3)
LYMPHOCYTES NFR BLD AUTO: 26 %
MCH RBC QN AUTO: 31.2 PG (ref 26.5–33)
MCHC RBC AUTO-ENTMCNC: 34.7 G/DL (ref 31.5–36.5)
MCV RBC AUTO: 90 FL (ref 78–100)
MONOCYTES # BLD AUTO: 0.4 10E9/L (ref 0–1.3)
MONOCYTES NFR BLD AUTO: 8.2 %
MUCOUS THREADS #/AREA URNS LPF: PRESENT /LPF
NEUTROPHILS # BLD AUTO: 3.4 10E9/L (ref 1.6–8.3)
NEUTROPHILS NFR BLD AUTO: 63 %
NITRATE UR QL: NEGATIVE
PH UR STRIP: 6 PH (ref 5–7)
PLATELET # BLD AUTO: 300 10E9/L (ref 150–450)
POTASSIUM SERPL-SCNC: 3.5 MMOL/L (ref 3.4–5.3)
RBC # BLD AUTO: 4.94 10E12/L (ref 4.4–5.9)
RBC #/AREA URNS AUTO: >182 /HPF (ref 0–2)
SODIUM SERPL-SCNC: 142 MMOL/L (ref 133–144)
SOURCE: ABNORMAL
SP GR UR STRIP: 1.02 (ref 1–1.03)
SQUAMOUS #/AREA URNS AUTO: <1 /HPF (ref 0–1)
UROBILINOGEN UR STRIP-MCNC: 0 MG/DL (ref 0–2)
WBC # BLD AUTO: 5.4 10E9/L (ref 4–11)
WBC #/AREA URNS AUTO: 17 /HPF (ref 0–5)

## 2018-05-11 PROCEDURE — 96375 TX/PRO/DX INJ NEW DRUG ADDON: CPT

## 2018-05-11 PROCEDURE — 99285 EMERGENCY DEPT VISIT HI MDM: CPT | Mod: 25

## 2018-05-11 PROCEDURE — 96361 HYDRATE IV INFUSION ADD-ON: CPT

## 2018-05-11 PROCEDURE — 96376 TX/PRO/DX INJ SAME DRUG ADON: CPT

## 2018-05-11 PROCEDURE — 80048 BASIC METABOLIC PNL TOTAL CA: CPT | Performed by: FAMILY MEDICINE

## 2018-05-11 PROCEDURE — 81001 URINALYSIS AUTO W/SCOPE: CPT | Performed by: FAMILY MEDICINE

## 2018-05-11 PROCEDURE — 99285 EMERGENCY DEPT VISIT HI MDM: CPT | Mod: Z6 | Performed by: FAMILY MEDICINE

## 2018-05-11 PROCEDURE — 96374 THER/PROPH/DIAG INJ IV PUSH: CPT

## 2018-05-11 PROCEDURE — 87086 URINE CULTURE/COLONY COUNT: CPT | Performed by: FAMILY MEDICINE

## 2018-05-11 PROCEDURE — 25000132 ZZH RX MED GY IP 250 OP 250 PS 637: Performed by: FAMILY MEDICINE

## 2018-05-11 PROCEDURE — 74176 CT ABD & PELVIS W/O CONTRAST: CPT

## 2018-05-11 PROCEDURE — 85025 COMPLETE CBC W/AUTO DIFF WBC: CPT | Performed by: FAMILY MEDICINE

## 2018-05-11 PROCEDURE — 25000128 H RX IP 250 OP 636: Performed by: FAMILY MEDICINE

## 2018-05-11 RX ORDER — ONDANSETRON 2 MG/ML
4 INJECTION INTRAMUSCULAR; INTRAVENOUS EVERY 30 MIN PRN
Status: DISCONTINUED | OUTPATIENT
Start: 2018-05-11 | End: 2018-05-11 | Stop reason: HOSPADM

## 2018-05-11 RX ORDER — TAMSULOSIN HYDROCHLORIDE 0.4 MG/1
0.4 CAPSULE ORAL DAILY
Qty: 10 CAPSULE | Refills: 0 | Status: SHIPPED | OUTPATIENT
Start: 2018-05-11 | End: 2018-05-21

## 2018-05-11 RX ORDER — OXYCODONE AND ACETAMINOPHEN 5; 325 MG/1; MG/1
1-2 TABLET ORAL ONCE
Status: COMPLETED | OUTPATIENT
Start: 2018-05-11 | End: 2018-05-11

## 2018-05-11 RX ORDER — OXYCODONE AND ACETAMINOPHEN 5; 325 MG/1; MG/1
1-2 TABLET ORAL EVERY 4 HOURS PRN
Qty: 12 TABLET | Refills: 0 | Status: SHIPPED | OUTPATIENT
Start: 2018-05-11 | End: 2018-07-20

## 2018-05-11 RX ORDER — KETOROLAC TROMETHAMINE 30 MG/ML
30 INJECTION, SOLUTION INTRAMUSCULAR; INTRAVENOUS ONCE
Status: COMPLETED | OUTPATIENT
Start: 2018-05-11 | End: 2018-05-11

## 2018-05-11 RX ORDER — HYDROMORPHONE HYDROCHLORIDE 1 MG/ML
0.5 INJECTION, SOLUTION INTRAMUSCULAR; INTRAVENOUS; SUBCUTANEOUS
Status: DISCONTINUED | OUTPATIENT
Start: 2018-05-11 | End: 2018-05-11 | Stop reason: HOSPADM

## 2018-05-11 RX ADMIN — HYDROMORPHONE HYDROCHLORIDE 0.5 MG: 1 INJECTION, SOLUTION INTRAMUSCULAR; INTRAVENOUS; SUBCUTANEOUS at 08:30

## 2018-05-11 RX ADMIN — SODIUM CHLORIDE 1000 ML: 9 INJECTION, SOLUTION INTRAVENOUS at 08:12

## 2018-05-11 RX ADMIN — KETOROLAC TROMETHAMINE 30 MG: 30 INJECTION, SOLUTION INTRAMUSCULAR at 08:12

## 2018-05-11 RX ADMIN — OXYCODONE HYDROCHLORIDE AND ACETAMINOPHEN 1 TABLET: 5; 325 TABLET ORAL at 09:58

## 2018-05-11 RX ADMIN — HYDROMORPHONE HYDROCHLORIDE 0.5 MG: 1 INJECTION, SOLUTION INTRAMUSCULAR; INTRAVENOUS; SUBCUTANEOUS at 08:13

## 2018-05-11 RX ADMIN — ONDANSETRON 4 MG: 2 INJECTION INTRAMUSCULAR; INTRAVENOUS at 08:17

## 2018-05-11 NOTE — ED AVS SNAPSHOT
[facility] Emergency Department    [address]     [place] [address]    Phone:  [phone]    Fax:  [phone]                                       Meño Omalley   MRN: [id number]    Department:  [facility] Emergency Department   Date of Visit:  5/11/2018           Patient Information     Date Of Birth          1970        Your diagnoses for this visit were:     Ureteral stone (right 3 mm UVJ)        You were seen by Emily Khan MD.      Follow-up Information     Follow up with Delio Alcala MD In 3 days.    Specialty:  Family Practice    Why:  if not improving    Contact information:    [address] [place] [address] [phone]          Follow up with [facility] Emergency Department.    Specialty:  EMERGENCY MEDICINE    Why:  If symptoms worsen    Contact information:    [address]   [place] [id number] [phone]    Additional information:    From [facility] [address]: Exit at [address] on south side of [place]. Turn right on [address]. Turn left at stoplight on [address]. [facility] will be in view two blocks ahead        Discharge Instructions       Thank you for giving us the opportunity to see you. The impression is that you have is that you have a 3 mm right distal ureteral stone.    Drink plenty of water throughout the day.    Take ibuprofen up to 600 mg 4 times a day and add Percocet for breakthrough pain.    The following medications were given during your stay: Toradol, Zofran, Dilaudid    Since you received an opiate pain medication or sedative during your visit, please do not drive for at least 8 hours.     If you are not seeing an improvement within 3-5 days, please follow up with your primary care provider or clinic.     After discharge, please closely monitor for any new or worsening symptoms. Return to the Emergency Department at any time if your symptoms worsen.        Treating Kidney  Stones: Expectant Therapy  Most kidney stones are about the size of a grape seed. Stones of this size are small enough to pass naturally. Once it is passed, a stone can be analyzed. This wait-and-see approach is called expectant therapy. Small stones can often be passed with expectant therapy. If pain is a problem, ask your healthcare provider about pain medicines. Then follow his or her directions on how much water to drink. Drinking more water creates more urine to flush out your stone. Also be sure to strain your urine. Take any stones you pass to your provider for analysis.    Drink lots of water  Drinking lots of water may help your stone pass. Water also dilutes the chemicals in your urine. This reduces your risk of forming new stones. You may be told to drink 8, 12-ounce glasses of water a day. Avoid liquids that dehydrate you, such as those containing caffeine or alcohol.  Strain your urine  Straining your urine lets you collect your stone for analysis. Use the strainer each time you urinate. Strain your urine for as long as your healthcare provider suggests. Watch for brown, tan, gold, or black specks or tiny mariana. These may be kidney stones.  Take your medicine  Your healthcare provider may give you medicine that makes it more likely for you to pass the kidney stone.   Follow up with your healthcare provider  Follow up by taking any stones you find to your provider for analysis. The type of stone you have determines your diet and prevention program. You may need more tests in the future. These tests will ensure that new stones are not forming.  Date Last Reviewed: 1/1/2017 2000-2017 [employer]. [address] [place] [address], [place] [address]. All rights reserved. This information is not intended as a substitute for professional medical care. Always follow your healthcare professional's instructions.          Your next 10 appointments already scheduled     May 14, 2018  1:30 PM CDT    Colonoscopy with Ignacio Rose MD   [facility] ([facility])    [address]  [address]  [facility] [id number] [phone]            Jun 22, 2018  8:00 AM CDT   LAB with [facility] ([facility])    [address] [place] [id number] [phone]           Please do not eat 10-12 hours before your appointment if you are coming in fasting for labs on lipids, cholesterol, or glucose (sugar). This does not apply to pregnant women. Water, hot tea and black coffee (with nothing added) are okay. Do not drink other fluids, diet soda or chew gum.            Jun 22, 2018  8:30 AM CDT   CT CHEST ABDOMEN PELVIS W/O & W CONTRAST with PHCT1   [facility] CT Scan ([facility])    [address] [id number] [phone]           Please bring any scans or X-rays taken at other hospitals, if similar tests were done. Also bring a list of your medicines, including vitamins, minerals and over-the-counter drugs. It is safest to leave personal items at home.  Be sure to tell your doctor:   If you have any allergies.   If there s any chance you are pregnant.   If you are breastfeeding.  How to prepare:   Do not eat or drink for 2 hours before your exam. If you need to take medicine, you may take it with small sips of water. (We may ask you to take liquid medicine as well.)   Please wear loose clothing, such as a sweat suit or jogging clothes. Avoid snaps, zippers and other metal. We may ask you to undress and put on a hospital gown.  Please arrive 30 minutes early for your CT. Once in the department you might be asked to drink water 15-20 minutes prior to your exam.  If indicated you may be asked to drink an oral contrast in advance of your CT.  If this is the case, the imaging team will let you know or be in contact with you prior to your appointment  Patients over 70 or patients with  diabetes or kidney problems:   If you haven t had a blood test (creatinine test) within the last 30 days, the Cardiologist/Radiologist may require you to get this test prior to your exam.  If you have diabetes:   Continue to take your metformin medication on the day of your exam  If you have any questions, please call the Imaging Department where you will have your exam.            Jun 27, 2018  9:45 AM CDT   (Arrive by 9:30 AM)   Return Visit with Jeanette Mcarthur MD   [facility] ([facility])    [address] [place] [address]  [address]  [facility] [address] [phone]              24 Hour Appointment Hotline       To make an appointment at any [facility] call [phone] ([phone]). If you don't have a family doctor or clinic, we will help you find one. [facility] clinics are conveniently located to serve the needs of you and your family.             Review of your medicines      START taking        Dose / Directions Last dose taken    oxyCODONE-acetaminophen 5-325 MG per tablet   Commonly known as:  PERCOCET   Dose:  1-2 tablet   Quantity:  12 tablet        Take 1-2 tablets by mouth every 4 hours as needed for pain   Refills:  0        tamsulosin 0.4 MG capsule   Commonly known as:  FLOMAX   Dose:  0.4 mg   Quantity:  10 capsule        Take 1 capsule (0.4 mg) by mouth daily for 10 doses   Refills:  0          Our records show that you are taking the medicines listed below. If these are incorrect, please call your family doctor or clinic.        Dose / Directions Last dose taken    escitalopram 20 MG tablet   Commonly known as:  LEXAPRO   Dose:  20 mg   Quantity:  30 tablet        Take 1 tablet (20 mg) by mouth every morning   Refills:  5        FLOVENT  MCG/ACT Inhaler   Dose:  2 puff   Generic drug:  fluticasone        Take 2 puffs by mouth 2 times daily as needed   Refills:  0        LORazepam 0.5 MG tablet   Commonly known as:  ATIVAN   Dose:  0.5 mg    Quantity:  30 tablet        Take 1 tablet (0.5 mg) by mouth every 4 hours as needed (Anxiety, Nausea/Vomiting or Sleep)   Refills:  2                Information about OPIOIDS     PRESCRIPTION OPIOIDS: WHAT YOU NEED TO KNOW   You have a prescription for an opioid (narcotic) pain medicine. Opioids can cause addiction. If you have a history of chemical dependency of any type, you are at a higher risk of becoming addicted to opioids. Only take this medicine after all other options have been tried. Take it for as short a time and as few doses as possible.     Do not:    Drive. If you drive while taking these medicines, you could be arrested for driving under the influence (DUI).    Operate heavy machinery    Do any other dangerous activities while taking these medicines.     Drink any alcohol while taking these medicines.      Take with any other medicines that contain acetaminophen. Read all labels carefully. Look for the word  acetaminophen  or  Tylenol.  Ask your pharmacist if you have questions or are unsure.    Store your pills in a secure place, locked if possible. We will not replace any lost or stolen medicine. If you don t finish your medicine, please throw away (dispose) as directed by your pharmacist. The [place] [employer] has more information about safe disposal: https://www.pca.[facility].us/living-green/managing-unwanted-medications    All opioids tend to cause constipation. Drink plenty of water and eat foods that have a lot of fiber, such as fruits, vegetables, prune juice, apple juice and high-fiber cereal. Take a laxative (Miralax, milk of magnesia, Colace, Senna) if you don t move your bowels at least every other day.         Prescriptions were sent or printed at these locations (2 Prescriptions)                   [facility] [address] [place] [address]    Telephone:  [phone]   Fax:  [phone]   Hours:                   E-Prescribed (1 of 2)         tamsulosin (FLOMAX) 0.4 MG capsule                 Printed at Department/Unit printer (1 of 2)         oxyCODONE-acetaminophen (PERCOCET) 5-325 MG per tablet                Procedures and tests performed during your visit     Abd/pelvis CT - no contrast - Stone Protocol    Basic metabolic panel    CBC with platelets differential    Peripheral IV catheter    UA reflex to Microscopic    Urine Culture Aerobic Bacterial      Orders Needing Specimen Collection     None      Pending Results     Date and Time Order Name Status Description    5/11/2018 0856 Urine Culture Aerobic Bacterial In process             Pending Culture Results     Date and Time Order Name Status Description    5/11/2018 0856 Urine Culture Aerobic Bacterial In process             Pending Results Instructions     If you had any lab results that were not finalized at the time of your Discharge, you can call the ED Lab Result RN at [phone]. You will be contacted by this team for any positive Lab results or changes in treatment. The nurses are available 7 days a week from 10A to 6:30P.  You can leave a message 24 hours per day and they will return your call.        Thank you for choosing [facility]       Thank you for choosing [facility] for your care. Our goal is always to provide you with excellent care. Hearing back from our patients is one way we can continue to improve our services. Please take a few minutes to complete the written survey that you may receive in the mail after you visit with us. Thank you!        [employer] Information     [employer] gives you secure access to your electronic health record. If you see a primary care provider, you can also send messages to your care team and make appointments. If you have questions, please call your primary care clinic.  If you do not have a primary care provider, please call [phone] and they will assist you.        Care EveryWhere ID     This is your Care EveryWhere  ID. This could be used by other organizations to access your [facility] medical records  [id number]        Equal Access to Services     LUCIO BOGGS : Hadii snow Anders, marlo anderson, lynda mcbride. So [facility] [phone].    ATENCIÓN: Si habla español, tiene a valle disposición servicios gratuitos de asistencia lingüística. Llame al [phone].    We comply with applicable federal civil rights laws and [place] laws. We do not discriminate on the basis of race, color, national origin, age, disability, sex, sexual orientation, or gender identity.            After Visit Summary       This is your record. Keep this with you and show to your community pharmacist(s) and doctor(s) at your next visit.

## 2018-05-11 NOTE — DISCHARGE INSTRUCTIONS
Thank you for giving us the opportunity to see you. The impression is that you have is that you have a 3 mm right distal ureteral stone.    Drink plenty of water throughout the day.    Take ibuprofen up to 600 mg 4 times a day and add Percocet for breakthrough pain.    The following medications were given during your stay: Toradol, Zofran, Dilaudid    Since you received an opiate pain medication or sedative during your visit, please do not drive for at least 8 hours.     If you are not seeing an improvement within 3-5 days, please follow up with your primary care provider or clinic.     After discharge, please closely monitor for any new or worsening symptoms. Return to the Emergency Department at any time if your symptoms worsen.        Treating Kidney Stones: Expectant Therapy  Most kidney stones are about the size of a grape seed. Stones of this size are small enough to pass naturally. Once it is passed, a stone can be analyzed. This wait-and-see approach is called expectant therapy. Small stones can often be passed with expectant therapy. If pain is a problem, ask your healthcare provider about pain medicines. Then follow his or her directions on how much water to drink. Drinking more water creates more urine to flush out your stone. Also be sure to strain your urine. Take any stones you pass to your provider for analysis.    Drink lots of water  Drinking lots of water may help your stone pass. Water also dilutes the chemicals in your urine. This reduces your risk of forming new stones. You may be told to drink 8, 12-ounce glasses of water a day. Avoid liquids that dehydrate you, such as those containing caffeine or alcohol.  Strain your urine  Straining your urine lets you collect your stone for analysis. Use the strainer each time you urinate. Strain your urine for as long as your healthcare provider suggests. Watch for brown, tan, gold, or black specks or tiny mariana. These may be kidney stones.  Take your  medicine  Your healthcare provider may give you medicine that makes it more likely for you to pass the kidney stone.   Follow up with your healthcare provider  Follow up by taking any stones you find to your provider for analysis. The type of stone you have determines your diet and prevention program. You may need more tests in the future. These tests will ensure that new stones are not forming.  Date Last Reviewed: 1/1/2017 2000-2017 [employer]. [address] [place] [address], [place] [address]. All rights reserved. This information is not intended as a substitute for professional medical care. Always follow your healthcare professional's instructions.

## 2018-05-11 NOTE — ED PROVIDER NOTES
[facility] ED Provider Note   CC:     Chief Complaint   Patient presents with     Flank Pain     HPI:  Meño Omalley is a 47 year old male who presented to the emergency department with acute onset of right lower quadrant abdominal pain with radiation into the right flank.  Symptoms started abruptly at 5:00 this morning.  Patient describes severe pain, associated with nausea, but no vomiting.  He denies fever, chills, chest pain.  He had a bowel movement yesterday.  He's noticed that the urine is dark.  He has a history of rectal cancer and underwent colectomy and cancer treatment last year.  He has never had a kidney stone before.  He denies any family history of kidney stones.  There is been no alleviating or exacerbating factors.    Problem List:  Patient Active Problem List    Diagnosis     Adenocarcinoma of rectum (H)     Rectal cancer (H)     Hyperlipidemia LDL goal <160     Family history of colon cancer     Obesity     Mild persistent asthma       MEDS:   Discharge Medication List as of 5/11/2018 10:21 AM      CONTINUE these medications which have NOT CHANGED    Details   escitalopram (LEXAPRO) 20 MG tablet Take 1 tablet (20 mg) by mouth every morning, Disp-30 tablet, R-5, E-Prescribe      fluticasone (FLOVENT HFA) 110 MCG/ACT inhaler Take 2 puffs by mouth 2 times daily as needed , Historical      LORazepam (ATIVAN) 0.5 MG tablet Take 1 tablet (0.5 mg) by mouth every 4 hours as needed (Anxiety, Nausea/Vomiting or Sleep), Disp-30 tablet, R-2, Local Print             ALLERGIES:  No Known Allergies    Past Surgical History:   Procedure Laterality Date     LAPAROSCOPIC COLECTOMY LOW ANTERIOR N/A 4/12/2017    Procedure: LAPAROSCOPIC COLECTOMY LOW ANTERIOR;  Surgeon: Ignacio Rose MD;  Location:  OR     NO HISTORY OF SURGERY       REMOVE PORT VASCULAR ACCESS Right 11/8/2017    Procedure: REMOVE PORT VASCULAR ACCESS;  Right chest Port  "Removal;  Surgeon: Chuy Steiner PA-C;  Location: [facility] OR     SIGMOIDOSCOPY FLEXIBLE N/A 4/12/2017    Procedure: SIGMOIDOSCOPY FLEXIBLE;  Surgeon: Ignacio Rose MD;  Location:  OR       Social History   Substance Use Topics     Smoking status: Never Smoker     Smokeless tobacco: Never Used     Alcohol use Yes      Comment: beer couple times per months         Review of Systems   Except as noted in HPI, all other systems were reviewed and are negative    Physical Exam     Vitals were reviewed  Patient Vitals for the past 8 hrs:   BP Temp Temp src Pulse Heart Rate Resp SpO2 Height Weight   05/11/18 0949 130/83 - - - 69 - - - -   05/11/18 0852 127/74 - - 78 - - - - -   05/11/18 0748 (!) 143/112 97  F (36.1  C) Oral 73 - 16 96 % 1.753 m (5' 9\") 95.3 kg (210 lb)     GENERAL APPEARANCE: Alert, severe distress due to pain  FACE: normal facies  EYES: Pupils are equal  HENT: normal external exam  NECK: no adenopathy or asymmetry  RESP: normal respiratory effort; clear breath sounds bilaterally  CV: regular rate and rhythm; no significant murmurs, gallops or rubs  ABD: soft, obese, right flank and right lower quadrant tenderness; no rebound or guarding; bowel sounds are normal  MS: no gross deformities noted; normal muscle tone.  EXT: No calf tenderness or pitting edema  SKIN: no worrisome rash  NEURO: no facial droop; no focal deficits, speech is normal  PSYCH: normal mood and affect      Available Lab/Imaging Results     Results for orders placed or performed during the hospital encounter of 05/11/18 (from the past 24 hour(s))   CBC with platelets differential   Result Value Ref Range    WBC 5.4 4.0 - 11.0 10e9/L    RBC Count 4.94 4.4 - 5.9 10e12/L    Hemoglobin 15.4 13.3 - 17.7 g/dL    Hematocrit 44.4 40.0 - 53.0 %    MCV 90 78 - 100 fl    MCH 31.2 26.5 - 33.0 pg    MCHC 34.7 31.5 - 36.5 g/dL    RDW 12.8 10.0 - 15.0 %    Platelet Count 300 150 - 450 10e9/L    Diff Method Automated Method     % Neutrophils 63.0 %    " % Lymphocytes 26.0 %    % Monocytes 8.2 %    % Eosinophils 1.7 %    % Basophils 0.9 %    % Immature Granulocytes 0.2 %    Absolute Neutrophil 3.4 1.6 - 8.3 10e9/L    Absolute Lymphocytes 1.4 0.8 - 5.3 10e9/L    Absolute Monocytes 0.4 0.0 - 1.3 10e9/L    Absolute Eosinophils 0.1 0.0 - 0.7 10e9/L    Absolute Basophils 0.1 0.0 - 0.2 10e9/L    Abs Immature Granulocytes 0.0 0 - 0.4 10e9/L   Basic metabolic panel   Result Value Ref Range    Sodium 142 133 - 144 mmol/L    Potassium 3.5 3.4 - 5.3 mmol/L    Chloride 109 94 - 109 mmol/L    Carbon Dioxide 24 20 - 32 mmol/L    Anion Gap 9 3 - 14 mmol/L    Glucose 150 (H) 70 - 99 mg/dL    Urea Nitrogen 18 7 - 30 mg/dL    Creatinine 0.93 0.66 - 1.25 mg/dL    GFR Estimate 87 >60 mL/min/1.7m2    GFR Estimate If Black >90 >60 mL/min/1.7m2    Calcium 9.0 8.5 - 10.1 mg/dL   UA reflex to Microscopic   Result Value Ref Range    Color Urine Yellow     Appearance Urine Slightly Cloudy     Glucose Urine Negative NEG^Negative mg/dL    Bilirubin Urine Negative NEG^Negative    Ketones Urine 5 (A) NEG^Negative mg/dL    Specific Gravity Urine 1.025 1.003 - 1.035    Blood Urine Large (A) NEG^Negative    pH Urine 6.0 5.0 - 7.0 pH    Protein Albumin Urine 30 (A) NEG^Negative mg/dL    Urobilinogen mg/dL 0.0 0.0 - 2.0 mg/dL    Nitrite Urine Negative NEG^Negative    Leukocyte Esterase Urine Negative NEG^Negative    Source Midstream Urine     RBC Urine >182 (H) 0 - 2 /HPF    WBC Urine 17 (H) 0 - 5 /HPF    Squamous Epithelial /HPF Urine <1 0 - 1 /HPF    Mucous Urine Present (A) NEG^Negative /LPF   Abd/pelvis CT - no contrast - Stone Protocol    Narrative    CT ABDOMEN AND PELVIS WITHOUT CONTRAST   5/11/2018 8:41 AM     HISTORY: Right flank pain.    COMPARISON: 12/15/2017- CT chest, abdomen and pelvis.    TECHNIQUE: Without intravenous or oral contrast, helical sections were  acquired from the top of the diaphragm through the pubic symphysis.  Coronal reconstructions were generated. Radiation dose for  this scan  was reduced using automated exposure control, adjustment of the mA  and/or kV according to the patient's size, or iterative reconstruction  technique. (Renal stone protocol)    FINDINGS:   Right urinary tract: 0.3 cm calculus in the distal ureter near the  ureterovesicular junction. Mild dilatation of the intrarenal  collecting system and ureter. No additional renal or ureteral calculi.    Left urinary tract: No renal or ureteral calculi. No dilatation of the  intrarenal collecting system or ureter.    Urinary bladder: No additional visualized calculi.    Remainder of the abdomen and pelvis: Slightly nodular outer contour of  the liver, possibly related to cirrhosis. The liver, spleen, pancreas  and adrenal glands are otherwise unremarkable to the limits of a  noncontrast CT scan. The gallbladder is present. Very small hiatal  hernia. Prior rectal surgery. The small and large bowel are normal in  caliber. The appendix is unremarkable. No bowel wall thickening,  pneumatosis or free intraperitoneal gas. No enlarged lymph nodes or  free fluid in the abdomen or pelvis.    Scan through the lower chest is unremarkable.       Impression    IMPRESSION:   1. 0.3 cm distal right ureteral calculus near the ureterovesicular  junction, resulting in mild obstruction.  2. No additional renal or ureteral calculi.    HECTOR BARRIOS MD                Impression     Final diagnoses:   Ureteral stone (right 3 mm UVJ)         ED Course & Medical Decision Making   Meño Omalley is a 47 year old male who presented to the emergency department beyond with acute onset of right lower quadrant abdominal pain with right flank pain.  Symptoms started abruptly this morning.  He had not had any previous episodes of kidney stones.  Patient had some nausea but no vomiting.  Patient was seen shortly after arrival.  He was in moderate to severe distress due to pain.  He was slightly diaphoretic.  He had tenderness in the right lower  quadrant and flank.  Patient was afebrile with temp of 97 .  Blood pressure 143/112 which normalized after the pain was controlled.  He received IV fluids, Toradol, Zofran and Dilaudid.  His workup reveals a urinalysis with greater than 182 red blood cells, and 17 white blood cells.  Urine culture was added and is pending.  His CBC reveals a normal white blood count of 5.4 and basic metabolic panel that was normal except for slightly elevated 150.  CT scan of the abdomen reveals a 3 mm distal right ureteral calculus near the UVJ.  I discussed the findings with him, and he was feeling much better.  We watched him for an additional hour, and he seemed  to be well managed on oral pain medication.  Patient felt comfortable going home, understanding that he may need to return to the emergency department if his pain comes severe.  I asked him to watch for any signs of infection.  Follow-up in 3-4 days if not improving.  Continue to push fluids, take ibuprofen, reserve oxycodone/Tylenol for pain and begin Flomax.  Return to the emergency department at any time if symptoms worsen.  Patient is stable for discharge home.               Written after-visit summary and instructions were given at the time of discharge.      Discharge Medication List as of 5/11/2018 10:21 AM      START taking these medications    Details   oxyCODONE-acetaminophen (PERCOCET) 5-325 MG per tablet Take 1-2 tablets by mouth every 4 hours as needed for pain, Disp-12 tablet, R-0, Local Print      tamsulosin (FLOMAX) 0.4 MG capsule Take 1 capsule (0.4 mg) by mouth daily for 10 doses, Disp-10 capsule, R-0, E-Prescribe                         Emily Khan MD  05/11/18 0793

## 2018-05-11 NOTE — ED AVS SNAPSHOT
[facility] Emergency Department    [address] [place] [address]    Phone:  [phone]    Fax:  [phone]                                       Meño Omalley   MRN: [id number]    Department:  [facility] Emergency Department   Date of Visit:  5/11/2018           After Visit Summary Signature Page     I have received my discharge instructions, and my questions have been answered. I have discussed any challenges I see with this plan with the nurse or doctor.    ..........................................................................................................................................  Patient/Patient Representative Signature      ..........................................................................................................................................  Patient Representative Print Name and Relationship to Patient    ..................................................               ................................................  Date                                            Time    ..........................................................................................................................................  Reviewed by Signature/Title    ...................................................              ..............................................  Date                                                            Time

## 2018-05-12 LAB
BACTERIA SPEC CULT: NO GROWTH
Lab: NORMAL
SPECIMEN SOURCE: NORMAL

## 2018-06-04 ENCOUNTER — TELEPHONE (OUTPATIENT)
Dept: GASTROENTEROLOGY | Facility: OUTPATIENT CENTER | Age: 48
End: 2018-06-04

## 2018-06-04 NOTE — TELEPHONE ENCOUNTER
6-11 appointment confirmed. Patient reports no changes since last pre-assessment and states he has a .

## 2018-06-11 ENCOUNTER — DOCUMENTATION ONLY (OUTPATIENT)
Dept: GASTROENTEROLOGY | Facility: OUTPATIENT CENTER | Age: 48
End: 2018-06-11
Payer: COMMERCIAL

## 2018-06-11 ENCOUNTER — TRANSFERRED RECORDS (OUTPATIENT)
Dept: HEALTH INFORMATION MANAGEMENT | Facility: CLINIC | Age: 48
End: 2018-06-11

## 2018-06-13 LAB — COPATH REPORT: NORMAL

## 2018-06-22 ENCOUNTER — HOSPITAL ENCOUNTER (OUTPATIENT)
Dept: CT IMAGING | Facility: CLINIC | Age: 48
Discharge: HOME | End: 2018-06-22
Attending: INTERNAL MEDICINE | Admitting: INTERNAL MEDICINE
Payer: COMMERCIAL

## 2018-06-22 DIAGNOSIS — C20 ADENOCARCINOMA OF RECTUM (H): ICD-10-CM

## 2018-06-22 LAB
ALBUMIN SERPL-MCNC: 3.7 G/DL (ref 3.4–5)
ALP SERPL-CCNC: 67 U/L (ref 40–150)
ALT SERPL W P-5'-P-CCNC: 39 U/L (ref 0–70)
ANION GAP SERPL CALCULATED.3IONS-SCNC: 7 MMOL/L (ref 3–14)
AST SERPL W P-5'-P-CCNC: 24 U/L (ref 0–45)
BASOPHILS # BLD AUTO: 0 10E9/L (ref 0–0.2)
BASOPHILS NFR BLD AUTO: 0.8 %
BILIRUB SERPL-MCNC: 0.5 MG/DL (ref 0.2–1.3)
BUN SERPL-MCNC: 14 MG/DL (ref 7–30)
CALCIUM SERPL-MCNC: 8.4 MG/DL (ref 8.5–10.1)
CHLORIDE SERPL-SCNC: 107 MMOL/L (ref 94–109)
CO2 SERPL-SCNC: 27 MMOL/L (ref 20–32)
CREAT SERPL-MCNC: 0.88 MG/DL (ref 0.66–1.25)
DIFFERENTIAL METHOD BLD: NORMAL
EOSINOPHIL NFR BLD AUTO: 3.4 %
ERYTHROCYTE [DISTWIDTH] IN BLOOD BY AUTOMATED COUNT: 12.2 % (ref 10–15)
GFR SERPL CREATININE-BSD FRML MDRD: >90 ML/MIN/1.7M2
GLUCOSE SERPL-MCNC: 91 MG/DL (ref 70–99)
HCT VFR BLD AUTO: 43.5 % (ref 40–53)
HGB BLD-MCNC: 14.8 G/DL (ref 13.3–17.7)
IMM GRANULOCYTES # BLD: 0 10E9/L (ref 0–0.4)
IMM GRANULOCYTES NFR BLD: 0.4 %
LYMPHOCYTES # BLD AUTO: 1.7 10E9/L (ref 0.8–5.3)
LYMPHOCYTES NFR BLD AUTO: 32.8 %
MCH RBC QN AUTO: 30.8 PG (ref 26.5–33)
MCHC RBC AUTO-ENTMCNC: 34 G/DL (ref 31.5–36.5)
MCV RBC AUTO: 91 FL (ref 78–100)
MONOCYTES # BLD AUTO: 0.4 10E9/L (ref 0–1.3)
MONOCYTES NFR BLD AUTO: 7.1 %
NEUTROPHILS # BLD AUTO: 2.8 10E9/L (ref 1.6–8.3)
NEUTROPHILS NFR BLD AUTO: 55.5 %
NRBC # BLD AUTO: 0 10*3/UL
NRBC BLD AUTO-RTO: 0 /100
PLATELET # BLD AUTO: 274 10E9/L (ref 150–450)
POTASSIUM SERPL-SCNC: 4.5 MMOL/L (ref 3.4–5.3)
PROT SERPL-MCNC: 7.4 G/DL (ref 6.8–8.8)
RBC # BLD AUTO: 4.8 10E12/L (ref 4.4–5.9)
SODIUM SERPL-SCNC: 141 MMOL/L (ref 133–144)
WBC # BLD AUTO: 5.1 10E9/L (ref 4–11)

## 2018-06-22 PROCEDURE — 80053 COMPREHEN METABOLIC PANEL: CPT | Performed by: INTERNAL MEDICINE

## 2018-06-22 PROCEDURE — 85025 COMPLETE CBC W/AUTO DIFF WBC: CPT | Performed by: INTERNAL MEDICINE

## 2018-06-22 PROCEDURE — 25000128 H RX IP 250 OP 636: Performed by: RADIOLOGY

## 2018-06-22 PROCEDURE — 71260 CT THORAX DX C+: CPT

## 2018-06-22 PROCEDURE — 25000125 ZZHC RX 250: Performed by: RADIOLOGY

## 2018-06-22 PROCEDURE — 36415 COLL VENOUS BLD VENIPUNCTURE: CPT | Performed by: INTERNAL MEDICINE

## 2018-06-22 RX ORDER — IOPAMIDOL 755 MG/ML
500 INJECTION, SOLUTION INTRAVASCULAR ONCE
Status: COMPLETED | OUTPATIENT
Start: 2018-06-22 | End: 2018-06-22

## 2018-06-22 RX ADMIN — SODIUM CHLORIDE 60 ML: 9 INJECTION, SOLUTION INTRAVENOUS at 08:35

## 2018-06-22 RX ADMIN — IOPAMIDOL 100 ML: 755 INJECTION, SOLUTION INTRAVENOUS at 08:36

## 2018-06-27 ENCOUNTER — ONCOLOGY VISIT (OUTPATIENT)
Dept: ONCOLOGY | Facility: CLINIC | Age: 48
End: 2018-06-27
Attending: INTERNAL MEDICINE
Payer: COMMERCIAL

## 2018-06-27 VITALS
OXYGEN SATURATION: 98 % | BODY MASS INDEX: 32.58 KG/M2 | SYSTOLIC BLOOD PRESSURE: 122 MMHG | HEIGHT: 69 IN | HEART RATE: 102 BPM | WEIGHT: 220 LBS | TEMPERATURE: 97 F | DIASTOLIC BLOOD PRESSURE: 82 MMHG

## 2018-06-27 DIAGNOSIS — C20 ADENOCARCINOMA OF RECTUM (H): Primary | ICD-10-CM

## 2018-06-27 PROCEDURE — 40000114 ZZH STATISTIC NO CHARGE CLINIC VISIT

## 2018-06-27 PROCEDURE — 99214 OFFICE O/P EST MOD 30 MIN: CPT | Mod: ZP | Performed by: INTERNAL MEDICINE

## 2018-06-27 PROCEDURE — G0463 HOSPITAL OUTPT CLINIC VISIT: HCPCS | Mod: ZF

## 2018-06-27 ASSESSMENT — PAIN SCALES - GENERAL: PAINLEVEL: NO PAIN (0)

## 2018-06-27 NOTE — MR AVS SNAPSHOT
After Visit Summary   6/27/2018    Meño Omalley    MRN: [id number]           Patient Information     Date Of Birth          1970        Visit Information        Provider Department      6/27/2018 9:45 AM Jeanette Mcarthur MD [facility]         Follow-ups after your visit        Your next 10 appointments already scheduled     Jun 22, 2018  8:00 AM CDT   LAB with NL LAB [facility] ([facility])    [address] [place] [id number]   [phone]           Please do not eat 10-12 hours before your appointment if you are coming in fasting for labs on lipids, cholesterol, or glucose (sugar). This does not apply to pregnant women. Water, hot tea and black coffee (with nothing added) are okay. Do not drink other fluids, diet soda or chew gum.            Jun 22, 2018  8:30 AM CDT   (Arrive by 8:15 AM)   CT CHEST ABDOMEN PELVIS W/O & W CONTRAST with PHCT1   [facility] CT Scan ([facility])    [address] [place] [id number]   [phone]           Please bring any scans or X-rays taken at other hospitals, if similar tests were done. Also bring a list of your medicines, including vitamins, minerals and over-the-counter drugs. It is safest to leave personal items at home.  Be sure to tell your doctor:   If you have any allergies.   If there s any chance you are pregnant.   If you are breastfeeding.  You may have contrast for this exam. To prepare:   Do not eat or drink for 2 hours before your exam. If you need to take medicine, you may take it with small sips of water. (We may ask you to take liquid medicine as well.)   The day before your exam, drink extra fluids at least six 8-ounce glasses (unless your doctor tells you to restrict your fluids).   You will be given instructions on how to drink the contrast.  Patients over 70 or patients with diabetes or kidney problems:   If you haven t had a blood  test (creatinine test) within the last 30 days, the Cardiologist/Radiologist may require you to get this test prior to your exam.  If you have diabetes:   Continue to take your metformin medication on the day of your exam  Please wear loose clothing, such as a sweat suit or jogging clothes. Avoid snaps, zippers and other metal. We may ask you to undress and put on a hospital gown.  If you have any questions, please call the Imaging Department where you will have your exam.            Jun 27, 2018  9:45 AM CDT   (Arrive by 9:30 AM)   Return Visit with Jeanette Mcarthur MD   [facility] ([facility] [place])    [address]  [address]  [place] [place] [address] [phone]              Who to contact     If you have questions or need follow up information about today's clinic visit or your schedule please contact [facility] directly at [phone].  Normal or non-critical lab and imaging results will be communicated to you by [employer], letter or phone within 4 business days after the clinic has received the results. If you do not hear from us within 7 days, please contact the clinic through [employer] or phone. If you have a critical or abnormal lab result, we will notify you by phone as soon as possible.  Submit refill requests through [employer] or call your pharmacy and they will forward the refill request to us. Please allow 3 business days for your refill to be completed.          Additional Information About Your Visit        [employer] Information     1[employer] gives you secure access to your electronic health record. If you see a primary care provider, you can also send messages to your care team and make appointments. If you have questions, please call your primary care clinic.  If you do not have a primary care provider, please call [phone] and they will assist you.        Care EveryWhere ID     This is your Care EveryWhere ID. This could be used by  other organizations to access your [place] medical records  [id number]         Blood Pressure from Last 3 Encounters:   03/21/18 130/75   01/10/18 104/62   12/20/17 122/84    Weight from Last 3 Encounters:   03/21/18 103.5 kg (228 lb 3.2 oz)   01/10/18 102.5 kg (225 lb 14.4 oz)   12/20/17 101.2 kg (223 lb)              Today, you had the following     No orders found for display       Primary Care Provider Office Phone # Fax #    Delio Alcala [phone] [address] [address]        Equal Access to Services     ABBIE [facility] : Hadii snow Anders, wale anderson, qamiguel angel kaalmada nathalia, lynda hines. So [facility] [phone].    ATENCIÓN: Si habla español, tiene a valle disposición servicios gratuitos de asistencia lingüística. Llame al [phone].    We comply with applicable federal civil rights laws and [place] laws. We do not discriminate on the basis of race, color, national origin, age, disability, sex, sexual orientation, or gender identity.            Thank you!     Thank you for choosing [facility]  for your care. Our goal is always to provide you with excellent care. Hearing back from our patients is one way we can continue to improve our services. Please take a few minutes to complete the written survey that you may receive in the mail after your visit with us. Thank you!             Your Updated Medication List - Protect others around you: Learn how to safely use, store and throw away your medicines at www.disposemymeds.org.          This list is accurate as of 4/11/18  8:16 AM.  Always use your most recent med list.                   Brand Name Dispense Instructions for use Diagnosis    escitalopram 20 MG tablet    LEXAPRO    30 tablet    Take 1 tablet (20 mg) by mouth every morning    Major depressive disorder, recurrent episode, mild (H)       FLOVENT  MCG/ACT Inhaler   Generic drug:   fluticasone      Take 2 puffs by mouth 2 times daily as needed        lidocaine-prilocaine cream    EMLA    30 g    Apply topically as needed for moderate pain    Rectal cancer (H)       LORazepam 0.5 MG tablet    ATIVAN    30 tablet    Take 1 tablet (0.5 mg) by mouth every 4 hours as needed (Anxiety, Nausea/Vomiting or Sleep)    Adenocarcinoma of rectum (H)       ondansetron 8 MG tablet    ZOFRAN    60 tablet    Take 1 tablet (8 mg) by mouth every 8 hours as needed for nausea    Adenocarcinoma of rectum (H)       prochlorperazine 10 MG tablet    COMPAZINE    30 tablet    Take 1 tablet (10 mg) by mouth every 6 hours as needed (Nausea/Vomiting)    Adenocarcinoma of rectum (H)

## 2018-06-27 NOTE — LETTER
"6/27/2018       RE: Danii Angulo  [address] [address]     Dear Colleague,    Thank you for referring your patient, Danii Angulo, to the [facility]. Please see a copy of my visit note below.    REASON FOR VISIT:  The patient is a 47-year-old male here for followup for rectal cancer, status post surgery and adjuvant therapy, now here for 6-month followup.     Background : refer to treatment history. Stage III colon cancer s.p surgery and adjuvant chemo 6 months - Folfox ( dropped oxaliplatin for last few cycles)     INTERVAL HISTORY:  Danii say that he is doing really well.  He had an episode of renal calculus where he went to ER and it passed and now he is feeling well,.   He states his bowel and bladder are fine.  He does not have any residual neuropathy at this point.  He has no nausea, vomiting, abdominal pain, chest pain, shortness of breath or fever. Rest 14 point ROS negative   PMH, PSH, Fam and social history unchanged      PHYSICAL EXAMINATION:   VITAL SIGNS:   /82 (BP Location: Left arm, Patient Position: Sitting)  Pulse 102  Temp 97  F (36.1  C)  Ht 1.753 m (5' 9.02\")  Wt 99.8 kg (220 lb)  SpO2 98%  BMI 32.47 kg/m2    GENERAL:  Alert and oriented x3.  No apparent distress.   HEENT:  Moist mucous membranes.   CARDIOVASCULAR:  S1, S2 clear.   RESPIRATORY:  Clear to auscultation.   ABDOMEN:  Nontender to palpation.   LOWER EXTREMITIES:  No pedal edema.      LABORATORY EVALUATION:    Results for DANII ANGULO (MRN [id number]) as of 6/27/2018 10:17   Ref. Range 6/22/2018 08:04   Sodium Latest Ref Range: 133 - 144 mmol/L 141   Potassium Latest Ref Range: 3.4 - 5.3 mmol/L 4.5   Chloride Latest Ref Range: 94 - 109 mmol/L 107   Carbon Dioxide Latest Ref Range: 20 - 32 mmol/L 27   Urea Nitrogen Latest Ref Range: 7 - 30 mg/dL 14   Creatinine Latest Ref Range: 0.66 - 1.25 mg/dL 0.88   GFR Estimate Latest Ref Range: >60 mL/min/1.7m2 >90   GFR Estimate If " Black Latest Ref Range: >60 mL/min/1.7m2 >90   Calcium Latest Ref Range: 8.5 - 10.1 mg/dL 8.4 (L)   Anion Gap Latest Ref Range: 3 - 14 mmol/L 7   Albumin Latest Ref Range: 3.4 - 5.0 g/dL 3.7   Protein Total Latest Ref Range: 6.8 - 8.8 g/dL 7.4   Bilirubin Total Latest Ref Range: 0.2 - 1.3 mg/dL 0.5   Alkaline Phosphatase Latest Ref Range: 40 - 150 U/L 67   ALT Latest Ref Range: 0 - 70 U/L 39   AST Latest Ref Range: 0 - 45 U/L 24   Glucose Latest Ref Range: 70 - 99 mg/dL 91   WBC Latest Ref Range: 4.0 - 11.0 10e9/L 5.1   Hemoglobin Latest Ref Range: 13.3 - 17.7 g/dL 14.8   Hematocrit Latest Ref Range: 40.0 - 53.0 % 43.5   Platelet Count Latest Ref Range: 150 - 450 10e9/L 274   RBC Count Latest Ref Range: 4.4 - 5.9 10e12/L 4.80   MCV Latest Ref Range: 78 - 100 fl 91   MCH Latest Ref Range: 26.5 - 33.0 pg 30.8   MCHC Latest Ref Range: 31.5 - 36.5 g/dL 34.0   RDW Latest Ref Range: 10.0 - 15.0 % 12.2   Diff Method Unknown Automated Method   % Neutrophils Latest Units: % 55.5   % Lymphocytes Latest Units: % 32.8   % Monocytes Latest Units: % 7.1   % Eosinophils Latest Units: % 3.4   % Basophils Latest Units: % 0.8   % Immature Granulocytes Latest Units: % 0.4   Nucleated RBCs Latest Ref Range: 0 /100 0   Absolute Neutrophil Latest Ref Range: 1.6 - 8.3 10e9/L 2.8   Absolute Lymphocytes Latest Ref Range: 0.8 - 5.3 10e9/L 1.7   Absolute Monocytes Latest Ref Range: 0.0 - 1.3 10e9/L 0.4   Absolute Basophils Latest Ref Range: 0.0 - 0.2 10e9/L 0.0   Abs Immature Granulocytes Latest Ref Range: 0 - 0.4 10e9/L 0.0   Absolute Nucleated RBC Unknown 0.0       Imaging :     FINDINGS:   Chest: Tiny, approximately 2 mm diameter densities in the left lung  are stable since at least 2017 and therefore benign. Lungs are  otherwise clear without significant nodule, mass, infiltrate,  effusion, or pneumothorax.     The heart is normal in size. There are right-sided mediastinal  calcified lymph nodes which could be from chronic  granulomatous  disease. No mediastinal, hilar, or axillary lymphadenopathy is seen.  The thoracic aorta is of normal caliber and demonstrates no evidence  for dissection. No significant atherosclerosis is identified. No  central pulmonary artery filling defects to suggest central pulmonary  artery embolism.     No aggressive osseous lesions are seen. There are mild degenerative  changes in the spine.     Abdomen and pelvis:  The liver, gallbladder, pancreas, spleen,  bilateral adrenal glands and bilateral kidneys enhance normally. No  hydronephrosis, nephrolithiasis, hydroureter or ureteral calculus is  identified. Urinary bladder is grossly unremarkable. Prostate gland is  upper normal size.     No adenopathy, free fluid or free air seen in the peritoneal cavity.  There is minimal nonaneurysmal atherosclerosis of the lower abdominal  aorta.     Postop changes status post distal colon resection are noted with an  anastomotic staple line near the rectum. The colon is otherwise  grossly of normal caliber without pericolonic inflammatory change to  suggest diverticulitis. Appendix is normal in appearance. Small bowel  is of normal caliber. The stomach contains some fluid and air but is  otherwise unremarkable.         IMPRESSION:  1. Postop changes of the distal colon.  2. No evidence for recurrent malignancy or metastasis is seen.  3. Stable, tiny, left lung lobe nodules are likely of no significance  and are most consistent with a benign process given their stability       Colonoscopy : few polyps but otherwise unremarkable RTC in 3 years     ASSESSMENT AND PLAN:  This is a patient with a history of rectal cancer Stage III , status post surgery and adjuvant chemotherapy which he finished in 01/2018, here for followup.  At this point, he does not have any signs or symptoms concerning for recurrent disease.  When he returns in 6 months with labs repeat CT scan at that time.     I spent 35 minutes in the care of this  patient >50% of which was spent in coordinating and counseling.    Jeanette Mcarthur   of Medicine   Hematology and medical Oncology   [facility]      June 27, 2018

## 2018-06-27 NOTE — NURSING NOTE
"Oncology Rooming Note    June 27, 2018 9:43 AM   Meño Omalley is a 48 year old male who presents for:    Chief Complaint   Patient presents with     Oncology Clinic Visit     return     Initial Vitals: /82 (BP Location: Left arm, Patient Position: Sitting)  Pulse 102  Temp 97  F (36.1  C)  Ht 1.753 m (5' 9.02\")  Wt 99.8 kg (220 lb)  SpO2 98%  BMI 32.47 kg/m2 Estimated body mass index is 32.47 kg/(m^2) as calculated from the following:    Height as of this encounter: 1.753 m (5' 9.02\").    Weight as of this encounter: 99.8 kg (220 lb). Body surface area is 2.2 meters squared.  No Pain (0) Comment: Data Unavailable   No LMP for male patient.  Allergies reviewed: Yes  Medications reviewed: Yes    Medications: Medication refills not needed today.  Pharmacy name entered into EPIC:    [address] - [address]  [facility] [place], [place] - [address]    Clinical concerns: none     6 minutes for nursing intake (face to face time)     Aisha Caballero RN              "

## 2018-06-27 NOTE — PROGRESS NOTES
"REASON FOR VISIT:  The patient is a 47-year-old male here for followup for rectal cancer, status post surgery and adjuvant therapy, now here for 6-month followup.     Background : refer to treatment history. Stage III colon cancer s.p surgery and adjuvant chemo 6 months - Folfox ( dropped oxaliplatin for last few cycles)     INTERVAL HISTORY:  Danii say that he is doing really well.  He had an episode of renal calculus where he went to ER and it passed and now he is feeling well,.   He states his bowel and bladder are fine.  He does not have any residual neuropathy at this point.  He has no nausea, vomiting, abdominal pain, chest pain, shortness of breath or fever. Rest 14 point ROS negative   PMH, PSH, Fam and social history unchanged      PHYSICAL EXAMINATION:   VITAL SIGNS:   /82 (BP Location: Left arm, Patient Position: Sitting)  Pulse 102  Temp 97  F (36.1  C)  Ht 1.753 m (5' 9.02\")  Wt 99.8 kg (220 lb)  SpO2 98%  BMI 32.47 kg/m2    GENERAL:  Alert and oriented x3.  No apparent distress.   HEENT:  Moist mucous membranes.   CARDIOVASCULAR:  S1, S2 clear.   RESPIRATORY:  Clear to auscultation.   ABDOMEN:  Nontender to palpation.   LOWER EXTREMITIES:  No pedal edema.      LABORATORY EVALUATION:    Results for DANII ANGULO (MRN [id number]) as of 6/27/2018 10:17   Ref. Range 6/22/2018 08:04   Sodium Latest Ref Range: 133 - 144 mmol/L 141   Potassium Latest Ref Range: 3.4 - 5.3 mmol/L 4.5   Chloride Latest Ref Range: 94 - 109 mmol/L 107   Carbon Dioxide Latest Ref Range: 20 - 32 mmol/L 27   Urea Nitrogen Latest Ref Range: 7 - 30 mg/dL 14   Creatinine Latest Ref Range: 0.66 - 1.25 mg/dL 0.88   GFR Estimate Latest Ref Range: >60 mL/min/1.7m2 >90   GFR Estimate If Black Latest Ref Range: >60 mL/min/1.7m2 >90   Calcium Latest Ref Range: 8.5 - 10.1 mg/dL 8.4 (L)   Anion Gap Latest Ref Range: 3 - 14 mmol/L 7   Albumin Latest Ref Range: 3.4 - 5.0 g/dL 3.7   Protein Total Latest Ref Range: 6.8 - 8.8 g/dL " 7.4   Bilirubin Total Latest Ref Range: 0.2 - 1.3 mg/dL 0.5   Alkaline Phosphatase Latest Ref Range: 40 - 150 U/L 67   ALT Latest Ref Range: 0 - 70 U/L 39   AST Latest Ref Range: 0 - 45 U/L 24   Glucose Latest Ref Range: 70 - 99 mg/dL 91   WBC Latest Ref Range: 4.0 - 11.0 10e9/L 5.1   Hemoglobin Latest Ref Range: 13.3 - 17.7 g/dL 14.8   Hematocrit Latest Ref Range: 40.0 - 53.0 % 43.5   Platelet Count Latest Ref Range: 150 - 450 10e9/L 274   RBC Count Latest Ref Range: 4.4 - 5.9 10e12/L 4.80   MCV Latest Ref Range: 78 - 100 fl 91   MCH Latest Ref Range: 26.5 - 33.0 pg 30.8   MCHC Latest Ref Range: 31.5 - 36.5 g/dL 34.0   RDW Latest Ref Range: 10.0 - 15.0 % 12.2   Diff Method Unknown Automated Method   % Neutrophils Latest Units: % 55.5   % Lymphocytes Latest Units: % 32.8   % Monocytes Latest Units: % 7.1   % Eosinophils Latest Units: % 3.4   % Basophils Latest Units: % 0.8   % Immature Granulocytes Latest Units: % 0.4   Nucleated RBCs Latest Ref Range: 0 /100 0   Absolute Neutrophil Latest Ref Range: 1.6 - 8.3 10e9/L 2.8   Absolute Lymphocytes Latest Ref Range: 0.8 - 5.3 10e9/L 1.7   Absolute Monocytes Latest Ref Range: 0.0 - 1.3 10e9/L 0.4   Absolute Basophils Latest Ref Range: 0.0 - 0.2 10e9/L 0.0   Abs Immature Granulocytes Latest Ref Range: 0 - 0.4 10e9/L 0.0   Absolute Nucleated RBC Unknown 0.0       Imaging :     FINDINGS:   Chest: Tiny, approximately 2 mm diameter densities in the left lung  are stable since at least 2017 and therefore benign. Lungs are  otherwise clear without significant nodule, mass, infiltrate,  effusion, or pneumothorax.     The heart is normal in size. There are right-sided mediastinal  calcified lymph nodes which could be from chronic granulomatous  disease. No mediastinal, hilar, or axillary lymphadenopathy is seen.  The thoracic aorta is of normal caliber and demonstrates no evidence  for dissection. No significant atherosclerosis is identified. No  central pulmonary artery filling  defects to suggest central pulmonary  artery embolism.     No aggressive osseous lesions are seen. There are mild degenerative  changes in the spine.     Abdomen and pelvis:  The liver, gallbladder, pancreas, spleen,  bilateral adrenal glands and bilateral kidneys enhance normally. No  hydronephrosis, nephrolithiasis, hydroureter or ureteral calculus is  identified. Urinary bladder is grossly unremarkable. Prostate gland is  upper normal size.     No adenopathy, free fluid or free air seen in the peritoneal cavity.  There is minimal nonaneurysmal atherosclerosis of the lower abdominal  aorta.     Postop changes status post distal colon resection are noted with an  anastomotic staple line near the rectum. The colon is otherwise  grossly of normal caliber without pericolonic inflammatory change to  suggest diverticulitis. Appendix is normal in appearance. Small bowel  is of normal caliber. The stomach contains some fluid and air but is  otherwise unremarkable.         IMPRESSION:  1. Postop changes of the distal colon.  2. No evidence for recurrent malignancy or metastasis is seen.  3. Stable, tiny, left lung lobe nodules are likely of no significance  and are most consistent with a benign process given their stability       Colonoscopy : few polyps but otherwise unremarkable RTC in 3 years     ASSESSMENT AND PLAN:  This is a patient with a history of rectal cancer Stage III , status post surgery and adjuvant chemotherapy which he finished in 01/2018, here for followup.  At this point, he does not have any signs or symptoms concerning for recurrent disease.  When he returns in 6 months with labs repeat CT scan at that time.     I spent 35 minutes in the care of this patient >50% of which was spent in coordinating and counseling.    Jeanette Mcarthur   of Medicine   Hematology and medical Oncology   [facility]      June 27, 2018

## 2018-07-10 ENCOUNTER — RADIANT APPOINTMENT (OUTPATIENT)
Dept: GENERAL RADIOLOGY | Facility: CLINIC | Age: 48
End: 2018-07-10
Attending: ORTHOPAEDIC SURGERY
Payer: COMMERCIAL

## 2018-07-10 ENCOUNTER — OFFICE VISIT (OUTPATIENT)
Dept: ORTHOPEDICS | Facility: CLINIC | Age: 48
End: 2018-07-10
Payer: COMMERCIAL

## 2018-07-10 VITALS
SYSTOLIC BLOOD PRESSURE: 144 MMHG | HEIGHT: 69 IN | BODY MASS INDEX: 33.03 KG/M2 | WEIGHT: 223 LBS | DIASTOLIC BLOOD PRESSURE: 89 MMHG | HEART RATE: 70 BPM

## 2018-07-10 DIAGNOSIS — M25.562 LEFT KNEE PAIN, UNSPECIFIED CHRONICITY: ICD-10-CM

## 2018-07-10 DIAGNOSIS — M25.562 LEFT KNEE PAIN, UNSPECIFIED CHRONICITY: Primary | ICD-10-CM

## 2018-07-10 DIAGNOSIS — M25.562 MEDIAL KNEE PAIN, LEFT: ICD-10-CM

## 2018-07-10 PROCEDURE — 73564 X-RAY EXAM KNEE 4 OR MORE: CPT | Mod: TC

## 2018-07-10 PROCEDURE — 99204 OFFICE O/P NEW MOD 45 MIN: CPT | Performed by: ORTHOPAEDIC SURGERY

## 2018-07-10 ASSESSMENT — PAIN SCALES - GENERAL: PAINLEVEL: NO PAIN (0)

## 2018-07-10 NOTE — LETTER
"    7/10/2018         RE: Meño Omalley  [address] [address] [facility] [address]        Dear Colleague,    Thank you for referring your patient, Meño Omalley, to the [facility]. Please see a copy of my visit note below.    ORTHOPEDIC CLINIC CONSULT      Meño Omalley is a 48 year old male who is seen in consultation at the request of Self.  History of Present illness:  Meño presents for evaluation of:   1.) Left knee pain    Onset:  7/3/18    Symptoms brought on by Twisted knee.  Patient was cleaning out his  Character:  constant, throbbing and Stiff.    Progression of symptoms:  better.    Previous similar pain: no .   Pain Level:  1/10.   Previous treatments:  nothing.  Currently on Blood thinners? No  Diagnosis of Diabetes? No    Patient reports as above.  He was cleaning his camper in a semi-squat position while carrying a load and had heard or felt rather a \"shifting\" on the medial side of his knee.  Since this incident, his knee has been swollen.  Pain as well as swelling has decreased since the incident but is still bothersome to him.  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  He does like to do triathlons and running events to remain active.  Patient describes his left knee is feeling \"stiff\"    Patient's past medical, surgical, social and family histories reviewed.       Past Medical History:   Diagnosis Date     Colon cancer (H)      Depressive disorder      Family history of malignant hyperthermia     UNCONFIRMED BUT FATHER HAD \"FEVER WITH ANESTHESIA\"     Nephrolithiasis     asymptomatic     Obese      Uncomplicated asthma     exercise induced       Past Surgical History:   Procedure Laterality Date     LAPAROSCOPIC COLECTOMY LOW ANTERIOR N/A 4/12/2017    Procedure: LAPAROSCOPIC COLECTOMY LOW ANTERIOR;  Surgeon: Ignacio Rose MD;  Location:  OR     NO HISTORY OF SURGERY       REMOVE PORT VASCULAR ACCESS Right 11/8/2017 "    Procedure: REMOVE PORT VASCULAR ACCESS;  Right chest Port Removal;  Surgeon: Chuy Steiner PA-C;  Location: [facility] OR     SIGMOIDOSCOPY FLEXIBLE N/A 4/12/2017    Procedure: SIGMOIDOSCOPY FLEXIBLE;  Surgeon: Ignacio Rose MD;  Location: [facility] OR       Home Medications:  Prior to Admission medications    Medication Sig Start Date End Date Taking? Authorizing Provider   escitalopram (LEXAPRO) 20 MG tablet Take 1 tablet (20 mg) by mouth every morning 1/10/18  Yes Delio Alcala MD   fluticasone (FLOVENT HFA) 110 MCG/ACT inhaler Take 2 puffs by mouth 2 times daily as needed    Yes Reported, Patient   LORazepam (ATIVAN) 0.5 MG tablet Take 1 tablet (0.5 mg) by mouth every 4 hours as needed (Anxiety, Nausea/Vomiting or Sleep)  Patient not taking: Reported on 6/27/2018 5/23/17   Jeanette Mcarthur MD   oxyCODONE-acetaminophen (PERCOCET) 5-325 MG per tablet Take 1-2 tablets by mouth every 4 hours as needed for pain  Patient not taking: Reported on 6/27/2018 5/11/18   Emily Khan MD       Not on File    Social History     Occupational History     [employer]     Social History Main Topics     Smoking status: Never Smoker     Smokeless tobacco: Never Used     Alcohol use Yes      Comment: beer couple times per months     Drug use: No     Sexual activity: Yes     Partners: Female       Family History   Problem Relation Age of Onset     Cancer - colorectal Father 62     C.A.D. Father 59     Hypertension Father      Neurologic Disorder Mother 70     ALS     Cancer - colorectal Sister 54       REVIEW OF SYSTEMS    General: Negative for fever or fatigue    Psych:  negative anxiety or depression     Ophthalmic:  Corrective lenses?  No    ENT:  Hearing difficulty? No    CV: negative for chest pain, venous insufficiency, no history of MI or stroke    Endocrine:  negative diabetes     Urology:  negative kidney disease    Resp:  Normal respiratory effort, no history of pulmonary disease or  "asthma    Skin: negative for cuts/sores or redness    Musculoskeletal: as above    Neurologic:negative for numbness/tingling    Hematologic: negative for bleeding disorder, does not use of prescription anticoagulants     Patient does have a history of rectal cancer.  He is undergoing treatment.      Physical Exam:    Vitals: /89 (BP Location: Left arm, Patient Position: Chair, Cuff Size: Adult Large)  Pulse 70  Ht 1.753 m (5' 9.02\")  Wt 101.2 kg (223 lb)  BMI 32.91 kg/m2  BMI= Body mass index is 32.91 kg/(m^2).    GENERAL APPEARANCE:  Healthy, alert, no distress    SKIN:  negative for suspicious lesions or rashes    NEURO: Normal strength and tone, mentation intact and speech normal    PSYCH:   Mentation appears Normal and affect normal/bright    RESPIRATORY: negative for respiratory distress.    EYES: negative for Conjunctivitis    Cardiovascular: No vascular discoloration of bilateral lower extremities      GAIT: Minimally antalgic    JOINT/EXTREMITIES:  Left knee with visible effusion/swelling  Palpation of the medial joint line with mild pain.  Patient is very resistive to Kezia's maneuvers being performed.  He has discomfort with the initial rotation.  Patient's range of motion remains full extension and flexion to approximately 120  but is \"stiffer\" than the right knee.  Patient tolerates full maneuvers with the right knee.  Patient gait is minimally antalgic    Radiographs: X-ray images reveal subtle narrowing of the medial compartment.  The patellofemoral compartment as well as the lateral compartment joint spacing remains well preserved.  Independent visualization of the films was made.     Impression:      ICD-10-CM    1. Left knee pain, unspecified chronicity M25.562 XR Knee Left G/E 4 Views   2. Medial knee pain, left M25.562      Patient with very specific medial sided pain with apprehension of Kezia's maneuvers making meniscus tear the most likely occurrence in his knee.  Patient does " have visible swelling.    Plan:  Patient is advised of the above.  We recommend MRI imaging for further diagnostic purposes.  Patient follow-up shortly after MRI is accomplished for repeat examination.    BP Readings from Last 1 Encounters:   07/10/18 144/89     BP noted to be well controlled today in office.     Patient is evaluated with and plan developed in conjunction with Dr. Skaggs    Scribed by  Soha Cope PA-C   7/10/2018  3:57 PM        I attest I have seen and evaluated the patient.  I agree with above impression and plan.    Rashad Skaggs MD                    Again, thank you for allowing me to participate in the care of your patient.        Sincerely,        Rashad Skaggs MD

## 2018-07-10 NOTE — MR AVS SNAPSHOT
After Visit Summary   7/10/2018    Meño Omalley    MRN: [id number]           Patient Information     Date Of Birth          1970        Visit Information        Provider Department      7/10/2018 2:30 PM Rashad Skaggs MD [facility]        Today's Diagnoses     Left knee pain, unspecified chronicity    -  1    Medial knee pain, left           Follow-ups after your visit        Your next 10 appointments already scheduled     Jul 12, 2018  8:00 AM CDT   MR KNEE LEFT W/O CONTRAST with PHMR2   [facility] ([facility])    [address] [address]   [phone]           Take your medicines as usual, unless your doctor tells you not to. Bring a list of your current medicines to your exam (including vitamins, minerals and over-the-counter drugs). Also bring the results of similar scans you may have had.  Please remove any body piercings and hair extensions before you arrive.  Follow your doctor s orders. If you do not, we may have to postpone your exam.  You may or may not receive IV contrast for this exam pending the discretion of the Radiologist.  You do not need to do anything special to prepare.  The MRI machine uses a strong magnet. Please wear clothes without metal (snaps, zippers). A sweatsuit works well, or we may give you a hospital gown.   **IMPORTANT** THE INSTRUCTIONS BELOW ARE ONLY FOR THOSE PATIENTS WHO HAVE BEEN PRESCRIBED SEDATION OR GENERAL ANESTHESIA DURING THEIR MRI PROCEDURE:  IF YOUR DOCTOR PRESCRIBED ORAL SEDATION (take medicine to help you relax during your exam):   You must get the medicine from your doctor (oral medication) before you arrive. Bring the medicine to the exam. Do not take it at home. You ll be told when to take it upon arriving for your exam.   Arrive one hour early. Bring someone who can take you home after the test. Your medicine will make you sleepy. After the exam, you may not drive,  take a bus or take a taxi by yourself.  IF YOUR DOCTOR PRESCRIBED IV SEDATION:   Arrive one hour early. Bring someone who can take you home after the test. Your medicine will make you sleepy. After the exam, you may not drive, take a bus or take a taxi by yourself.   No eating 6 hours before your exam. You may have clear liquids up until 4 hours before your exam. (Clear liquids include water, clear tea, black coffee and fruit juice without pulp.)  IF YOUR DOCTOR PRESCRIBED ANESTHESIA (be asleep for your exam):   Arrive 1 1/2 hours early. Bring someone who can take you home after the test. You may not drive, take a bus or take a taxi by yourself.   No eating 8 hours before your exam. You may have clear liquids up until 4 hours before your exam. (Clear liquids include water, clear tea, black coffee and fruit juice without pulp.)   You will spend four to five hours in the recovery room.  Please call the Imaging Department at your exam site with any questions.            Dec 21, 2018  8:00 AM CST   LAB with NL LAB [facility] ([facility])    [address] [address]   [phone]           Please do not eat 10-12 hours before your appointment if you are coming in fasting for labs on lipids, cholesterol, or glucose (sugar). This does not apply to pregnant women. Water, hot tea and black coffee (with nothing added) are okay. Do not drink other fluids, diet soda or chew gum.            Dec 21, 2018  8:30 AM CST   CT CHEST ABDOMEN PELVIS W/O & W CONTRAST with PHCT1   [facility] CT Scan ([facility])    [address] [place] [id number]   [phone]           Please bring any scans or X-rays taken at other hospitals, if similar tests were done. Also bring a list of your medicines, including vitamins, minerals and over-the-counter drugs. It is safest to leave personal items at home.  Be sure to tell your doctor:   If you have any  allergies.   If there s any chance you are pregnant.   If you are breastfeeding.  How to prepare:   Do not eat or drink for 2 hours before your exam. If you need to take medicine, you may take it with small sips of water. (We may ask you to take liquid medicine as well.)   Please wear loose clothing, such as a sweat suit or jogging clothes. Avoid snaps, zippers and other metal. We may ask you to undress and put on a hospital gown.  Please arrive 30 minutes early for your CT. Once in the department you might be asked to drink water 15-20 minutes prior to your exam.  If indicated you may be asked to drink an oral contrast in advance of your CT.  If this is the case, the imaging team will let you know or be in contact with you prior to your appointment  Patients over 70 or patients with diabetes or kidney problems:   If you haven t had a blood test (creatinine test) within the last 30 days, the Cardiologist/Radiologist may require you to get this test prior to your exam.  If you have diabetes:   Continue to take your metformin medication on the day of your exam  If you have any questions, please call the Imaging Department where you will have your exam.            Dec 26, 2018 11:15 AM CST   (Arrive by 11:00 AM)   Return Visit with Jeanette Mcarthur MD   [facility] ([facility])    [address] [place] [address]  [address] [place] [address] [phone]              Future tests that were ordered for you today     Open Future Orders        Priority Expected Expires Ordered    MR Knee Left w/o Contrast Routine  7/10/2019 7/10/2018            Who to contact     If you have questions or need follow up information about today's clinic visit or your schedule please contact [facility] directly at [phone].  Normal or non-critical lab and imaging results will be communicated to you by MyChart, letter or phone within 4 business days after the clinic has received the  "results. If you do not hear from us within 7 days, please contact the clinic through [employer] or phone. If you have a critical or abnormal lab result, we will notify you by phone as soon as possible.  Submit refill requests through [employer] or call your pharmacy and they will forward the refill request to us. Please allow 3 business days for your refill to be completed.          Additional Information About Your Visit        [employer] Information     [employer] gives you secure access to your electronic health record. If you see a primary care provider, you can also send messages to your care team and make appointments. If you have questions, please call your primary care clinic.  If you do not have a primary care provider, please call [phone] and they will assist you.        Care EveryWhere ID     This is your Care EveryWhere ID. This could be used by other organizations to access your [facility] medical records  [id number]        Your Vitals Were     Pulse Height BMI (Body Mass Index)             70 1.753 m (5' 9.02\") 32.91 kg/m2          Blood Pressure from Last 3 Encounters:   07/10/18 144/89   06/27/18 122/82   05/11/18 130/83    Weight from Last 3 Encounters:   07/10/18 101.2 kg (223 lb)   06/27/18 99.8 kg (220 lb)   05/11/18 95.3 kg (210 lb)               Primary Care Provider Office Phone # Fax #    Delio Alcala [phone]       [address] [facility] [address] [facility] [address]        Equal Access to Services     ABBIE [facility] CenterCARYN AH: Hadii aad ku hadasho Soomaali, waaxda luqadaha, qaybta kaalmada adeegyada, lynda morales . So [facility] [phone].    ATENCIÓN: Si habla español, tiene a valle disposición servicios gratuitos de asistencia lingüística. Llame al [phone].    We comply with applicable federal civil rights laws and [place] laws. We do not discriminate on the basis of race, color, national origin, age, disability, sex, sexual orientation, or gender " identity.            Thank you!     Thank you for choosing [facility]  for your care. Our goal is always to provide you with excellent care. Hearing back from our patients is one way we can continue to improve our services. Please take a few minutes to complete the written survey that you may receive in the mail after your visit with us. Thank you!             Your Updated Medication List - Protect others around you: Learn how to safely use, store and throw away your medicines at www.disposemymeds.org.          This list is accurate as of 7/10/18 11:59 PM.  Always use your most recent med list.                   Brand Name Dispense Instructions for use Diagnosis    escitalopram 20 MG tablet    LEXAPRO    30 tablet    Take 1 tablet (20 mg) by mouth every morning    Major depressive disorder, recurrent episode, mild (H)       FLOVENT  MCG/ACT Inhaler   Generic drug:  fluticasone      Take 2 puffs by mouth 2 times daily as needed        LORazepam 0.5 MG tablet    ATIVAN    30 tablet    Take 1 tablet (0.5 mg) by mouth every 4 hours as needed (Anxiety, Nausea/Vomiting or Sleep)    Adenocarcinoma of rectum (H)       oxyCODONE-acetaminophen 5-325 MG per tablet    PERCOCET    12 tablet    Take 1-2 tablets by mouth every 4 hours as needed for pain

## 2018-07-10 NOTE — PROGRESS NOTES
"ORTHOPEDIC CLINIC CONSULT      Meño Omalley is a 48 year old male who is seen in consultation at the request of Self.  History of Present illness:  Meño presents for evaluation of:   1.) Left knee pain    Onset:  7/3/18    Symptoms brought on by Twisted knee.  Patient was cleaning out his  Character:  constant, throbbing and Stiff.    Progression of symptoms:  better.    Previous similar pain: no .   Pain Level:  1/10.   Previous treatments:  nothing.  Currently on Blood thinners? No  Diagnosis of Diabetes? No    Patient reports as above.  He was cleaning his camper in a semi-squat position while carrying a load and had heard or felt rather a \"shifting\" on the medial side of his knee.  Since this incident, his knee has been swollen.  Pain as well as swelling has decreased since the incident but is still bothersome to him.  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  He does like to do triathlons and running events to remain active.  Patient describes his left knee is feeling \"stiff\"    Patient's past medical, surgical, social and family histories reviewed.       Past Medical History:   Diagnosis Date     Colon cancer (H)      Depressive disorder      Family history of malignant hyperthermia     UNCONFIRMED BUT FATHER HAD \"FEVER WITH ANESTHESIA\"     Nephrolithiasis     asymptomatic     Obese      Uncomplicated asthma     exercise induced       Past Surgical History:   Procedure Laterality Date     LAPAROSCOPIC COLECTOMY LOW ANTERIOR N/A 4/12/2017    Procedure: LAPAROSCOPIC COLECTOMY LOW ANTERIOR;  Surgeon: Ignacio Rose MD;  Location:  OR     NO HISTORY OF SURGERY       REMOVE PORT VASCULAR ACCESS Right 11/8/2017    Procedure: REMOVE PORT VASCULAR ACCESS;  Right chest Port Removal;  Surgeon: Chuy Steiner PA-C;  [facility]: [facility] OR     SIGMOIDOSCOPY FLEXIBLE N/A 4/12/2017    Procedure: SIGMOIDOSCOPY FLEXIBLE;  Surgeon: Ignacio Rose MD;  Location: [facility] " OR       Home Medications:  Prior to Admission medications    Medication Sig Start Date End Date Taking? Authorizing Provider   escitalopram (LEXAPRO) 20 MG tablet Take 1 tablet (20 mg) by mouth every morning 1/10/18  Yes Delio Alcala MD   fluticasone (FLOVENT HFA) 110 MCG/ACT inhaler Take 2 puffs by mouth 2 times daily as needed    Yes Reported, Patient   LORazepam (ATIVAN) 0.5 MG tablet Take 1 tablet (0.5 mg) by mouth every 4 hours as needed (Anxiety, Nausea/Vomiting or Sleep)  Patient not taking: Reported on 6/27/2018 5/23/17   Jeanette Mcarthur MD   oxyCODONE-acetaminophen (PERCOCET) 5-325 MG per tablet Take 1-2 tablets by mouth every 4 hours as needed for pain  Patient not taking: Reported on 6/27/2018 5/11/18   Emily Khan MD       Not on File    Social History     Occupational History     [employer]     Social History Main Topics     Smoking status: Never Smoker     Smokeless tobacco: Never Used     Alcohol use Yes      Comment: beer couple times per months     Drug use: No     Sexual activity: Yes     Partners: Female       Family History   Problem Relation Age of Onset     Cancer - colorectal Father 62     C.A.D. Father 59     Hypertension Father      Neurologic Disorder Mother 70     ALS     Cancer - colorectal Sister 54       REVIEW OF SYSTEMS    General: Negative for fever or fatigue    Psych:  negative anxiety or depression     Ophthalmic:  Corrective lenses?  No    ENT:  Hearing difficulty? No    CV: negative for chest pain, venous insufficiency, no history of MI or stroke    Endocrine:  negative diabetes     Urology:  negative kidney disease    Resp:  Normal respiratory effort, no history of pulmonary disease or asthma    Skin: negative for cuts/sores or redness    Musculoskeletal: as above    Neurologic:negative for numbness/tingling    Hematologic: negative for bleeding disorder, does not use of prescription anticoagulants     Patient does have a history of  "rectal cancer.  He is undergoing treatment.      Physical Exam:    Vitals: /89 (BP Location: Left arm, Patient Position: Chair, Cuff Size: Adult Large)  Pulse 70  Ht 1.753 m (5' 9.02\")  Wt 101.2 kg (223 lb)  BMI 32.91 kg/m2  BMI= Body mass index is 32.91 kg/(m^2).    GENERAL APPEARANCE:  Healthy, alert, no distress    SKIN:  negative for suspicious lesions or rashes    NEURO: Normal strength and tone, mentation intact and speech normal    PSYCH:   Mentation appears Normal and affect normal/bright    RESPIRATORY: negative for respiratory distress.    EYES: negative for Conjunctivitis    Cardiovascular: No vascular discoloration of bilateral lower extremities      GAIT: Minimally antalgic    JOINT/EXTREMITIES:  Left knee with visible effusion/swelling  Palpation of the medial joint line with mild pain.  Patient is very resistive to Kezia's maneuvers being performed.  He has discomfort with the initial rotation.  Patient's range of motion remains full extension and flexion to approximately 120  but is \"stiffer\" than the right knee.  Patient tolerates full maneuvers with the right knee.  Patient gait is minimally antalgic    Radiographs: X-ray images reveal subtle narrowing of the medial compartment.  The patellofemoral compartment as well as the lateral compartment joint spacing remains well preserved.  Independent visualization of the films was made.     Impression:      ICD-10-CM    1. Left knee pain, unspecified chronicity M25.562 XR Knee Left G/E 4 Views   2. Medial knee pain, left M25.562      Patient with very specific medial sided pain with apprehension of Kezia's maneuvers making meniscus tear the most likely occurrence in his knee.  Patient does have visible swelling.    Plan:  Patient is advised of the above.  We recommend MRI imaging for further diagnostic purposes.  Patient follow-up shortly after MRI is accomplished for repeat examination.    BP Readings from Last 1 Encounters:   07/10/18 " 144/89     BP noted to be well controlled today in office.     Patient is evaluated with and plan developed in conjunction with Dr. Skaggs    Scribed by  Soha Cope PA-C   7/10/2018  3:57 PM        I attest I have seen and evaluated the patient.  I agree with above impression and plan.    Rashad Skaggs MD

## 2018-07-12 ENCOUNTER — HOSPITAL ENCOUNTER (OUTPATIENT)
Dept: MRI IMAGING | Facility: CLINIC | Age: 48
Discharge: HOME | End: 2018-07-12
Attending: ORTHOPAEDIC SURGERY | Admitting: ORTHOPAEDIC SURGERY
Payer: COMMERCIAL

## 2018-07-12 DIAGNOSIS — M25.562 MEDIAL KNEE PAIN, LEFT: ICD-10-CM

## 2018-07-12 PROCEDURE — 73721 MRI JNT OF LWR EXTRE W/O DYE: CPT | Mod: LT

## 2018-07-19 ENCOUNTER — OFFICE VISIT (OUTPATIENT)
Dept: ORTHOPEDICS | Facility: CLINIC | Age: 48
End: 2018-07-19
Payer: COMMERCIAL

## 2018-07-19 ENCOUNTER — TELEPHONE (OUTPATIENT)
Dept: ORTHOPEDICS | Facility: CLINIC | Age: 48
End: 2018-07-19

## 2018-07-19 VITALS
BODY MASS INDEX: 33.03 KG/M2 | HEIGHT: 69 IN | WEIGHT: 223 LBS | SYSTOLIC BLOOD PRESSURE: 132 MMHG | HEART RATE: 74 BPM | DIASTOLIC BLOOD PRESSURE: 92 MMHG

## 2018-07-19 DIAGNOSIS — S83.212A BUCKET-HANDLE TEAR OF MEDIAL MENISCUS OF LEFT KNEE AS CURRENT INJURY, INITIAL ENCOUNTER: Primary | ICD-10-CM

## 2018-07-19 PROBLEM — S83.211A BUCKET-HANDLE TEAR OF MEDIAL MENISCUS OF RIGHT KNEE AS CURRENT INJURY, INITIAL ENCOUNTER: Status: ACTIVE | Noted: 2018-07-19

## 2018-07-19 PROCEDURE — 99213 OFFICE O/P EST LOW 20 MIN: CPT | Performed by: ORTHOPAEDIC SURGERY

## 2018-07-19 ASSESSMENT — PAIN SCALES - GENERAL: PAINLEVEL: NO PAIN (0)

## 2018-07-19 NOTE — PROGRESS NOTES
"Office Visit-Follow up  HISTORY OF PRESENT ILLNESS:    Meño Omalley is a 48 year old male who is seen in follow up for   Chief Complaint   Patient presents with     RECHECK     Left knee pain.  Onset:  7/3/18  (s/p 16 days) Symptoms brought on by Twisted knee.  Patient was cleaning     Results       Patient presents with:  RECHECK: Left knee pain.  Onset:  7/3/18  (s/p 16 days) Symptoms brought on by Twisted knee.  Patient was cleaning  Results      Returns to clinic for MRI results.  States pain is somewhat better than last visit but still having pain especially with twisting of the knee and with activity. No new injuries    REVIEW OF SYSTEMS    General: negative for, night sweats, dizziness, fatigue  Resp: No shortness of breath and no cough  CV: negative for chest pain, syncope or near-syncope  GI: negative for nausea, vomiting and diarrhea  : negative for dysuria and hematuria  Musculoskeletal: as above  Neurologic: negative for syncope   Hematologic: negative for bleeding disorder    Physical Exam:    Vitals: BP (!) 132/92 (BP Location: Left arm, Patient Position: Chair, Cuff Size: Adult Large)  Pulse 74  Ht 1.753 m (5' 9.02\")  Wt 101.2 kg (223 lb)  BMI 32.91 kg/m2  BMI= Body mass index is 32.91 kg/(m^2).  Constitutional: healthy, alert and no acute distress   Psychiatric: mentation appears normal and affect normal/bright  NEURO: no focal deficits  SKIN: no excoriation or erythema. No signs of infection.    GAIT: antalgic    JOINT/EXTREMITIES:     Left knee: Mild effusion. No deformities.  Tender to medial joint line.  AROM 0-120.  Distal neurovascular grossly intact.        IMAGING INTERPRETATION: Left knee MRI: oblique tear of posterior horn and body of the medial mensicus with a bucket-handle type tear with meniscal fragment in the intercondylar notch.  Grade 1 chondromalacia to medial compartment.         Impression:      ICD-10-CM    1. Bucket-handle tear of medial meniscus of right knee as " current injury, initial encounter S83.211Z [facility]-Operative Worksheet         Plan:   The above was reviewed with Meño.  Discussed options, with the findings on imaging and his age recommended surgical correction especially with a fragment and flap from the bucket handle tear.  Discussed surgical options and recommended knee arthroscopy with medial mensicus repair vs partial mensicectomy. Described benefits of mensicus repair and attempting to perverse the meniscus along with incidents of failure of the repair and possibility of needing a second procedure as a partial meniscectomy if the repair fails. Ultimately the decision to repair the mensicus will be made in the OR once the knee can visualized with arthroscopy. Also described activity restrictions with both procedures. Patient would like to proceed with this.    The patient has attempted conservative treatments that include time, rest, activity modifications,  yet still continues to have significant issues. These issues include pain with activity, difficulty with running and other hobbies. Secondary to these reasons I have offered surgery in the form of left knee arthroscopy with  medial meniscus repair vs partial medial menisectomy.    Risks, benefits, complications, limitations, and anticipated postoperative course were discussed. Risks including infections (requiring possible repeat surgery and antibiotics), bleeding, blood clots, cartilage degeneration (arthritis), scar, scar tenderness, stiffness, skin problems and failure to relieve all symptoms were reviewed.  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 Will be non-weight bearing with crutches for 4-6 weeks with the repair. All question were answered, surgery is planned in the near future.      Patient will need a pre-op history and physical prior to surgery.      Return to clinic 10 days post-op    Scribed by:  Nacho  Kathleen, APRN, CNP, DNP  12:51 PM  7/19/2018  I attest I have seen and evaluated the patient.  I agree with above impression and plan.       Rashad Skaggs MD

## 2018-07-19 NOTE — LETTER
"    7/19/2018         RE: Meño Omalley  [address] [facility] [address]        Dear Colleague,    Thank you for referring your patient, Meño Omalley, to the [facility]. Please see a copy of my visit note below.    Office Visit-Follow up  HISTORY OF PRESENT ILLNESS:    Meño Omalley is a 48 year old male who is seen in follow up for   Chief Complaint   Patient presents with     RECHECK     Left knee pain.  Onset:  7/3/18  (s/p 16 days) Symptoms brought on by Twisted knee.  Patient was cleaning     Results       Patient presents with:  RECHECK: Left knee pain.  Onset:  7/3/18  (s/p 16 days) Symptoms brought on by Twisted knee.  Patient was cleaning  Results      Returns to clinic for MRI results.  States pain is somewhat better than last visit but still having pain especially with twisting of the knee and with activity. No new injuries    REVIEW OF SYSTEMS    General: negative for, night sweats, dizziness, fatigue  Resp: No shortness of breath and no cough  CV: negative for chest pain, syncope or near-syncope  GI: negative for nausea, vomiting and diarrhea  : negative for dysuria and hematuria  Musculoskeletal: as above  Neurologic: negative for syncope   Hematologic: negative for bleeding disorder    Physical Exam:    Vitals: BP (!) 132/92 (BP Location: Left arm, Patient Position: Chair, Cuff Size: Adult Large)  Pulse 74  Ht 1.753 m (5' 9.02\")  Wt 101.2 kg (223 lb)  BMI 32.91 kg/m2  BMI= Body mass index is 32.91 kg/(m^2).  Constitutional: healthy, alert and no acute distress   Psychiatric: mentation appears normal and affect normal/bright  NEURO: no focal deficits  SKIN: no excoriation or erythema. No signs of infection.    GAIT: antalgic    JOINT/EXTREMITIES:     Left knee: Mild effusion. No deformities.  Tender to medial joint line.  AROM 0-120.  Distal neurovascular grossly intact.        IMAGING INTERPRETATION: Left knee MRI: oblique tear of posterior horn and body of the " medial mensicus with a bucket-handle type tear with meniscal fragment in the intercondylar notch.  Grade 1 chondromalacia to medial compartment.         Impression:      ICD-10-CM    1. Bucket-handle tear of medial meniscus of right knee as current injury, initial encounter S83.211A [facility]-Operative Worksheet         Plan:   The above was reviewed with Meño.  Discussed options, with the findings on imaging and his age recommended surgical correction especially with a fragment and flap from the bucket handle tear.  Discussed surgical options and recommended knee arthroscopy with medial mensicus repair vs partial mensicectomy. Described benefits of mensicus repair and attempting to perverse the meniscus along with incidents of failure of the repair and possibility of needing a second procedure as a partial meniscectomy if the repair fails. Ultimately the decision to repair the mensicus will be made in the OR once the knee can visualized with arthroscopy. Also described activity restrictions with both procedures. Patient would like to proceed with this.    The patient has attempted conservative treatments that include time, rest, activity modifications,  yet still continues to have significant issues. These issues include pain with activity, difficulty with running and other hobbies. Secondary to these reasons I have offered surgery in the form of left knee arthroscopy with  medial meniscus repair vs partial medial menisectomy.    Risks, benefits, complications, limitations, and anticipated postoperative course were discussed. Risks including infections (requiring possible repeat surgery and antibiotics), bleeding, blood clots, cartilage degeneration (arthritis), scar, scar tenderness, stiffness, skin problems and failure to relieve all symptoms were reviewed.  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 Will be non-weight bearing with  crutches for 4-6 weeks with the repair. All question were answered, surgery is planned in the near future.      Patient will need a pre-op history and physical prior to surgery.      Return to clinic 10 days post-op    Scribed by:  Nacho Wang APRN, CNP, DNP  12:51 PM  7/19/2018  I attest I have seen and evaluated the patient.  I agree with above impression and plan.       Rashad Skaggs MD            Again, thank you for allowing me to participate in the care of your patient.        Sincerely,        Rashad Skaggs MD

## 2018-07-19 NOTE — TELEPHONE ENCOUNTER
Type of surgery: Arthroscopy right knee with possible meniscus repair. Inside out technique  Location of surgery: [facility]   Date of surgery: 7/25/18  Surgeon: Dr. Skaggs  Pre-Op Appt Date: message sent to PCP  Post-Op Appt Date: 8/7/18   Packet sent out: Surgery packet mailed to patient's home address.   Pre-cert/Authorization completed: NA  Date: 7/19/2018    Alejandrina English  Surgery Scheduler

## 2018-07-19 NOTE — TELEPHONE ENCOUNTER
Per Dr. Alcala, can see tomorrow at 3:20. Put pt in 4:00 Dr. Only spot. I have contacted pt and assisted with scheduling. Radha Rivas CMA ([facility])

## 2018-07-20 ENCOUNTER — OFFICE VISIT (OUTPATIENT)
Dept: FAMILY MEDICINE | Facility: CLINIC | Age: 48
End: 2018-07-20
Payer: COMMERCIAL

## 2018-07-20 VITALS
RESPIRATION RATE: 16 BRPM | HEIGHT: 69 IN | WEIGHT: 216 LBS | BODY MASS INDEX: 31.99 KG/M2 | SYSTOLIC BLOOD PRESSURE: 128 MMHG | TEMPERATURE: 98.1 F | HEART RATE: 60 BPM | DIASTOLIC BLOOD PRESSURE: 72 MMHG

## 2018-07-20 DIAGNOSIS — Z01.818 PREOP GENERAL PHYSICAL EXAM: Primary | ICD-10-CM

## 2018-07-20 DIAGNOSIS — S83.212A BUCKET-HANDLE TEAR OF MEDIAL MENISCUS OF LEFT KNEE AS CURRENT INJURY, INITIAL ENCOUNTER: ICD-10-CM

## 2018-07-20 PROCEDURE — 99214 OFFICE O/P EST MOD 30 MIN: CPT | Performed by: FAMILY MEDICINE

## 2018-07-20 ASSESSMENT — PAIN SCALES - GENERAL: PAINLEVEL: NO PAIN (0)

## 2018-07-20 NOTE — NURSING NOTE
"Chief Complaint   Patient presents with     Pre-Op Exam     DOS 7/25/18     Estimated body mass index is 31.71 kg/(m^2) as calculated from the following:    Height as of this encounter: 5' 9.2\" (1.758 m).    Weight as of this encounter: 216 lb (98 kg).  BP Readings from Last 1 Encounters:   07/20/18 128/72   ]  BP cuff size:  large  Blanca Bettencourt CMA ([facility])     "

## 2018-07-20 NOTE — PROGRESS NOTES
[address] [address]  [phone]  Dept: [phone]    PRE-OP EVALUATION:  Today's date: 2018    Meño Omalley (: 1970) presents for pre-operative evaluation assessment as requested by Dr. Skaggs.  He requires evaluation and anesthesia risk assessment prior to undergoing surgery/procedure for treatment of Arthroscopy left knee with possible meniscus repair. Inside out technique .    Primary Physician: Delio Alcala  Type of Anesthesia Anticipated: General    Patient has a Health Care Directive or Living Will:  NO    Preop Questions 2018   Who is doing your surgery? [facility]   What are you having done? left meniscus repair   Date of Surgery/Procedure:    Facility or Hospital where procedure/surgery will be performed: [facility]   1.  Do you have a history of Heart attack, stroke, stent, coronary bypass surgery, or other heart surgery? No   2.  Do you ever have any pain or discomfort in your chest? No   3.  Do you have a history of  Heart Failure? No   4.   Are you troubled by shortness of breath when:  walking on a level surface, or up a slight hill, or at night? No   5.  Do you currently have a cold, bronchitis or other respiratory infection? No   6.  Do you have a cough, shortness of breath, or wheezing? No   7.  Do you sometimes get pains in the calves of your legs when you walk? No   8. Do you or anyone in your family have previous history of blood clots? No   9.  Do you or does anyone in your family have a serious bleeding problem such as prolonged bleeding following surgeries or cuts? No   10. Have you ever had problems with anemia or been told to take iron pills? No   11. Have you had any abnormal blood loss such as black, tarry or bloody stools? No   12. Have you ever had a blood transfusion? No   13. Have you or any of your relatives ever had problems with anesthesia? No   14. Do you have sleep apnea, excessive snoring or  "daytime drowsiness? No   15. Do you have any prosthetic heart valves? No   16. Do you have prosthetic joints? No         HPI:     HPI related to upcoming procedure: With squatting and twisting earlier this month and felt his knee pop.  MRI has revealed a meniscus tear.      See problem list for active medical problems.  Problems all longstanding and stable, except as noted/documented.  See ROS for pertinent symptoms related to these conditions.                                                                                                                                                          .    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     Priority: Medium     Rectal cancer (H) 04/12/2017     Priority: Medium     Hyperlipidemia LDL goal <160 07/31/2013     Priority: Medium     Family history of colon cancer 07/31/2013     Priority: Medium     Obesity 07/31/2013     Priority: Medium     Mild persistent asthma 07/31/2013     Priority: Medium      Past Medical History:   Diagnosis Date     Colon cancer (H)      Depressive disorder      Family history of malignant hyperthermia     UNCONFIRMED BUT FATHER HAD \"FEVER WITH ANESTHESIA\"     Nephrolithiasis     asymptomatic     Obese      Uncomplicated asthma     exercise induced     Past Surgical History:   Procedure Laterality Date     LAPAROSCOPIC COLECTOMY LOW ANTERIOR N/A 4/12/2017    Procedure: LAPAROSCOPIC COLECTOMY LOW ANTERIOR;  Surgeon: Ignacio Rose MD;  Location: [facility] OR     NO HISTORY OF SURGERY       REMOVE PORT VASCULAR ACCESS Right 11/8/2017    Procedure: REMOVE PORT VASCULAR ACCESS;  Right chest Port Removal;  Surgeon: Chuy Steiner PA-C;  Location: [facility] OR     SIGMOIDOSCOPY FLEXIBLE N/A 4/12/2017 " "   Procedure: SIGMOIDOSCOPY FLEXIBLE;  Surgeon: Ignacio Rose MD;  Location: [facility] OR     Current Outpatient Prescriptions   Medication Sig Dispense Refill     escitalopram (LEXAPRO) 20 MG tablet Take 1 tablet (20 mg) by mouth every morning 30 tablet 5     fluticasone (FLOVENT HFA) 110 MCG/ACT inhaler Take 2 puffs by mouth 2 times daily as needed        OTC products: None, except as noted above    No Known Allergies   Latex Allergy: NO    Social History   Substance Use Topics     Smoking status: Never Smoker     Smokeless tobacco: Never Used     Alcohol use Yes      Comment: beer couple times per months     History   Drug Use No       REVIEW OF SYSTEMS:   Constitutional, neuro, ENT, endocrine, pulmonary, cardiac, gastrointestinal, genitourinary, musculoskeletal, integument and psychiatric systems are negative, except as otherwise noted.    EXAM:   /72  Pulse 60  Temp 98.1  F (36.7  C) (Tympanic)  Resp 16  Ht 5' 9.2\" (1.758 m)  Wt 216 lb (98 kg)  BMI 31.71 kg/m2    GENERAL APPEARANCE: healthy, alert and no distress     EYES: EOMI,  PERRL     HENT: ear canals and TM's normal and nose and mouth without ulcers or lesions     NECK: no adenopathy, no asymmetry, masses, or scars and thyroid normal to palpation     RESP: lungs clear to auscultation - no rales, rhonchi or wheezes     CV: regular rates and rhythm, normal S1 S2, no S3 or S4 and no murmur, click or rub     ABDOMEN:  soft, nontender, no HSM or masses and bowel sounds normal     MS: extremities normal- no gross deformities noted, no evidence of inflammation in joints, FROM in all extremities.     SKIN: no suspicious lesions or rashes     NEURO: Normal strength and tone, sensory exam grossly normal, mentation intact and speech normal     PSYCH: mentation appears normal. and affect normal/bright     LYMPHATICS: No cervical adenopathy    DIAGNOSTICS:   EKG: Not indicated due to non-vascular surgery and low risk of event (age <65 and without cardiac risk " factors)    Recent Labs   Lab Test  06/22/18   0804  05/11/18   0810   11/08/17   1206   05/22/17   1030   HGB  14.8  15.4   < >   --    < >   --    PLT  274  300   < >   --    < >   --    INR   --    --    --   1.00   --   1.01   NA  141  142   < >   --    < >   --    POTASSIUM  4.5  3.5   < >   --    < >   --    CR  0.88  0.93   < >   --    < >   --     < > = values in this interval not displayed.        IMPRESSION:   Reason for surgery/procedure: Meniscus tear of left knee    The proposed surgical procedure is considered INTERMEDIATE risk.    REVISED CARDIAC RISK INDEX  The patient has the following serious cardiovascular risks for perioperative complications such as (MI, PE, VFib and 3  AV Block):  No serious cardiac risks  INTERPRETATION: 0 risks: Class I (very low risk - 0.4% complication rate)    The patient has the following additional risks for perioperative complications:  No identified additional risks      ICD-10-CM    1. Preop general physical exam Z01.818        RECOMMENDATIONS:         --Patient is to take all scheduled medications on the day of surgery EXCEPT for modifications listed below.    APPROVAL GIVEN to proceed with proposed procedure, without further diagnostic evaluation       Signed Electronically by: Delio Alcala MD    Copy of this evaluation report is provided to requesting physician.    [facility] Preop Guidelines    Revised Cardiac Risk Index

## 2018-07-20 NOTE — MR AVS SNAPSHOT
After Visit Summary   7/20/2018    Meño Omalley    MRN: [id number]           Patient Information     Date Of Birth          1970        Visit Information        Provider Department      7/20/2018 4:00 PM Delio Alcala MD [facility]        Today's Diagnoses     Preop general physical exam    -  1    Bucket-handle tear of medial meniscus of left knee as current injury, initial encounter          Care Instructions      Before Your Surgery      Call your surgeon if there is any change in your health. This includes signs of a cold or flu (such as a sore throat, runny nose, cough, rash or fever).    Do not smoke, drink alcohol or take over the counter medicine (unless your surgeon or primary care doctor tells you to) for the 24 hours before and after surgery.    If you take prescribed drugs: Follow your doctor s orders about which medicines to take and which to stop until after surgery.    Eating and drinking prior to surgery: follow the instructions from your surgeon    Take a shower or bath the night before surgery. Use the soap your surgeon gave you to gently clean your skin. If you do not have soap from your surgeon, use your regular soap. Do not shave or scrub the surgery site.  Wear clean pajamas and have clean sheets on your bed.           Follow-ups after your visit        Your next 10 appointments already scheduled     Jul 20, 2018  4:00 PM CDT   Pre-Op physical with Delio Alcala MD   [facility]    [address] [place] [address]   [phone]            Jul 25, 2018   Procedure with Rashad Skaggs MD   [facility] ([facility])    [address] [place] [address]   [phone]           [address]: Exit at [address] on [address] side of [address]. Turn right on [address]. Turn left at stoplight on [address] [place] [address]. [facility] will be  in view two blocks ahead            Aug 07, 2018  7:40 AM CDT   Return Visit with Rashad Skaggs MD   [facility]    [address] [place] [id number] [phone]            Dec 21, 2018  8:00 AM CST   LAB with NL LAB PMC   [facility] ([facility])    [address] [place] [address]   [phone]           Please do not eat 10-12 hours before your appointment if you are coming in fasting for labs on lipids, cholesterol, or glucose (sugar). This does not apply to pregnant women. Water, hot tea and black coffee (with nothing added) are okay. Do not drink other fluids, diet soda or chew gum.            Dec 21, 2018  8:30 AM CST   CT CHEST ABDOMEN PELVIS W/O & W CONTRAST with PHCT1   [facility] CT Scan ([facility])    [address] [place] [id number] [phone]           Please bring any scans or X-rays taken at other hospitals, if similar tests were done. Also bring a list of your medicines, including vitamins, minerals and over-the-counter drugs. It is safest to leave personal items at home.  Be sure to tell your doctor:   If you have any allergies.   If there s any chance you are pregnant.   If you are breastfeeding.  How to prepare:   Do not eat or drink for 2 hours before your exam. If you need to take medicine, you may take it with small sips of water. (We may ask you to take liquid medicine as well.)   Please wear loose clothing, such as a sweat suit or jogging clothes. Avoid snaps, zippers and other metal. We may ask you to undress and put on a hospital gown.  Please arrive 30 minutes early for your CT. Once in the department you might be asked to drink water 15-20 minutes prior to your exam.  If indicated you may be asked to drink an oral contrast in advance of your CT.  If this is the case, the imaging team will let you know or be in contact with you prior to your  appointment  Patients over 70 or patients with diabetes or kidney problems:   If you haven t had a blood test (creatinine test) within the last 30 days, the Cardiologist/Radiologist may require you to get this test prior to your exam.  If you have diabetes:   Continue to take your metformin medication on the day of your exam  If you have any questions, please call the Imaging Department where you will have your exam.            Dec 26, 2018 11:15 AM CST   (Arrive by 11:00 AM)   Return Visit with Jeanette Mcarthur MD   [facility] ([facility])    [address]  [address]  [facility] [address] [phone]              Who to contact     If you have questions or need follow up information about today's clinic visit or your schedule please contact [facility] directly at [phone].  Normal or non-critical lab and imaging results will be communicated to you by MyChart, letter or phone within 4 business days after the clinic has received the results. If you do not hear from us within 7 days, please contact the clinic through [employer] or phone. If you have a critical or abnormal lab result, we will notify you by phone as soon as possible.  Submit refill requests through [employer] or call your pharmacy and they will forward the refill request to us. Please allow 3 business days for your refill to be completed.          Additional Information About Your Visit        MyChart Information     [employer] gives you secure access to your electronic health record. If you see a primary care provider, you can also send messages to your care team and make appointments. If you have questions, please call your primary care clinic.  If you do not have a primary care provider, please call [phone] and they will assist you.        Care EveryWhere ID     This is your Care EveryWhere ID. This could be used by other organizations to access your [facility]  "records  [id number]        Your Vitals Were     Pulse Temperature Respirations Height BMI (Body Mass Index)       60 98.1  F (36.7  C) (Tympanic) 16 5' 9.2\" (1.758 m) 31.71 kg/m2        Blood Pressure from Last 3 Encounters:   07/20/18 128/72   07/19/18 (!) 132/92   07/10/18 144/89    Weight from Last 3 Encounters:   07/20/18 216 lb (98 kg)   07/19/18 223 lb (101.2 kg)   07/10/18 223 lb (101.2 kg)              Today, you had the following     No orders found for display       Primary Care Provider Office Phone # Fax #    Delio Alcala [phone] [address] [address]        Equal Access to Services     ABBIE BOGGS : Hadii aad ku hadasho Sostephen, waaxda luqadaha, qaybta kaalmada adeshantalyaalicia, lynda morales . So [facility] [phone].    ATENCIÓN: Si habla español, tiene a valle disposición servicios gratuitos de asistencia lingüística. Jose al [phone].    We comply with applicable federal civil rights laws and [place] laws. We do not discriminate on the basis of race, color, national origin, age, disability, sex, sexual orientation, or gender identity.            Thank you!     Thank you for choosing [facility]  for your care. Our goal is always to provide you with excellent care. Hearing back from our patients is one way we can continue to improve our services. Please take a few minutes to complete the written survey that you may receive in the mail after your visit with us. Thank you!             Your Updated Medication List - Protect others around you: Learn how to safely use, store and throw away your medicines at www.disposemymeds.org.          This list is accurate as of 7/20/18  3:58 PM.  Always use your most recent med list.                   Brand Name Dispense Instructions for use Diagnosis    escitalopram 20 MG tablet    LEXAPRO    30 tablet    Take 1 tablet (20 mg) by mouth every morning    Major depressive " disorder, recurrent episode, mild (H)       FLOVENT  MCG/ACT Inhaler   Generic drug:  fluticasone      Take 2 puffs by mouth 2 times daily as needed

## 2018-07-24 NOTE — H&P (VIEW-ONLY)
[address] [address]  [phone]  Dept: [phone]    PRE-OP EVALUATION:  Today's date: 2018    Meño Omalley (: 1970) presents for pre-operative evaluation assessment as requested by Dr. Skaggs.  He requires evaluation and anesthesia risk assessment prior to undergoing surgery/procedure for treatment of Arthroscopy left knee with possible meniscus repair. Inside out technique .    Primary Physician: Delio Alcala  Type of Anesthesia Anticipated: General    Patient has a Health Care Directive or Living Will:  NO    Preop Questions 2018   Who is doing your surgery? [facility]   What are you having done? left meniscus repair   Date of Surgery/Procedure:    Facility or Hospital where procedure/surgery will be performed: [place]   1.  Do you have a history of Heart attack, stroke, stent, coronary bypass surgery, or other heart surgery? No   2.  Do you ever have any pain or discomfort in your chest? No   3.  Do you have a history of  Heart Failure? No   4.   Are you troubled by shortness of breath when:  walking on a level surface, or up a slight hill, or at night? No   5.  Do you currently have a cold, bronchitis or other respiratory infection? No   6.  Do you have a cough, shortness of breath, or wheezing? No   7.  Do you sometimes get pains in the calves of your legs when you walk? No   8. Do you or anyone in your family have previous history of blood clots? No   9.  Do you or does anyone in your family have a serious bleeding problem such as prolonged bleeding following surgeries or cuts? No   10. Have you ever had problems with anemia or been told to take iron pills? No   11. Have you had any abnormal blood loss such as black, tarry or bloody stools? No   12. Have you ever had a blood transfusion? No   13. Have you or any of your relatives ever had problems with anesthesia? No   14. Do you have sleep apnea, excessive snoring or  "daytime drowsiness? No   15. Do you have any prosthetic heart valves? No   16. Do you have prosthetic joints? No         HPI:     HPI related to upcoming procedure: With squatting and twisting earlier this month and felt his knee pop.  MRI has revealed a meniscus tear.      See problem list for active medical problems.  Problems all longstanding and stable, except as noted/documented.  See ROS for pertinent symptoms related to these conditions.                                                                                                                                                          .    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     Priority: Medium     Rectal cancer (H) 04/12/2017     Priority: Medium     Hyperlipidemia LDL goal <160 07/31/2013     Priority: Medium     Family history of colon cancer 07/31/2013     Priority: Medium     Obesity 07/31/2013     Priority: Medium     Mild persistent asthma 07/31/2013     Priority: Medium      Past Medical History:   Diagnosis Date     Colon cancer (H)      Depressive disorder      Family history of malignant hyperthermia     UNCONFIRMED BUT FATHER HAD \"FEVER WITH ANESTHESIA\"     Nephrolithiasis     asymptomatic     Obese      Uncomplicated asthma     exercise induced     Past Surgical History:   Procedure Laterality Date     LAPAROSCOPIC COLECTOMY LOW ANTERIOR N/A 4/12/2017    Procedure: LAPAROSCOPIC COLECTOMY LOW ANTERIOR;  Surgeon: Ignacio Rose MD;  Location: [facility] OR     NO HISTORY OF SURGERY       REMOVE PORT VASCULAR ACCESS Right 11/8/2017    Procedure: REMOVE PORT VASCULAR ACCESS;  Right chest Port Removal;  Surgeon: Chuy Steiner PA-C;  Location: [facility] OR     SIGMOIDOSCOPY FLEXIBLE N/A 4/12/2017 " "   Procedure: SIGMOIDOSCOPY FLEXIBLE;  Surgeon: Ignacio Rose MD;  Location: [facility] OR     Current Outpatient Prescriptions   Medication Sig Dispense Refill     escitalopram (LEXAPRO) 20 MG tablet Take 1 tablet (20 mg) by mouth every morning 30 tablet 5     fluticasone (FLOVENT HFA) 110 MCG/ACT inhaler Take 2 puffs by mouth 2 times daily as needed        OTC products: None, except as noted above    No Known Allergies   Latex Allergy: NO    Social History   Substance Use Topics     Smoking status: Never Smoker     Smokeless tobacco: Never Used     Alcohol use Yes      Comment: beer couple times per months     History   Drug Use No       REVIEW OF SYSTEMS:   Constitutional, neuro, ENT, endocrine, pulmonary, cardiac, gastrointestinal, genitourinary, musculoskeletal, integument and psychiatric systems are negative, except as otherwise noted.    EXAM:   /72  Pulse 60  Temp 98.1  F (36.7  C) (Tympanic)  Resp 16  Ht 5' 9.2\" (1.758 m)  Wt 216 lb (98 kg)  BMI 31.71 kg/m2    GENERAL APPEARANCE: healthy, alert and no distress     EYES: EOMI,  PERRL     HENT: ear canals and TM's normal and nose and mouth without ulcers or lesions     NECK: no adenopathy, no asymmetry, masses, or scars and thyroid normal to palpation     RESP: lungs clear to auscultation - no rales, rhonchi or wheezes     CV: regular rates and rhythm, normal S1 S2, no S3 or S4 and no murmur, click or rub     ABDOMEN:  soft, nontender, no HSM or masses and bowel sounds normal     MS: extremities normal- no gross deformities noted, no evidence of inflammation in joints, FROM in all extremities.     SKIN: no suspicious lesions or rashes     NEURO: Normal strength and tone, sensory exam grossly normal, mentation intact and speech normal     PSYCH: mentation appears normal. and affect normal/bright     LYMPHATICS: No cervical adenopathy    DIAGNOSTICS:   EKG: Not indicated due to non-vascular surgery and low risk of event (age <65 and without cardiac risk " factors)    Recent Labs   Lab Test  06/22/18   0804  05/11/18   0810   11/08/17   1206   05/22/17   1030   HGB  14.8  15.4   < >   --    < >   --    PLT  274  300   < >   --    < >   --    INR   --    --    --   1.00   --   1.01   NA  141  142   < >   --    < >   --    POTASSIUM  4.5  3.5   < >   --    < >   --    CR  0.88  0.93   < >   --    < >   --     < > = values in this interval not displayed.        IMPRESSION:   Reason for surgery/procedure: Meniscus tear of left knee    The proposed surgical procedure is considered INTERMEDIATE risk.    REVISED CARDIAC RISK INDEX  The patient has the following serious cardiovascular risks for perioperative complications such as (MI, PE, VFib and 3  AV Block):  No serious cardiac risks  INTERPRETATION: 0 risks: Class I (very low risk - 0.4% complication rate)    The patient has the following additional risks for perioperative complications:  No identified additional risks      ICD-10-CM    1. Preop general physical exam Z01.818        RECOMMENDATIONS:         --Patient is to take all scheduled medications on the day of surgery EXCEPT for modifications listed below.    APPROVAL GIVEN to proceed with proposed procedure, without further diagnostic evaluation       Signed Electronically by: Delio Alcala MD    Copy of this evaluation report is provided to requesting physician.    [facility] Preop Guidelines    Revised Cardiac Risk Index

## 2018-07-25 ENCOUNTER — ANESTHESIA (OUTPATIENT)
Dept: SURGERY | Facility: CLINIC | Age: 48
End: 2018-07-25
Payer: COMMERCIAL

## 2018-07-25 ENCOUNTER — ANESTHESIA EVENT (OUTPATIENT)
Dept: SURGERY | Facility: CLINIC | Age: 48
End: 2018-07-25
Payer: COMMERCIAL

## 2018-07-25 ENCOUNTER — HOSPITAL ENCOUNTER (OUTPATIENT)
Facility: CLINIC | Age: 48
Discharge: HOME | End: 2018-07-25
Attending: ORTHOPAEDIC SURGERY | Admitting: ORTHOPAEDIC SURGERY
Payer: COMMERCIAL

## 2018-07-25 ENCOUNTER — SURGERY (OUTPATIENT)
Age: 48
End: 2018-07-25

## 2018-07-25 VITALS
TEMPERATURE: 97.7 F | RESPIRATION RATE: 16 BRPM | SYSTOLIC BLOOD PRESSURE: 136 MMHG | DIASTOLIC BLOOD PRESSURE: 96 MMHG | HEART RATE: 72 BPM | OXYGEN SATURATION: 98 %

## 2018-07-25 DIAGNOSIS — G89.18 POST-OP PAIN: ICD-10-CM

## 2018-07-25 DIAGNOSIS — S83.212A BUCKET-HANDLE TEAR OF MEDIAL MENISCUS OF LEFT KNEE AS CURRENT INJURY, INITIAL ENCOUNTER: Primary | ICD-10-CM

## 2018-07-25 PROCEDURE — 37000009 ZZH ANESTHESIA TECHNICAL FEE, EACH ADDTL 15 MIN: Performed by: ORTHOPAEDIC SURGERY

## 2018-07-25 PROCEDURE — 25000125 ZZHC RX 250: Performed by: NURSE ANESTHETIST, CERTIFIED REGISTERED

## 2018-07-25 PROCEDURE — 27210794 ZZH OR GENERAL SUPPLY STERILE: Performed by: ORTHOPAEDIC SURGERY

## 2018-07-25 PROCEDURE — 37000008 ZZH ANESTHESIA TECHNICAL FEE, 1ST 30 MIN: Performed by: ORTHOPAEDIC SURGERY

## 2018-07-25 PROCEDURE — 25000125 ZZHC RX 250: Performed by: ORTHOPAEDIC SURGERY

## 2018-07-25 PROCEDURE — 36000056 ZZH SURGERY LEVEL 3 1ST 30 MIN: Performed by: ORTHOPAEDIC SURGERY

## 2018-07-25 PROCEDURE — 36000058 ZZH SURGERY LEVEL 3 EA 15 ADDTL MIN: Performed by: ORTHOPAEDIC SURGERY

## 2018-07-25 PROCEDURE — 25000128 H RX IP 250 OP 636: Performed by: NURSE ANESTHETIST, CERTIFIED REGISTERED

## 2018-07-25 PROCEDURE — 71000014 ZZH RECOVERY PHASE 1 LEVEL 2 FIRST HR: Performed by: ORTHOPAEDIC SURGERY

## 2018-07-25 PROCEDURE — 40000306 ZZH STATISTIC PRE PROC ASSESS II: Performed by: ORTHOPAEDIC SURGERY

## 2018-07-25 PROCEDURE — 29881 ARTHRS KNE SRG MNISECTMY M/L: CPT | Mod: LT | Performed by: ORTHOPAEDIC SURGERY

## 2018-07-25 PROCEDURE — 25000128 H RX IP 250 OP 636: Performed by: PHYSICIAN ASSISTANT

## 2018-07-25 PROCEDURE — 71000027 ZZH RECOVERY PHASE 2 EACH 15 MINS: Performed by: ORTHOPAEDIC SURGERY

## 2018-07-25 RX ORDER — SODIUM CHLORIDE, SODIUM LACTATE, POTASSIUM CHLORIDE, CALCIUM CHLORIDE 600; 310; 30; 20 MG/100ML; MG/100ML; MG/100ML; MG/100ML
INJECTION, SOLUTION INTRAVENOUS CONTINUOUS
Status: DISCONTINUED | OUTPATIENT
Start: 2018-07-25 | End: 2018-07-25 | Stop reason: HOSPADM

## 2018-07-25 RX ORDER — MEPERIDINE HYDROCHLORIDE 25 MG/ML
12.5 INJECTION INTRAMUSCULAR; INTRAVENOUS; SUBCUTANEOUS
Status: DISCONTINUED | OUTPATIENT
Start: 2018-07-25 | End: 2018-07-25 | Stop reason: HOSPADM

## 2018-07-25 RX ORDER — CEFAZOLIN SODIUM 2 G/100ML
2 INJECTION, SOLUTION INTRAVENOUS
Status: COMPLETED | OUTPATIENT
Start: 2018-07-25 | End: 2018-07-25

## 2018-07-25 RX ORDER — LIDOCAINE HYDROCHLORIDE 20 MG/ML
INJECTION, SOLUTION INFILTRATION; PERINEURAL PRN
Status: DISCONTINUED | OUTPATIENT
Start: 2018-07-25 | End: 2018-07-25

## 2018-07-25 RX ORDER — ONDANSETRON 2 MG/ML
INJECTION INTRAMUSCULAR; INTRAVENOUS PRN
Status: DISCONTINUED | OUTPATIENT
Start: 2018-07-25 | End: 2018-07-25

## 2018-07-25 RX ORDER — LIDOCAINE 40 MG/G
CREAM TOPICAL
Status: DISCONTINUED | OUTPATIENT
Start: 2018-07-25 | End: 2018-07-25 | Stop reason: HOSPADM

## 2018-07-25 RX ORDER — BUPIVACAINE HYDROCHLORIDE AND EPINEPHRINE 5; 5 MG/ML; UG/ML
INJECTION, SOLUTION PERINEURAL PRN
Status: DISCONTINUED | OUTPATIENT
Start: 2018-07-25 | End: 2018-07-25 | Stop reason: HOSPADM

## 2018-07-25 RX ORDER — PROPOFOL 10 MG/ML
INJECTION, EMULSION INTRAVENOUS CONTINUOUS PRN
Status: DISCONTINUED | OUTPATIENT
Start: 2018-07-25 | End: 2018-07-25

## 2018-07-25 RX ORDER — ONDANSETRON 4 MG/1
4 TABLET, ORALLY DISINTEGRATING ORAL EVERY 30 MIN PRN
Status: DISCONTINUED | OUTPATIENT
Start: 2018-07-25 | End: 2018-07-25 | Stop reason: HOSPADM

## 2018-07-25 RX ORDER — CEFAZOLIN SODIUM 1 G/3ML
1 INJECTION, POWDER, FOR SOLUTION INTRAMUSCULAR; INTRAVENOUS SEE ADMIN INSTRUCTIONS
Status: DISCONTINUED | OUTPATIENT
Start: 2018-07-25 | End: 2018-07-25 | Stop reason: HOSPADM

## 2018-07-25 RX ORDER — HYDROMORPHONE HYDROCHLORIDE 1 MG/ML
.3-.5 INJECTION, SOLUTION INTRAMUSCULAR; INTRAVENOUS; SUBCUTANEOUS EVERY 10 MIN PRN
Status: DISCONTINUED | OUTPATIENT
Start: 2018-07-25 | End: 2018-07-25 | Stop reason: HOSPADM

## 2018-07-25 RX ORDER — FENTANYL CITRATE 50 UG/ML
25-50 INJECTION, SOLUTION INTRAMUSCULAR; INTRAVENOUS
Status: DISCONTINUED | OUTPATIENT
Start: 2018-07-25 | End: 2018-07-25 | Stop reason: HOSPADM

## 2018-07-25 RX ORDER — DEXAMETHASONE SODIUM PHOSPHATE 10 MG/ML
INJECTION, SOLUTION INTRAMUSCULAR; INTRAVENOUS PRN
Status: DISCONTINUED | OUTPATIENT
Start: 2018-07-25 | End: 2018-07-25

## 2018-07-25 RX ORDER — OXYCODONE HYDROCHLORIDE 5 MG/1
10 TABLET ORAL
Status: DISCONTINUED | OUTPATIENT
Start: 2018-07-25 | End: 2018-07-25 | Stop reason: HOSPADM

## 2018-07-25 RX ORDER — PROPOFOL 10 MG/ML
INJECTION, EMULSION INTRAVENOUS PRN
Status: DISCONTINUED | OUTPATIENT
Start: 2018-07-25 | End: 2018-07-25

## 2018-07-25 RX ORDER — NALOXONE HYDROCHLORIDE 0.4 MG/ML
.1-.4 INJECTION, SOLUTION INTRAMUSCULAR; INTRAVENOUS; SUBCUTANEOUS
Status: DISCONTINUED | OUTPATIENT
Start: 2018-07-25 | End: 2018-07-25 | Stop reason: HOSPADM

## 2018-07-25 RX ORDER — AMOXICILLIN 250 MG
1-2 CAPSULE ORAL 2 TIMES DAILY
Qty: 30 TABLET | Refills: 0 | Status: SHIPPED | OUTPATIENT
Start: 2018-07-25 | End: 2018-08-07

## 2018-07-25 RX ORDER — ONDANSETRON 2 MG/ML
4 INJECTION INTRAMUSCULAR; INTRAVENOUS EVERY 30 MIN PRN
Status: DISCONTINUED | OUTPATIENT
Start: 2018-07-25 | End: 2018-07-25 | Stop reason: HOSPADM

## 2018-07-25 RX ORDER — FENTANYL CITRATE 50 UG/ML
INJECTION, SOLUTION INTRAMUSCULAR; INTRAVENOUS PRN
Status: DISCONTINUED | OUTPATIENT
Start: 2018-07-25 | End: 2018-07-25

## 2018-07-25 RX ORDER — OXYCODONE HYDROCHLORIDE 5 MG/1
5-10 TABLET ORAL
Qty: 30 TABLET | Refills: 0 | Status: SHIPPED | OUTPATIENT
Start: 2018-07-25 | End: 2018-08-07

## 2018-07-25 RX ADMIN — PROPOFOL 50 MG: 10 INJECTION, EMULSION INTRAVENOUS at 10:59

## 2018-07-25 RX ADMIN — BUPIVACAINE HYDROCHLORIDE AND EPINEPHRINE BITARTRATE 30 ML: 5; .005 INJECTION, SOLUTION SUBCUTANEOUS at 11:17

## 2018-07-25 RX ADMIN — SODIUM CHLORIDE, POTASSIUM CHLORIDE, SODIUM LACTATE AND CALCIUM CHLORIDE: 600; 310; 30; 20 INJECTION, SOLUTION INTRAVENOUS at 10:22

## 2018-07-25 RX ADMIN — LIDOCAINE HYDROCHLORIDE 1 ML: 10 INJECTION, SOLUTION EPIDURAL; INFILTRATION; INTRACAUDAL; PERINEURAL at 10:31

## 2018-07-25 RX ADMIN — LIDOCAINE HYDROCHLORIDE 100 MG: 20 INJECTION, SOLUTION INFILTRATION; PERINEURAL at 10:40

## 2018-07-25 RX ADMIN — FENTANYL CITRATE 50 MCG: 50 INJECTION, SOLUTION INTRAMUSCULAR; INTRAVENOUS at 10:45

## 2018-07-25 RX ADMIN — CEFAZOLIN SODIUM 2 G: 2 INJECTION, SOLUTION INTRAVENOUS at 10:46

## 2018-07-25 RX ADMIN — DEXAMETHASONE SODIUM PHOSPHATE 10 MG: 10 INJECTION, SOLUTION INTRAMUSCULAR; INTRAVENOUS at 11:05

## 2018-07-25 RX ADMIN — PROPOFOL 175 MCG/KG/MIN: 10 INJECTION, EMULSION INTRAVENOUS at 10:43

## 2018-07-25 RX ADMIN — MIDAZOLAM 2 MG: 1 INJECTION INTRAMUSCULAR; INTRAVENOUS at 10:32

## 2018-07-25 RX ADMIN — FENTANYL CITRATE 50 MCG: 50 INJECTION, SOLUTION INTRAMUSCULAR; INTRAVENOUS at 10:57

## 2018-07-25 RX ADMIN — PROPOFOL 250 MG: 10 INJECTION, EMULSION INTRAVENOUS at 10:40

## 2018-07-25 RX ADMIN — Medication 0.5 MG: at 11:51

## 2018-07-25 RX ADMIN — ONDANSETRON 4 MG: 2 INJECTION INTRAMUSCULAR; INTRAVENOUS at 10:59

## 2018-07-25 RX ADMIN — HYDROMORPHONE HYDROCHLORIDE 0.5 MG: 1 INJECTION, SOLUTION INTRAMUSCULAR; INTRAVENOUS; SUBCUTANEOUS at 11:08

## 2018-07-25 NOTE — ANESTHESIA CARE TRANSFER NOTE
Patient: Meño Omalley    Procedure(s):  Arthroscopy left knee with Meniscal Debriedment - Wound Class: I-Clean    Diagnosis: Bucket-handle tear medial meniscus left knee  Diagnosis Additional Information: No value filed.    Anesthesia Type:   General, LMA     Note:  Airway :Face Mask  Patient transferred to:PACU  Handoff Report: Identifed the Patient, Identified the Reponsible Provider, Reviewed the pertinent medical history, Discussed the surgical course, Reviewed Intra-OP anesthesia mangement and issues during anesthesia, Set expectations for post-procedure period and Allowed opportunity for questions and acknowledgement of understanding      Vitals: (Last set prior to Anesthesia Care Transfer)    CRNA VITALS  7/25/2018 1059 - 7/25/2018 1132      7/25/2018             Pulse: 77    SpO2: 98 %    Resp Rate (observed): 8                Electronically Signed By: SARWAT Morris CRNA  July 25, 2018  11:32 AM

## 2018-07-25 NOTE — IP AVS SNAPSHOT
[facility]esthesia [facility]    [address] [place] [address]    Phone:  [phone]                                       After Visit Summary   7/25/2018    Meño Omalley    MRN: [id number]           After Visit Summary Signature Page     I have received my discharge instructions, and my questions have been answered. I have discussed any challenges I see with this plan with the nurse or doctor.    ..........................................................................................................................................  Patient/Patient Representative Signature      ..........................................................................................................................................  Patient Representative Print Name and Relationship to Patient    ..................................................               ................................................  Date                                            Time    ..........................................................................................................................................  Reviewed by Signature/Title    ...................................................              ..............................................  Date                                                            Time

## 2018-07-25 NOTE — ANESTHESIA POSTPROCEDURE EVALUATION
Patient: Meño Omalley    Procedure(s):  Arthroscopy left knee with Meniscal Debriedment - Wound Class: I-Clean    Diagnosis:Bucket-handle tear medial meniscus left knee  Diagnosis Additional Information: No value filed.    Anesthesia Type:  General, LMA    Note:  Anesthesia Post Evaluation    Patient location during evaluation: Phase 2  Patient participation: Able to fully participate in evaluation  Level of consciousness: awake and alert  Pain management: adequate  Airway patency: patent  Cardiovascular status: acceptable  Respiratory status: acceptable  Hydration status: acceptable  PONV: none     Anesthetic complications: None          Last vitals:  Vitals:    07/25/18 1215 07/25/18 1230 07/25/18 1245   BP: (!) 126/97 (!) 136/96    Pulse:      Resp: 14 16    Temp:      SpO2: 98% 99% 98%         Electronically Signed By: SARWAT Rojas CRNA  July 25, 2018  1:40 PM

## 2018-07-25 NOTE — OP NOTE
Procedure Date: 07/25/2018      PREOPERATIVE DIAGNOSIS:  Torn medial meniscus, left knee.      POSTOPERATIVE DIAGNOSIS:  Bucket handle tear medial meniscus, left knee, with white on white configuration.      PROCEDURE:  Arthroscopic evaluation with arthroscopic debridement of medial meniscus.      SURGEON:  Rashad Skaggs MD      ANESTHESIA:  General with local infiltration of the wound with 0.5% Marcaine.      ASSISTANT:  Soha Coep PA-C, who was recruited for this case as we anticipated an attempt at meniscus repair.      INDICATIONS:  Meño is a 48-year-old gentleman who developed knee pain after having squatted down.  An MRI showed that he had a meniscus tear that appeared to be a large bucket handle.  Because of his activity level, we had discussed treatment options.  This included meniscus debridement versus repair.  The rationale, risks and benefits of each were carefully reviewed.  The limitations of meniscus repair were reviewed so that we were prepared to proceed with excision if necessary.      He was seen in the preoperative hold area.  His wife was present.  Consent was obtained and the extremity was marked.      DETAILS OF PROCEDURE:  The patient was brought to the operating room, placed supine on the table and general anesthesia was induced.  Left upper thigh tourniquet was applied.  He was positioned appropriately on the table.  The left leg was placed in the surgical leg escobar, and the right leg was placed in the well leg escobar.  The left leg was now prepped and draped in standard fashion for arthroscopic surgery.  Timeout protocol was followed.      His knee was filled with normal saline through a lateral arthrocentesis.  The leg was then exsanguinated with an Esmarch and the tourniquet was inflated.      A superior lateral outlet was established, followed by an anterolateral arthroscopic portal.  We rapidly established a standard anterior medial portal for placement of an instrument.      The knee  was examined in systematic fashion.  This included the suprapatellar pouch, medial and lateral gutters, medial and lateral compartments and intercondylar notch.  The patella was tracking laterally but there was no significant chondromalacia.  The lateral compartment showed a healthy age appropriate compartment with no meniscal pathology.  The cruciate ligaments were intact.      The medial compartment showed an obvious bucket handle tear from the anterior horn to the posterior root.  This was inspected as well as the remnant rim was inspected.  It was clear that this was a white on white meniscus tear and that the portion that was displaced had already been traumatized.  Excision was felt to be the more appropriate approach.      Anterior horn was removed with the arthroscopic scissor.  We did establish an accessory portal so that we could access the posterior horn.  Coming from the high lateral portal, we were able to use the arthroscopic scissor and shaver to clean the posterior horn.  The meniscal fragment was removed.      With the above accomplished, the instruments were now removed.  Fluid was extruded.  The wounds were infiltrated with 0.5% Marcaine.  They were closed with figure-of-eight nylon.  Dressings of Xeroform, 4 x 4, ABD and sterile Webril were applied followed by toes to groin bulky dressing.      The patient was repositioned, awakened and transferred to the [facility].      He was then taken to the recovery area, where stable vital signs were noted.         CARLA ROBLERO MD             D: 2018   T: 2018   MT: [id number]      Name:     DANII ANGULO   MRN:      [id number]        Account:        [id number]   :      1970           Procedure Date: 2018      Document: [id number]

## 2018-07-25 NOTE — BRIEF OP NOTE
[facility] Orthopedic Brief Operative Note    Pre-operative diagnosis: Bucket-handle tear medial meniscus left knee   Post-operative diagnosis: Same  White on White   Procedure: Procedure(s):  ARTHROSCOPY KNEE WITH MEDIAL MENISCECTOMY   Surgeon: Rashad Skaggs MD   Assistant(s): Donny Cope   Anesthesia: General endotracheal anesthesia and Local anesthesia   Estimated blood loss: Less than 10 ml   Total IV fluids: (See anesthesia record)   Drains: None   Specimens: None   Implants: See op note   Findings:    Complications: None   Weight bearing status: Weight bearing as tolerated   Comments: See dictated operative report for full details

## 2018-07-25 NOTE — ANESTHESIA PREPROCEDURE EVALUATION
Anesthesia Evaluation     . Pt has had prior anesthetic. Type: General and MAC           ROS/MED HX    ENT/Pulmonary:     (+)asthma , . .    Neurologic:  - neg neurologic ROS     Cardiovascular:     (+) Dyslipidemia, ----. : . . . :. .       METS/Exercise Tolerance:     Hematologic:  - neg hematologic  ROS       Musculoskeletal:  - neg musculoskeletal ROS       GI/Hepatic:  - neg GI/hepatic ROS       Renal/Genitourinary:  - ROS Renal section negative       Endo:     (+) Obesity, .      Psychiatric:     (+) psychiatric history depression      Infectious Disease:  - neg infectious disease ROS       Malignancy:   (+) Malignancy History of GI  GI CA status post Surgery and Chemo,         Other:    - neg other ROS                 Physical Exam  Normal systems: cardiovascular, pulmonary and dental    Airway   Mallampati: I  TM distance: >3 FB  Neck ROM: full    Dental     Cardiovascular   Rhythm and rate: regular and normal      Pulmonary    breath sounds clear to auscultation                    Anesthesia Plan      History & Physical Review  History and physical reviewed and following examination; no interval change.    ASA Status:  3 .    NPO Status:  > 8 hours    Plan for General and LMA with Intravenous and Propofol induction. Maintenance will be Balanced.    PONV prophylaxis:  Ondansetron (or other 5HT-3) and Dexamethasone or Solumedrol  MH PRECAUTIONS followed.  Pt's father had fevers with anesthesia.  New soda lime,machine flushed,  new circuit and charcoal filters placed on the machine.  MH cart is available.    Recommended to patient to have MH testing done.      Postoperative Care  Postoperative pain management:  IV analgesics and Oral pain medications.      Consents                          .

## 2018-07-25 NOTE — IP AVS SNAPSHOT
MRN:[id number]                      After Visit Summary   7/25/2018    Meño Omalley    MRN: [id number]           Thank you!     Thank you for choosing [facility] for your care. Our goal is always to provide you with excellent care. Hearing back from our patients is one way we can continue to improve our services. Please take a few minutes to complete the written survey that you may receive in the mail after you visit with us. Thank you!        Patient Information     Date Of Birth          1970        About your hospital stay     You were admitted on:  July 25, 2018 You last received care in the:  [facility]    You were discharged on:  July 25, 2018       Who to Call     For medical emergencies, please call 911.  For non-urgent questions about your medical care, please call your primary care provider or clinic, [phone]  For questions related to your surgery, please call your surgery clinic        Attending Provider     Provider Specialty    Rashad Skaggs MD Orthopedics       Primary Care Provider Office Phone # Fax #    Delio Alcala [phone]      After Care Instructions     Discharge Instructions       Review outpatient procedure discharge instructions with patient as directed by Provider            Dressing Change        IF leaking, remove and redress. Wash hands before and after and use gloves.            Return to clinic       Return to clinic in 10 days.            Weight bearing - As tolerated                 Your next 10 appointments already scheduled     Aug 07, 2018  7:40 AM CDT   Return Visit with Rashad Skaggs MD   [address] [id number] [phone]            Dec 21, 2018  8:00 AM CST   LAB with [facility] [address] [address] [phone]            Please do not eat 10-12 hours before your appointment if you are coming in fasting for labs on lipids, cholesterol, or glucose (sugar). This does not apply to pregnant women. Water, hot tea and black coffee (with nothing added) are okay. Do not drink other fluids, diet soda or chew gum.            Dec 21, 2018  8:30 AM CST   CT CHEST ABDOMEN PELVIS W/O & W CONTRAST with PHCT1   [facility] CT Scan ([facility])    [address] [place] [id number] [phone]           Please bring any scans or X-rays taken at other hospitals, if similar tests were done. Also bring a list of your medicines, including vitamins, minerals and over-the-counter drugs. It is safest to leave personal items at home.  Be sure to tell your doctor:   If you have any allergies.   If there s any chance you are pregnant.   If you are breastfeeding.  How to prepare:   Do not eat or drink for 2 hours before your exam. If you need to take medicine, you may take it with small sips of water. (We may ask you to take liquid medicine as well.)   Please wear loose clothing, such as a sweat suit or jogging clothes. Avoid snaps, zippers and other metal. We may ask you to undress and put on a hospital gown.  Please arrive 30 minutes early for your CT. Once in the department you might be asked to drink water 15-20 minutes prior to your exam.  If indicated you may be asked to drink an oral contrast in advance of your CT.  If this is the case, the imaging team will let you know or be in contact with you prior to your appointment  Patients over 70 or patients with diabetes or kidney problems:   If you haven t had a blood test (creatinine test) within the last 30 days, the Cardiologist/Radiologist may require you to get this test prior to your exam.  If you have diabetes:   Continue to take your metformin medication on the day of your exam  If you have any questions, please call the Imaging Department where you will  have your exam.            Dec 26, 2018 11:15 AM CST   (Arrive by 11:00 AM)   Return Visit with Jeanette Mcarthur MD   [facility] ([facility])    [address]  [address]  [facility] [address] [phone]              Further instructions from your care team       General Knee Arthroscopy Discharge Instructions    [phone]  Bone and Joint Service Line for issues or concerns    Home Prescriptions:  Oxycodone 5 mg tablets:  Take one or two tablets every 4 hrs as needed for pain.  Stool Softener:    You may use any stool softener you are familiar with. We have suggested  over the counter Senokot-S; Pericolace as a fall back.  Tylenol :  You may take one or two tablets (325mg) every six hours as needed.      General Care:  After surgery you may feel tired/sleepy. This is normal. Please have someone stay with you for 24 hours after surgery. You should avoid driving for 1-2 days after surgery, as your reaction time may be slow. You should not drive at all if you have had surgery on your arms, right leg and/or are taking narcotic pain medications until released by your doctor. If you have any question along the way please contact the office. If you feel it is an issue cannot wait for normal office hours, contact the on-call physician.    Bandages:   Change your bandage after the first 48 hours. You may use Band-Aids or sterile gauze with a small amount of tape. It s normal to have some blood-tinged fluid on your bandages, this will usually continue for the first day or two. Keep the area clean and dry. Do not apply any ointments.   I ask that you re apply the ace bandage as it was originally wrapped from your toes up to your groin.  This offers uniform compression and helps to keep the leg and knee from swelling.  You may take this off at night, and re wrap it frequently during the day.     Bathing:  Do not submerge your incision in water such as a bath or pool. It  is ok to shower after removing your initial bandage after the first 2 days from surgery. Avoid any excessively hot showers, baths, or hot tubes after surgery.     Follow up:  Your follow up appointment should already be scheduled. If its not, please call the office to verify an appointment 10 days after surgery.     Diet:  Start with non-alcoholic liquids at first, particularly water or sports drinks after surgery. Progress to bland foods such as crackers and bread and finally to your normal diet if you have no problems.     Pain control:  Take your pain medications as prescribed. These medications may make you sleepy. Do not drive, operate equipment, or drink alcohol when taking these.  You may take Tylenol (Generic name is acetaminophen) as directed on the bottle for additional relief or in place of the prescribed pain medications as your pain gets better. Ibuprofen,and Aleve can be used in supplement to the pain prescription and tylenol if you need . If the medications cause a reaction such as nausea or skin rash, stop taking them and contact your doctor. Please plan accordingly, pain medications will not be re-filled on the weekends or at night. Call the office during the day if you need more medications.    Crutches:  Use crutches/walker/cane as needed after surgery. You can stop using these when you feel stable on your feet.     Braces:  Some surgeries will require the use of a brace. Use this brace as directed.         Physical Therapy:  Depending on your surgery, physical therapy may start within a few days or be delayed 4-6 weeks. At your first post-operative visit, your doctor will direct you on your personal therapy program if needed.     Activity:  Unless otherwise instructed, you can weight bear as tolerated. While laying or sitting down you should straighten your knee all the way out and then gently work on bending the knee back. Do not worry at first if your knee feels stiff and will not bend like  normal, this will get better.     Normal findings after surgery:  Numbness and tenderness around the incisions is normal.  You may have bruising around the incisions.  Your knee will be swollen for 3 weeks after surgery. It will feel  tight  to move.   Low grade fevers less than 100.5 degrees Fahrenheit are normal.       When to call the Office:  Temperature greater than 101.5 degrees Fahreheit.  Fever, chills, and increasing pain in the knee.  Excessive drainage from the incisions that include bright red blood.  Drainage from the incisions sites that appear yellow, pus-like, or foul smelling.  Increasing pain the knee not relieved by the prescribed pain medications or ice.  Persistent nausea or vomiting  Pain or swelling in your calf area (in back above your ankle)  Any other effects you feel are significant.      KNEE EXERCISES  Start leg exercises first day after surgery (see last page).  These exercises help maintain muscle tone in your leg and strengthen the muscles supporting the knee.  Do them a minimum of 3 times a day; 20 times each.  If you have questions about the exercises or problems doing them please contact your doctor.          KNEE SURGERY - HOME EXERCISE PROGRAM    All exercises to be performed at least 3 times per day.      Quad Sets    Sit with leg extended    Tighten quad muscles in front of leg, trying to    push back of knee downward    Hold exercise for 10 seconds    Rest 10 seconds between reps    Perform 1 set of 20 reps, 3 times a day      Heel Slides     Lie on back with legs straight    Slide heel to buttocks     Return to start position    Repeat with other leg    Perform 1 rep every 4 seconds    Perform 3 sets of 20 reps, 3 times a day    Rest 1 minute between sets      Ankle Pumps     Lie on back with foot elevated on pillow    Move foot up and down, pumping ankle    Perform 3 sets of 20 reps, 3 times a day    Perform 1 rep every 4 seconds    Rest  1 minute between sets           Straight Leg Raise    Lie on back with uninvolved knee bent    Raise straight leg to thigh level of bend leg    Return to starting position    Perform 3 sets of 20 reps, 3 times a day    Perform 1 rep every 4 seconds    Rest 1 minute between sets           Remember to get your knee completely straight.      Same-Day Surgery   Adult Discharge Orders & Instructions     For 24 hours after surgery    1. Get plenty of rest.  A responsible adult must stay with you for at least 24 hours after you leave the hospital.   2. Do not drive or use heavy equipment.  If you have weakness or tingling, don't drive or use heavy equipment until this feeling goes away.  3. Do not drink alcohol.  4. Avoid strenuous or risky activities.  Ask for help when climbing stairs.   5. You may feel lightheaded.  If so, sit for a few minutes before standing.  Have someone help you get up.   6. You may have a slight fever. Call the doctor if your fever is over 100 F (37.7 C) (taken under the tongue) or lasts longer than 24 hours.  7. You may have a dry mouth, a sore throat, muscle aches or trouble sleeping.  These should go away after 24 hours.  8. Do not make important or legal decisions.  Based on the surgery/procedure that you had today, we do not expect that you will have any problems.  However, we want you to know what to do if you have pain, nausea, bleeding,or infection:  To control pain:  Take medicines your physician has prescribed or or over-the counter medicine he or she advises.  Ice packs and periods of rest are often helpful.  For surgery on an arm or leg, raise it on a pillow to ease swelling.  If your pain is not managed with the above methods, contact your physician.  To control nausea:  Take anti-nausea medicine approved by your physician.  Drink clear liquids such as apple juice, ginger ale, broth or 7-Up. Be sure to drink enough fluids.  Move to a regular diet as you feel able.  Rest may also help.  Bleeding:  You may see a  little blood on your dressing, about the size of a quarter in the first 24 hours.  If you see this, there is no reason to be alarmed.  However, if this continues to increase in size, apply pressure if able, and notify your physician.  Infection: Please contact your physician if you have any of the following signs:  redness, swelling, heat, increasing pain or foul-smelling drainage at your surgery site, fever or chills,     Call your doctor for any of the followin.  It has been over 8 to 10 hours since surgery and you are still not able to urinate (pass water).    2.  Headache for over 24 hours.    3.  Numbness, tingling or weakness in your legs the day after surgery (if you had spinal anesthesia).    Nurse advice line: [phone]            Pending Results     No orders found from 2018 to 2018.            Admission Information     Date & Time Provider Department Dept. Phone    2018 Rashad Skaggs MD [facility] [phone]      Your Vitals Were     Blood Pressure Pulse Temperature Respirations Pulse Oximetry       135/93 72 97.7  F (36.5  C) (Axillary) 14 96%       MyChart Information     [facility] gives you secure access to your electronic health record. If you see a primary care provider, you can also send messages to your care team and make appointments. If you have questions, please call your primary care clinic.  If you do not have a primary care provider, please call [phone] and they will assist you.        Care EveryWhere ID     This is your Care EveryWhere ID. This could be used by other organizations to access your [facility] medical records  [id number]        Equal Access to Services     [facility]: Hadii snow Anders, waaxda luqadaha, qaybta kaallynda alston. So [facility] [phone].    ATENCIÓN: Si habla español, tiene a valle disposición servicios gratuitos de asistencia lingüística. Llame  al [phone].    We comply with applicable federal civil rights laws and [place] laws. We do not discriminate on the basis of race, color, national origin, age, disability, sex, sexual orientation, or gender identity.               Review of your medicines      START taking        Dose / Directions    oxyCODONE IR 5 MG tablet   Commonly known as:  ROXICODONE   Used for:  Bucket-handle tear of medial meniscus of left knee as current injury, initial encounter, Post-op pain   Notes to Patient:  PAIN MEDS ARE CONSTIPATING.  STOOL SOFTENERS ARE RECOMMENDED WHILE TAKING THEM.  .        Dose:  5-10 mg   Take 1-2 tablets (5-10 mg) by mouth every 3 hours as needed for pain or other (Moderate to Severe)   Quantity:  30 tablet   Refills:  0       senna-docusate 8.6-50 MG per tablet   Commonly known as:  SENOKOT-S;PERICOLACE   Used for:  Post-op pain        Dose:  1-2 tablet   Take 1-2 tablets by mouth 2 times daily Take while on oral narcotics to prevent or treat constipation.   Quantity:  30 tablet   Refills:  0         CONTINUE these medicines which have NOT CHANGED        Dose / Directions    escitalopram 20 MG tablet   Commonly known as:  LEXAPRO   Used for:  Major depressive disorder, recurrent episode, mild (H)        Dose:  20 mg   Take 1 tablet (20 mg) by mouth every morning   Quantity:  30 tablet   Refills:  5       FLOVENT  MCG/ACT Inhaler   Generic drug:  fluticasone        Dose:  2 puff   Take 2 puffs by mouth 2 times daily as needed   Refills:  0            Where to get your medicines      Some of these will need a paper prescription and others can be bought over the counter. Ask your nurse if you have questions.     Bring a paper prescription for each of these medications     oxyCODONE IR 5 MG tablet    senna-docusate 8.6-50 MG per tablet                Protect others around you: Learn how to safely use, store and throw away your medicines at www.disposemymeds.org.        Information about OPIOIDS      PRESCRIPTION OPIOIDS: WHAT YOU NEED TO KNOW   We gave you an opioid (narcotic) pain medicine. It is important to manage your pain, but opioids are not always the best choice. You should first try all the other options your care team gave you. Take this medicine for as short a time (and as few doses) as possible.     These medicines have risks:    DO NOT drive when on new or higher doses of pain medicine. These medicines can affect your alertness and reaction times, and you could be arrested for driving under the influence (DUI). If you need to use opioids long-term, talk to your care team about driving.    DO NOT operate heave machinery    DO NOT do any other dangerous activities while taking these medicines.     DO NOT drink any alcohol while taking these medicines.      If the opioid prescribed includes acetaminophen, DO NOT take with any other medicines that contain acetaminophen. Read all labels carefully. Look for the word  acetaminophen  or  Tylenol.  Ask your pharmacist if you have questions or are unsure.    You can get addicted to pain medicines, especially if you have a history of addiction (chemical, alcohol or substance dependence). Talk to your care team about ways to reduce this risk.    Store your pills in a secure place, locked if possible. We will not replace any lost or stolen medicine. If you don t finish your medicine, please throw away (dispose) as directed by your pharmacist. The [place] Pollution Control Agency has more information about safe disposal: https://www.pca.[facility].[place].us/living-green/managing-unwanted-medications.     All opioids tend to cause constipation. Drink plenty of water and eat foods that have a lot of fiber, such as fruits, vegetables, prune juice, apple juice and high-fiber cereal. Take a laxative (Miralax, milk of magnesia, Colace, Senna) if you don t move your bowels at least every other day.              Medication List: This is a list of all your medications and when  to take them. Check marks below indicate your daily home schedule. Keep this list as a reference.      Medications           Morning Afternoon Evening Bedtime As Needed    escitalopram 20 MG tablet   Commonly known as:  LEXAPRO   Take 1 tablet (20 mg) by mouth every morning                                FLOVENT  MCG/ACT Inhaler   Take 2 puffs by mouth 2 times daily as needed   Generic drug:  fluticasone                                oxyCODONE IR 5 MG tablet   Commonly known as:  ROXICODONE   Take 1-2 tablets (5-10 mg) by mouth every 3 hours as needed for pain or other (Moderate to Severe)   Notes to Patient:  PAIN MEDS ARE CONSTIPATING.  STOOL SOFTENERS ARE RECOMMENDED WHILE TAKING THEM.  .                                senna-docusate 8.6-50 MG per tablet   Commonly known as:  SENOKOT-S;PERICOLACE   Take 1-2 tablets by mouth 2 times daily Take while on oral narcotics to prevent or treat constipation.

## 2018-07-25 NOTE — DISCHARGE INSTRUCTIONS
General Knee Arthroscopy Discharge Instructions    [phone]  Bone and Joint Service Line for issues or concerns    Home Prescriptions:  Oxycodone 5 mg tablets:  Take one or two tablets every 4 hrs as needed for pain.  Stool Softener:    You may use any stool softener you are familiar with. We have suggested  over the counter Senokot-S; Pericolace as a fall back.  Tylenol :  You may take one or two tablets (325mg) every six hours as needed.      General Care:  After surgery you may feel tired/sleepy. This is normal. Please have someone stay with you for 24 hours after surgery. You should avoid driving for 1-2 days after surgery, as your reaction time may be slow. You should not drive at all if you have had surgery on your arms, right leg and/or are taking narcotic pain medications until released by your doctor. If you have any question along the way please contact the office. If you feel it is an issue cannot wait for normal office hours, contact the on-call physician.    Bandages:   Change your bandage after the first 48 hours. You may use Band-Aids or sterile gauze with a small amount of tape. It s normal to have some blood-tinged fluid on your bandages, this will usually continue for the first day or two. Keep the area clean and dry. Do not apply any ointments.   I ask that you re apply the ace bandage as it was originally wrapped from your toes up to your groin.  This offers uniform compression and helps to keep the leg and knee from swelling.  You may take this off at night, and re wrap it frequently during the day.     Bathing:  Do not submerge your incision in water such as a bath or pool. It is ok to shower after removing your initial bandage after the first 2 days from surgery. Avoid any excessively hot showers, baths, or hot tubes after surgery.     Follow up:  Your follow up appointment should already be scheduled. If its not, please call the office to verify an appointment 10 days after surgery.      Diet:  Start with non-alcoholic liquids at first, particularly water or sports drinks after surgery. Progress to bland foods such as crackers and bread and finally to your normal diet if you have no problems.     Pain control:  Take your pain medications as prescribed. These medications may make you sleepy. Do not drive, operate equipment, or drink alcohol when taking these.  You may take Tylenol (Generic name is acetaminophen) as directed on the bottle for additional relief or in place of the prescribed pain medications as your pain gets better. Ibuprofen,and Aleve can be used in supplement to the pain prescription and tylenol if you need . If the medications cause a reaction such as nausea or skin rash, stop taking them and contact your doctor. Please plan accordingly, pain medications will not be re-filled on the weekends or at night. Call the office during the day if you need more medications.    Crutches:  Use crutches/walker/cane as needed after surgery. You can stop using these when you feel stable on your feet.     Braces:  Some surgeries will require the use of a brace. Use this brace as directed.         Physical Therapy:  Depending on your surgery, physical therapy may start within a few days or be delayed 4-6 weeks. At your first post-operative visit, your doctor will direct you on your personal therapy program if needed.     Activity:  Unless otherwise instructed, you can weight bear as tolerated. While laying or sitting down you should straighten your knee all the way out and then gently work on bending the knee back. Do not worry at first if your knee feels stiff and will not bend like normal, this will get better.     Normal findings after surgery:  Numbness and tenderness around the incisions is normal.  You may have bruising around the incisions.  Your knee will be swollen for 3 weeks after surgery. It will feel  tight  to move.   Low grade fevers less than 100.5 degrees Fahrenheit are normal.        When to call the Office:  Temperature greater than 101.5 degrees Fahreheit.  Fever, chills, and increasing pain in the knee.  Excessive drainage from the incisions that include bright red blood.  Drainage from the incisions sites that appear yellow, pus-like, or foul smelling.  Increasing pain the knee not relieved by the prescribed pain medications or ice.  Persistent nausea or vomiting  Pain or swelling in your calf area (in back above your ankle)  Any other effects you feel are significant.      KNEE EXERCISES  Start leg exercises first day after surgery (see last page).  These exercises help maintain muscle tone in your leg and strengthen the muscles supporting the knee.  Do them a minimum of 3 times a day; 20 times each.  If you have questions about the exercises or problems doing them please contact your doctor.          KNEE SURGERY - HOME EXERCISE PROGRAM    All exercises to be performed at least 3 times per day.      Quad Sets    Sit with leg extended    Tighten quad muscles in front of leg, trying to    push back of knee downward    Hold exercise for 10 seconds    Rest 10 seconds between reps    Perform 1 set of 20 reps, 3 times a day      Heel Slides     Lie on back with legs straight    Slide heel to buttocks     Return to start position    Repeat with other leg    Perform 1 rep every 4 seconds    Perform 3 sets of 20 reps, 3 times a day    Rest 1 minute between sets      Ankle Pumps     Lie on back with foot elevated on pillow    Move foot up and down, pumping ankle    Perform 3 sets of 20 reps, 3 times a day    Perform 1 rep every 4 seconds    Rest  1 minute between sets          Straight Leg Raise    Lie on back with uninvolved knee bent    Raise straight leg to thigh level of bend leg    Return to starting position    Perform 3 sets of 20 reps, 3 times a day    Perform 1 rep every 4 seconds    Rest 1 minute between sets           Remember to get your knee completely straight.      Same-Day  Surgery   Adult Discharge Orders & Instructions     For 24 hours after surgery    1. Get plenty of rest.  A responsible adult must stay with you for at least 24 hours after you leave the hospital.   2. Do not drive or use heavy equipment.  If you have weakness or tingling, don't drive or use heavy equipment until this feeling goes away.  3. Do not drink alcohol.  4. Avoid strenuous or risky activities.  Ask for help when climbing stairs.   5. You may feel lightheaded.  If so, sit for a few minutes before standing.  Have someone help you get up.   6. You may have a slight fever. Call the doctor if your fever is over 100 F (37.7 C) (taken under the tongue) or lasts longer than 24 hours.  7. You may have a dry mouth, a sore throat, muscle aches or trouble sleeping.  These should go away after 24 hours.  8. Do not make important or legal decisions.  Based on the surgery/procedure that you had today, we do not expect that you will have any problems.  However, we want you to know what to do if you have pain, nausea, bleeding,or infection:  To control pain:  Take medicines your physician has prescribed or or over-the counter medicine he or she advises.  Ice packs and periods of rest are often helpful.  For surgery on an arm or leg, raise it on a pillow to ease swelling.  If your pain is not managed with the above methods, contact your physician.  To control nausea:  Take anti-nausea medicine approved by your physician.  Drink clear liquids such as apple juice, ginger ale, broth or 7-Up. Be sure to drink enough fluids.  Move to a regular diet as you feel able.  Rest may also help.  Bleeding:  You may see a little blood on your dressing, about the size of a quarter in the first 24 hours.  If you see this, there is no reason to be alarmed.  However, if this continues to increase in size, apply pressure if able, and notify your physician.  Infection: Please contact your physician if you have any of the following signs:   redness, swelling, heat, increasing pain or foul-smelling drainage at your surgery site, fever or chills,     Call your doctor for any of the followin.  It has been over 8 to 10 hours since surgery and you are still not able to urinate (pass water).    2.  Headache for over 24 hours.    3.  Numbness, tingling or weakness in your legs the day after surgery (if you had spinal anesthesia).    Nurse advice line: [phone]

## 2018-08-07 ENCOUNTER — OFFICE VISIT (OUTPATIENT)
Dept: ORTHOPEDICS | Facility: CLINIC | Age: 48
End: 2018-08-07
Payer: COMMERCIAL

## 2018-08-07 VITALS
BODY MASS INDEX: 31.84 KG/M2 | HEIGHT: 69 IN | WEIGHT: 215 LBS | SYSTOLIC BLOOD PRESSURE: 120 MMHG | HEART RATE: 73 BPM | DIASTOLIC BLOOD PRESSURE: 88 MMHG

## 2018-08-07 DIAGNOSIS — Z48.89 POSTOPERATIVE VISIT: ICD-10-CM

## 2018-08-07 DIAGNOSIS — S83.212A BUCKET-HANDLE TEAR OF MEDIAL MENISCUS OF LEFT KNEE AS CURRENT INJURY, INITIAL ENCOUNTER: Primary | ICD-10-CM

## 2018-08-07 PROCEDURE — 99024 POSTOP FOLLOW-UP VISIT: CPT | Performed by: ORTHOPAEDIC SURGERY

## 2018-08-07 ASSESSMENT — PAIN SCALES - GENERAL: PAINLEVEL: NO PAIN (0)

## 2018-08-07 NOTE — MR AVS SNAPSHOT
After Visit Summary   8/7/2018    Meño Omalley    MRN: [id number]           Patient Information     Date Of Birth          1970        Visit Information        Provider Department      8/7/2018 7:40 AM Rashad Skaggs MD [facility]        Today's Diagnoses     Bucket-handle tear of medial meniscus of left knee as current injury, initial encounter    -  1    Postoperative visit           Follow-ups after your visit        Your next 10 appointments already scheduled     Dec 21, 2018  8:00 AM CST   LAB with NL LAB [facility] ([facility])    [address] [place] [address]   [phone]           Please do not eat 10-12 hours before your appointment if you are coming in fasting for labs on lipids, cholesterol, or glucose (sugar). This does not apply to pregnant women. Water, hot tea and black coffee (with nothing added) are okay. Do not drink other fluids, diet soda or chew gum.            Dec 21, 2018  8:30 AM CST   CT CHEST ABDOMEN PELVIS W/O & W CONTRAST with PHCT1   [facility] CT Scan ([facility])    [address] [facility] [address]   [phone]           Please bring any scans or X-rays taken at other hospitals, if similar tests were done. Also bring a list of your medicines, including vitamins, minerals and over-the-counter drugs. It is safest to leave personal items at home.  Be sure to tell your doctor:   If you have any allergies.   If there s any chance you are pregnant.   If you are breastfeeding.  How to prepare:   Do not eat or drink for 2 hours before your exam. If you need to take medicine, you may take it with small sips of water. (We may ask you to take liquid medicine as well.)   Please wear loose clothing, such as a sweat suit or jogging clothes. Avoid snaps, zippers and other metal. We may ask you to undress and put on a hospital gown.  Please arrive 30 minutes early  for your CT. Once in the department you might be asked to drink water 15-20 minutes prior to your exam.  If indicated you may be asked to drink an oral contrast in advance of your CT.  If this is the case, the imaging team will let you know or be in contact with you prior to your appointment  Patients over 70 or patients with diabetes or kidney problems:   If you haven t had a blood test (creatinine test) within the last 30 days, the Cardiologist/Radiologist may require you to get this test prior to your exam.  If you have diabetes:   Continue to take your metformin medication on the day of your exam  If you have any questions, please call the Imaging Department where you will have your exam.            Dec 26, 2018 11:15 AM CST   (Arrive by 11:00 AM)   Return Visit with Jeanette Mcarthur MD   [facility]    [address] [place] [address]  [address]  [facility] [address] [phone]              Who to contact     If you have questions or need follow up information about today's clinic visit or your schedule please contact [facility] directly at [phone].  Normal or non-critical lab and imaging results will be communicated to you by MyChart, letter or phone within 4 business days after the clinic has received the results. If you do not hear from us within 7 days, please contact the clinic through [email or web address] or phone. If you have a critical or abnormal lab result, we will notify you by phone as soon as possible.  Submit refill requests through smartclip or call your pharmacy and they will forward the refill request to us. Please allow 3 business days for your refill to be completed.          Additional Information About Your Visit        [email or web address] Information     smartclip gives you secure access to your electronic health record. If you see a primary care provider, you can also send messages to your care team and make appointments. If you have questions,  "please call your primary care clinic.  If you do not have a primary care provider, please call [phone] and they will assist you.        Care EveryWhere ID     This is your Care EveryWhere ID. This could be used by other organizations to access your [facility] medical records  [id number]        Your Vitals Were     Pulse Height BMI (Body Mass Index)             73 1.758 m (5' 9.2\") 31.57 kg/m2          Blood Pressure from Last 3 Encounters:   08/07/18 120/88   07/25/18 (!) 136/96   07/20/18 128/72    Weight from Last 3 Encounters:   08/07/18 97.5 kg (215 lb)   07/20/18 98 kg (216 lb)   07/19/18 101.2 kg (223 lb)              Today, you had the following     No orders found for display       Primary Care Provider Office Phone # Fax #    Delio Alcala [phone]       [address] [facility] [address]        Equal Access to Services     ABBIE BOGGS : Hadii aad ku hadasho Soomaali, waaxda luqadaha, qaybta kaalmada adeegyada, waxay idiin hayleanne morales . So [facility] [phone].    ATENCIÓN: Si habla español, tiene a valle disposición servicios gratuitos de asistencia lingüística. [facility] [phone].    We comply with applicable federal civil rights laws and [place] laws. We do not discriminate on the basis of race, color, national origin, age, disability, sex, sexual orientation, or gender identity.            Thank you!     Thank you for choosing [facility]  for your care. Our goal is always to provide you with excellent care. Hearing back from our patients is one way we can continue to improve our services. Please take a few minutes to complete the written survey that you may receive in the mail after your visit with us. Thank you!             Your Updated Medication List - Protect others around you: Learn how to safely use, store and throw away your medicines at www.disposemymeds.org.          This list is accurate as of 8/7/18  8:12 AM.  Always use " your most recent med list.                   Brand Name Dispense Instructions for use Diagnosis    escitalopram 20 MG tablet    LEXAPRO    30 tablet    Take 1 tablet (20 mg) by mouth every morning    Major depressive disorder, recurrent episode, mild (H)       FLOVENT  MCG/ACT Inhaler   Generic drug:  fluticasone      Take 2 puffs by mouth 2 times daily as needed

## 2018-08-07 NOTE — LETTER
"    8/7/2018         RE: Meño Omalley  [address] [facility] [address]        Dear Colleague,    Thank you for referring your patient, Meño Omalley, to the [facility]. Please see a copy of my visit note below.    HISTORY OF PRESENT ILLNESS:    Meño Omalley is a 48 year old male who is seen in follow up for   Chief Complaint   Patient presents with     RECHECK     Arthroscopy left knee with Meniscal Debridement.  DOS: 7/25/18 (13 days postop)     Surgical Followup     PREOPERATIVE DIAGNOSIS:  Torn medial meniscus, left knee.   POSTOPERATIVE DIAGNOSIS:  Bucket handle tear medial meniscus, left knee, with white on white configuration. PROCEDURE:  Arthroscopic evaluation with arthroscopic debridement of medial meniscus.     Present symptoms: Offers no unusual postsurgical complaints.  First  Treatments tried to this point: First postop visit.    PHYSICAL EXAM:  /88 (BP Location: Left arm, Patient Position: Chair, Cuff Size: Adult Large)  Pulse 73  Ht 1.758 m (5' 9.2\")  Wt 97.5 kg (215 lb)  BMI 31.57 kg/m2  Body mass index is 31.57 kg/(m^2).       Physical Exam:  Vitals: /88 (BP Location: Left arm, Patient Position: Chair, Cuff Size: Adult Large)  Pulse 73  Ht 1.758 m (5' 9.2\")  Wt 97.5 kg (215 lb)  BMI 31.57 kg/m2  BMI= Body mass index is 31.57 kg/(m^2).  Constitutional: healthy, alert and no acute distress   Psychiatric: mentation appears normal and affect normal/bright    JOINT/EXTREMITIES:  NEURO: no focal deficits  Affected extremity pulses are easily palpable.  SKIN: no excoriation or erythema. No signs of infection.    Sutures were removed as needed.    GAIT: non-antalgic        Swelling: Almost none  Bruising: No bruising identified  ROM: Has regained full extension and can flex 120 .      IMAGING INTERPRETATION: No new x-rays obtained.    Intraoperative photos were reviewed labeled and issued to him today.     ASSESSMENT:    ICD-10-CM    1. Bucket-handle " tear of medial meniscus of left knee as current injury, initial encounter S83.212A    2. Postoperative visit Z48.89        Is doing well    PLAN:      Edema control reviewed.  Pain medication usage reviewed.  Request for refill.  Physical Therapy: Shown additional home exercises to do.  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  Long-term ramifications of this injury were carefully reviewed.  Specifically address things that he should avoid activity wise.  The importance of recovering from his surgery was reviewed.  Includes modifying activity to the swelling is completely gone and to focus on the strengthening.    I suggested a 6 week follow-up will be at any questions.    Return to clinic 6, weeks, or PRN    Rashad Skaggs MD            Again, thank you for allowing me to participate in the care of your patient.        Sincerely,        Rashad Skaggs MD

## 2018-08-07 NOTE — PROGRESS NOTES
"HISTORY OF PRESENT ILLNESS:    Meño Omalley is a 48 year old male who is seen in follow up for   Chief Complaint   Patient presents with     RECHECK     Arthroscopy left knee with Meniscal Debridement.  DOS: 7/25/18 (13 days postop)     Surgical Followup     PREOPERATIVE DIAGNOSIS:  Torn medial meniscus, left knee.   POSTOPERATIVE DIAGNOSIS:  Bucket handle tear medial meniscus, left knee, with white on white configuration. PROCEDURE:  Arthroscopic evaluation with arthroscopic debridement of medial meniscus.     Present symptoms: Offers no unusual postsurgical complaints.  First  Treatments tried to this point: First postop visit.    PHYSICAL EXAM:  /88 (BP Location: Left arm, Patient Position: Chair, Cuff Size: Adult Large)  Pulse 73  Ht 1.758 m (5' 9.2\")  Wt 97.5 kg (215 lb)  BMI 31.57 kg/m2  Body mass index is 31.57 kg/(m^2).       Physical Exam:  Vitals: /88 (BP Location: Left arm, Patient Position: Chair, Cuff Size: Adult Large)  Pulse 73  Ht 1.758 m (5' 9.2\")  Wt 97.5 kg (215 lb)  BMI 31.57 kg/m2  BMI= Body mass index is 31.57 kg/(m^2).  Constitutional: healthy, alert and no acute distress   Psychiatric: mentation appears normal and affect normal/bright    JOINT/EXTREMITIES:  NEURO: no focal deficits  Affected extremity pulses are easily palpable.  SKIN: no excoriation or erythema. No signs of infection.    Sutures were removed as needed.    GAIT: non-antalgic        Swelling: Almost none  Bruising: No bruising identified  ROM: Has regained full extension and can flex 120 .      IMAGING INTERPRETATION: No new x-rays obtained.    Intraoperative photos were reviewed labeled and issued to him today.     ASSESSMENT:    ICD-10-CM    1. Bucket-handle tear of medial meniscus of left knee as current injury, initial encounter S83.212A    2. Postoperative visit Z48.89        Is doing well    PLAN:      Edema control reviewed.  Pain medication usage reviewed.  Request for refill.  Physical " Therapy: Shown additional home exercises to do.  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  Long-term ramifications of this injury were carefully reviewed.  Specifically address things that he should avoid activity wise.  The importance of recovering from his surgery was reviewed.  Includes modifying activity to the swelling is completely gone and to focus on the strengthening.    I suggested a 6 week follow-up will be at any questions.    Return to clinic 6, weeks, or PRN    Rashad Skaggs MD

## 2018-12-21 ENCOUNTER — HOSPITAL ENCOUNTER (OUTPATIENT)
Dept: CT IMAGING | Facility: CLINIC | Age: 48
Discharge: HOME | End: 2018-12-21
Attending: INTERNAL MEDICINE | Admitting: INTERNAL MEDICINE
Payer: COMMERCIAL

## 2018-12-21 DIAGNOSIS — C20 ADENOCARCINOMA OF RECTUM (H): ICD-10-CM

## 2018-12-21 LAB
ALBUMIN SERPL-MCNC: 3.8 G/DL (ref 3.4–5)
ALP SERPL-CCNC: 67 U/L (ref 40–150)
ALT SERPL W P-5'-P-CCNC: 34 U/L (ref 0–70)
ANION GAP SERPL CALCULATED.3IONS-SCNC: 5 MMOL/L (ref 3–14)
AST SERPL W P-5'-P-CCNC: 19 U/L (ref 0–45)
BASOPHILS # BLD AUTO: 0.1 10E9/L (ref 0–0.2)
BASOPHILS NFR BLD AUTO: 1.1 %
BILIRUB SERPL-MCNC: 0.5 MG/DL (ref 0.2–1.3)
BUN SERPL-MCNC: 15 MG/DL (ref 7–30)
CALCIUM SERPL-MCNC: 8.9 MG/DL (ref 8.5–10.1)
CHLORIDE SERPL-SCNC: 105 MMOL/L (ref 94–109)
CO2 SERPL-SCNC: 31 MMOL/L (ref 20–32)
CREAT SERPL-MCNC: 0.99 MG/DL (ref 0.66–1.25)
DIFFERENTIAL METHOD BLD: NORMAL
EOSINOPHIL NFR BLD AUTO: 1.9 %
ERYTHROCYTE [DISTWIDTH] IN BLOOD BY AUTOMATED COUNT: 11.8 % (ref 10–15)
GFR SERPL CREATININE-BSD FRML MDRD: >90 ML/MIN/{1.73_M2}
GLUCOSE SERPL-MCNC: 93 MG/DL (ref 70–99)
HCT VFR BLD AUTO: 45.3 % (ref 40–53)
HGB BLD-MCNC: 15.5 G/DL (ref 13.3–17.7)
IMM GRANULOCYTES # BLD: 0 10E9/L (ref 0–0.4)
IMM GRANULOCYTES NFR BLD: 0.4 %
LYMPHOCYTES # BLD AUTO: 1.9 10E9/L (ref 0.8–5.3)
LYMPHOCYTES NFR BLD AUTO: 36.4 %
MCH RBC QN AUTO: 31.3 PG (ref 26.5–33)
MCHC RBC AUTO-ENTMCNC: 34.2 G/DL (ref 31.5–36.5)
MCV RBC AUTO: 91 FL (ref 78–100)
MONOCYTES # BLD AUTO: 0.4 10E9/L (ref 0–1.3)
MONOCYTES NFR BLD AUTO: 7.8 %
NEUTROPHILS # BLD AUTO: 2.8 10E9/L (ref 1.6–8.3)
NEUTROPHILS NFR BLD AUTO: 52.4 %
NRBC # BLD AUTO: 0 10*3/UL
NRBC BLD AUTO-RTO: 0 /100
PLATELET # BLD AUTO: 257 10E9/L (ref 150–450)
POTASSIUM SERPL-SCNC: 4.5 MMOL/L (ref 3.4–5.3)
PROT SERPL-MCNC: 7.7 G/DL (ref 6.8–8.8)
RBC # BLD AUTO: 4.96 10E12/L (ref 4.4–5.9)
SODIUM SERPL-SCNC: 141 MMOL/L (ref 133–144)
WBC # BLD AUTO: 5.3 10E9/L (ref 4–11)

## 2018-12-21 PROCEDURE — 80053 COMPREHEN METABOLIC PANEL: CPT | Performed by: INTERNAL MEDICINE

## 2018-12-21 PROCEDURE — 25000128 H RX IP 250 OP 636: Performed by: RADIOLOGY

## 2018-12-21 PROCEDURE — 36415 COLL VENOUS BLD VENIPUNCTURE: CPT | Performed by: INTERNAL MEDICINE

## 2018-12-21 PROCEDURE — 85025 COMPLETE CBC W/AUTO DIFF WBC: CPT | Performed by: INTERNAL MEDICINE

## 2018-12-21 PROCEDURE — 74177 CT ABD & PELVIS W/CONTRAST: CPT

## 2018-12-21 PROCEDURE — 25000125 ZZHC RX 250: Performed by: RADIOLOGY

## 2018-12-21 RX ORDER — IOPAMIDOL 755 MG/ML
500 INJECTION, SOLUTION INTRAVASCULAR ONCE
Status: COMPLETED | OUTPATIENT
Start: 2018-12-21 | End: 2018-12-21

## 2018-12-21 RX ADMIN — IOPAMIDOL 100 ML: 755 INJECTION, SOLUTION INTRAVENOUS at 08:51

## 2018-12-21 RX ADMIN — SODIUM CHLORIDE 60 ML: 9 INJECTION, SOLUTION INTRAVENOUS at 08:50

## 2018-12-26 ENCOUNTER — ONCOLOGY VISIT (OUTPATIENT)
Dept: ONCOLOGY | Facility: CLINIC | Age: 48
End: 2018-12-26
Attending: INTERNAL MEDICINE
Payer: COMMERCIAL

## 2018-12-26 VITALS
OXYGEN SATURATION: 95 % | TEMPERATURE: 98.1 F | RESPIRATION RATE: 16 BRPM | DIASTOLIC BLOOD PRESSURE: 72 MMHG | SYSTOLIC BLOOD PRESSURE: 122 MMHG | HEART RATE: 92 BPM | BODY MASS INDEX: 32.29 KG/M2 | HEIGHT: 69 IN | WEIGHT: 218 LBS

## 2018-12-26 DIAGNOSIS — C20 ADENOCARCINOMA OF RECTUM (H): Primary | ICD-10-CM

## 2018-12-26 PROCEDURE — 99215 OFFICE O/P EST HI 40 MIN: CPT | Mod: ZP | Performed by: INTERNAL MEDICINE

## 2018-12-26 PROCEDURE — G0463 HOSPITAL OUTPT CLINIC VISIT: HCPCS | Mod: ZF

## 2018-12-26 ASSESSMENT — PAIN SCALES - GENERAL: PAINLEVEL: NO PAIN (0)

## 2018-12-26 ASSESSMENT — MIFFLIN-ST. JEOR: SCORE: 1854.22

## 2018-12-26 NOTE — PROGRESS NOTES
"REASON FOR VISIT:  The patient is a 47-year-old male here for followup for rectal cancer, status post surgery and adjuvant therapy, now here for 6-month followup.     Background : refer to treatment history. Stage III colon cancer s.p surgery and adjuvant chemo 6 months - Folfox ( dropped oxaliplatin for last few cycles)     INTERVAL HISTORY:  Danii say that he is doing really well.  He has a lot of sinus drainage and has a bit of scratchy throat,    He states his bowel and bladder are fine.  He does not have any residual neuropathy at this point.  He has no nausea, vomiting, abdominal pain, chest pain, shortness of breath or fever. Rest 14 point ROS negative   PMH, PSH, Fam and social history unchanged      PHYSICAL EXAMINATION:   VITAL SIGNS:   /72   Pulse 92   Temp 98.1  F (36.7  C) (Oral)   Resp 16   Ht 1.753 m (5' 9\")   Wt 98.9 kg (218 lb)   SpO2 95%   BMI 32.19 kg/m      GENERAL:  Alert and oriented x3.  No apparent distress.   HEENT:  Moist mucous membranes.   CARDIOVASCULAR:  S1, S2 clear.   RESPIRATORY:  Clear to auscultation.   ABDOMEN:  Nontender to palpation.   LOWER EXTREMITIES:  No pedal edema.      LABORATORY EVALUATION:       Results for DANII ANGULO (MRN [id number]) as of 12/26/2018 11:29   Ref. Range 12/21/2018 08:05   Sodium Latest Ref Range: 133 - 144 mmol/L 141   Potassium Latest Ref Range: 3.4 - 5.3 mmol/L 4.5   Chloride Latest Ref Range: 94 - 109 mmol/L 105   Carbon Dioxide Latest Ref Range: 20 - 32 mmol/L 31   Urea Nitrogen Latest Ref Range: 7 - 30 mg/dL 15   Creatinine Latest Ref Range: 0.66 - 1.25 mg/dL 0.99   GFR Estimate Latest Ref Range: >60 mL/min/1.73_m2 >90   GFR Estimate If Black Latest Ref Range: >60 mL/min/1.73_m2 >90   Calcium Latest Ref Range: 8.5 - 10.1 mg/dL 8.9   Anion Gap Latest Ref Range: 3 - 14 mmol/L 5   Albumin Latest Ref Range: 3.4 - 5.0 g/dL 3.8   Protein Total Latest Ref Range: 6.8 - 8.8 g/dL 7.7   Bilirubin Total Latest Ref Range: 0.2 - 1.3 mg/dL " 0.5   Alkaline Phosphatase Latest Ref Range: 40 - 150 U/L 67   ALT Latest Ref Range: 0 - 70 U/L 34   AST Latest Ref Range: 0 - 45 U/L 19   Glucose Latest Ref Range: 70 - 99 mg/dL 93   WBC Latest Ref Range: 4.0 - 11.0 10e9/L 5.3   Hemoglobin Latest Ref Range: 13.3 - 17.7 g/dL 15.5   Hematocrit Latest Ref Range: 40.0 - 53.0 % 45.3   Platelet Count Latest Ref Range: 150 - 450 10e9/L 257   RBC Count Latest Ref Range: 4.4 - 5.9 10e12/L 4.96   MCV Latest Ref Range: 78 - 100 fl 91   MCH Latest Ref Range: 26.5 - 33.0 pg 31.3   MCHC Latest Ref Range: 31.5 - 36.5 g/dL 34.2   RDW Latest Ref Range: 10.0 - 15.0 % 11.8   Diff Method Unknown Automated Method   % Neutrophils Latest Units: % 52.4   % Lymphocytes Latest Units: % 36.4   % Monocytes Latest Units: % 7.8   % Eosinophils Latest Units: % 1.9   % Basophils Latest Units: % 1.1   % Immature Granulocytes Latest Units: % 0.4   Nucleated RBCs Latest Ref Range: 0 /100 0   Absolute Neutrophil Latest Ref Range: 1.6 - 8.3 10e9/L 2.8   Absolute Lymphocytes Latest Ref Range: 0.8 - 5.3 10e9/L 1.9   Absolute Monocytes Latest Ref Range: 0.0 - 1.3 10e9/L 0.4   Absolute Basophils Latest Ref Range: 0.0 - 0.2 10e9/L 0.1   Abs Immature Granulocytes Latest Ref Range: 0 - 0.4 10e9/L 0.0   Absolute Nucleated RBC Unknown 0.0       Imaging :   IMPRESSION:  1. Liver appears somewhat smaller and has slightly lobulated edge.  Cirrhosis is not excluded in the appropriate clinical setting.  2. Postop changes status post distal partial colectomy.  3. No evidence for metastasis or recurrent malignancy is identified.    Colonoscopy : few polyps but otherwise unremarkable RTC in 3 years     ASSESSMENT AND PLAN:  This is a patient with a history of rectal cancer Stage III , status post surgery and adjuvant chemotherapy which he finished in 01/2018, here for followup.  At this point, he does not have any signs or symptoms concerning for recurrent disease.  When he returns in 6 months with labs repeat CT scan  at that time.     Cirrhotic configuration of the liver on CT with normal labs. counseled about alcohol use,      I spent 35 minutes in the care of this patient >50% of which was spent in coordinating and counseling.    Jeanette Mcarthur   of Medicine   Hematology and medical Oncology   [facility]    Dec 26, 2018

## 2018-12-26 NOTE — NURSING NOTE
"Oncology Rooming Note    December 26, 2018 11:10 AM   Meño Omalley is a 48 year old male who presents for:    Chief Complaint   Patient presents with     Oncology Clinic Visit     Rectal Cancer     Initial Vitals: /72   Pulse 92   Temp 98.1  F (36.7  C) (Oral)   Resp 16   Ht 1.753 m (5' 9\")   Wt 98.9 kg (218 lb)   SpO2 95%   BMI 32.19 kg/m   Estimated body mass index is 32.19 kg/m  as calculated from the following:    Height as of this encounter: 1.753 m (5' 9\").    Weight as of this encounter: 98.9 kg (218 lb). Body surface area is 2.19 meters squared.  No Pain (0) Comment: Data Unavailable   No LMP for male patient.  Allergies reviewed: Yes  Medications reviewed: Yes    Medications: Medication refills not needed today.  Pharmacy name entered into EPIC:    [facility] [address] [place] [address] - [address]  [facility] [place], [place] - [address]     Clinical concerns: No New Concerns    5 minutes for nursing intake (face to face time)     MARTHA Hernandez      "

## 2018-12-26 NOTE — LETTER
"12/26/2018       RE: Danii Angulo  [address] [facility] [facility] [address]     Dear Colleague,    Thank you for referring your patient, Danii Angulo, to the [facility]. Please see a copy of my visit note below.    REASON FOR VISIT:  The patient is a 47-year-old male here for followup for rectal cancer, status post surgery and adjuvant therapy, now here for 6-month followup.     Background : refer to treatment history. Stage III colon cancer s.p surgery and adjuvant chemo 6 months - Folfox ( dropped oxaliplatin for last few cycles)     INTERVAL HISTORY:  Danii say that he is doing really well.  He has a lot of sinus drainage and has a bit of scratchy throat,    He states his bowel and bladder are fine.  He does not have any residual neuropathy at this point.  He has no nausea, vomiting, abdominal pain, chest pain, shortness of breath or fever. Rest 14 point ROS negative   PMH, PSH, Fam and social history unchanged      PHYSICAL EXAMINATION:   VITAL SIGNS:   /72   Pulse 92   Temp 98.1  F (36.7  C) (Oral)   Resp 16   Ht 1.753 m (5' 9\")   Wt 98.9 kg (218 lb)   SpO2 95%   BMI 32.19 kg/m       GENERAL:  Alert and oriented x3.  No apparent distress.   HEENT:  Moist mucous membranes.   CARDIOVASCULAR:  S1, S2 clear.   RESPIRATORY:  Clear to auscultation.   ABDOMEN:  Nontender to palpation.   LOWER EXTREMITIES:  No pedal edema.      LABORATORY EVALUATION:       Results for DANII ANGULO (MRN [id number]) as of 12/26/2018 11:29   Ref. Range 12/21/2018 08:05   Sodium Latest Ref Range: 133 - 144 mmol/L 141   Potassium Latest Ref Range: 3.4 - 5.3 mmol/L 4.5   Chloride Latest Ref Range: 94 - 109 mmol/L 105   Carbon Dioxide Latest Ref Range: 20 - 32 mmol/L 31   Urea Nitrogen Latest Ref Range: 7 - 30 mg/dL 15   Creatinine Latest Ref Range: 0.66 - 1.25 mg/dL 0.99   GFR Estimate Latest Ref Range: >60 mL/min/1.73_m2 >90   GFR Estimate If Black Latest Ref Range: >60 mL/min/1.73_m2 >90 "   Calcium Latest Ref Range: 8.5 - 10.1 mg/dL 8.9   Anion Gap Latest Ref Range: 3 - 14 mmol/L 5   Albumin Latest Ref Range: 3.4 - 5.0 g/dL 3.8   Protein Total Latest Ref Range: 6.8 - 8.8 g/dL 7.7   Bilirubin Total Latest Ref Range: 0.2 - 1.3 mg/dL 0.5   Alkaline Phosphatase Latest Ref Range: 40 - 150 U/L 67   ALT Latest Ref Range: 0 - 70 U/L 34   AST Latest Ref Range: 0 - 45 U/L 19   Glucose Latest Ref Range: 70 - 99 mg/dL 93   WBC Latest Ref Range: 4.0 - 11.0 10e9/L 5.3   Hemoglobin Latest Ref Range: 13.3 - 17.7 g/dL 15.5   Hematocrit Latest Ref Range: 40.0 - 53.0 % 45.3   Platelet Count Latest Ref Range: 150 - 450 10e9/L 257   RBC Count Latest Ref Range: 4.4 - 5.9 10e12/L 4.96   MCV Latest Ref Range: 78 - 100 fl 91   MCH Latest Ref Range: 26.5 - 33.0 pg 31.3   MCHC Latest Ref Range: 31.5 - 36.5 g/dL 34.2   RDW Latest Ref Range: 10.0 - 15.0 % 11.8   Diff Method Unknown Automated Method   % Neutrophils Latest Units: % 52.4   % Lymphocytes Latest Units: % 36.4   % Monocytes Latest Units: % 7.8   % Eosinophils Latest Units: % 1.9   % Basophils Latest Units: % 1.1   % Immature Granulocytes Latest Units: % 0.4   Nucleated RBCs Latest Ref Range: 0 /100 0   Absolute Neutrophil Latest Ref Range: 1.6 - 8.3 10e9/L 2.8   Absolute Lymphocytes Latest Ref Range: 0.8 - 5.3 10e9/L 1.9   Absolute Monocytes Latest Ref Range: 0.0 - 1.3 10e9/L 0.4   Absolute Basophils Latest Ref Range: 0.0 - 0.2 10e9/L 0.1   Abs Immature Granulocytes Latest Ref Range: 0 - 0.4 10e9/L 0.0   Absolute Nucleated RBC Unknown 0.0       Imaging :   IMPRESSION:  1. Liver appears somewhat smaller and has slightly lobulated edge.  Cirrhosis is not excluded in the appropriate clinical setting.  2. Postop changes status post distal partial colectomy.  3. No evidence for metastasis or recurrent malignancy is identified.    Colonoscopy : few polyps but otherwise unremarkable RTC in 3 years     ASSESSMENT AND PLAN:  This is a patient with a history of rectal cancer  Stage III , status post surgery and adjuvant chemotherapy which he finished in 01/2018, here for followup.  At this point, he does not have any signs or symptoms concerning for recurrent disease.  When he returns in 6 months with labs repeat CT scan at that time.     Cirrhotic configuration of the liver on CT with normal labs. counseled about alcohol use,      I spent 35 minutes in the care of this patient >50% of which was spent in coordinating and counseling.    Jeanette Mcarthur   of Medicine   Hematology and medical Oncology   [facility]    Dec 26, 2018

## 2019-01-19 DIAGNOSIS — F33.0 MAJOR DEPRESSIVE DISORDER, RECURRENT EPISODE, MILD (H): ICD-10-CM

## 2019-01-22 NOTE — TELEPHONE ENCOUNTER
"Routing refill request to provider for review/approval because:  A break in medication  Due for updated Phq-9    Lexapro  Last Written Prescription Date:  1/10/2018  Last Fill Quantity: 30,  # refills: 5   Last office visit: 7/20/2018 with prescribing provider:  7/20/2018   Future Office Visit:    PHQ-9 SCORE 1/10/2018   PHQ-9 Total Score 0     Requested Prescriptions   Pending Prescriptions Disp Refills     escitalopram (LEXAPRO) 20 MG tablet [Pharmacy Med Name: ESCITALOPRAM OXALATE 20MG TABS] 30 tablet 5     Sig: TAKE ONE TABLET BY MOUTH EVERY MORNING    SSRIs Protocol Failed - 1/19/2019 10:55 AM       Failed - PHQ-9 score less than 5 in past 6 months    Please review last PHQ-9 score.          Failed - Recent (6 mo) or future (30 days) visit within the authorizing provider's specialty    Patient had office visit in the last 6 months or has a visit in the next 30 days with authorizing provider or within the authorizing provider's specialty.  See \"Patient Info\" tab in inbasket, or \"Choose Columns\" in Meds & Orders section of the refill encounter.           Passed - Medication is active on med list       Passed - Patient is age 18 or older        Hermila Gao RN on 1/22/2019 at 2:09 PM    "

## 2019-01-23 RX ORDER — ESCITALOPRAM OXALATE 20 MG/1
TABLET ORAL
Qty: 30 TABLET | Refills: 5 | Status: SHIPPED | OUTPATIENT
Start: 2019-01-23 | End: 2019-10-16

## 2019-07-07 ENCOUNTER — NURSE TRIAGE (OUTPATIENT)
Dept: NURSING | Facility: CLINIC | Age: 49
End: 2019-07-07

## 2019-07-07 NOTE — TELEPHONE ENCOUNTER
Caller inquiring if he needs to come  2 hrs early for CT scan tomorrow to drink contrast as has been his past experieince; has not received a call pertaining to this.     Contacted radiology   @ [facility] and informed patient needs to come @ 0745 to drink water only   VM with this info left for patient   advised to call back with questions   Haylie Ellis RN  FNA      Reason for Disposition    Question about upcoming scheduled test, no triage required and triager able to answer question    Protocols used: INFORMATION ONLY CALL-A-AH

## 2019-07-08 ENCOUNTER — HOSPITAL ENCOUNTER (OUTPATIENT)
Dept: CT IMAGING | Facility: CLINIC | Age: 49
Discharge: HOME | End: 2019-07-08
Attending: INTERNAL MEDICINE | Admitting: INTERNAL MEDICINE
Payer: COMMERCIAL

## 2019-07-08 DIAGNOSIS — C20 ADENOCARCINOMA OF RECTUM (H): ICD-10-CM

## 2019-07-08 PROCEDURE — 25000128 H RX IP 250 OP 636: Performed by: RADIOLOGY

## 2019-07-08 PROCEDURE — 74177 CT ABD & PELVIS W/CONTRAST: CPT

## 2019-07-08 PROCEDURE — 71260 CT THORAX DX C+: CPT

## 2019-07-08 PROCEDURE — 25000125 ZZHC RX 250: Performed by: RADIOLOGY

## 2019-07-08 RX ORDER — IOPAMIDOL 755 MG/ML
500 INJECTION, SOLUTION INTRAVASCULAR ONCE
Status: COMPLETED | OUTPATIENT
Start: 2019-07-08 | End: 2019-07-08

## 2019-07-08 RX ADMIN — SODIUM CHLORIDE 60 ML: 9 INJECTION, SOLUTION INTRAVENOUS at 08:20

## 2019-07-08 RX ADMIN — IOPAMIDOL 100 ML: 755 INJECTION, SOLUTION INTRAVENOUS at 08:20

## 2019-07-10 ENCOUNTER — ONCOLOGY VISIT (OUTPATIENT)
Dept: ONCOLOGY | Facility: CLINIC | Age: 49
End: 2019-07-10
Attending: INTERNAL MEDICINE
Payer: COMMERCIAL

## 2019-07-10 ENCOUNTER — APPOINTMENT (OUTPATIENT)
Dept: LAB | Facility: CLINIC | Age: 49
End: 2019-07-10
Attending: INTERNAL MEDICINE
Payer: COMMERCIAL

## 2019-07-10 ENCOUNTER — TELEPHONE (OUTPATIENT)
Dept: FAMILY MEDICINE | Facility: CLINIC | Age: 49
End: 2019-07-10

## 2019-07-10 VITALS
OXYGEN SATURATION: 97 % | RESPIRATION RATE: 16 BRPM | BODY MASS INDEX: 32.48 KG/M2 | SYSTOLIC BLOOD PRESSURE: 147 MMHG | TEMPERATURE: 98.6 F | WEIGHT: 219.3 LBS | DIASTOLIC BLOOD PRESSURE: 90 MMHG | HEART RATE: 68 BPM | HEIGHT: 69 IN

## 2019-07-10 DIAGNOSIS — C20 ADENOCARCINOMA OF RECTUM (H): ICD-10-CM

## 2019-07-10 DIAGNOSIS — N32.89 BLADDER WALL THICKENING: Primary | ICD-10-CM

## 2019-07-10 LAB
ALBUMIN SERPL-MCNC: 3.6 G/DL (ref 3.4–5)
ALP SERPL-CCNC: 55 U/L (ref 40–150)
ALT SERPL W P-5'-P-CCNC: 36 U/L (ref 0–70)
ANION GAP SERPL CALCULATED.3IONS-SCNC: 4 MMOL/L (ref 3–14)
AST SERPL W P-5'-P-CCNC: 20 U/L (ref 0–45)
BASOPHILS # BLD AUTO: 0 10E9/L (ref 0–0.2)
BASOPHILS NFR BLD AUTO: 1 %
BILIRUB SERPL-MCNC: 0.6 MG/DL (ref 0.2–1.3)
BUN SERPL-MCNC: 17 MG/DL (ref 7–30)
CALCIUM SERPL-MCNC: 8.6 MG/DL (ref 8.5–10.1)
CEA SERPL-MCNC: <0.5 UG/L (ref 0–2.5)
CHLORIDE SERPL-SCNC: 108 MMOL/L (ref 94–109)
CO2 SERPL-SCNC: 26 MMOL/L (ref 20–32)
CREAT SERPL-MCNC: 0.8 MG/DL (ref 0.66–1.25)
DIFFERENTIAL METHOD BLD: NORMAL
EOSINOPHIL # BLD AUTO: 0.1 10E9/L (ref 0–0.7)
EOSINOPHIL NFR BLD AUTO: 2.8 %
ERYTHROCYTE [DISTWIDTH] IN BLOOD BY AUTOMATED COUNT: 12.3 % (ref 10–15)
GFR SERPL CREATININE-BSD FRML MDRD: >90 ML/MIN/{1.73_M2}
GLUCOSE SERPL-MCNC: 100 MG/DL (ref 70–99)
HCT VFR BLD AUTO: 41.8 % (ref 40–53)
HGB BLD-MCNC: 14.5 G/DL (ref 13.3–17.7)
IMM GRANULOCYTES # BLD: 0 10E9/L (ref 0–0.4)
IMM GRANULOCYTES NFR BLD: 0.3 %
LYMPHOCYTES # BLD AUTO: 1.3 10E9/L (ref 0.8–5.3)
LYMPHOCYTES NFR BLD AUTO: 32.1 %
MCH RBC QN AUTO: 31.8 PG (ref 26.5–33)
MCHC RBC AUTO-ENTMCNC: 34.7 G/DL (ref 31.5–36.5)
MCV RBC AUTO: 92 FL (ref 78–100)
MONOCYTES # BLD AUTO: 0.4 10E9/L (ref 0–1.3)
MONOCYTES NFR BLD AUTO: 8.8 %
NEUTROPHILS # BLD AUTO: 2.2 10E9/L (ref 1.6–8.3)
NEUTROPHILS NFR BLD AUTO: 55 %
NRBC # BLD AUTO: 0 10*3/UL
NRBC BLD AUTO-RTO: 0 /100
PLATELET # BLD AUTO: 260 10E9/L (ref 150–450)
POTASSIUM SERPL-SCNC: 4.1 MMOL/L (ref 3.4–5.3)
PROT SERPL-MCNC: 7.2 G/DL (ref 6.8–8.8)
RBC # BLD AUTO: 4.56 10E12/L (ref 4.4–5.9)
SODIUM SERPL-SCNC: 138 MMOL/L (ref 133–144)
WBC # BLD AUTO: 4 10E9/L (ref 4–11)

## 2019-07-10 PROCEDURE — 82378 CARCINOEMBRYONIC ANTIGEN: CPT | Performed by: INTERNAL MEDICINE

## 2019-07-10 PROCEDURE — 36415 COLL VENOUS BLD VENIPUNCTURE: CPT

## 2019-07-10 PROCEDURE — G0463 HOSPITAL OUTPT CLINIC VISIT: HCPCS | Mod: ZF

## 2019-07-10 PROCEDURE — 99214 OFFICE O/P EST MOD 30 MIN: CPT | Mod: ZP | Performed by: INTERNAL MEDICINE

## 2019-07-10 PROCEDURE — 80053 COMPREHEN METABOLIC PANEL: CPT | Performed by: INTERNAL MEDICINE

## 2019-07-10 PROCEDURE — 85025 COMPLETE CBC W/AUTO DIFF WBC: CPT | Performed by: INTERNAL MEDICINE

## 2019-07-10 ASSESSMENT — MIFFLIN-ST. JEOR: SCORE: 1855.37

## 2019-07-10 ASSESSMENT — PAIN SCALES - GENERAL: PAINLEVEL: NO PAIN (0)

## 2019-07-10 NOTE — TELEPHONE ENCOUNTER
Called patient to see if patient was planing on est care with Darío or if he made this appointment with him in error. Fredrick is listed as his PCP. Let VM on phone to return call. Please ask patient if he wishes to see Fredrick or aDrío.     Kelli Michelle on 7/10/2019 at 5:11 PM

## 2019-07-10 NOTE — NURSING NOTE
Chief Complaint   Patient presents with     Blood Draw     Labs drawn via VPT by RN in lab. VS taken. pt checked in for next appt     Labs collected from venipuncture by RN. Vitals taken. Checked in for appointment(s).    Sola VOGEL RN PHN BSN  BMT/Oncology Lab

## 2019-07-10 NOTE — NURSING NOTE
"Oncology Rooming Note    July 10, 2019 10:32 AM   Meño Omalley is a 49 year old male who presents for:    Chief Complaint   Patient presents with     Blood Draw     Labs drawn via VPT by RN in lab. VS taken. pt checked in for next appt     Oncology Clinic Visit     Return visit related to Rectal Cancer     Initial Vitals: /90 (BP Location: Left arm, Patient Position: Sitting, Cuff Size: Adult Regular)   Pulse 68   Temp 98.6  F (37  C) (Oral)   Resp 16   Ht 1.753 m (5' 9.02\")   Wt 99.5 kg (219 lb 4.8 oz)   SpO2 97%   BMI 32.37 kg/m   Estimated body mass index is 32.37 kg/m  as calculated from the following:    Height as of this encounter: 1.753 m (5' 9.02\").    Weight as of this encounter: 99.5 kg (219 lb 4.8 oz). Body surface area is 2.2 meters squared.  No Pain (0) Comment: Data Unavailable   No LMP for male patient.  Allergies reviewed: Yes  Medications reviewed: Yes    Medications: Medication refills not needed today.  Pharmacy name entered into EPIC:    [facility] - [place], [place] - [address]  [facility] [place], [place] - [address]    Clinical concerns: No new concerns. Provider was notified.      Luanne Ramirez LPN            "

## 2019-07-10 NOTE — LETTER
"7/10/2019       RE: Danii Angulo  [address] [facility] [address]     Dear Colleague,    Thank you for referring your patient, Danii Angulo, to the [facility]. Please see a copy of my visit note below.    Jul 10, 2019        REASON FOR VISIT:  The patient is a 49 year old male here for followup for rectal cancer, status post surgery and adjuvant therapy, now here for 6-month followup.     Background : refer to treatment history. Stage III colon cancer s.p surgery and adjuvant chemo 6 months - Folfox ( dropped oxaliplatin for last few cycles)     INTERVAL HISTORY:  Danii say that he is doing really well.   He states his bowel and bladder are fine.  He does not have any residual neuropathy at this point.  He has no nausea, vomiting, abdominal pain, chest pain, shortness of breath or fever. Rest 14 point ROS negative .       PMH, PSH, Fam and social history unchanged      PHYSICAL EXAMINATION:   VITAL SIGNS:   /90 (BP Location: Left arm, Patient Position: Sitting, Cuff Size: Adult Regular)   Pulse 68   Temp 98.6  F (37  C) (Oral)   Resp 16   Ht 1.753 m (5' 9.02\")   Wt 99.5 kg (219 lb 4.8 oz)   SpO2 97%   BMI 32.37 kg/m       GENERAL:  Alert and oriented x3.  No apparent distress.   HEENT:  Moist mucous membranes.   ABDOMEN:  Nontender to palpation. No organomegaly appreciated   LOWER EXTREMITIES:  No pedal edema.   Psych - appropriate affect   CNS: CN 2-12 intact , moving all extremities appropriately      LABORATORY EVALUATION:      Results for DANII ANGULO (MRN [id number]) as of 7/11/2019 14:43   Ref. Range 7/10/2019 10:24   Sodium Latest Ref Range: 133 - 144 mmol/L 138   Potassium Latest Ref Range: 3.4 - 5.3 mmol/L 4.1   Chloride Latest Ref Range: 94 - 109 mmol/L 108   Carbon Dioxide Latest Ref Range: 20 - 32 mmol/L 26   Urea Nitrogen Latest Ref Range: 7 - 30 mg/dL 17   Creatinine Latest Ref Range: 0.66 - 1.25 mg/dL 0.80   GFR Estimate Latest Ref Range: >60 " mL/min/1.73_m2 >90   GFR Estimate If Black Latest Ref Range: >60 mL/min/1.73_m2 >90   Calcium Latest Ref Range: 8.5 - 10.1 mg/dL 8.6   Anion Gap Latest Ref Range: 3 - 14 mmol/L 4   Albumin Latest Ref Range: 3.4 - 5.0 g/dL 3.6   Protein Total Latest Ref Range: 6.8 - 8.8 g/dL 7.2   Bilirubin Total Latest Ref Range: 0.2 - 1.3 mg/dL 0.6   Alkaline Phosphatase Latest Ref Range: 40 - 150 U/L 55   ALT Latest Ref Range: 0 - 70 U/L 36   AST Latest Ref Range: 0 - 45 U/L 20   Glucose Latest Ref Range: 70 - 99 mg/dL 100 (H)   WBC Latest Ref Range: 4.0 - 11.0 10e9/L 4.0   Hemoglobin Latest Ref Range: 13.3 - 17.7 g/dL 14.5   Hematocrit Latest Ref Range: 40.0 - 53.0 % 41.8   Platelet Count Latest Ref Range: 150 - 450 10e9/L 260   RBC Count Latest Ref Range: 4.4 - 5.9 10e12/L 4.56   MCV Latest Ref Range: 78 - 100 fl 92   MCH Latest Ref Range: 26.5 - 33.0 pg 31.8   MCHC Latest Ref Range: 31.5 - 36.5 g/dL 34.7   RDW Latest Ref Range: 10.0 - 15.0 % 12.3   Diff Method Unknown Automated Method   % Neutrophils Latest Units: % 55.0   % Lymphocytes Latest Units: % 32.1   % Monocytes Latest Units: % 8.8   % Eosinophils Latest Units: % 2.8   % Basophils Latest Units: % 1.0   % Immature Granulocytes Latest Units: % 0.3   Nucleated RBCs Latest Ref Range: 0 /100 0   Absolute Neutrophil Latest Ref Range: 1.6 - 8.3 10e9/L 2.2   Absolute Lymphocytes Latest Ref Range: 0.8 - 5.3 10e9/L 1.3   Absolute Monocytes Latest Ref Range: 0.0 - 1.3 10e9/L 0.4   Absolute Eosinophils Latest Ref Range: 0.0 - 0.7 10e9/L 0.1   Absolute Basophils Latest Ref Range: 0.0 - 0.2 10e9/L 0.0   Abs Immature Granulocytes Latest Ref Range: 0 - 0.4 10e9/L 0.0   Absolute Nucleated RBC Unknown 0.0   CEA Latest Ref Range: 0 - 2.5 ug/L <0.5       Imaging :     IMPRESSION:  1. Postop changes status post partial colectomy.  2. Diffuse fatty infiltration of the liver.  3. Evidence of chronic benign granulomatous disease of the right lung  and right pulmonary hilum.  4. Mild irregular  urinary bladder wall thickening could represent  cystitis or incomplete distention in the urinary bladder. Other  etiologies are considered less likely. Recommend clinical correlation.  5. No significant change since the prior study. No new evidence for  metastasis is seen.  Colonoscopy : few polyps but otherwise unremarkable RTC in 3 years     ASSESSMENT AND PLAN:  This is a patient with a history of rectal cancer Stage III , status post surgery and adjuvant chemotherapy which he finished in 01/2018, here for followup with restaging scan .  At this point, he does not have any signs or symptoms concerning for recurrent disease.  When he returns in 6 months with labs repeat CT scan at that time.     Cirrhotic configuration of the liver on CT with normal labs. counseled about alcohol use, diet and weight loss     Concern for cystitis on imaging- he endorses no s/s - ordered a UA and if abnormal findings will refer to Urology.     Recommended establishing primary care      I spent 25 minutes in the care of this patient >50% of which was spent in coordinating and counseling.    Jeanette Mcarthur   of Medicine   Hematology and medical Oncology   [facility]    Jul 10, 2019

## 2019-07-10 NOTE — TELEPHONE ENCOUNTER
Please review this message and confirm if this appointment is too far out for what he is being seen for.  If he needs to be seen sooner, please forward this to the schedulers, if not please document and sign encounter.    Thank you,  Cinda HELLER      Appointment For: Meño Omalley ([id number])   Visit Type: MYCHART OFFICE VISIT SHORT (910)      9/24/2019    9:10 AM  20 mins.  Ian Chanel MD     FAMILY PRACTICE      Patient Comments:   Cholesterol ldl levels - liver showing some issues on recent ct scan

## 2019-07-11 NOTE — PROGRESS NOTES
"Jul 10, 2019        REASON FOR VISIT:  The patient is a 49 year old male here for followup for rectal cancer, status post surgery and adjuvant therapy, now here for 6-month followup.     Background : refer to treatment history. Stage III colon cancer s.p surgery and adjuvant chemo 6 months - Folfox ( dropped oxaliplatin for last few cycles)     INTERVAL HISTORY:  Danii say that he is doing really well.   He states his bowel and bladder are fine.  He does not have any residual neuropathy at this point.  He has no nausea, vomiting, abdominal pain, chest pain, shortness of breath or fever. Rest 14 point ROS negative .       PMH, PSH, Fam and social history unchanged      PHYSICAL EXAMINATION:   VITAL SIGNS:   /90 (BP Location: Left arm, Patient Position: Sitting, Cuff Size: Adult Regular)   Pulse 68   Temp 98.6  F (37  C) (Oral)   Resp 16   Ht 1.753 m (5' 9.02\")   Wt 99.5 kg (219 lb 4.8 oz)   SpO2 97%   BMI 32.37 kg/m      GENERAL:  Alert and oriented x3.  No apparent distress.   HEENT:  Moist mucous membranes.   ABDOMEN:  Nontender to palpation. No organomegaly appreciated   LOWER EXTREMITIES:  No pedal edema.   Psych - appropriate affect   CNS: CN 2-12 intact , moving all extremities appropriately      LABORATORY EVALUATION:      Results for DANII ANGULO (MRN [id number]) as of 7/11/2019 14:43   Ref. Range 7/10/2019 10:24   Sodium Latest Ref Range: 133 - 144 mmol/L 138   Potassium Latest Ref Range: 3.4 - 5.3 mmol/L 4.1   Chloride Latest Ref Range: 94 - 109 mmol/L 108   Carbon Dioxide Latest Ref Range: 20 - 32 mmol/L 26   Urea Nitrogen Latest Ref Range: 7 - 30 mg/dL 17   Creatinine Latest Ref Range: 0.66 - 1.25 mg/dL 0.80   GFR Estimate Latest Ref Range: >60 mL/min/1.73_m2 >90   GFR Estimate If Black Latest Ref Range: >60 mL/min/1.73_m2 >90   Calcium Latest Ref Range: 8.5 - 10.1 mg/dL 8.6   Anion Gap Latest Ref Range: 3 - 14 mmol/L 4   Albumin Latest Ref Range: 3.4 - 5.0 g/dL 3.6   Protein Total " Latest Ref Range: 6.8 - 8.8 g/dL 7.2   Bilirubin Total Latest Ref Range: 0.2 - 1.3 mg/dL 0.6   Alkaline Phosphatase Latest Ref Range: 40 - 150 U/L 55   ALT Latest Ref Range: 0 - 70 U/L 36   AST Latest Ref Range: 0 - 45 U/L 20   Glucose Latest Ref Range: 70 - 99 mg/dL 100 (H)   WBC Latest Ref Range: 4.0 - 11.0 10e9/L 4.0   Hemoglobin Latest Ref Range: 13.3 - 17.7 g/dL 14.5   Hematocrit Latest Ref Range: 40.0 - 53.0 % 41.8   Platelet Count Latest Ref Range: 150 - 450 10e9/L 260   RBC Count Latest Ref Range: 4.4 - 5.9 10e12/L 4.56   MCV Latest Ref Range: 78 - 100 fl 92   MCH Latest Ref Range: 26.5 - 33.0 pg 31.8   MCHC Latest Ref Range: 31.5 - 36.5 g/dL 34.7   RDW Latest Ref Range: 10.0 - 15.0 % 12.3   Diff Method Unknown Automated Method   % Neutrophils Latest Units: % 55.0   % Lymphocytes Latest Units: % 32.1   % Monocytes Latest Units: % 8.8   % Eosinophils Latest Units: % 2.8   % Basophils Latest Units: % 1.0   % Immature Granulocytes Latest Units: % 0.3   Nucleated RBCs Latest Ref Range: 0 /100 0   Absolute Neutrophil Latest Ref Range: 1.6 - 8.3 10e9/L 2.2   Absolute Lymphocytes Latest Ref Range: 0.8 - 5.3 10e9/L 1.3   Absolute Monocytes Latest Ref Range: 0.0 - 1.3 10e9/L 0.4   Absolute Eosinophils Latest Ref Range: 0.0 - 0.7 10e9/L 0.1   Absolute Basophils Latest Ref Range: 0.0 - 0.2 10e9/L 0.0   Abs Immature Granulocytes Latest Ref Range: 0 - 0.4 10e9/L 0.0   Absolute Nucleated RBC Unknown 0.0   CEA Latest Ref Range: 0 - 2.5 ug/L <0.5       Imaging :     IMPRESSION:  1. Postop changes status post partial colectomy.  2. Diffuse fatty infiltration of the liver.  3. Evidence of chronic benign granulomatous disease of the right lung  and right pulmonary hilum.  4. Mild irregular urinary bladder wall thickening could represent  cystitis or incomplete distention in the urinary bladder. Other  etiologies are considered less likely. Recommend clinical correlation.  5. No significant change since the prior study. No new  evidence for  metastasis is seen.  Colonoscopy : few polyps but otherwise unremarkable RTC in 3 years     ASSESSMENT AND PLAN:  This is a patient with a history of rectal cancer Stage III , status post surgery and adjuvant chemotherapy which he finished in 01/2018, here for followup with restaging scan .  At this point, he does not have any signs or symptoms concerning for recurrent disease.  When he returns in 6 months with labs repeat CT scan at that time.     Cirrhotic configuration of the liver on CT with normal labs. counseled about alcohol use, diet and weight loss     Concern for cystitis on imaging- he endorses no s/s - ordered a UA and if abnormal findings will refer to Urology.     Recommended establishing primary care      I spent 25 minutes in the care of this patient >50% of which was spent in coordinating and counseling.    Jeanette Mcarthur   of Medicine   Hematology and medical Oncology   [facility]    Jul 10, 2019

## 2019-07-11 NOTE — TELEPHONE ENCOUNTER
"Dr. Alcala has reviewed the CT scan and comments: \"Liver showed some fatty infultration. Not concerning. Blood test on liver function was normal.\" Dr. Alcala recommends that patient follows a diet low in fats. Pt does not need an office visit.   "

## 2019-09-20 ENCOUNTER — TELEPHONE (OUTPATIENT)
Dept: FAMILY MEDICINE | Facility: CLINIC | Age: 49
End: 2019-09-20

## 2019-09-20 NOTE — TELEPHONE ENCOUNTER
Reason for Call:  Other appointment    Detailed comments: patients appointment on 9/24/19 had to be rescheduled, as provider is out of the office. Patient would like to know if he can be worked in before his next available appointment on 1/06/20, for  Cholesterol ldl levels - liver showing some issues on recent ct scan please call to advise. Patient is aware that provider is out of the office today.     Phone Number Patient can be reached at: Cell number on file:    Telephone Information:   Mobile [phone]       Best Time: any    Can we leave a detailed message on this number? YES    Call taken on 9/20/2019 at 11:32 AM by Mona Kraft

## 2019-09-22 NOTE — TELEPHONE ENCOUNTER
We can use an acute visit or  only to get him in within the next month or so.    Electronically signed by:  Ian Chanel M.D.  9/22/2019

## 2019-09-26 NOTE — Clinical Note
Pt in infusion  Prednisone Counseling:  I discussed with the patient the risks of prolonged use of prednisone including but not limited to weight gain, insomnia, osteoporosis, mood changes, diabetes, susceptibility to infection, glaucoma and high blood pressure.  In cases where prednisone use is prolonged, patients should be monitored with blood pressure checks, serum glucose levels and an eye exam.  Additionally, the patient may need to be placed on GI prophylaxis, PCP prophylaxis, and calcium and vitamin D supplementation and/or a bisphosphonate.  The patient verbalized understanding of the proper use and the possible adverse effects of prednisone.  All of the patient's questions and concerns were addressed.

## 2019-10-16 DIAGNOSIS — F33.0 MAJOR DEPRESSIVE DISORDER, RECURRENT EPISODE, MILD (H): ICD-10-CM

## 2019-10-16 RX ORDER — ESCITALOPRAM OXALATE 20 MG/1
TABLET ORAL
Qty: 30 TABLET | Refills: 0 | Status: SHIPPED | OUTPATIENT
Start: 2019-10-16 | End: 2019-12-01

## 2019-10-16 NOTE — TELEPHONE ENCOUNTER
"Lexapro  Last Written Prescription Date:  01/23/2019  Last Fill Quantity: 30,  # refills: 5   Last office visit: 7/20/2018 with prescribing provider:    Future Office Visit:   Next 5 appointments (look out 90 days)    Oct 29, 2019  4:00 PM CDT  SHORT with Ian Chanel MD  [facility] [address] [place] [id number] [phone]        PHQ-9 score:    PHQ-9 SCORE 1/10/2018   PHQ-9 Total Score 0   Medication is being filled for 1 time refill only due to:  pending appointment  Requested Prescriptions   Pending Prescriptions Disp Refills     escitalopram (LEXAPRO) 20 MG tablet [Pharmacy Med Name: ESCITALOPRAM OXALATE 20MG TABS] 30 tablet 5     Sig: TAKE ONE TABLET BY MOUTH EVERY MORNING       SSRIs Protocol Failed - 10/16/2019  7:33 AM        Failed - PHQ-9 score less than 5 in past 6 months     Please review last PHQ-9 score.           Passed - Medication is active on med list        Passed - Patient is age 18 or older        Passed - Recent (6 mo) or future (30 days) visit within the authorizing provider's specialty     Patient had office visit in the last 6 months or has a visit in the next 30 days with authorizing provider or within the authorizing provider's specialty.  See \"Patient Info\" tab in inbasket, or \"Choose Columns\" in Meds & Orders section of the refill encounter.              "

## 2019-10-16 NOTE — TELEPHONE ENCOUNTER
"excitalopram  Last Written Prescription Date:  1/23/2019  Last Fill Quantity: 30,  # refills: 5   Last office visit: 7/20/2018 with prescribing provider:      Future Office Visit:   Next 5 appointments (look out 90 days)    Oct 29, 2019  4:00 PM CDT  SHORT with [facility]el MD  [facility] [address] [place] [id number] [phone]           Requested Prescriptions   Pending Prescriptions Disp Refills     escitalopram (LEXAPRO) 20 MG tablet [Pharmacy Med Name: ESCITALOPRAM OXALATE 20MG TABS] 30 tablet 5     Sig: TAKE ONE TABLET BY MOUTH EVERY MORNING       SSRIs Protocol Failed - 10/16/2019  7:33 AM        Failed - PHQ-9 score less than 5 in past 6 months     Please review last PHQ-9 score.   PHQ-9 SCORE 1/10/2018   PHQ-9 Total Score 0             Passed - Medication is active on med list        Passed - Patient is age 18 or older        Passed - Recent (6 mo) or future (30 days) visit within the authorizing provider's specialty     Patient had office visit in the last 6 months or has a visit in the next 30 days with authorizing provider or within the authorizing provider's specialty.  See \"Patient Info\" tab in inbasket, or \"Choose Columns\" in Meds & Orders section of the refill encounter.              Medication is being filled for 1 time refill only due to:  Patient needs to be seen because it has been more than one year since last visit. Appt made.      Carmella Alcala RN on 10/16/2019 at 10:44 AM    "

## 2019-10-31 ENCOUNTER — OFFICE VISIT (OUTPATIENT)
Dept: FAMILY MEDICINE | Facility: CLINIC | Age: 49
End: 2019-10-31
Payer: COMMERCIAL

## 2019-10-31 VITALS
HEART RATE: 68 BPM | WEIGHT: 223 LBS | DIASTOLIC BLOOD PRESSURE: 74 MMHG | BODY MASS INDEX: 32.92 KG/M2 | RESPIRATION RATE: 18 BRPM | SYSTOLIC BLOOD PRESSURE: 134 MMHG | TEMPERATURE: 97.9 F | OXYGEN SATURATION: 96 %

## 2019-10-31 DIAGNOSIS — Z13.220 LIPID SCREENING: ICD-10-CM

## 2019-10-31 DIAGNOSIS — Z11.4 SCREENING FOR HUMAN IMMUNODEFICIENCY VIRUS WITHOUT PRESENCE OF RISK FACTORS: ICD-10-CM

## 2019-10-31 DIAGNOSIS — J45.990 EXERCISE-INDUCED ASTHMA: ICD-10-CM

## 2019-10-31 DIAGNOSIS — R93.41 ABNORMAL CT SCAN, BLADDER: ICD-10-CM

## 2019-10-31 DIAGNOSIS — Z23 NEED FOR PROPHYLACTIC VACCINATION AND INOCULATION AGAINST INFLUENZA: ICD-10-CM

## 2019-10-31 DIAGNOSIS — K76.0 FATTY LIVER DISEASE, NONALCOHOLIC: ICD-10-CM

## 2019-10-31 PROBLEM — S83.212A BUCKET-HANDLE TEAR OF MEDIAL MENISCUS OF LEFT KNEE AS CURRENT INJURY, INITIAL ENCOUNTER: Status: RESOLVED | Noted: 2018-07-19 | Resolved: 2019-10-31

## 2019-10-31 LAB
ALBUMIN SERPL-MCNC: 3.8 G/DL (ref 3.4–5)
ALP SERPL-CCNC: 56 U/L (ref 40–150)
ALT SERPL W P-5'-P-CCNC: 32 U/L (ref 0–70)
ANION GAP SERPL CALCULATED.3IONS-SCNC: 5 MMOL/L (ref 3–14)
AST SERPL W P-5'-P-CCNC: 15 U/L (ref 0–45)
BILIRUB SERPL-MCNC: 0.5 MG/DL (ref 0.2–1.3)
BUN SERPL-MCNC: 17 MG/DL (ref 7–30)
CALCIUM SERPL-MCNC: 8.7 MG/DL (ref 8.5–10.1)
CHLORIDE SERPL-SCNC: 105 MMOL/L (ref 94–109)
CHOLEST SERPL-MCNC: 216 MG/DL
CO2 SERPL-SCNC: 28 MMOL/L (ref 20–32)
CREAT SERPL-MCNC: 0.8 MG/DL (ref 0.66–1.25)
GFR SERPL CREATININE-BSD FRML MDRD: >90 ML/MIN/{1.73_M2}
GLUCOSE SERPL-MCNC: 90 MG/DL (ref 70–99)
HDLC SERPL-MCNC: 56 MG/DL
LDLC SERPL CALC-MCNC: 127 MG/DL
NONHDLC SERPL-MCNC: 160 MG/DL
POTASSIUM SERPL-SCNC: 4.1 MMOL/L (ref 3.4–5.3)
PROT SERPL-MCNC: 7.5 G/DL (ref 6.8–8.8)
SODIUM SERPL-SCNC: 138 MMOL/L (ref 133–144)
TRIGL SERPL-MCNC: 165 MG/DL

## 2019-10-31 PROCEDURE — 36415 COLL VENOUS BLD VENIPUNCTURE: CPT | Performed by: FAMILY MEDICINE

## 2019-10-31 PROCEDURE — 90471 IMMUNIZATION ADMIN: CPT | Performed by: FAMILY MEDICINE

## 2019-10-31 PROCEDURE — 87389 HIV-1 AG W/HIV-1&-2 AB AG IA: CPT | Performed by: FAMILY MEDICINE

## 2019-10-31 PROCEDURE — 80061 LIPID PANEL: CPT | Performed by: FAMILY MEDICINE

## 2019-10-31 PROCEDURE — 99214 OFFICE O/P EST MOD 30 MIN: CPT | Mod: 25 | Performed by: FAMILY MEDICINE

## 2019-10-31 PROCEDURE — 90686 IIV4 VACC NO PRSV 0.5 ML IM: CPT | Performed by: FAMILY MEDICINE

## 2019-10-31 PROCEDURE — 80053 COMPREHEN METABOLIC PANEL: CPT | Performed by: FAMILY MEDICINE

## 2019-10-31 ASSESSMENT — PAIN SCALES - GENERAL: PAINLEVEL: NO PAIN (0)

## 2019-10-31 NOTE — PROGRESS NOTES
Subjective     Meño Omalley is a 49 year old male who presents to clinic today for the following health issues:    HPI   Chief Complaint   Patient presents with     Results     Review results from the bladder     Imm/Inj     Flu Shot       PROBLEMS TO ADD ON...  We reviewed his CT scan from July 2019.  This was a scan for follow-up of his colon cancer.  That all turned out fine, but there was a comment made about the contour of the bladder being mildly irregular with possible wall thickening.  He has had a kidney stone back in 2018 but other than that has not had any other bladder issues.  He does not see any blood in his urine nor does he have issues with emptying his bladder.    We did review his family history which is significant for colorectal cancer his father and 1 of his sisters who is the oldest of the family.  Many of his siblings have had colon polyps in 1 of his other sisters had another type of cancer but is not sure what.  He is got 6 sisters and 3 brothers for a total of 10 children in the family he is the youngest.  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  He was diagnosed with a fatty liver on 1 of his scans but is not a big drinker himself.  His father had liver failure secondary to nonalcoholic fatty liver.    Patient Active Problem List   Diagnosis     Hyperlipidemia LDL goal <160     Family history of colon cancer     Obesity     Rectal cancer (H)     Adenocarcinoma of rectum (H)     Medial knee pain, left     Exercise-induced asthma     Past Surgical History:   Procedure Laterality Date     ARTHROSCOPY KNEE WITH MEDIAL MENISCECTOMY Left 7/25/2018    Procedure: ARTHROSCOPY KNEE WITH MEDIAL MENISCECTOMY;  Arthroscopy left knee with Meniscal Debridement;  Surgeon: Rashad Skaggs MD;  Location: [facility] OR     LAPAROSCOPIC COLECTOMY LOW ANTERIOR N/A 4/12/2017    Procedure: LAPAROSCOPIC COLECTOMY LOW ANTERIOR;  Surgeon: Milton  MD Ignacio;  Location: [facility] OR     NO HISTORY OF SURGERY       REMOVE PORT VASCULAR ACCESS Right 11/8/2017    Procedure: REMOVE PORT VASCULAR ACCESS;  Right chest Port Removal;  Surgeon: Chuy Steiner PA-C;  Location: [facility] OR     SIGMOIDOSCOPY FLEXIBLE N/A 4/12/2017    Procedure: SIGMOIDOSCOPY FLEXIBLE;  Surgeon: Ignacio Rose MD;  Location: [facility] OR       Social History     Tobacco Use     Smoking status: Never Smoker     Smokeless tobacco: Never Used   Substance Use Topics     Alcohol use: Yes     Comment: beer couple times per months     Family History   Problem Relation Age of Onset     Cancer - colorectal Father 62     C.A.D. Father 59     Hypertension Father      Neurologic Disorder Mother 70        ALS     Other - See Comments Sister         12 yrs older     Abdominal Aortic Aneurysm Sister      Other - See Comments Sister         8 yrs older     Other - See Comments Sister         twin 6 yrs older     Hypertension Sister      Other - See Comments Sister         twin     Hypertension Sister      Other - See Comments Sister         5 yrs older     Other - See Comments Brother         15 yrs older     Colon Polyps Brother      Other - See Comments Brother         14 yrs older     Colon Polyps Brother      Other - See Comments Brother         10 yrs older     Cancer - colorectal Sister 54        17 yrs older     Other - See Comments Son         19 yr old adopted     Other - See Comments Daughter         16 yrs old adopted         Current Outpatient Medications   Medication Sig Dispense Refill     escitalopram (LEXAPRO) 20 MG tablet TAKE ONE TABLET BY MOUTH EVERY MORNING 30 tablet 0     fluticasone (FLOVENT HFA) 110 MCG/ACT inhaler Take 2 puffs by mouth 2 times daily as needed        No Known Allergies  Recent Labs   Lab Test 07/10/19  1024 12/21/18  0805 06/22/18  0804  02/11/13  0949 02/07/13  1650   LDL  --   --   --   --  157*  --    HDL  --   --   --   --  52  --    TRIG  --   --   --   --  110  --    ALT  36 34 39   < >  --  47   CR 0.80 0.99 0.88   < >  --  0.80   GFRESTIMATED >90 >90 >90   < >  --  >90   GFRESTBLACK >90 >90 >90   < >  --  >90   POTASSIUM 4.1 4.5 4.5   < >  --  3.8   TSH  --   --   --   --   --  1.40    < > = values in this interval not displayed.      BP Readings from Last 3 Encounters:   10/31/19 134/74   07/10/19 147/90   12/26/18 122/72    Wt Readings from Last 3 Encounters:   10/31/19 101.2 kg (223 lb)   07/10/19 99.5 kg (219 lb 4.8 oz)   12/26/18 98.9 kg (218 lb)        Reviewed and updated as needed this visit by Provider  Tobacco  Allergies  Meds  Problems  Med Hx  Surg Hx  Fam Hx         Review of Systems   ROS COMP: Constitutional, HEENT, cardiovascular, pulmonary, gi and gu systems are negative, except as otherwise noted.      Objective    /74   Pulse 68   Temp 97.9  F (36.6  C) (Temporal)   Resp 18   Wt 101.2 kg (223 lb)   SpO2 96%   BMI 32.92 kg/m    Body mass index is 32.92 kg/m .  Physical Exam   GENERAL: healthy, alert and no distress  RESP: lungs clear to auscultation - no rales, rhonchi or wheezes  CV: regular rate and rhythm, normal S1 S2, no S3 or S4, no murmur, click or rub, no peripheral edema and peripheral pulses strong  ABDOMEN: Mildly obese with bowel sounds throughout.  His abdomen is soft nontender.  I do not feel a liver edge on exam.  MS: no gross musculoskeletal defects noted, no edema    Diagnostic Test Results:  Labs reviewed in [facility]  Labs will be drawn today.        Assessment & Plan   (R93.41) Abnormal CT scan, bladder  Comment: Abnormal or irregular bladder wall noted on CT scan  Plan: US Abdomen Complete        We are going to get an ultrasound of the bladder pre-and postvoid to look at bladder contour.  I also talked with the radiologist to see if this is the best test to investigate this.    (K76.0) Fatty liver disease, nonalcoholic  Comment: History of fatty liver and a non-heavy drinker.  Plan: Lipid panel reflex to direct LDL Non-fasting,  "        Comprehensive metabolic panel        We are going to check his lipid and conference of profile today.  He may benefit from being on a lipid-lowering agent to try to draw the cholesterol and fat on the liver.    (Z13.220) Lipid screening  Comment: He has not had a lipid screening done since 2013  Plan: Lipid panel reflex to direct LDL Non-fasting        We will do this today nonfasting.    (J45.990) Exercise-induced asthma  Comment: His asthma has been under good control he is not using any regular medications and is typically only exercise-induced.  Plan: No changes diagnosis in his chart.    (Z23) Need for prophylactic vaccination and inoculation against influenza  Comment: He is going to get his flu shot today  Plan: INFLUENZA VACCINE IM > 6 MONTHS VALENT IIV4         [37696]        Given    (Z11.4) Screening for human immunodeficiency virus without presence of risk factors  Comment: He is agreeable to doing a one-time HIV screen today  Plan: HIV Antigen Antibody Combo        Be drawn with his other labs.         BMI:   Estimated body mass index is 32.92 kg/m  as calculated from the following:    Height as of 7/10/19: 1.753 m (5' 9.02\").    Weight as of this encounter: 101.2 kg (223 lb).   Weight management plan: Discussed his weight he is been exercising on a regular basis but he is going to try to change it up a little bit and do some high intensity interval training try to stimulate more metabolism and quicker weight loss.    Return if symptoms worsen or fail to improve.    Electronically signed by:  Ian Chanel M.D.  10/31/2019          "

## 2019-11-01 LAB — HIV 1+2 AB+HIV1 P24 AG SERPL QL IA: NONREACTIVE

## 2019-11-05 ENCOUNTER — HOSPITAL ENCOUNTER (OUTPATIENT)
Dept: ULTRASOUND IMAGING | Facility: CLINIC | Age: 49
Discharge: HOME | End: 2019-11-05
Attending: FAMILY MEDICINE | Admitting: FAMILY MEDICINE
Payer: COMMERCIAL

## 2019-11-05 DIAGNOSIS — R93.41 ABNORMAL CT SCAN, BLADDER: ICD-10-CM

## 2019-11-05 PROCEDURE — 76857 US EXAM PELVIC LIMITED: CPT

## 2020-01-06 ENCOUNTER — HOSPITAL ENCOUNTER (OUTPATIENT)
Dept: CT IMAGING | Facility: CLINIC | Age: 50
Discharge: HOME | End: 2020-01-06
Attending: INTERNAL MEDICINE | Admitting: INTERNAL MEDICINE
Payer: COMMERCIAL

## 2020-01-06 DIAGNOSIS — C20 ADENOCARCINOMA OF RECTUM (H): ICD-10-CM

## 2020-01-06 DIAGNOSIS — N32.89 BLADDER WALL THICKENING: ICD-10-CM

## 2020-01-06 LAB
ALBUMIN SERPL-MCNC: 4.3 G/DL (ref 3.4–5)
ALBUMIN UR-MCNC: NEGATIVE MG/DL
ALP SERPL-CCNC: 56 U/L (ref 40–150)
ALT SERPL W P-5'-P-CCNC: 37 U/L (ref 0–70)
ANION GAP SERPL CALCULATED.3IONS-SCNC: 7 MMOL/L (ref 3–14)
APPEARANCE UR: CLEAR
AST SERPL W P-5'-P-CCNC: 16 U/L (ref 0–45)
BASOPHILS # BLD AUTO: 0.1 10E9/L (ref 0–0.2)
BASOPHILS NFR BLD AUTO: 1.1 %
BILIRUB SERPL-MCNC: 1.2 MG/DL (ref 0.2–1.3)
BILIRUB UR QL STRIP: NEGATIVE
BUN SERPL-MCNC: 14 MG/DL (ref 7–30)
CALCIUM SERPL-MCNC: 9.1 MG/DL (ref 8.5–10.1)
CEA SERPL-MCNC: <0.5 UG/L (ref 0–2.5)
CHLORIDE SERPL-SCNC: 106 MMOL/L (ref 94–109)
CO2 SERPL-SCNC: 27 MMOL/L (ref 20–32)
COLOR UR AUTO: NORMAL
CREAT SERPL-MCNC: 0.93 MG/DL (ref 0.66–1.25)
DIFFERENTIAL METHOD BLD: NORMAL
EOSINOPHIL NFR BLD AUTO: 1.6 %
ERYTHROCYTE [DISTWIDTH] IN BLOOD BY AUTOMATED COUNT: 12.2 % (ref 10–15)
GFR SERPL CREATININE-BSD FRML MDRD: >90 ML/MIN/{1.73_M2}
GLUCOSE SERPL-MCNC: 99 MG/DL (ref 70–99)
GLUCOSE UR STRIP-MCNC: NEGATIVE MG/DL
HCT VFR BLD AUTO: 46.2 % (ref 40–53)
HGB BLD-MCNC: 16 G/DL (ref 13.3–17.7)
HGB UR QL STRIP: NEGATIVE
IMM GRANULOCYTES # BLD: 0 10E9/L (ref 0–0.4)
IMM GRANULOCYTES NFR BLD: 0.2 %
KETONES UR STRIP-MCNC: NEGATIVE MG/DL
LEUKOCYTE ESTERASE UR QL STRIP: NEGATIVE
LYMPHOCYTES # BLD AUTO: 1.5 10E9/L (ref 0.8–5.3)
LYMPHOCYTES NFR BLD AUTO: 34.3 %
MCH RBC QN AUTO: 31.7 PG (ref 26.5–33)
MCHC RBC AUTO-ENTMCNC: 34.6 G/DL (ref 31.5–36.5)
MCV RBC AUTO: 92 FL (ref 78–100)
MONOCYTES # BLD AUTO: 0.4 10E9/L (ref 0–1.3)
MONOCYTES NFR BLD AUTO: 8.6 %
NEUTROPHILS # BLD AUTO: 2.4 10E9/L (ref 1.6–8.3)
NEUTROPHILS NFR BLD AUTO: 54.2 %
NITRATE UR QL: NEGATIVE
NRBC # BLD AUTO: 0 10*3/UL
NRBC BLD AUTO-RTO: 0 /100
PH UR STRIP: 5 PH (ref 5–7)
PLATELET # BLD AUTO: 325 10E9/L (ref 150–450)
POTASSIUM SERPL-SCNC: 4.2 MMOL/L (ref 3.4–5.3)
PROT SERPL-MCNC: 7.9 G/DL (ref 6.8–8.8)
RBC # BLD AUTO: 5.05 10E12/L (ref 4.4–5.9)
SODIUM SERPL-SCNC: 140 MMOL/L (ref 133–144)
SOURCE: NORMAL
SP GR UR STRIP: 1.03 (ref 1–1.03)
UROBILINOGEN UR STRIP-MCNC: 0 MG/DL (ref 0–2)
WBC # BLD AUTO: 4.4 10E9/L (ref 4–11)

## 2020-01-06 PROCEDURE — 25000125 ZZHC RX 250: Performed by: RADIOLOGY

## 2020-01-06 PROCEDURE — 36415 COLL VENOUS BLD VENIPUNCTURE: CPT | Performed by: INTERNAL MEDICINE

## 2020-01-06 PROCEDURE — 80053 COMPREHEN METABOLIC PANEL: CPT | Performed by: INTERNAL MEDICINE

## 2020-01-06 PROCEDURE — 74177 CT ABD & PELVIS W/CONTRAST: CPT

## 2020-01-06 PROCEDURE — 85025 COMPLETE CBC W/AUTO DIFF WBC: CPT | Performed by: INTERNAL MEDICINE

## 2020-01-06 PROCEDURE — 81003 URINALYSIS AUTO W/O SCOPE: CPT | Performed by: INTERNAL MEDICINE

## 2020-01-06 PROCEDURE — 25000128 H RX IP 250 OP 636: Performed by: RADIOLOGY

## 2020-01-06 PROCEDURE — 82378 CARCINOEMBRYONIC ANTIGEN: CPT | Performed by: INTERNAL MEDICINE

## 2020-01-06 RX ORDER — IOPAMIDOL 755 MG/ML
500 INJECTION, SOLUTION INTRAVASCULAR ONCE
Status: COMPLETED | OUTPATIENT
Start: 2020-01-06 | End: 2020-01-06

## 2020-01-06 RX ADMIN — SODIUM CHLORIDE 60 ML: 9 INJECTION, SOLUTION INTRAVENOUS at 09:10

## 2020-01-06 RX ADMIN — IOPAMIDOL 100 ML: 755 INJECTION, SOLUTION INTRAVENOUS at 09:11

## 2020-01-06 NOTE — RESULT ENCOUNTER NOTE
All the labs look acceptable.   Jeanette Mcarthur   of Medicine   Hematology and medical Oncology   [facility]

## 2020-01-08 ENCOUNTER — ONCOLOGY VISIT (OUTPATIENT)
Dept: ONCOLOGY | Facility: CLINIC | Age: 50
End: 2020-01-08
Attending: INTERNAL MEDICINE
Payer: COMMERCIAL

## 2020-01-08 VITALS
OXYGEN SATURATION: 95 % | SYSTOLIC BLOOD PRESSURE: 123 MMHG | BODY MASS INDEX: 32.55 KG/M2 | RESPIRATION RATE: 18 BRPM | WEIGHT: 220.5 LBS | DIASTOLIC BLOOD PRESSURE: 81 MMHG | TEMPERATURE: 98.6 F | HEART RATE: 78 BPM

## 2020-01-08 DIAGNOSIS — C20 ADENOCARCINOMA OF RECTUM (H): Primary | ICD-10-CM

## 2020-01-08 PROCEDURE — 99214 OFFICE O/P EST MOD 30 MIN: CPT | Mod: ZP | Performed by: INTERNAL MEDICINE

## 2020-01-08 PROCEDURE — G0463 HOSPITAL OUTPT CLINIC VISIT: HCPCS | Mod: ZF

## 2020-01-08 ASSESSMENT — PAIN SCALES - GENERAL: PAINLEVEL: NO PAIN (0)

## 2020-01-08 NOTE — NURSING NOTE
"Oncology Rooming Note    January 8, 2020 10:48 AM   Meño Omalley is a 49 year old male who presents for:    Chief Complaint   Patient presents with     Oncology Clinic Visit     Patient with Adenocarcinoma of rectum here for provider visit and lab review      Initial Vitals: /81 (BP Location: Left arm, Patient Position: Chair, Cuff Size: Adult Large)   Pulse 78   Temp 98.6  F (37  C) (Oral)   Resp 18   Wt 100 kg (220 lb 8 oz)   SpO2 95%   BMI 32.55 kg/m   Estimated body mass index is 32.55 kg/m  as calculated from the following:    Height as of 7/10/19: 1.753 m (5' 9.02\").    Weight as of this encounter: 100 kg (220 lb 8 oz). Body surface area is 2.21 meters squared.  No Pain (0) Comment: Data Unavailable   No LMP for male patient.  Allergies reviewed: Yes  Medications reviewed: Yes    Medications: Medication refills not needed today.  Pharmacy name entered into EPIC:    [facility] [address] [place] [address] - [address]  [facility] [place], [place] - [address]    Clinical concerns:     Bridget Garcia CMA              "

## 2020-01-08 NOTE — PROGRESS NOTES
Jan 8, 2020        REASON FOR VISIT:  The patient is a 49 year old male here for followup for rectal cancer, status post surgery and adjuvant therapy, now here for 6-month followup.     Background : refer to treatment history. Stage III colon cancer s.p surgery and adjuvant chemo 6 months - Folfox ( dropped oxaliplatin for last few cycles) finished chemo January 2018     INTERVAL HISTORY:  Danii say that he is doing really well.   He states his bowel and bladder are fine.  He does not have any residual neuropathy at this point.  He has no nausea, vomiting, abdominal pain, chest pain, shortness of breath or fever. Rest 14 point ROS negative . His urinary issues have resolved, he saw a urologist and had cystoscopy which was negative. After the last discussion he has tried to make some changes in his life style but has not last any weight.         PMH, PSH, Fam and social history unchanged . His father in law and brother in law were diagnosed with cancer and there is a lot of social stress that they are dealing with as a family.   .        PHYSICAL EXAMINATION:   VITAL SIGNS:   /81 (BP Location: Left arm, Patient Position: Chair, Cuff Size: Adult Large)   Pulse 78   Temp 98.6  F (37  C) (Oral)   Resp 18   Wt 100 kg (220 lb 8 oz)   SpO2 95%   BMI 32.55 kg/m      GENERAL:  Alert and oriented x3.  No apparent distress.   HEENT:  Moist mucous membranes.   ABDOMEN:  Nontender to palpation. No organomegaly appreciated   LOWER EXTREMITIES:  No pedal edema.   Psych - appropriate affect   CNS: CN 2-12 intact , moving all extremities appropriately      LABORATORY EVALUATION:      Results for DANII ANGULO (MRN [id number]) as of 1/13/2020 15:41   Ref. Range 1/6/2020 08:07 1/6/2020 08:17   Sodium Latest Ref Range: 133 - 144 mmol/L 140    Potassium Latest Ref Range: 3.4 - 5.3 mmol/L 4.2    Chloride Latest Ref Range: 94 - 109 mmol/L 106    Carbon Dioxide Latest Ref Range: 20 - 32 mmol/L 27    Urea Nitrogen Latest  Ref Range: 7 - 30 mg/dL 14    Creatinine Latest Ref Range: 0.66 - 1.25 mg/dL 0.93    GFR Estimate Latest Ref Range: >60 mL/min/1.73_m2 >90    GFR Estimate If Black Latest Ref Range: >60 mL/min/1.73_m2 >90    Calcium Latest Ref Range: 8.5 - 10.1 mg/dL 9.1    Anion Gap Latest Ref Range: 3 - 14 mmol/L 7    Albumin Latest Ref Range: 3.4 - 5.0 g/dL 4.3    Protein Total Latest Ref Range: 6.8 - 8.8 g/dL 7.9    Bilirubin Total Latest Ref Range: 0.2 - 1.3 mg/dL 1.2    Alkaline Phosphatase Latest Ref Range: 40 - 150 U/L 56    ALT Latest Ref Range: 0 - 70 U/L 37    AST Latest Ref Range: 0 - 45 U/L 16    Glucose Latest Ref Range: 70 - 99 mg/dL 99    WBC Latest Ref Range: 4.0 - 11.0 10e9/L 4.4    Hemoglobin Latest Ref Range: 13.3 - 17.7 g/dL 16.0    Hematocrit Latest Ref Range: 40.0 - 53.0 % 46.2    Platelet Count Latest Ref Range: 150 - 450 10e9/L 325    RBC Count Latest Ref Range: 4.4 - 5.9 10e12/L 5.05    MCV Latest Ref Range: 78 - 100 fl 92    MCH Latest Ref Range: 26.5 - 33.0 pg 31.7    MCHC Latest Ref Range: 31.5 - 36.5 g/dL 34.6    RDW Latest Ref Range: 10.0 - 15.0 % 12.2    Diff Method Unknown Automated Method    % Neutrophils Latest Units: % 54.2    % Lymphocytes Latest Units: % 34.3    % Monocytes Latest Units: % 8.6    % Eosinophils Latest Units: % 1.6    % Basophils Latest Units: % 1.1    % Immature Granulocytes Latest Units: % 0.2    Nucleated RBCs Latest Ref Range: 0 /100 0    Absolute Neutrophil Latest Ref Range: 1.6 - 8.3 10e9/L 2.4    Absolute Lymphocytes Latest Ref Range: 0.8 - 5.3 10e9/L 1.5    Absolute Monocytes Latest Ref Range: 0.0 - 1.3 10e9/L 0.4    Absolute Basophils Latest Ref Range: 0.0 - 0.2 10e9/L 0.1    Abs Immature Granulocytes Latest Ref Range: 0 - 0.4 10e9/L 0.0    Absolute Nucleated RBC Unknown 0.0    Color Urine Unknown  Elvia   Appearance Urine Unknown  Clear   Glucose Urine Latest Ref Range: NEG^Negative mg/dL  Negative   Bilirubin Urine Latest Ref Range: NEG^Negative   Negative   Ketones  Urine Latest Ref Range: NEG^Negative mg/dL  Negative   Specific Gravity Urine Latest Ref Range: 1.003 - 1.035   1.029   pH Urine Latest Ref Range: 5.0 - 7.0 pH  5.0   Protein Albumin Urine Latest Ref Range: NEG^Negative mg/dL  Negative   Urobilinogen mg/dL Latest Ref Range: 0.0 - 2.0 mg/dL  0.0   Nitrite Urine Latest Ref Range: NEG^Negative   Negative   Blood Urine Latest Ref Range: NEG^Negative   Negative   Leukocyte Esterase Urine Latest Ref Range: NEG^Negative   Negative   Source Unknown  Unspecified Urine   CEA Latest Ref Range: 0 - 2.5 ug/L <0.5        IMAGING :    CHEST/ABDOMEN/PELVIS WITH CONTRAST  1/6/2020 9:11 AM     HISTORY:  Colon cancer; adenocarcinoma of rectum (H).     TECHNIQUE: Scans obtained of the chest, abdomen, and pelvis with IV  contrast. 100 mL Isovue 370 injected. Radiation dose for this scan was  reduced using automated exposure control, adjustment of the mA and/or  kV according to patient size, or iterative reconstruction technique.     COMPARISON: CT chest, abdomen and pelvis dated 7/8/2019.     FINDINGS:   Chest: Tiny calcified granuloma superomedial right middle lobe (image  119 series 8) is stable. Lungs are otherwise clear without new nodule,  mass, infiltrate, effusion, or pneumothorax.     The heart is grossly normal in size. No mediastinal, hilar or axillary  lymphadenopathy is identified. Calcified lymph nodes in the right  hilar region are again noted. Thoracic aorta is of normal caliber. No  central pulmonary artery filling defects to suggest central pulmonary  artery embolism. This study was not optimized for pulmonary artery  evaluation. Visualized portions of the thyroid are unremarkable.     No aggressive osseous lesions or acute osseous fractures are  identified.     Abdomen and pelvis:  There is persistent diffuse fatty infiltration of  the liver. The liver, gallbladder, pancreas, spleen, bilateral adrenal  glands and bilateral kidneys otherwise enhance normally.  No  hydronephrosis, nephrolithiasis, hydroureter or ureteral calculus is  identified. Urinary bladder is mostly decompressed. There is mild  irregularity of the urinary bladder wall which could represent  trabeculation in the appropriate setting. This is similar to the prior  study. Prostate gland is mildly enlarged but otherwise unremarkable.     No adenopathy, free fluid or free air is seen in the peritoneal  cavity.     The colon is of normal caliber without pericolonic inflammatory change  to suggest acute diverticulitis. Postop changes in the rectosigmoid  region are again noted. A moderate amount of stool is seen in the  colon. Appendix is normal in appearance and extends posteriorly,  inferiorly, and medially from the cecum. Small bowel contains fluid  but is otherwise unremarkable. The stomach is mostly decompressed but  otherwise unremarkable.                                                                      IMPRESSION:  1. Postop changes status post partial distal colonic resection are  again noted.  2. Stable evidence of chronic granulomatous disease of the right lung  and hilum.  3. Diffuse fatty infiltration of the liver.  4. Mild irregularity of the urinary bladder wall could represent  cystitis or incomplete distention versus chronic bladder outlet  obstruction. This has not changed.  5. No new evidence for metastasis. No significant change since the  prior study dated 7/8/2019.  Colonoscopy : few polyps but otherwise unremarkable RTC in 3 years     ASSESSMENT AND PLAN:  This is a patient with a history of rectal cancer Stage III , status post surgery and adjuvant chemotherapy which he finished in 01/2018, here for followup with restaging scan .  At this point, he does not have any signs or symptoms concerning for recurrent disease.  When he returns in 6 mths with visit and 1 year  with labs repeat CT scan at that time.     Cirrhotic configuration of the liver on CT with normal labs. counseled  about alcohol use, diet and weight loss- referral to dietician        Recommended establishing primary care      I spent 25 minutes in the care of this patient >50% of which was spent in coordinating and counseling.    Jeanette Mcarthur   of Medicine   Hematology and medical Oncology   [facility]    Jan 8, 2020

## 2020-01-08 NOTE — LETTER
RE: Danii Angulo  [address] [id number]     Dear Colleague,    Thank you for referring your patient, Danii Angulo, to the [facility]. Please see a copy of my visit note below.    Jan 8, 2020    REASON FOR VISIT:  The patient is a 49 year old male here for followup for rectal cancer, status post surgery and adjuvant therapy, now here for 6-month followup.     Background : refer to treatment history. Stage III colon cancer s.p surgery and adjuvant chemo 6 months - Folfox ( dropped oxaliplatin for last few cycles) finished chemo January 2018     INTERVAL HISTORY:  Danii say that he is doing really well.   He states his bowel and bladder are fine.  He does not have any residual neuropathy at this point.  He has no nausea, vomiting, abdominal pain, chest pain, shortness of breath or fever. Rest 14 point ROS negative . His urinary issues have resolved, he saw a urologist and had cystoscopy which was negative. After the last discussion he has tried to make some changes in his life style but has not last any weight.       PMH, PSH, Fam and social history unchanged . His father in law and brother in law were diagnosed with cancer and there is a lot of social stress that they are dealing with as a family.        PHYSICAL EXAMINATION:   VITAL SIGNS:   /81 (BP Location: Left arm, Patient Position: Chair, Cuff Size: Adult Large)   Pulse 78   Temp 98.6  F (37  C) (Oral)   Resp 18   Wt 100 kg (220 lb 8 oz)   SpO2 95%   BMI 32.55 kg/m       GENERAL:  Alert and oriented x3.  No apparent distress.   HEENT:  Moist mucous membranes.   ABDOMEN:  Nontender to palpation. No organomegaly appreciated   LOWER EXTREMITIES:  No pedal edema.   Psych - appropriate affect   CNS: CN 2-12 intact , moving all extremities appropriately      LABORATORY EVALUATION:      Results for DANII ANGULO (MRN [id number]) as of 1/13/2020 15:41   Ref. Range 1/6/2020 08:07 1/6/2020 08:17    Sodium Latest Ref Range: 133 - 144 mmol/L 140    Potassium Latest Ref Range: 3.4 - 5.3 mmol/L 4.2    Chloride Latest Ref Range: 94 - 109 mmol/L 106    Carbon Dioxide Latest Ref Range: 20 - 32 mmol/L 27    Urea Nitrogen Latest Ref Range: 7 - 30 mg/dL 14    Creatinine Latest Ref Range: 0.66 - 1.25 mg/dL 0.93    GFR Estimate Latest Ref Range: >60 mL/min/1.73_m2 >90    GFR Estimate If Black Latest Ref Range: >60 mL/min/1.73_m2 >90    Calcium Latest Ref Range: 8.5 - 10.1 mg/dL 9.1    Anion Gap Latest Ref Range: 3 - 14 mmol/L 7    Albumin Latest Ref Range: 3.4 - 5.0 g/dL 4.3    Protein Total Latest Ref Range: 6.8 - 8.8 g/dL 7.9    Bilirubin Total Latest Ref Range: 0.2 - 1.3 mg/dL 1.2    Alkaline Phosphatase Latest Ref Range: 40 - 150 U/L 56    ALT Latest Ref Range: 0 - 70 U/L 37    AST Latest Ref Range: 0 - 45 U/L 16    Glucose Latest Ref Range: 70 - 99 mg/dL 99    WBC Latest Ref Range: 4.0 - 11.0 10e9/L 4.4    Hemoglobin Latest Ref Range: 13.3 - 17.7 g/dL 16.0    Hematocrit Latest Ref Range: 40.0 - 53.0 % 46.2    Platelet Count Latest Ref Range: 150 - 450 10e9/L 325    RBC Count Latest Ref Range: 4.4 - 5.9 10e12/L 5.05    MCV Latest Ref Range: 78 - 100 fl 92    MCH Latest Ref Range: 26.5 - 33.0 pg 31.7    MCHC Latest Ref Range: 31.5 - 36.5 g/dL 34.6    RDW Latest Ref Range: 10.0 - 15.0 % 12.2    Diff Method Unknown Automated Method    % Neutrophils Latest Units: % 54.2    % Lymphocytes Latest Units: % 34.3    % Monocytes Latest Units: % 8.6    % Eosinophils Latest Units: % 1.6    % Basophils Latest Units: % 1.1    % Immature Granulocytes Latest Units: % 0.2    Nucleated RBCs Latest Ref Range: 0 /100 0    Absolute Neutrophil Latest Ref Range: 1.6 - 8.3 10e9/L 2.4    Absolute Lymphocytes Latest Ref Range: 0.8 - 5.3 10e9/L 1.5    Absolute Monocytes Latest Ref Range: 0.0 - 1.3 10e9/L 0.4    Absolute Basophils Latest Ref Range: 0.0 - 0.2 10e9/L 0.1    Abs Immature Granulocytes Latest Ref Range: 0 - 0.4 10e9/L 0.0     Absolute Nucleated RBC Unknown 0.0    Color Urine Unknown  Elvia   Appearance Urine Unknown  Clear   Glucose Urine Latest Ref Range: NEG^Negative mg/dL  Negative   Bilirubin Urine Latest Ref Range: NEG^Negative   Negative   Ketones Urine Latest Ref Range: NEG^Negative mg/dL  Negative   Specific Gravity Urine Latest Ref Range: 1.003 - 1.035   1.029   pH Urine Latest Ref Range: 5.0 - 7.0 pH  5.0   Protein Albumin Urine Latest Ref Range: NEG^Negative mg/dL  Negative   Urobilinogen mg/dL Latest Ref Range: 0.0 - 2.0 mg/dL  0.0   Nitrite Urine Latest Ref Range: NEG^Negative   Negative   Blood Urine Latest Ref Range: NEG^Negative   Negative   Leukocyte Esterase Urine Latest Ref Range: NEG^Negative   Negative   Source Unknown  Unspecified Urine   CEA Latest Ref Range: 0 - 2.5 ug/L <0.5        IMAGING :    CHEST/ABDOMEN/PELVIS WITH CONTRAST  1/6/2020 9:11 AM     HISTORY:  Colon cancer; adenocarcinoma of rectum (H).     TECHNIQUE: Scans obtained of the chest, abdomen, and pelvis with IV  contrast. 100 mL Isovue 370 injected. Radiation dose for this scan was  reduced using automated exposure control, adjustment of the mA and/or  kV according to patient size, or iterative reconstruction technique.     COMPARISON: CT chest, abdomen and pelvis dated 7/8/2019.     FINDINGS:   Chest: Tiny calcified granuloma superomedial right middle lobe (image  119 series 8) is stable. Lungs are otherwise clear without new nodule,  mass, infiltrate, effusion, or pneumothorax.     The heart is grossly normal in size. No mediastinal, hilar or axillary  lymphadenopathy is identified. Calcified lymph nodes in the right  hilar region are again noted. Thoracic aorta is of normal caliber. No  central pulmonary artery filling defects to suggest central pulmonary  artery embolism. This study was not optimized for pulmonary artery  evaluation. Visualized portions of the thyroid are unremarkable.     No aggressive osseous lesions or acute osseous  fractures are  identified.     Abdomen and pelvis:  There is persistent diffuse fatty infiltration of  the liver. The liver, gallbladder, pancreas, spleen, bilateral adrenal  glands and bilateral kidneys otherwise enhance normally. No  hydronephrosis, nephrolithiasis, hydroureter or ureteral calculus is  identified. Urinary bladder is mostly decompressed. There is mild  irregularity of the urinary bladder wall which could represent  trabeculation in the appropriate setting. This is similar to the prior  study. Prostate gland is mildly enlarged but otherwise unremarkable.     No adenopathy, free fluid or free air is seen in the peritoneal  cavity.     The colon is of normal caliber without pericolonic inflammatory change  to suggest acute diverticulitis. Postop changes in the rectosigmoid  region are again noted. A moderate amount of stool is seen in the  colon. Appendix is normal in appearance and extends posteriorly,  inferiorly, and medially from the cecum. Small bowel contains fluid  but is otherwise unremarkable. The stomach is mostly decompressed but  otherwise unremarkable.                                                                      IMPRESSION:  1. Postop changes status post partial distal colonic resection are  again noted.  2. Stable evidence of chronic granulomatous disease of the right lung  and hilum.  3. Diffuse fatty infiltration of the liver.  4. Mild irregularity of the urinary bladder wall could represent  cystitis or incomplete distention versus chronic bladder outlet  obstruction. This has not changed.  5. No new evidence for metastasis. No significant change since the  prior study dated 7/8/2019.  Colonoscopy : few polyps but otherwise unremarkable RTC in 3 years     ASSESSMENT AND PLAN:  This is a patient with a history of rectal cancer Stage III , status post surgery and adjuvant chemotherapy which he finished in 01/2018, here for followup with restaging scan .  At this point, he does  not have any signs or symptoms concerning for recurrent disease.  When he returns in 6 mths with visit and 1 year  with labs repeat CT scan at that time.     Cirrhotic configuration of the liver on CT with normal labs. counseled about alcohol use, diet and weight loss- referral to dietician        Recommended establishing primary care      I spent 25 minutes in the care of this patient >50% of which was spent in coordinating and counseling.    Jeanette Mcarthur   of Medicine   Hematology and medical Oncology   [facility]    Jan 8, 2020

## 2020-01-09 ENCOUNTER — TELEPHONE (OUTPATIENT)
Dept: ONCOLOGY | Facility: CLINIC | Age: 50
End: 2020-01-09

## 2020-01-09 NOTE — TELEPHONE ENCOUNTER
----- Message from Jeanette Mcarthur MD sent at 1/6/2020  9:13 AM CST -----  All the labs look acceptable.   Jeanette Mcarthur   of Medicine   Hematology and medical Oncology   [facility]

## 2020-02-07 ENCOUNTER — MYC MEDICAL ADVICE (OUTPATIENT)
Dept: FAMILY MEDICINE | Facility: CLINIC | Age: 50
End: 2020-02-07

## 2020-03-01 ENCOUNTER — HEALTH MAINTENANCE LETTER (OUTPATIENT)
Age: 50
End: 2020-03-01

## 2020-07-08 ENCOUNTER — VIRTUAL VISIT (OUTPATIENT)
Dept: ONCOLOGY | Facility: CLINIC | Age: 50
End: 2020-07-08
Attending: PHYSICIAN ASSISTANT
Payer: COMMERCIAL

## 2020-07-08 DIAGNOSIS — C20 ADENOCARCINOMA OF RECTUM (H): ICD-10-CM

## 2020-07-08 LAB
ALBUMIN SERPL-MCNC: 3.5 G/DL (ref 3.4–5)
ALP SERPL-CCNC: 51 U/L (ref 40–150)
ALT SERPL W P-5'-P-CCNC: 38 U/L (ref 0–70)
ANION GAP SERPL CALCULATED.3IONS-SCNC: 6 MMOL/L (ref 3–14)
AST SERPL W P-5'-P-CCNC: 22 U/L (ref 0–45)
BASOPHILS # BLD AUTO: 0.1 10E9/L (ref 0–0.2)
BASOPHILS NFR BLD AUTO: 1.4 %
BILIRUB SERPL-MCNC: 0.5 MG/DL (ref 0.2–1.3)
BUN SERPL-MCNC: 13 MG/DL (ref 7–30)
CALCIUM SERPL-MCNC: 8.6 MG/DL (ref 8.5–10.1)
CHLORIDE SERPL-SCNC: 108 MMOL/L (ref 94–109)
CO2 SERPL-SCNC: 25 MMOL/L (ref 20–32)
CREAT SERPL-MCNC: 0.91 MG/DL (ref 0.66–1.25)
DIFFERENTIAL METHOD BLD: NORMAL
EOSINOPHIL NFR BLD AUTO: 4.2 %
ERYTHROCYTE [DISTWIDTH] IN BLOOD BY AUTOMATED COUNT: 12.1 % (ref 10–15)
GFR SERPL CREATININE-BSD FRML MDRD: >90 ML/MIN/{1.73_M2}
GLUCOSE SERPL-MCNC: 120 MG/DL (ref 70–99)
HCT VFR BLD AUTO: 42.1 % (ref 40–53)
HGB BLD-MCNC: 14.6 G/DL (ref 13.3–17.7)
IMM GRANULOCYTES # BLD: 0 10E9/L (ref 0–0.4)
IMM GRANULOCYTES NFR BLD: 0.2 %
LYMPHOCYTES # BLD AUTO: 1.4 10E9/L (ref 0.8–5.3)
LYMPHOCYTES NFR BLD AUTO: 32.3 %
MCH RBC QN AUTO: 31.8 PG (ref 26.5–33)
MCHC RBC AUTO-ENTMCNC: 34.7 G/DL (ref 31.5–36.5)
MCV RBC AUTO: 92 FL (ref 78–100)
MONOCYTES # BLD AUTO: 0.3 10E9/L (ref 0–1.3)
MONOCYTES NFR BLD AUTO: 7.2 %
NEUTROPHILS # BLD AUTO: 2.4 10E9/L (ref 1.6–8.3)
NEUTROPHILS NFR BLD AUTO: 54.7 %
NRBC # BLD AUTO: 0 10*3/UL
NRBC BLD AUTO-RTO: 0 /100
PLATELET # BLD AUTO: 265 10E9/L (ref 150–450)
POTASSIUM SERPL-SCNC: 4.2 MMOL/L (ref 3.4–5.3)
PROT SERPL-MCNC: 7 G/DL (ref 6.8–8.8)
RBC # BLD AUTO: 4.59 10E12/L (ref 4.4–5.9)
SODIUM SERPL-SCNC: 139 MMOL/L (ref 133–144)
WBC # BLD AUTO: 4.3 10E9/L (ref 4–11)

## 2020-07-08 PROCEDURE — 40001009 ZZH VIDEO/TELEPHONE VISIT; NO CHARGE

## 2020-07-08 PROCEDURE — 80053 COMPREHEN METABOLIC PANEL: CPT | Performed by: INTERNAL MEDICINE

## 2020-07-08 PROCEDURE — 99213 OFFICE O/P EST LOW 20 MIN: CPT | Mod: 95 | Performed by: PHYSICIAN ASSISTANT

## 2020-07-08 PROCEDURE — 36415 COLL VENOUS BLD VENIPUNCTURE: CPT | Performed by: INTERNAL MEDICINE

## 2020-07-08 PROCEDURE — 85025 COMPLETE CBC W/AUTO DIFF WBC: CPT | Performed by: INTERNAL MEDICINE

## 2020-07-08 NOTE — LETTER
7/8/2020         RE: Meño Omalley  [address] [address] [address]        Dear Colleague,    Thank you for referring your patient, Meño Omalley, to the [facility]. Please see a copy of my visit note below.    Meño Omalley is a 50 year old male who is being evaluated via a billable video visit.          Secondary Video Option ([employer]), send text message to:  [phone]    I have reviewed and updated the patient's allergies and medication list. Patient was asked if they had any patient reported vital signs to present, if yes, please see documented vitals.  Patient was also asked for their current weight and height, if presented, documented in vitals.    Concerns: Patient has no new concerns.    Refills: None    MADI Garrison      Oncology/Hematology Visit Note  Jul 8, 2020    Reason for Visit: follow up of rectal cancer    History of Present Illness: Meño Omalley is a 50 year old male with a history of rectal cancer. He initially presented to his primary care provider with blood in his stool for a few months and a change in bowel habits with the stool being more loose. Family history of father who developed colon cancer at age 60, and a sister who developed colon cancer at age 55.  He presented for colonoscopy with Dr. Delio Daniel on 03/24/2017.  Findings were several small polyps and a possible carcinoma of the rectosigmoid junction measured from 17-21 cm.  Biopsies obtained were remarkable for adenocarcinoma.  CT scan of the chest, abdomen and pelvis was done, which confirmed the presence of possible thickening in the rectosigmoid junction, but no evidence of metastases.  A 3D MRI was pursued on 03/31, and it showed circumferential wall thickening of the upper rectum/rectosigmoid with minimal extension into the mesorectal fat, consistent with rectal cancer, stage IIIB, N1.  His case was discussed in the Tumor Board, and the decision was made to forego  chemoradiation, but to go straight to surgery, and he underwent a low anterior resection. Surgery took place on 4/12/2017. The pathology on the tumor was moderately differentiated adenocarcinoma invading the muscularis propria with a tumor size of 3.7 cm all negative  carcinoma margins.  Lymphovascular invasion was not identified.  Two non-renea tumor deposits were identified, and 6 lymph nodes were negative for metastatic carcinoma.  He was staged by pathological staging and pT2 N1c.  The mismatch repair testing was done on his original biopsy, and he had a normal pattern of mismatch repair protein expression, ruling out Rodriguez syndrome.  He started adjuvant chemotherapy with FOLFOX on 5/24/17. After 7 cycles, he chose to pursue only 5FU and completed a total of 12 cycles of chemotherapy, last on 11/1/2017. He has had no evidence of recurrent disease since that time. Please see previous notes for further details on the patient's history. He is here today for routine follow up via video.    Interval History:  -Has no concerns.  -Appetite has been doing well.   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  -Has gone on some walks and fishing. Was on vacation last week in [place].     Review of Systems:  Patient denies any of the following except if noted above: fevers, chills, difficulty with energy, vision or hearing changes, chest pain, dyspnea, abdominal pain, nausea, vomiting, diarrhea, constipation, urinary concerns, headaches, numbness, tingling, issues with sleep or mood. He also denies lumps, bumps, rashes, or bleeding issues.    Current Outpatient Medications   Medication Sig Dispense Refill     escitalopram (LEXAPRO) 20 MG tablet TAKE ONE TABLET BY MOUTH EVERY MORNING 30 tablet 5     fluticasone (FLOVENT HFA) 110 MCG/ACT inhaler Take 2 puffs by mouth 2 times daily as needed        Video physical exam  General: Patient appears well in no acute distress. Obese body habitus.  Skin: No visualized  rash or lesions on visualized skin  Eyes: EOMI, no erythema, sclera icterus or discharge noted  Resp: Appears to be breathing comfortably without accessory muscle usage, speaking in full sentences, no cough  MSK: Appears to have normal range of motion based on visualized movements  Neurologic: No apparent tremors, facial movements symmetric  Psych: affect normal, alert and oriented    The rest of a comprehensive physical examination is deferred due to PHE (public health emergency) video restrictions    Laboratory Data:   7/8/2020 08:15   Sodium 139   Potassium 4.2   Chloride 108   Carbon Dioxide 25   Urea Nitrogen 13   Creatinine 0.91   GFR Estimate >90   GFR Estimate If Black >90   Calcium 8.6   Anion Gap 6   Albumin 3.5   Protein Total 7.0   Bilirubin Total 0.5   Alkaline Phosphatase 51   ALT 38   AST 22   Glucose 120 (H)   WBC 4.3   Hemoglobin 14.6   Hematocrit 42.1   Platelet Count 265   RBC Count 4.59   MCV 92   MCH 31.8   MCHC 34.7   RDW 12.1   Diff Method Automated Method   % Neutrophils 54.7   % Lymphocytes 32.3   % Monocytes 7.2   % Eosinophils 4.2   % Basophils 1.4   % Immature Granulocytes 0.2   Nucleated RBCs 0   Absolute Neutrophil 2.4   Absolute Lymphocytes 1.4   Absolute Monocytes 0.3   Absolute Basophils 0.1   Abs Immature Granulocytes 0.0   Absolute Nucleated RBC 0.0       Assessment and Plan:  Rectal cancer.   Patient underwent surgical resection, followed by adjuvant chemotherapy with FOLFOX as detailed above. He is doing well today with no concerns for recurrent disease at this time. His labs are unremarkable. He will follow-up with Dr. Mcarthur in 6 months with a CT CAP and labs. He will call sooner for concerns. Next colonoscopy is due in June 2021.     Health care maintenance.  Eye exams: Recommend exams at least every 2 years. Last was early March 2020. Had Lasix 2 years ago and has had some changes. Considering repeat Lasix.  Dental exams: Recommend exams and cleanings every 6 months. Last was  within the last year. Will consider scheduling follow-up.  Primary care: Recommend follow up at least annually. Follows at [facility]. Last visit was last fall. Recommend follow-up this fall.   Skin care: Recommend the use of sunscreen with SPF of at least 30, reapplying every 2 hours with prolonged sun exposure. Patient occasionally uses sunscreen.   Tobacco use: Recommend abstaining. Denies use.  Alcohol use: Recommend no more than 2/day for men. Drinks alcohol occasionally, a little more on his vacation.   Illicit drug use: Recommend abstaining. Denies any use.  Physical activity: Recommend regular activity, ideally 150 minutes/week of moderate intensity activity. Encouraged increasing activity.     Zahira Christensen PA-C  [facility] [place] [address]  [phone]    Video-Visit Details    Type of service:  Video Visit    Video Start Time: 10:27 AM  Video End Time: 10:34 AM    Originating Location (pt. Location): Home    Distant Location (provider location):  [facility]     Platform used for Video Visit: Michael Christensen PA-C  [facility] [place] [address]  [phone]            Again, thank you for allowing me to participate in the care of your patient.        Sincerely,        Zahira Christensen PA-C

## 2020-07-08 NOTE — PROGRESS NOTES
"Meño Omalley is a 50 year old male who is being evaluated via a billable video visit.      The patient has been notified of following:     \"This video visit will be conducted via a call between you and your physician/provider. We have found that certain health care needs can be provided without the need for an in-person physical exam.  This service lets us provide the care you need with a video conversation.  If a prescription is necessary we can send it directly to your pharmacy.  If lab work is needed we can place an order for that and you can then stop by our lab to have the test done at a later time.    Video visits are billed at different rates depending on your insurance coverage.  Please reach out to your insurance provider with any questions.    If during the course of the call the physician/provider feels a video visit is not appropriate, you will not be charged for this service.\"    Patient has given verbal consent for Video visit? Yes    How would you like to obtain your AVS? MyChart     Will anyone else be joining your video visit? No     Secondary Video Option (Doximity), send text message to:  [phone]    I have reviewed and updated the patient's allergies and medication list. Patient was asked if they had any patient reported vital signs to present, if yes, please see documented vitals.  Patient was also asked for their current weight and height, if presented, documented in vitals.    Concerns: Patient has no new concerns.    Refills: None    MADI Garrison      Oncology/Hematology Visit Note  Jul 8, 2020    Reason for Visit: follow up of rectal cancer    History of Present Illness: Meño Omalley is a 50 year old male with a history of rectal cancer. He initially presented to his primary care provider with blood in his stool for a few months and a change in bowel habits with the stool being more loose. Family history of father who developed colon cancer at age 60, and a sister who " developed colon cancer at age 55.  He presented for colonoscopy with Dr. Delio Daniel on 03/24/2017.  Findings were several small polyps and a possible carcinoma of the rectosigmoid junction measured from 17-21 cm.  Biopsies obtained were remarkable for adenocarcinoma.  CT scan of the chest, abdomen and pelvis was done, which confirmed the presence of possible thickening in the rectosigmoid junction, but no evidence of metastases.  A 3D MRI was pursued on 03/31, and it showed circumferential wall thickening of the upper rectum/rectosigmoid with minimal extension into the mesorectal fat, consistent with rectal cancer, stage IIIB, N1.  His case was discussed in the Tumor Board, and the decision was made to forego chemoradiation, but to go straight to surgery, and he underwent a low anterior resection. Surgery took place on 4/12/2017. The pathology on the tumor was moderately differentiated adenocarcinoma invading the muscularis propria with a tumor size of 3.7 cm all negative  carcinoma margins.  Lymphovascular invasion was not identified.  Two non-renea tumor deposits were identified, and 6 lymph nodes were negative for metastatic carcinoma.  He was staged by pathological staging and pT2 N1c.  The mismatch repair testing was done on his original biopsy, and he had a normal pattern of mismatch repair protein expression, ruling out Rodriguez syndrome.  He started adjuvant chemotherapy with FOLFOX on 5/24/17. After 7 cycles, he chose to pursue only 5FU and completed a total of 12 cycles of chemotherapy, last on 11/1/2017. He has had no evidence of recurrent disease since that time. Please see previous notes for further details on the patient's history. He is here today for routine follow up via video.    Interval History:  -Has no concerns.  -Appetite has been doing well.   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  -Has gone on some walks and fishing. Was on vacation last week in [place].     Review  of Systems:  Patient denies any of the following except if noted above: fevers, chills, difficulty with energy, vision or hearing changes, chest pain, dyspnea, abdominal pain, nausea, vomiting, diarrhea, constipation, urinary concerns, headaches, numbness, tingling, issues with sleep or mood. He also denies lumps, bumps, rashes, or bleeding issues.    Current Outpatient Medications   Medication Sig Dispense Refill     escitalopram (LEXAPRO) 20 MG tablet TAKE ONE TABLET BY MOUTH EVERY MORNING 30 tablet 5     fluticasone (FLOVENT HFA) 110 MCG/ACT inhaler Take 2 puffs by mouth 2 times daily as needed        Video physical exam  General: Patient appears well in no acute distress. Obese body habitus.  Skin: No visualized rash or lesions on visualized skin  Eyes: EOMI, no erythema, sclera icterus or discharge noted  Resp: Appears to be breathing comfortably without accessory muscle usage, speaking in full sentences, no cough  MSK: Appears to have normal range of motion based on visualized movements  Neurologic: No apparent tremors, facial movements symmetric  Psych: affect normal, alert and oriented    The rest of a comprehensive physical examination is deferred due to PHE (public health emergency) video restrictions    Laboratory Data:   7/8/2020 08:15   Sodium 139   Potassium 4.2   Chloride 108   Carbon Dioxide 25   Urea Nitrogen 13   Creatinine 0.91   GFR Estimate >90   GFR Estimate If Black >90   Calcium 8.6   Anion Gap 6   Albumin 3.5   Protein Total 7.0   Bilirubin Total 0.5   Alkaline Phosphatase 51   ALT 38   AST 22   Glucose 120 (H)   WBC 4.3   Hemoglobin 14.6   Hematocrit 42.1   Platelet Count 265   RBC Count 4.59   MCV 92   MCH 31.8   MCHC 34.7   RDW 12.1   Diff Method Automated Method   % Neutrophils 54.7   % Lymphocytes 32.3   % Monocytes 7.2   % Eosinophils 4.2   % Basophils 1.4   % Immature Granulocytes 0.2   Nucleated RBCs 0   Absolute Neutrophil 2.4   Absolute Lymphocytes 1.4   Absolute Monocytes 0.3    Absolute Basophils 0.1   Abs Immature Granulocytes 0.0   Absolute Nucleated RBC 0.0       Assessment and Plan:  Rectal cancer.   Patient underwent surgical resection, followed by adjuvant chemotherapy with FOLFOX as detailed above. He is doing well today with no concerns for recurrent disease at this time. His labs are unremarkable. He will follow-up with Dr. Mcarthur in 6 months with a CT CAP and labs. He will call sooner for concerns. Next colonoscopy is due in June 2021.     Health care maintenance.  Eye exams: Recommend exams at least every 2 years. Last was early March 2020. Had Lasix 2 years ago and has had some changes. Considering repeat Lasix.  Dental exams: Recommend exams and cleanings every 6 months. Last was within the last year. Will consider scheduling follow-up.  Primary care: Recommend follow up at least annually. Follows at [facility]. Last visit was last fall. Recommend follow-up this fall.   Skin care: Recommend the use of sunscreen with SPF of at least 30, reapplying every 2 hours with prolonged sun exposure. Patient occasionally uses sunscreen.   Tobacco use: Recommend abstaining. Denies use.  Alcohol use: Recommend no more than 2/day for men. Drinks alcohol occasionally, a little more on his vacation.   Illicit drug use: Recommend abstaining. Denies any use.  Physical activity: Recommend regular activity, ideally 150 minutes/week of moderate intensity activity. Encouraged increasing activity.     Zahira Christensen PA-C  [facility] [address] [address]  [phone]    Video-Visit Details    Type of service:  Video Visit    Video Start Time: 10:27 AM  Video End Time: 10:34 AM    Originating Location (pt. Location): Home    Distant Location (provider location):  [facility]     Platform used for Video Visit: Michael Christensen PA-C  [facility] [address] [address]  [phone]

## 2020-10-27 DIAGNOSIS — F33.0 MAJOR DEPRESSIVE DISORDER, RECURRENT EPISODE, MILD (H): ICD-10-CM

## 2020-10-27 NOTE — TELEPHONE ENCOUNTER
"Routing refill request to provider for review/approval because:  A break in medication  PHQ9 score not current  T'd up 1 month for provider review.    Sending to [facility] primary for update on PHQ9      Requested Prescriptions   Pending Prescriptions Disp Refills     escitalopram (LEXAPRO) 20 MG tablet [Pharmacy Med Name: ESCITALOPRAM OXALATE 20MG TABS] 30 tablet 5     Sig: TAKE ONE TABLET BY MOUTH EVERY MORNING   Last Written Prescription Date:  12/4/2019  Last Fill Quantity: 30,  # refills: 5   Last office visit: 10/31/2019    Future Office Visit:        SSRIs Protocol Failed - 10/27/2020  8:43 AM        Failed - PHQ-9 score less than 5 in past 6 months     Please review last PHQ-9 score.   PHQ-9 score:    PHQ 1/10/2018   PHQ-9 Total Score 0   Q9: Thoughts of better off dead/self-harm past 2 weeks Not at all           Failed - Recent (6 mo) or future (30 days) visit within the authorizing provider's specialty     Patient had office visit in the last 6 months or has a visit in the next 30 days with authorizing provider or within the authorizing provider's specialty.  See \"Patient Info\" tab in inbasket, or \"Choose Columns\" in Meds & Orders section of the refill encounter.            Passed - Medication is active on med list        Passed - Patient is age 18 or older         Nidia Faust RN      "

## 2020-10-28 ENCOUNTER — VIRTUAL VISIT (OUTPATIENT)
Dept: FAMILY MEDICINE | Facility: OTHER | Age: 50
End: 2020-10-28

## 2020-10-28 RX ORDER — ESCITALOPRAM OXALATE 20 MG/1
TABLET ORAL
Qty: 30 TABLET | Refills: 0 | Status: SHIPPED | OUTPATIENT
Start: 2020-10-28 | End: 2020-12-30

## 2020-10-28 ASSESSMENT — PATIENT HEALTH QUESTIONNAIRE - PHQ9: SUM OF ALL RESPONSES TO PHQ QUESTIONS 1-9: 1

## 2020-10-28 NOTE — TELEPHONE ENCOUNTER
Spoke with patient and informed of message. PHQ9 was compeleted over phone. Patient notified script sent to [facility] pharmacy.     PHQ-9 score:    PHQ 10/28/2020   PHQ-9 Total Score 1   Q9: Thoughts of better off dead/self-harm past 2 weeks Not at all       Danielle Flores MA

## 2020-10-28 NOTE — PROGRESS NOTES
"Date: 10/28/2020 16:07:42  Clinician: Ignacio Hagen  Clinician NPI: [id number]  Patient: Meño Omalley  Patient : 1970  Patient Address: [address] [place] [address]  Patient Phone: [phone]  Visit Protocol: URI  Patient Summary:  Meño is a 50 year old ( : 1970 ) male who initiated a [facility] Visit for COVID-19 (Coronavirus) evaluation and screening. When asked the question \"Please sign me up to receive news, health information and promotions. \", Meño responded \"No\".    Meño states his symptoms started suddenly 3-4 days ago. After his symptoms started, they improved and then got worse again.   His symptoms consist of a headache, a cough, nasal congestion, myalgia, chills, malaise, a sore throat, ageusia, and rhinitis. He is experiencing difficulty breathing due to nasal congestion but he is not short of breath. Meño also feels feverish but was unable to measure his temperature.   Symptom details     Nasal secretions: The color of his mucus is yellow and clear.    Cough: Meño coughs every 5-10 minutes and his cough is not more bothersome at night. Phlegm does not come into his throat when he coughs. He does not believe his cough is caused by post-nasal drip.     Sore throat: Meño reports having moderate throat pain (4-6 on a 10 point pain scale), does not have exudate on his tonsils, and can swallow liquids. The lymph nodes in his neck are not enlarged. A rash has not appeared on the skin since the sore throat started.     Headache: He states the headache is mild (1-3 on a 10 point pain scale).      Meño denies having ear pain, wheezing, enlarged lymph nodes, nausea, facial pain or pressure, teeth pain, diarrhea, anosmia, and vomiting. He also denies having recent facial or sinus surgery in the past 60 days, having a sinus infection within the past year, and taking antibiotic medication in the past month.   Precipitating events  Within the past week, Meño has not " been exposed to someone with strep throat. He has not recently been exposed to someone with influenza. Meño has been in close contact with the following high risk individuals: adults 65 or older and people with asthma, heart disease or diabetes.   Pertinent COVID-19 (Coronavirus) information  Meño does not work or volunteer as healthcare worker or a . In the past 14 days, Meño has not worked or volunteered at a healthcare facility or group living setting.   In the past 14 days, he also has not lived in a congregate living setting.   Meño has not had a close contact with a laboratory-confirmed COVID-19 patient within 14 days of symptom onset.    Since December 2019, Meño has not been diagnosed with lab-confirmed COVID-19 test and has not had upper respiratory infection or influenza-like illness.   Pertinent medical history  Meño does not need a return to work/school note.   Weight: 220 lbs   Meño does not smoke or use smokeless tobacco.   Additional information as reported by the patient (free text): Previously had colon cancer (2018)   Weight: 220 lbs    MEDICATIONS: Benadryl Allergy oral, escitalopram oxalate oral, ALLERGIES: NKDA  Clinician Response:  Dear Meño,  Based on the information provided, you have a viral upper respiratory infection, otherwise known as a cold. Symptoms vary from person to person, but can include sneezing, coughing, a runny nose, sore throat, and headache and range from mild to severe.  Unfortunately, there are no medications that can cure a cold, so treatment is focused on controlling symptoms as much as possible. Most people gradually feel better until symptoms are gone in 1-2 weeks.  Medication information  Because you have a viral infection, antibiotics will not help you get better. Treating a viral infection with antibiotics could actually make you feel worse.  Unless you are allergic to the over-the-counter medication(s) below, I recommend  using:       Acetaminophen (Tylenol or store brand) oral tablet. Take 1-2 tablets by mouth every 4-6 hours to help with the discomfort.      Ibuprofen (Advil or store brand) 200 mg oral tablet. Take 1-3 tablets (200-600 mg) by mouth every 8 hours to help with the discomfort. Make sure to take the ibuprofen with food. Do not exceed 2400 mg in 24 hours.    A decongestant such as Sudafed PE or store brand.      Guaifenesin + dextromethorphan (Robitussin DM, Mucinex DM, or store brand).    A sinus irrigation kit such as Sinus Rinse, Neti Pot, SinuCleanse, or store brand. Be sure to use sterile or previously boiled water to prevent unwanted infections.     Over-the-counter medications do not require a prescription. Ask the pharmacist if you have any questions.  Self care  Steps you can take to be as comfortable as possible:     Rest.    Drink plenty of fluids.    Take a warm shower to loosen congestion    Use a cool-mist humidifier.    Use throat lozenges.    Suck on frozen items such as popsicles.    Drink hot tea with lemon and honey.    Gargle with warm salt water (1/4 teaspoon of salt per 8 ounce glass of water).    Take a spoonful of honey to reduce your cough.     When to seek care  Please be seen in a clinic or urgent care if any of the following occur:   New symptoms develop, or symptoms become worse   Call ahead before going to the clinic or urgent care.  Call 911 or go to the emergency room if you feel that your throat is closing off, you suddenly develop a rash, you are unable to swallow fluids, you are drooling, or you are having difficulty breathing.  Additional treatment plan   Your symptoms show that you may have coronavirus (COVID-19). This illness can cause fever, cough and trouble breathing. Many people get a mild case and get better on their own. Some people can get very sick.  What should I do?  We would like to test you for this virus.   1. Please call [phone] to schedule your visit. Explain  "that [facility]. Be ready to share your [facility] visit ID number.  The following will serve as your written order for this COVID Test, ordered by me, for the indication of suspected COVID [Z20.828]: The test will be ordered in [employer], our electronic health record, after you are scheduled. It will show as ordered and authorized by Romero Matthews MD.  Order: COVID-19 (Coronavirus) PCR for SYMPTOMATIC testing from [facility].   2. When it's time for your COVID test:  Stay at least 6 feet away from others. (If someone will drive you to your test, stay in the backseat, as far away from the  as you can.)   Cover your mouth and nose with a mask, tissue or washcloth.  Go straight to the testing site. Don't make any stops on the way there or back.      3.Starting now: Stay home and away from others (self-isolate) until:   You've had no fever---and no medicine that reduces fever---for one full day (24 hours). And...   Your other symptoms have gotten better. For example, your cough or breathing has improved. And...   At least 10 days have passed since your symptoms started.       During this time, don't leave the house except for testing or medical care.   Stay in your own room, even for meals. Use your own bathroom if you can.   Stay away from others in your home. No hugging, kissing or shaking hands. No visitors.  Don't go to work, school or anywhere else.    Clean \"high touch\" surfaces often (doorknobs, counters, handles, etc.). Use a household cleaning spray or wipes. You'll find a full list of  on the EPA website: www.epa.gov/pesticide-registration/list-n-disinfectants-use-against-sars-cov-2.   Cover your mouth and nose with a mask, tissue or washcloth to avoid spreading germs.  Wash your hands and face often. Use soap and water.  Caregivers in these groups are at risk for severe illness due to COVID-19:  o People 65 years and older  o People who live in a nursing home or " long-term care facility  o People with chronic disease (lung, heart, cancer, diabetes, kidney, liver, immunologic)  o People who have a weakened immune system, including those who:   Are in cancer treatment  Take medicine that weakens the immune system, such as corticosteroids  Had a bone marrow or organ transplant  Have an immune deficiency  Have poorly controlled HIV or AIDS  Are obese (body mass index of 40 or higher)  Smoke regularly   o Caregivers should wear gloves while washing dishes, handling laundry and cleaning bedrooms and bathrooms.  o Use caution when washing and drying laundry: Don't shake dirty laundry, and use the warmest water setting that you can.  o For more tips, go to www.cdc.gov/coronavirus/2019-ncov/downloads/10Things.pdf.    4.Sign up for [employer]. We know it's scary to hear that you might have COVID-19. We want to track your symptoms to make sure you're okay over the next 2 weeks. Please look for an email from [employer]---this is a free, online program that we'll use to keep in touch. To sign up, follow the link in the email. Learn more at [email or web address]  How can I take care of myself?   Get lots of rest. Drink extra fluids (unless a doctor has told you not to).   Take Tylenol (acetaminophen) for fever or pain. If you have liver or kidney problems, ask your family doctor if it's okay to take Tylenol.   Adults can take either:    650 mg (two 325 mg pills) every 4 to 6 hours, or...   1,000 mg (two 500 mg pills) every 8 hours as needed.    Note: Don't take more than 3,000 mg in one day. Acetaminophen is found in many medicines (both prescribed and over-the-counter medicines). Read all labels to be sure you don't take too much.   For children, check the Tylenol bottle for the right dose. The dose is based on the child's age or weight.    If you have other health problems (like cancer, heart failure, an organ transplant or severe kidney disease): Call your specialty  clinic if you don't feel better in the next 2 days.       Know when to call 911. Emergency warning signs include:    Trouble breathing or shortness of breath Pain or pressure in the chest that doesn't go away Feeling confused like you haven't felt before, or not being able to wake up Bluish-colored lips or face.  Where can I get more information?   [facility] -- About COVID-19: [email or web address]   CDC -- What to Do If You're Sick: www.cdc.gov/coronavirus/2019-ncov/about/steps-when-sick.html   [facility] -- Ending Home Isolation: www.cdc.gov/coronavirus/2019-ncov/hcp/disposition-in-home-patients.html   [facility] -- Caring for Someone: www.cdc.gov/coronavirus/2019-ncov/if-you-are-sick/care-for-someone.html   [facility] -- Interim Guidance for Hospital Discharge to Home: www.health.[facility].mn./diseases/coronavirus/hcp/hospdischarge.pdf   [facility] clinical trials (COVID-19 research studies): [email or web address]-clinical-trials    Below are the COVID-19 hotlines at the [facility] of Health ([facility]). Interpreters are available.    For health questions: Call 437-192-0888 or 1-928.641.2228 (7 a.m. to 7 p.m.) For questions about schools and childcare: Call 495-202-5243 or 1-435.681.6916 (7 a.m. to 7 p.m.)    Diagnosis: Contact with and (suspected) exposure to other viral communicable diseases  Diagnosis ICD: Z20.828  Additional Clinician Notes:   If your symptoms are not improving or worsen, please go to one of our urgent care locations for evaluation and possible lab work.&nbsp; Or see your regular doctor.

## 2020-11-01 ENCOUNTER — MYC MEDICAL ADVICE (OUTPATIENT)
Dept: FAMILY MEDICINE | Facility: CLINIC | Age: 50
End: 2020-11-01

## 2020-11-01 DIAGNOSIS — U07.1 LAB TEST POSITIVE FOR DETECTION OF COVID-19 VIRUS: ICD-10-CM

## 2020-11-02 PROBLEM — U07.1 LAB TEST POSITIVE FOR DETECTION OF COVID-19 VIRUS: Status: ACTIVE | Noted: 2020-11-02

## 2020-11-23 ENCOUNTER — MYC MEDICAL ADVICE (OUTPATIENT)
Dept: FAMILY MEDICINE | Facility: CLINIC | Age: 50
End: 2020-11-23

## 2020-11-23 DIAGNOSIS — J45.990 EXERCISE-INDUCED ASTHMA: Primary | ICD-10-CM

## 2020-11-23 RX ORDER — DEXAMETHASONE 4 MG/1
2 TABLET ORAL 2 TIMES DAILY PRN
Qty: 1 INHALER | Refills: 1 | Status: SHIPPED | OUTPATIENT
Start: 2020-11-23 | End: 2021-08-09

## 2020-12-13 ENCOUNTER — HEALTH MAINTENANCE LETTER (OUTPATIENT)
Age: 50
End: 2020-12-13

## 2020-12-24 NOTE — NURSING NOTE
Chief Complaint   Patient presents with     Port Draw     accessed with power needle for labs and infusion, vitals checked        No

## 2020-12-28 DIAGNOSIS — F33.0 MAJOR DEPRESSIVE DISORDER, RECURRENT EPISODE, MILD (H): ICD-10-CM

## 2020-12-30 RX ORDER — ESCITALOPRAM OXALATE 20 MG/1
TABLET ORAL
Qty: 90 TABLET | Refills: 0 | Status: SHIPPED | OUTPATIENT
Start: 2020-12-30 | End: 2021-06-21

## 2020-12-30 NOTE — TELEPHONE ENCOUNTER
Left a detailed message on patient's voicemail informing patient that his prescription was approved and that he is due for an office visit before further refills

## 2020-12-30 NOTE — TELEPHONE ENCOUNTER
Medication is being filled for 1 time refill only due to:  Patient needs to be seen because it has been more than one year since last visit.     Routing to team to set up appointment for patient.  Patient has been given #90 to get to appointment per triage protocol.    Connie Moreno, RAVINN, RN  [facility]

## 2021-01-08 ENCOUNTER — HOSPITAL ENCOUNTER (OUTPATIENT)
Dept: CT IMAGING | Facility: CLINIC | Age: 51
Discharge: HOME | End: 2021-01-08
Attending: PHYSICIAN ASSISTANT | Admitting: PHYSICIAN ASSISTANT
Payer: COMMERCIAL

## 2021-01-08 DIAGNOSIS — C20 ADENOCARCINOMA OF RECTUM (H): ICD-10-CM

## 2021-01-08 LAB
ALBUMIN SERPL-MCNC: 4.1 G/DL (ref 3.4–5)
ALP SERPL-CCNC: 53 U/L (ref 40–150)
ALT SERPL W P-5'-P-CCNC: 39 U/L (ref 0–70)
ANION GAP SERPL CALCULATED.3IONS-SCNC: 6 MMOL/L (ref 3–14)
AST SERPL W P-5'-P-CCNC: 13 U/L (ref 0–45)
BASOPHILS # BLD AUTO: 0.1 10E9/L (ref 0–0.2)
BASOPHILS NFR BLD AUTO: 1.1 %
BILIRUB SERPL-MCNC: 0.5 MG/DL (ref 0.2–1.3)
BUN SERPL-MCNC: 20 MG/DL (ref 7–30)
CALCIUM SERPL-MCNC: 9.3 MG/DL (ref 8.5–10.1)
CHLORIDE SERPL-SCNC: 106 MMOL/L (ref 94–109)
CO2 SERPL-SCNC: 27 MMOL/L (ref 20–32)
CREAT SERPL-MCNC: 0.85 MG/DL (ref 0.66–1.25)
DIFFERENTIAL METHOD BLD: NORMAL
EOSINOPHIL NFR BLD AUTO: 2.9 %
ERYTHROCYTE [DISTWIDTH] IN BLOOD BY AUTOMATED COUNT: 12.3 % (ref 10–15)
GFR SERPL CREATININE-BSD FRML MDRD: >90 ML/MIN/{1.73_M2}
GLUCOSE SERPL-MCNC: 93 MG/DL (ref 70–99)
HCT VFR BLD AUTO: 45.2 % (ref 40–53)
HGB BLD-MCNC: 15.7 G/DL (ref 13.3–17.7)
IMM GRANULOCYTES # BLD: 0 10E9/L (ref 0–0.4)
IMM GRANULOCYTES NFR BLD: 0.2 %
LYMPHOCYTES # BLD AUTO: 1.3 10E9/L (ref 0.8–5.3)
LYMPHOCYTES NFR BLD AUTO: 29.7 %
MCH RBC QN AUTO: 31.3 PG (ref 26.5–33)
MCHC RBC AUTO-ENTMCNC: 34.7 G/DL (ref 31.5–36.5)
MCV RBC AUTO: 90 FL (ref 78–100)
MONOCYTES # BLD AUTO: 0.4 10E9/L (ref 0–1.3)
MONOCYTES NFR BLD AUTO: 7.9 %
NEUTROPHILS # BLD AUTO: 2.6 10E9/L (ref 1.6–8.3)
NEUTROPHILS NFR BLD AUTO: 58.2 %
NRBC # BLD AUTO: 0 10*3/UL
NRBC BLD AUTO-RTO: 0 /100
PLATELET # BLD AUTO: 295 10E9/L (ref 150–450)
POTASSIUM SERPL-SCNC: 4 MMOL/L (ref 3.4–5.3)
PROT SERPL-MCNC: 7.8 G/DL (ref 6.8–8.8)
RBC # BLD AUTO: 5.02 10E12/L (ref 4.4–5.9)
SODIUM SERPL-SCNC: 139 MMOL/L (ref 133–144)
WBC # BLD AUTO: 4.5 10E9/L (ref 4–11)

## 2021-01-08 PROCEDURE — 250N000009 HC RX 250: Performed by: RADIOLOGY

## 2021-01-08 PROCEDURE — 36415 COLL VENOUS BLD VENIPUNCTURE: CPT | Performed by: PHYSICIAN ASSISTANT

## 2021-01-08 PROCEDURE — 71260 CT THORAX DX C+: CPT

## 2021-01-08 PROCEDURE — 250N000011 HC RX IP 250 OP 636: Performed by: RADIOLOGY

## 2021-01-08 PROCEDURE — 85025 COMPLETE CBC W/AUTO DIFF WBC: CPT | Performed by: PHYSICIAN ASSISTANT

## 2021-01-08 PROCEDURE — 80053 COMPREHEN METABOLIC PANEL: CPT | Performed by: PHYSICIAN ASSISTANT

## 2021-01-08 RX ORDER — IOPAMIDOL 755 MG/ML
500 INJECTION, SOLUTION INTRAVASCULAR ONCE
Status: COMPLETED | OUTPATIENT
Start: 2021-01-08 | End: 2021-01-08

## 2021-01-08 RX ADMIN — IOPAMIDOL 100 ML: 755 INJECTION, SOLUTION INTRAVENOUS at 08:32

## 2021-01-08 RX ADMIN — SODIUM CHLORIDE 60 ML: 9 INJECTION, SOLUTION INTRAVENOUS at 08:32

## 2021-01-11 ENCOUNTER — VIRTUAL VISIT (OUTPATIENT)
Dept: ONCOLOGY | Facility: CLINIC | Age: 51
End: 2021-01-11
Attending: PHYSICIAN ASSISTANT
Payer: COMMERCIAL

## 2021-01-11 DIAGNOSIS — C20 ADENOCARCINOMA OF RECTUM (H): Primary | ICD-10-CM

## 2021-01-11 DIAGNOSIS — F33.0 MAJOR DEPRESSIVE DISORDER, RECURRENT EPISODE, MILD (H): ICD-10-CM

## 2021-01-11 PROCEDURE — 999N001193 HC VIDEO/TELEPHONE VISIT; NO CHARGE

## 2021-01-11 PROCEDURE — 99213 OFFICE O/P EST LOW 20 MIN: CPT | Mod: 95 | Performed by: INTERNAL MEDICINE

## 2021-01-11 RX ORDER — ESCITALOPRAM OXALATE 20 MG/1
20 TABLET ORAL EVERY MORNING
Qty: 90 TABLET | Refills: 0 | Status: CANCELLED | OUTPATIENT
Start: 2021-01-11

## 2021-01-11 NOTE — PROGRESS NOTES
"Meño is a 50 year old who is being evaluated via a billable video visit.      How would you like to obtain your AVS? MyChart  If the video visit is dropped, the invitation should be resent by: Text to cell phone: [phone]  Will anyone else be joining your video visit? No      Vitals - Patient Reported  Weight (Patient Reported): 95.3 kg (210 lb)  Height (Patient Reported): 175.3 cm (5' 9\")  BMI (Based on Pt Reported Ht/Wt): 31.01  Pain Score: No Pain (0)      I have reviewed and updated patient's allergy and medication list.    Concerns: NONE  Refills: NONE      Jill Parks CMA    Could not establish video contact - conducted a phone visit. 12 min in total/             "

## 2021-01-11 NOTE — LETTER
"1/11/2021      RE: Meño Omalley  [address] [facility] [address]       Meño is a 50 year old who is being evaluated via a billable video visit.      How would you like to obtain your AVS? MyChart  If the video visit is dropped, the invitation should be resent by: Text to cell phone: [phone]  Will anyone else be joining your video visit? No      Vitals - Patient Reported  Weight (Patient Reported): 95.3 kg (210 lb)  Height (Patient Reported): 175.3 cm (5' 9\")  BMI (Based on Pt Reported Ht/Wt): 31.01  Pain Score: No Pain (0)      I have reviewed and updated patient's allergy and medication list.    Concerns: NONE  Refills: NONE      Jill Parks CMA    Could not establish video contact - conducted a phone visit. 12 min in total/               Jan 11, 2021  Oncology Clinic Visit for Followup    REASON FOR VISIT:  The patient is a 50 year old male here for followup for rectal cancer, status post surgery and adjuvant therapy, now here for 6-month followup.  Background : refer to treatment history. Stage III colon cancer s.p surgery and adjuvant chemo 6 months - Folfox ( dropped oxaliplatin for last few cycles) finished chemo January 2018     INTERVAL HISTORY:  Meño reports overall he is \"good.\" He had COVID-19 at the end of October 2020 with body aches, temperature fluctuation, headache, cough and fatigue. Now he feels it has slowly cleared up. Thinks he may be near baseline but had at least a month of breathing difficulty and coughing.  He states his bowel (BM at least every day, no constipation or diarrhea) and bladder.  He does not have any residual neuropathy at this point. He has no nausea, vomiting, abdominal pain, chest pain, shortness of breath or fever. Rest 14 point ROS negative.     In terms of lifestyle changes, trying to eat a lower carb diet. For exercise, has done some swimming and treadmill work but stopped this after the fitness centers closed.     PMH, PSH, Fam and social " history unchanged. His father in law and brother in law were diagnosed with cancer and there is a lot of social stress that they are dealing with as a family. Still working, related to computers.     PHYSICAL EXAMINATION:   VITAL SIGNS:   There were no vitals taken for this visit.  Telephone visit: sounds comfortable, in no distress     LABORATORY EVALUATION:  Reviewed CBC and CMP, in normal range 1/8/21    IMAGING :   CT CHEST/ABDOMEN/PELVIS WITH CONTRAST  1/8/2021 8:42 AM     CLINICAL HISTORY:  Follow-up of rectal cancer. Adenocarcinoma of  rectum (H).     TECHNIQUE: CT scan of the chest, abdomen, and pelvis was performed  following injection of IV contrast. Multiplanar reformats were  obtained. Dose reduction techniques were used.   CONTRAST: 100 mL Isovue 370     COMPARISON: 1/6/2020, 7/8/2019 CTs.     FINDINGS:   LUNGS AND PLEURA: Lungs are clear. No pleural effusions. Couple of  tiny benign 2 mm bilateral lung base nodules remain unchanged since at  least July 2019. No new/enlarging suspicious nodules.     MEDIASTINUM/AXILLAE: Normal heart size. No pericardial effusion. No  lymphadenopathy. Normal caliber aorta. Tiny subcentimeter incidental  left thyroid cyst or nodule is unchanged.     HEPATOBILIARY: No significant mass or bile duct dilatation. No  calcified gallstones.      PANCREAS: Normal.     SPLEEN: Normal.     ADRENAL GLANDS: Normal.     KIDNEYS/BLADDER: Normal.     BOWEL: No obstruction or inflammatory change. Unremarkable  rectosigmoid anastomosis. No evidence for local tumor recurrence.     No ascites or other signs of carcinomatosis.     PELVIC ORGANS: No pelvic masses.     ADDITIONAL FINDINGS: Small fat-containing periumbilical hernia is  unchanged.     MUSCULOSKELETAL: No acute fractures or suspicious bone lesions  visualized.                                                                      IMPRESSION:  1.  No evidence for recurrent/metastatic malignancy.  2.  No acute findings or significant  interval change.  3.  Partial colectomy with rectosigmoid anastomosis.    ASSESSMENT AND PLAN:  This is a patient with a history of rectal cancer Stage III , status post surgery and adjuvant chemotherapy which he finished in 01/2018, here for followup with restaging scan and labs.  At this point, he does not have any signs or symptoms concerning for recurrent disease.  When he returns in 6 mths with colonoscopy, visit and 1 year with labs repeat CT scan at that time.   - Instructed Meño to reach out to get colonoscopy scheduled     I spent 25 minutes in the care of this patient >50% of which was spent in coordinating and counseling.    Brendan Michael PGY3 seen with  Jeanette Mcarthur   of Medicine   Hematology and medical Oncology   [facility]      Attestation signed by Jeanette Mcarthur MD at 1/25/2021 12:03 PM:  Physician Attestation   I, Jeanette Mcarthur MD, saw this patient and agree with the findings and plan of care as documented in the note.      Items personally reviewed/procedural attestation: vitals, labs, and imaging and agree with the interpretation documented in the note.    He is doing well, had COVID and recovered. Labs and imaging IMAN. RTC in 6 mths with labs    Jeanette Mcarthur MD

## 2021-01-11 NOTE — PROGRESS NOTES
"Jan 11, 2021  Oncology Clinic Visit for Followup    REASON FOR VISIT:  The patient is a 50 year old male here for followup for rectal cancer, status post surgery and adjuvant therapy, now here for 6-month followup.  Background : refer to treatment history. Stage III colon cancer s.p surgery and adjuvant chemo 6 months - Folfox ( dropped oxaliplatin for last few cycles) finished chemo January 2018     INTERVAL HISTORY:  Meño reports overall he is \"good.\" He had COVID-19 at the end of October 2020 with body aches, temperature fluctuation, headache, cough and fatigue. Now he feels it has slowly cleared up. Thinks he may be near baseline but had at least a month of breathing difficulty and coughing.  He states his bowel (BM at least every day, no constipation or diarrhea) and bladder.  He does not have any residual neuropathy at this point. He has no nausea, vomiting, abdominal pain, chest pain, shortness of breath or fever. Rest 14 point ROS negative.     In terms of lifestyle changes, trying to eat a lower carb diet. For exercise, has done some swimming and treadmill work but stopped this after the fitness centers closed.     PMH, PSH, Fam and social history unchanged. His father in law and brother in law were diagnosed with cancer and there is a lot of social stress that they are dealing with as a family. Still working, related to computers.     PHYSICAL EXAMINATION:   VITAL SIGNS:   There were no vitals taken for this visit.  Telephone visit: sounds comfortable, in no distress     LABORATORY EVALUATION:  Reviewed CBC and CMP, in normal range 1/8/21    IMAGING :   CT CHEST/ABDOMEN/PELVIS WITH CONTRAST  1/8/2021 8:42 AM     CLINICAL HISTORY:  Follow-up of rectal cancer. Adenocarcinoma of  rectum (H).     TECHNIQUE: CT scan of the chest, abdomen, and pelvis was performed  following injection of IV contrast. Multiplanar reformats were  obtained. Dose reduction techniques were used.   CONTRAST: 100 mL Isovue " 370     COMPARISON: 1/6/2020, 7/8/2019 CTs.     FINDINGS:   LUNGS AND PLEURA: Lungs are clear. No pleural effusions. Couple of  tiny benign 2 mm bilateral lung base nodules remain unchanged since at  least July 2019. No new/enlarging suspicious nodules.     MEDIASTINUM/AXILLAE: Normal heart size. No pericardial effusion. No  lymphadenopathy. Normal caliber aorta. Tiny subcentimeter incidental  left thyroid cyst or nodule is unchanged.     HEPATOBILIARY: No significant mass or bile duct dilatation. No  calcified gallstones.      PANCREAS: Normal.     SPLEEN: Normal.     ADRENAL GLANDS: Normal.     KIDNEYS/BLADDER: Normal.     BOWEL: No obstruction or inflammatory change. Unremarkable  rectosigmoid anastomosis. No evidence for local tumor recurrence.     No ascites or other signs of carcinomatosis.     PELVIC ORGANS: No pelvic masses.     ADDITIONAL FINDINGS: Small fat-containing periumbilical hernia is  unchanged.     MUSCULOSKELETAL: No acute fractures or suspicious bone lesions  visualized.                                                                      IMPRESSION:  1.  No evidence for recurrent/metastatic malignancy.  2.  No acute findings or significant interval change.  3.  Partial colectomy with rectosigmoid anastomosis.    ASSESSMENT AND PLAN:  This is a patient with a history of rectal cancer Stage III , status post surgery and adjuvant chemotherapy which he finished in 01/2018, here for followup with restaging scan and labs.  At this point, he does not have any signs or symptoms concerning for recurrent disease.  When he returns in 6 mths with colonoscopy, visit and 1 year with labs repeat CT scan at that time.   - Instructed Meño to reach out to get colonoscopy scheduled     I spent 25 minutes in the care of this patient >50% of which was spent in coordinating and counseling.    Brendan Michael PGY3 seen with  Jeanette Mcarthur   of Medicine   Hematology and medical Oncology   [facility]  [place]

## 2021-01-11 NOTE — LETTER
"    1/11/2021         RE: Meño Omalley  [address] [facility] [address]        Dear Colleague,    Thank you for referring your patient, Meño Omalley, to the [facility]. Please see a copy of my visit note below.    Meño is a 50 year old who is being evaluated via a billable video visit.      How would you like to obtain your AVS? MyChart  If the video visit is dropped, the invitation should be resent by: Text to cell phone: [phone]  Will anyone else be joining your video visit? No      Vitals - Patient Reported  Weight (Patient Reported): 95.3 kg (210 lb)  Height (Patient Reported): 175.3 cm (5' 9\")  BMI (Based on Pt Reported Ht/Wt): 31.01  Pain Score: No Pain (0)      I have reviewed and updated patient's allergy and medication list.    Concerns: NONE  Refills: NONE      Jill Parks CMA    Could not establish video contact - conducted a phone visit. 12 min in total/               Jan 11, 2021  Oncology Clinic Visit for Followup    REASON FOR VISIT:  The patient is a 50 year old male here for followup for rectal cancer, status post surgery and adjuvant therapy, now here for 6-month followup.  Background : refer to treatment history. Stage III colon cancer s.p surgery and adjuvant chemo 6 months - Folfox ( dropped oxaliplatin for last few cycles) finished chemo January 2018     INTERVAL HISTORY:  Meño reports overall he is \"good.\" He had COVID-19 at the end of October 2020 with body aches, temperature fluctuation, headache, cough and fatigue. Now he feels it has slowly cleared up. Thinks he may be near baseline but had at least a month of breathing difficulty and coughing.  He states his bowel (BM at least every day, no constipation or diarrhea) and bladder.  He does not have any residual neuropathy at this point. He has no nausea, vomiting, abdominal pain, chest pain, shortness of breath or fever. Rest 14 point ROS negative.     In terms of lifestyle " changes, trying to eat a lower carb diet. For exercise, has done some swimming and treadmill work but stopped this after the fitness centers closed.     PMH, PSH, Fam and social history unchanged. His father in law and brother in law were diagnosed with cancer and there is a lot of social stress that they are dealing with as a family. Still working, related to computers.     PHYSICAL EXAMINATION:   VITAL SIGNS:   There were no vitals taken for this visit.  Telephone visit: sounds comfortable, in no distress     LABORATORY EVALUATION:  Reviewed CBC and CMP, in normal range 1/8/21    IMAGING :   CT CHEST/ABDOMEN/PELVIS WITH CONTRAST  1/8/2021 8:42 AM     CLINICAL HISTORY:  Follow-up of rectal cancer. Adenocarcinoma of  rectum (H).     TECHNIQUE: CT scan of the chest, abdomen, and pelvis was performed  following injection of IV contrast. Multiplanar reformats were  obtained. Dose reduction techniques were used.   CONTRAST: 100 mL Isovue 370     COMPARISON: 1/6/2020, 7/8/2019 CTs.     FINDINGS:   LUNGS AND PLEURA: Lungs are clear. No pleural effusions. Couple of  tiny benign 2 mm bilateral lung base nodules remain unchanged since at  least July 2019. No new/enlarging suspicious nodules.     MEDIASTINUM/AXILLAE: Normal heart size. No pericardial effusion. No  lymphadenopathy. Normal caliber aorta. Tiny subcentimeter incidental  left thyroid cyst or nodule is unchanged.     HEPATOBILIARY: No significant mass or bile duct dilatation. No  calcified gallstones.      PANCREAS: Normal.     SPLEEN: Normal.     ADRENAL GLANDS: Normal.     KIDNEYS/BLADDER: Normal.     BOWEL: No obstruction or inflammatory change. Unremarkable  rectosigmoid anastomosis. No evidence for local tumor recurrence.     No ascites or other signs of carcinomatosis.     PELVIC ORGANS: No pelvic masses.     ADDITIONAL FINDINGS: Small fat-containing periumbilical hernia is  unchanged.     MUSCULOSKELETAL: No acute fractures or suspicious bone  lesions  visualized.                                                                      IMPRESSION:  1.  No evidence for recurrent/metastatic malignancy.  2.  No acute findings or significant interval change.  3.  Partial colectomy with rectosigmoid anastomosis.    ASSESSMENT AND PLAN:  This is a patient with a history of rectal cancer Stage III , status post surgery and adjuvant chemotherapy which he finished in 01/2018, here for followup with restaging scan and labs.  At this point, he does not have any signs or symptoms concerning for recurrent disease.  When he returns in 6 mths with colonoscopy, visit and 1 year with labs repeat CT scan at that time.   - Instructed Meño to reach out to get colonoscopy scheduled     I spent 25 minutes in the care of this patient >50% of which was spent in coordinating and counseling.    Brendan Michael PGY3 seen with  Jeanette Mcarthur   of Medicine   Hematology and medical Oncology   [facility]      Attestation signed by Jeanette Mcarthur MD at 1/25/2021 12:03 PM:  Physician Attestation   I, Jeanette Mcarthur MD, saw this patient and agree with the findings and plan of care as documented in the note.      Items personally reviewed/procedural attestation: vitals, labs, and imaging and agree with the interpretation documented in the note.    He is doing well, had COVID and recovered. Labs and imaging IMAN. RTC in 6 mths with labs    Jeanette Mcarthur MD        Again, thank you for allowing me to participate in the care of your patient.        Sincerely,        Jeanette Mcarthur MD

## 2021-04-17 ENCOUNTER — HEALTH MAINTENANCE LETTER (OUTPATIENT)
Age: 51
End: 2021-04-17

## 2021-06-21 ENCOUNTER — MYC REFILL (OUTPATIENT)
Dept: FAMILY MEDICINE | Facility: CLINIC | Age: 51
End: 2021-06-21

## 2021-06-21 DIAGNOSIS — F33.0 MAJOR DEPRESSIVE DISORDER, RECURRENT EPISODE, MILD (H): ICD-10-CM

## 2021-06-23 RX ORDER — ESCITALOPRAM OXALATE 20 MG/1
20 TABLET ORAL EVERY MORNING
Qty: 30 TABLET | Refills: 0 | Status: SHIPPED | OUTPATIENT
Start: 2021-06-23 | End: 2021-08-09

## 2021-06-23 NOTE — TELEPHONE ENCOUNTER
"Routing refill request to provider for review/approval because:  A break in medication  PHQ9 score not current  T'd up 1 month for provider review.  Patient needs to be seen because it has been more than 1 year since last office visit.    Sending to scheduling for yearly office visit due    Sending to [facility] primary to update PHQ9    Requested Prescriptions   Pending Prescriptions Disp Refills     escitalopram (LEXAPRO) 20 MG tablet 90 tablet 0     Sig: Take 1 tablet (20 mg) by mouth every morning   Last Written Prescription Date:  12/30/2020  Last Fill Quantity: 90,  # refills: 0   Last office visit: 10/31/2019 with prescribing provider:     Future Office Visit:        SSRIs Protocol Failed - 6/21/2021  9:31 AM        Failed - PHQ-9 score less than 5 in past 6 months     Please review last PHQ-9 score.   PHQ-9 score:    PHQ 10/28/2020   PHQ-9 Total Score 1   Q9: Thoughts of better off dead/self-harm past 2 weeks Not at all           Failed - Recent (6 mo) or future (30 days) visit within the authorizing provider's specialty     Patient had office visit in the last 6 months or has a visit in the next 30 days with authorizing provider or within the authorizing provider's specialty.  See \"Patient Info\" tab in inbasket, or \"Choose Columns\" in Meds & Orders section of the refill encounter.            Passed - Medication is active on med list        Passed - Patient is age 18 or older         Nidia Faust RN      "
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
## 2021-08-09 ENCOUNTER — OFFICE VISIT (OUTPATIENT)
Dept: FAMILY MEDICINE | Facility: CLINIC | Age: 51
End: 2021-08-09
Payer: COMMERCIAL

## 2021-08-09 VITALS
RESPIRATION RATE: 20 BRPM | SYSTOLIC BLOOD PRESSURE: 138 MMHG | DIASTOLIC BLOOD PRESSURE: 88 MMHG | BODY MASS INDEX: 33.95 KG/M2 | WEIGHT: 224 LBS | HEIGHT: 68 IN | OXYGEN SATURATION: 96 % | HEART RATE: 104 BPM | TEMPERATURE: 97.1 F

## 2021-08-09 DIAGNOSIS — Z00.00 ROUTINE GENERAL MEDICAL EXAMINATION AT A HEALTH CARE FACILITY: Primary | ICD-10-CM

## 2021-08-09 DIAGNOSIS — Z13.6 SCREENING FOR CARDIOVASCULAR CONDITION: ICD-10-CM

## 2021-08-09 DIAGNOSIS — F33.0 MAJOR DEPRESSIVE DISORDER, RECURRENT EPISODE, MILD (H): ICD-10-CM

## 2021-08-09 DIAGNOSIS — Z12.5 SCREENING FOR PROSTATE CANCER: ICD-10-CM

## 2021-08-09 DIAGNOSIS — E78.5 HYPERLIPIDEMIA LDL GOAL <130: ICD-10-CM

## 2021-08-09 LAB
CHOLEST SERPL-MCNC: 233 MG/DL
FASTING STATUS PATIENT QL REPORTED: NO
HDLC SERPL-MCNC: 52 MG/DL
LDLC SERPL CALC-MCNC: 143 MG/DL
LDLC SERPL CALC-MCNC: ABNORMAL MG/DL
NONHDLC SERPL-MCNC: 181 MG/DL
PSA SERPL-MCNC: 0.66 UG/L (ref 0–4)
TRIGL SERPL-MCNC: 425 MG/DL

## 2021-08-09 PROCEDURE — G0103 PSA SCREENING: HCPCS | Performed by: FAMILY MEDICINE

## 2021-08-09 PROCEDURE — 99396 PREV VISIT EST AGE 40-64: CPT | Performed by: FAMILY MEDICINE

## 2021-08-09 PROCEDURE — 36415 COLL VENOUS BLD VENIPUNCTURE: CPT | Performed by: FAMILY MEDICINE

## 2021-08-09 PROCEDURE — 80061 LIPID PANEL: CPT | Performed by: FAMILY MEDICINE

## 2021-08-09 RX ORDER — ESCITALOPRAM OXALATE 20 MG/1
20 TABLET ORAL EVERY MORNING
Qty: 90 TABLET | Refills: 3 | Status: SHIPPED | OUTPATIENT
Start: 2021-08-09 | End: 2024-05-28

## 2021-08-09 ASSESSMENT — ASTHMA QUESTIONNAIRES
QUESTION_1 LAST FOUR WEEKS HOW MUCH OF THE TIME DID YOUR ASTHMA KEEP YOU FROM GETTING AS MUCH DONE AT WORK, SCHOOL OR AT HOME: NONE OF THE TIME
ACT_TOTALSCORE: 25
QUESTION_3 LAST FOUR WEEKS HOW OFTEN DID YOUR ASTHMA SYMPTOMS (WHEEZING, COUGHING, SHORTNESS OF BREATH, CHEST TIGHTNESS OR PAIN) WAKE YOU UP AT NIGHT OR EARLIER THAN USUAL IN THE MORNING: NOT AT ALL
QUESTION_2 LAST FOUR WEEKS HOW OFTEN HAVE YOU HAD SHORTNESS OF BREATH: NOT AT ALL
QUESTION_4 LAST FOUR WEEKS HOW OFTEN HAVE YOU USED YOUR RESCUE INHALER OR NEBULIZER MEDICATION (SUCH AS ALBUTEROL): NOT AT ALL
QUESTION_5 LAST FOUR WEEKS HOW WOULD YOU RATE YOUR ASTHMA CONTROL: COMPLETELY CONTROLLED

## 2021-08-09 ASSESSMENT — ENCOUNTER SYMPTOMS
HEARTBURN: 0
WEAKNESS: 0
FREQUENCY: 0
DYSURIA: 0
SORE THROAT: 0
EYE PAIN: 0
FEVER: 0
CONSTIPATION: 0
NAUSEA: 0
CHILLS: 0
PALPITATIONS: 0
JOINT SWELLING: 0
ABDOMINAL PAIN: 0
HEMATOCHEZIA: 0
HEMATURIA: 0
DIARRHEA: 0
HEADACHES: 0
SHORTNESS OF BREATH: 0
ARTHRALGIAS: 0
COUGH: 0
NERVOUS/ANXIOUS: 0
PARESTHESIAS: 0
MYALGIAS: 0

## 2021-08-09 ASSESSMENT — PAIN SCALES - GENERAL: PAINLEVEL: NO PAIN (0)

## 2021-08-09 ASSESSMENT — MIFFLIN-ST. JEOR: SCORE: 1850.56

## 2021-08-09 ASSESSMENT — PATIENT HEALTH QUESTIONNAIRE - PHQ9: SUM OF ALL RESPONSES TO PHQ QUESTIONS 1-9: 2

## 2021-08-09 NOTE — PATIENT INSTRUCTIONS
Preventive Health Recommendations  Male Ages 50 - 64    Yearly exam:             See your health care provider every year in order to  o   Review health changes.   o   Discuss preventive care.    o   Review your medicines if your doctor has prescribed any.     Have a cholesterol test every 5 years, or more frequently if you are at risk for high cholesterol/heart disease.     Have a diabetes test (fasting glucose) every three years. If you are at risk for diabetes, you should have this test more often.     Have a colonoscopy at age 50, or have a yearly FIT test (stool test). These exams will check for colon cancer.      Talk with your health care provider about whether or not a prostate cancer screening test (PSA) is right for you.    You should be tested each year for STDs (sexually transmitted diseases), if you re at risk.     Shots: Get a flu shot each year. Get a tetanus shot every 10 years.     Nutrition:    Eat at least 5 servings of fruits and vegetables daily.     Eat whole-grain bread, whole-wheat pasta and brown rice instead of white grains and rice.     Get adequate Calcium and Vitamin D.     Lifestyle    Exercise for at least 150 minutes a week (30 minutes a day, 5 days a week). This will help you control your weight and prevent disease.     Limit alcohol to one drink per day.     No smoking.     Wear sunscreen to prevent skin cancer.     See your dentist every six months for an exam and cleaning.     See your eye doctor every 1 to 2 years.    
.

## 2021-08-09 NOTE — PROGRESS NOTES
SUBJECTIVE:   CC: Meño Omalley is an 51 year old male who presents for preventative health visit.         Patient has been advised of split billing requirements and indicates understanding: Yes  Healthy Habits:     Getting at least 3 servings of Calcium per day:  Yes    Bi-annual eye exam:  Yes    Dental care twice a year:  Yes    Sleep apnea or symptoms of sleep apnea:  Daytime drowsiness and Excessive snoring    Diet:  Regular (no restrictions)    Frequency of exercise:  2-3 days/week    Duration of exercise:  15-30 minutes    Taking medications regularly:  Yes    Medication side effects:  None    PHQ-2 Total Score: 0    Additional concerns today:  No      Today's PHQ-2 Score:   PHQ-2 ( 1999 [employer]) 8/9/2021   Q1: Little interest or pleasure in doing things 0   Q2: Feeling down, depressed or hopeless 0   PHQ-2 Score 0   Q1: Little interest or pleasure in doing things Not at all   Q2: Feeling down, depressed or hopeless Not at all   PHQ-2 Score 0       Abuse: Current or Past(Physical, Sexual or Emotional)- No  Do you feel safe in your environment? Yes    Have you ever done Advance Care Planning? (For example, a Health Directive, POLST, or a discussion with a medical provider or your loved ones about your wishes): No, advance care planning information given to patient to review.  Patient declined advance care planning discussion at this time.    Social History     Tobacco Use     Smoking status: Never Smoker     Smokeless tobacco: Never Used   Substance Use Topics     Alcohol use: Yes     Comment: beer couple times per months         Alcohol Use 8/9/2021   Prescreen: >3 drinks/day or >7 drinks/week? No   Prescreen: >3 drinks/day or >7 drinks/week? -       Last PSA: No results found for: PSA    Reviewed orders with patient. Reviewed health maintenance and updated orders accordingly - Yes  Labs reviewed in EPIC    Reviewed and updated as needed this visit by clinical staff  Tobacco  Allergies  Meds      Soc  "Hx        Reviewed and updated as needed this visit by Provider                Past Medical History:   Diagnosis Date     Bucket-handle tear of medial meniscus of left knee as current injury, initial encounter 7/19/2018     Colon cancer (H) (rectal cancer) 04/12/2017     Depressive disorder      Family history of malignant hyperthermia     UNCONFIRMED BUT FATHER HAD \"FEVER WITH ANESTHESIA\"     Nephrolithiasis     asymptomatic     Obese      Uncomplicated asthma     exercise induced      Past Surgical History:   Procedure Laterality Date     ARTHROSCOPY KNEE WITH MEDIAL MENISCECTOMY Left 7/25/2018    Procedure: ARTHROSCOPY KNEE WITH MEDIAL MENISCECTOMY;  Arthroscopy left knee with Meniscal Debridement;  Surgeon: Rashad Skaggs MD;  [facility]     LAPAROSCOPIC COLECTOMY LOW ANTERIOR N/A 4/12/2017    Procedure: LAPAROSCOPIC COLECTOMY LOW ANTERIOR;  Surgeon: Ignacio Rose MD;  Location: [facility]     NO HISTORY OF SURGERY       REMOVE PORT VASCULAR ACCESS Right 11/8/2017    Procedure: REMOVE PORT VASCULAR ACCESS;  Right chest Port Removal;  Surgeon: Chuy Steiner PA-C;  [facility]     SIGMOIDOSCOPY FLEXIBLE N/A 4/12/2017    Procedure: SIGMOIDOSCOPY FLEXIBLE;  Surgeon: Ignacio Rose MD;  Location: [facility]       Review of Systems   Constitutional: Negative for chills and fever.   HENT: Negative for congestion, ear pain, hearing loss and sore throat.    Eyes: Negative for pain and visual disturbance.   Respiratory: Negative for cough and shortness of breath.    Cardiovascular: Negative for chest pain, palpitations and peripheral edema.   Gastrointestinal: Negative for abdominal pain, constipation, diarrhea, heartburn, hematochezia and nausea.   Genitourinary: Negative for discharge, dysuria, frequency, genital sores, hematuria, impotence and urgency.   Musculoskeletal: Negative for arthralgias, joint swelling and myalgias.   Skin: Negative for rash.   Neurological: Negative for weakness, headaches and " "paresthesias.   Psychiatric/Behavioral: Negative for mood changes. The patient is not nervous/anxious.          OBJECTIVE:   /88   Pulse 104   Temp 97.1  F (36.2  C) (Temporal)   Resp 20   Ht 1.727 m (5' 8\")   Wt 101.6 kg (224 lb)   SpO2 96%   BMI 34.06 kg/m      Physical Exam  GENERAL: healthy, alert and no distress  EYES: Eyes grossly normal to inspection, PERRL and conjunctivae and sclerae normal  HENT: ear canals and TM's normal, nose and mouth without ulcers or lesions  NECK: no adenopathy, no asymmetry, masses, or scars and thyroid normal to palpation  RESP: lungs clear to auscultation - no rales, rhonchi or wheezes  CV: regular rate and rhythm, normal S1 S2, no S3 or S4, no murmur, click or rub, no peripheral edema and peripheral pulses strong  ABDOMEN: soft, nontender, no hepatosplenomegaly, no masses and bowel sounds normal  MS: no gross musculoskeletal defects noted, no edema  SKIN: no suspicious lesions or rashes  NEURO: Normal strength and tone, mentation intact and speech normal  PSYCH: mentation appears normal, affect normal/bright    Diagnostic Test Results:  Labs reviewed in Epic  Results for orders placed or performed in visit on 08/09/21 (from the past 24 hour(s))   PSA, screen   Result Value Ref Range    Prostate Specific Antigen Screen 0.66 0.00 - 4.00 ug/L   Lipid panel reflex to direct LDL Fasting   Result Value Ref Range    Cholesterol 233 (H) <200 mg/dL    Triglycerides 425 (H) <150 mg/dL    Direct Measure HDL 52 >=40 mg/dL    LDL Cholesterol Calculated      Non HDL Cholesterol 181 (H) <130 mg/dL    Patient Fasting > 8hrs? No    LDL cholesterol direct   Result Value Ref Range    LDL Cholesterol Direct 143 (H) <100 mg/dL       ASSESSMENT/PLAN:   1. Routine general medical examination at a health care facility  History of colorectal cancer in remission postsurgical follows with oncology    2. Major depressive disorder, recurrent episode, mild (H)  Doing fine takes Lexapro this was " "renewed for a year.  Mainly because of his medical conditions.  - escitalopram (LEXAPRO) 20 MG tablet; Take 1 tablet (20 mg) by mouth every morning  Dispense: 90 tablet; Refill: 3    3. Screening for cardiovascular condition  We will notify him with results elevated a bit consider getting him on something he was not fasting  - Lipid panel reflex to direct LDL Fasting; Future  - Lipid panel reflex to direct LDL Fasting  - LDL cholesterol direct    4. Screening for prostate cancer  We will notify him with results.  - PSA, screen; Future  - PSA, screen    5.  Hyperlipidemia  Patient has been advised of split billing requirements and indicates understanding: Yes  COUNSELING:   Reviewed preventive health counseling, as reflected in patient instructions       Regular exercise       Healthy diet/nutrition       Vision screening    Estimated body mass index is 34.06 kg/m  as calculated from the following:    Height as of this encounter: 1.727 m (5' 8\").    Weight as of this encounter: 101.6 kg (224 lb).     Weight management plan: Discussed healthy diet and exercise guidelines    He reports that he has never smoked. He has never used smokeless tobacco.      Counseling Resources:  ATP IV Guidelines  Pooled Cohorts Equation Calculator  FRAX Risk Assessment  ICSI Preventive Guidelines  Dietary Guidelines for Americans, 2010  USDA's MyPlate  ASA Prophylaxis  Lung CA Screening    Delio Alcala MD  [facility]  "

## 2021-08-10 ASSESSMENT — ASTHMA QUESTIONNAIRES: ACT_TOTALSCORE: 25

## 2021-08-25 ENCOUNTER — TELEPHONE (OUTPATIENT)
Dept: FAMILY MEDICINE | Facility: CLINIC | Age: 51
End: 2021-08-25

## 2021-08-25 DIAGNOSIS — E78.5 HYPERLIPIDEMIA LDL GOAL <130: Primary | ICD-10-CM

## 2021-08-25 RX ORDER — ATORVASTATIN CALCIUM 40 MG/1
40 TABLET, FILM COATED ORAL DAILY
Qty: 90 TABLET | Refills: 0 | Status: SHIPPED | OUTPATIENT
Start: 2021-08-25 | End: 2022-05-27

## 2021-08-25 NOTE — TELEPHONE ENCOUNTER
----- Message from Delio Alcala MD sent at 8/25/2021 10:18 AM CDT -----  Labs showed your cholesterol is up at 233 triglycerides were very high at 425.  Yesterday was fine.  With those numbers I would suggest getting on a medication to help lower cholesterol and help with the triglycerides.  Would go with Lipitor 40 mg a day and recheck a lipid panel and AST and ALT which are liver function tests in 2 months.

## 2021-08-25 NOTE — TELEPHONE ENCOUNTER
Called and LM for patient to call back. Please relay results below and see where he would like script sent. Script is pended and future labs pended as well.   Danielle Flores MA

## 2021-10-02 ENCOUNTER — HEALTH MAINTENANCE LETTER (OUTPATIENT)
Age: 51
End: 2021-10-02

## 2021-11-23 NOTE — PROGRESS NOTES
Infusion Nursing Note:  Meño Omalley presents today for Cycle 2 Day 1 Oxaliplatin/Leucovorin/Fluorouracil bolus and 46 hour pump connect.    Patient seen by provider today: Yes: Dr. Vee prior   present during visit today: Not Applicable.    Note: Pt denies complaints today.       Intravenous Access:  Implanted Port.    Treatment Conditions:  Lab Results   Component Value Date    HGB 13.5 06/07/2017     Lab Results   Component Value Date    WBC 4.0 06/07/2017      Lab Results   Component Value Date    ANEU 2.1 06/07/2017     Lab Results   Component Value Date     06/07/2017      Lab Results   Component Value Date     06/07/2017                   Lab Results   Component Value Date    POTASSIUM 4.2 06/07/2017           Lab Results   Component Value Date    MAG 2.1 04/13/2017            Lab Results   Component Value Date    CR 0.83 06/07/2017                   Lab Results   Component Value Date    CANDY 8.8 06/07/2017                Lab Results   Component Value Date    BILITOTAL 0.7 06/07/2017           Lab Results   Component Value Date    ALBUMIN 3.5 06/07/2017                    Lab Results   Component Value Date    ALT 44 06/07/2017           Lab Results   Component Value Date    AST 20 06/07/2017     Results reviewed, labs MET treatment parameters, ok to proceed with treatment.      Post Infusion Assessment:  Patient tolerated infusion without incident.  Blood return noted pre and post infusion.  Site patent and intact, free from redness, edema or discomfort.  No evidence of extravasations.    + BR checked every 2ccs while administering Fluorouracil. Tubing filter taped to pt's skin with the  per protocol. CSeries pump running @ 5.2ml/hour for 46 hours. Pump attached per protocol. [facility] @ [facility] aware to disconnect pt on 5/9/17@ 1030. Connections checked with Radhika Neely.    Discharge Plan:   Patient declined prescription refills.  Discharge instructions reviewed with: Patient and  Family.  Patient and/or family verbalized understanding of discharge instructions and all questions answered.  Copy of AVS reviewed with patient and/or family.  Patient will return 6/22/17 for next appointment.  Patient discharged in stable condition accompanied by: self and wife.  Departure Mode: Ambulatory.    Hermila Vincent RN                         Xolair Pregnancy And Lactation Text: This medication is Pregnancy Category B and is considered safe during pregnancy. This medication is excreted in breast milk.

## 2021-12-02 ENCOUNTER — MYC MEDICAL ADVICE (OUTPATIENT)
Dept: FAMILY MEDICINE | Facility: CLINIC | Age: 51
End: 2021-12-02
Payer: COMMERCIAL

## 2021-12-02 DIAGNOSIS — R06.83 SNORES: Primary | ICD-10-CM

## 2021-12-02 NOTE — TELEPHONE ENCOUNTER
Order is in for him to see the sleep team.  Can you find out if he is getting his colonoscopies?  He should be under close surveillance with this history of cancer

## 2022-01-28 ENCOUNTER — NEW PATIENT (OUTPATIENT)
Dept: URBAN - METROPOLITAN AREA CLINIC 25 | Facility: CLINIC | Age: 52
End: 2022-01-28

## 2022-01-28 DIAGNOSIS — H52.203: ICD-10-CM

## 2022-01-28 DIAGNOSIS — H52.4: ICD-10-CM

## 2022-01-28 PROCEDURE — 92015 DETERMINE REFRACTIVE STATE: CPT

## 2022-01-28 PROCEDURE — 92004 COMPRE OPH EXAM NEW PT 1/>: CPT

## 2022-01-28 ASSESSMENT — VISUAL ACUITY
OD_CC: 20/20
OS_SC: J5
OU_SC: J5
OD_SC: J5
OS_CC: 20/20-1

## 2022-01-28 ASSESSMENT — TONOMETRY
OD_IOP_MMHG: 11
OS_IOP_MMHG: 13

## 2022-01-31 NOTE — PROGRESS NOTES
Does Meño have a CPAP/Bipap?  No         Beatty Sleep Scale: 8    Meño is a 51 year old who is being evaluated via a billable video visit.      How would you like to obtain your AVS? MyChart  If the video visit is dropped, the invitation should be resent by: Text to cell phone: [phone]  Will anyone else be joining your video visit? No      Video Start Time: 8:16 AM        Subjective   Meño is a 51 year old who presents for the following health issues snores  Vitals:  No vitals were obtained today due to virtual visit.      Video-Visit Details    Type of service:  Video Visit    Video End Time:changed to phone visit due to audio problems. 45 minute duration.     Originating Location (pt. Location): Home    [facility]     Platform used for Video Visit: Well       Sleep Consultation:    Date on this visit: 2/4/2022    Meño Omalley is sent by Gunner Almazan for a sleep consultation regarding possible sleep apnea. Daytime fatigue,does not feel rested. Family hx jocelyn.     Primary Physician: Delio Alcala JESUS Omalley reports frequent snoring and poor quality of sleep and occasional kicking and snorting for 1 year, worse for 3-4 months.     Meño goes to sleep at 10:30 PM during the week. He wakes up at 6:30 AM with an alarm. He falls asleep in 30 minutes.  Meño denies difficulty falling asleep.  He wakes up 5-8 times a night for 5-30 minutes before falling back to sleep.  Meño wakes up to uncertain reasons and external stimuli.  On weekends, Meño goes to sleep at 11:30 PM.  He wakes up at 8:00 AM without an alarm. He falls asleep in 30 minutes.  Patient gets an average of 7-8 hours of sleep per night.     Patient does watch TV in bed.     Meño does not do shift work.  He works day shifts.      Meño does snore frequently. Patient does have a regular bed partner. There is report of snoring.  He does not  have witnessed apneas. They never sleep separately.  Patient sleeps on his side and stomach. He has occasional snort arousals, morning dry mouth and restless legs,. Meño has occasional bruxism.    He denies sleep walking, sleep talking, enuresis and sleep terrors as a child.  Meño has difficulty breathing through his nose, depression and anxiety.      Meño has gained 20-30 pounds in the last 3-4 years.  Patient describes themself as neither a morning or night person.  He would prefer to go to sleep at 12:00 PM and wake up at 8:00 AM.  Patient's Independence Sleepiness score 8/24 inconsistent with excessive  daytime sleepiness.      Meño naps 1-2 times per month for 20-30 minutes, feels refreshed after naps. He takes no inadvertant naps.  He denies closing eyes, dozing and falling asleep while driving.Patient was counseled on the importance of driving while alert, to pull over if drowsy, or nap before getting into the vehicle if sleepy.  He uses 2 cups/day of coffee, 1 sodas/day. Last caffeine intake is usually before 6 pm.    Allergies:    No Known Allergies    Medications:    Current Outpatient Medications   Medication Sig Dispense Refill     atorvastatin (LIPITOR) 40 MG tablet Take 1 tablet (40 mg) by mouth daily 90 tablet 0     escitalopram (LEXAPRO) 20 MG tablet Take 1 tablet (20 mg) by mouth every morning 90 tablet 3       Problem List:  Patient Active Problem List    Diagnosis Date Noted     Major depressive disorder, recurrent episode, mild (H) 08/09/2021     Priority: Medium     Lab test positive for detection of COVID-19 virus 11/02/2020     Priority: Medium     Exercise-induced asthma 10/31/2019     Priority: Medium     Abnormal CT scan, bladder 10/31/2019     Priority: Medium     Fatty liver disease, nonalcoholic 10/31/2019     Priority: Medium     Medial knee pain, left 07/10/2018     Priority: Medium     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     Priority: Medium     Rectal cancer (H) 04/12/2017  "    Priority: Medium     Hyperlipidemia LDL goal <160 07/31/2013     Priority: Medium     Family history of colon cancer 07/31/2013     Priority: Medium     Obesity 07/31/2013     Priority: Medium        Past Medical/Surgical History:  Past Medical History:   Diagnosis Date     Bucket-handle tear of medial meniscus of left knee as current injury, initial encounter 7/19/2018     Colon cancer (H) (rectal cancer) 04/12/2017     Depressive disorder      Family history of malignant hyperthermia     UNCONFIRMED BUT FATHER HAD \"FEVER WITH ANESTHESIA\"     Nephrolithiasis     asymptomatic     Obese      Uncomplicated asthma     exercise induced     Past Surgical History:   Procedure Laterality Date     ARTHROSCOPY KNEE WITH MEDIAL MENISCECTOMY Left 7/25/2018    Procedure: ARTHROSCOPY KNEE WITH MEDIAL MENISCECTOMY;  Arthroscopy left knee with Meniscal Debridement;  Surgeon: Rashad Skaggs MD;  [facility]     LAPAROSCOPIC COLECTOMY LOW ANTERIOR N/A 4/12/2017    Procedure: LAPAROSCOPIC COLECTOMY LOW ANTERIOR;  Surgeon: Ignacio Rose MD;  Location: [facility]     NO HISTORY OF SURGERY       REMOVE PORT VASCULAR ACCESS Right 11/8/2017    Procedure: REMOVE PORT VASCULAR ACCESS;  Right chest Port Removal;  Surgeon: Chuy Steiner PA-C;  [facility]     SIGMOIDOSCOPY FLEXIBLE N/A 4/12/2017    Procedure: SIGMOIDOSCOPY FLEXIBLE;  Surgeon: Ignacio Rose MD;  Location: [facility]       Social History:  Social History     Socioeconomic History     Marital status:      Spouse name: Not on file     Number of children: 2     Years of education: Not on file     Highest education level: Not on file   Occupational History     Occupation: Rail [employer]      Employer: [employer]   Tobacco Use     Smoking status: Never Smoker     Smokeless tobacco: Never Used   Vaping Use     Vaping Use: Never used   Substance and Sexual Activity     Alcohol use: Yes     Comment: beer couple times per months     Drug use: No "     Sexual activity: Yes     Partners: Female   Other Topics Concern     Parent/sibling w/ CABG, MI or angioplasty before 65F 55M? No   Social History Narrative    .  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     2 children - adopted    9 siblings 1 with colon cancer.     Father history of colon cancer.  Passed away age 89 after CABG    Mother -  ALS    No family history of bleeding, clotting disorders or complications with anesthesia.         Social Determinants of Health     Financial Resource Strain: Not on file   Food Insecurity: Not on file   Transportation Needs: Not on file   Physical Activity: Not on file   Stress: Not on file   Social Connections: Not on file   Intimate Partner Violence: Not on file   Housing Stability: Not on file       Family History:  Family History   Problem Relation Age of Onset     Cancer - colorectal Father 62     C.A.D. Father 59     Hypertension Father      Neurologic Disorder Mother 70        ALS     Other - See Comments Sister         12 yrs older     Abdominal Aortic Aneurysm Sister      Other - See Comments Sister         8 yrs older     Other - See Comments Sister         twin 6 yrs older     Hypertension Sister      Other - See Comments Sister         twin     Hypertension Sister      Other - See Comments Sister         5 yrs older     Other - See Comments Brother         15 yrs older     Colon Polyps Brother      Other - See Comments Brother         14 yrs older     Colon Polyps Brother      Other - See Comments Brother         10 yrs older     Cancer - colorectal Sister 54        17 yrs older     Other - See Comments Son         19 yr old adopted     Other - See Comments Daughter         16 yrs old adopted       Impression/Plan:  Snoring, daytime fatigue, wakes feeling fatigued. Family hx of AMY. Hx depression/anxiety.   Literature provided regarding sleep apnea.      He will follow up with me in approximately two weeks after his sleep study has been competed to  review the results and discuss plan of care.       Polysomnography reviewed.  Obstructive sleep apnea reviewed.  Complications of untreated sleep apnea were reviewed.        CC: Gunner Almazan

## 2022-02-04 ENCOUNTER — VIRTUAL VISIT (OUTPATIENT)
Dept: SLEEP MEDICINE | Facility: CLINIC | Age: 52
End: 2022-02-04
Attending: FAMILY MEDICINE
Payer: COMMERCIAL

## 2022-02-04 DIAGNOSIS — R29.818 SUSPECTED SLEEP APNEA: Primary | ICD-10-CM

## 2022-02-04 DIAGNOSIS — R06.83 SNORES: ICD-10-CM

## 2022-02-04 PROCEDURE — 99203 OFFICE O/P NEW LOW 30 MIN: CPT | Mod: 95 | Performed by: PHYSICIAN ASSISTANT

## 2022-02-04 NOTE — PATIENT INSTRUCTIONS
"      MY TREATMENT INFORMATION FOR SLEEP APNEA-  Meño Omalley    DOCTOR : GELA Tinoco-NICK    Am I having a sleep study at a sleep center?  --->Due to insurance clearance delays, you will be contacted within 2-4 weeks to schedule    Am I having a home sleep study?  --->Watch the video for the device you are using:    -/drop off device-   https://www.SquadMail.com/watch?v=yGGFBdELGhk    -Disposable device sent out require phone/computer application-   https://www.SquadMail.com/watch?v=BCce_vbiwxE      Frequently asked questions:  1. What is Obstructive Sleep Apnea (AMY)? AMY is the most common type of sleep apnea. Apnea means, \"without breath.\"  Apnea is most often caused by narrowing or collapse of the upper airway as muscles relax during sleep.   Almost everyone has occasional apneas. Most people with sleep apnea have had brief interruptions at night frequently for many years.  The severity of sleep apnea is related to how frequent and severe the events are.   2. What are the consequences of AMY? Symptoms include: feeling sleepy during the day, snoring loudly, gasping or stopping of breathing, trouble sleeping, and occasionally morning headaches or heartburn at night.  Sleepiness can be serious and even increase the risk of falling asleep while driving. Other health consequences may include development of high blood pressure and other cardiovascular disease in persons who are susceptible. Untreated AMY  can contribute to heart disease, stroke and diabetes.   3. What are the treatment options? In most situations, sleep apnea is a lifelong disease that must be managed with daily therapy. Medications are not effective for sleep apnea and surgery is generally not considered until other therapies have been tried. Your treatment is your choice . Continuous Positive Airway (CPAP) works right away and is the therapy that is effective in nearly everyone. An oral device to hold your jaw forward is usually the " next most reliable option. Other options include postioning devices (to keep you off your back), weight loss, and surgery including a tongue pacing device. There is more detail about some of these options below.  4. Are my sleep studies covered by insurance? Although we will request verification of coverage, we advise you also check in advance of the study to ensure there is coverage.    Important tips for those choosing CPAP and similar devices   Know your equipment:  CPAP is continuous positive airway pressure that prevents obstructive sleep apnea by keeping the throat from collapsing while you are sleeping. In most cases, the device is  smart  and can slowly self-adjusts if your throat collapses and keeps a record every day of how well you are treated-this information is available to you and your care team.  BPAP is bilevel positive airway pressure that keeps your throat open and also assists each breath with a pressure boost to maintain adequate breathing.  Special kinds of BPAP are used in patients who have inadequate breathing from lung or heart disease. In most cases, the device is  smart  and can slowly self-adjusts to assist breathing. Like CPAP, the device keeps a record of how well you are treated.  Your mask is your connection to the device. You get to choose what feels most comfortable and the staff will help to make sure if fits. Here: are some examples of the different masks that are available:       Key points to remember on your journey with sleep apnea:  1. Sleep study.  PAP devices often need to be adjusted during a sleep study to show that they are effective and adjusted right.  2. Good tips to remember: Try wearing just the mask during a quiet time during the day so your body adapts to wearing it. A humidifier is recommended for comfort in most cases to prevent drying of your nose and throat. Allergy medication from your provider may help you if you are having nasal congestion.  3. Getting  settled-in. It takes more than one night for most of us to get used to wearing a mask. Try wearing just the mask during a quiet time during the day so your body adapts to wearing it. A humidifier is recommended for comfort in most cases. Our team will work with you carefully on the first day and will be in contact within 4 days and again at 2 and 4 weeks for advice and remote device adjustments. Your therapy is evaluated by the device each day.   4. Use it every night. The more you are able to sleep naturally for 7-8 hours, the more likely you will have good sleep and to prevent health risks or symptoms from sleep apnea. Even if you use it 4 hours it helps. Occasionally all of us are unable to use a medical therapy, in sleep apnea, it is not dangerous to miss one night.   5. Communicate. Call our skilled team on the number provided on the first day if your visit for problems that make it difficult to wear the device. Over 2 out of 3 patients can learn to wear the device long-term with help from our team. Remember to call our team or your sleep providers if you are unable to wear the device as we may have other solutions for those who cannot adapt to mask CPAP therapy. It is recommended that you sleep your sleep provider within the first 3 months and yearly after that if you are not having problems.   6. Use it for your health. We encourage use of CPAP masks during daytime quiet periods to allow your face and brain to adapt to the sensation of CPAP so that it will be a more natural sensation to awaken to at night or during naps. This can be very useful during the first few weeks or months of adapting to CPAP though it does not help medically to wear CPAP during wakefulness and  should not be used as a strategy just to meet guidelines.  7. Take care of your equipment. Make sure you clean your mask and tubing using directions every day and that your filter and mask are replaced as recommended or if they are not  working.     BESIDES CPAP, WHAT OTHER THERAPIES ARE THERE?    Positioning Device  Positioning devices are generally used when sleep apnea is mild and only occurs on your back.This example shows a pillow that straps around the waist. It may be appropriate for those whose sleep study shows milder sleep apnea that occurs primarily when lying flat on one's back. Preliminary studies have shown benefit but effectiveness at home may need to be verified by a home sleep test. These devices are generally not covered by medical insurance.  Examples of devices that maintain sleeping on the back to prevent snoring and mild sleep apnea.    Belt type body positioner  http://[employer].HotDesk/    Electronic reminder  http://nightshifttherapy.com/  http://www.[employer].HotDesk.au/      Oral Appliance  What is oral appliance therapy?  An oral appliance device fits on your teeth at night like a retainer used after having braces. The device is made by a specialized dentist and requires several visits over 1-2 months before a manufactured device is made to fit your teeth and is adjusted to prevent your sleep apnea. Once an oral device is working properly, snoring should be improved. A home sleep test may be recommended at that time if to determine whether the sleep apnea is adequately treated.       Some things to remember:  -Oral devices are often, but not always, covered by your medical insurance. Be sure to check with your insurance provider.   -If you are referred for oral therapy, you will be given a list of specialized dentists to consider or you may choose to visit the Web site of the American Academy of Dental Sleep Medicine  -Oral devices are less likely to work if you have severe sleep apnea or are extremely overweight.     More detailed information  An oral appliance is a small acrylic device that fits over the upper and lower teeth  (similar to a retainer or a mouth guard). This device slightly moves jaw forward, which moves the base  of the tongue forward, opens the airway, improves breathing for effective treat snoring and obstructive sleep apnea in perhaps 7 out of 10 people .  The best working devices are custom-made by a dental device  after a mold is made of the teeth 1, 2, 3.  When is an oral appliance indicated?  Oral appliance therapy is recommended as a first-line treatment for patients with primary snoring, mild sleep apnea, and for patients with moderate sleep apnea who prefer appliance therapy to use of CPAP4, 5. Severity of sleep apnea is determined by sleep testing and is based on the number of respiratory events per hour of sleep.   How successful is oral appliance therapy?  The success rate of oral appliance therapy in patients with mild sleep apnea is 75-80% while in patients with moderate sleep apnea it is 50-70%. The chance of success in patients with severe sleep apnea is 40-50%. The research also shows that oral appliances have a beneficial effect on the cardiovascular health of AMY patients at the same magnitude as CPAP therapy7.  Oral appliances should be a second-line treatment in cases of severe sleep apnea, but if not completely successful then a combination therapy utilizing CPAP plus oral appliance therapy may be effective. Oral appliances tend to be effective in a broad range of patients although studies show that the patients who have the highest success are females, younger patients, those with milder disease, and less severe obesity. 3, 6.   Finding a dentist that practices dental sleep medicine  Specific training is available through the American Academy of Dental Sleep Medicine for dentists interested in working in the field of sleep. To find a dentist who is educated in the field of sleep and the use of oral appliances, near you, visit the Web site of the American Academy of Dental Sleep Medicine.    References  1. madalyn Billingsley al. Objectively measured vs self-reported compliance during oral  appliance therapy for sleep-disordered breathing. Chest 2013; 144(5): 3374-4741.  2. Rox, et al. Objective measurement of compliance during oral appliance therapy for sleep-disordered breathing. Thorax 2013; 68(1): 91-96.  3. Danis et al. Mandibular advancement devices in 620 men and women with AMY and snoring: tolerability and predictors of treatment success. Chest 2004; 125: 0474-5484.  4. Katiuska, et al. Oral appliances for snoring and AMY: a review. Sleep 2006; 29: 244-262.  5. Yvonne et al. Oral appliance treatment for AMY: an update. J Clin Sleep Med 2014; 10(2): 215-227.  6. Brandon et al. Predictors of OSAH treatment outcome. J Dent Res 2007; 86: 0697-3129.      Weight Loss:    Weight loss is a long-term strategy that may improve sleep apnea in some patients.    Weight management is a personal decision and the decision should be based on your interest and the potential benefits.  If you are interested in exploring weight loss strategies, the following discussion covers the impact on weight loss on sleep apnea and the approaches that may be successful.    Being overweight does not necessarily mean you will have health consequences.  Those who have BMI over 35 or over 27 with existing medical conditions carries greater risk.   Weight loss decreases severity of sleep apnea in most people with obesity. For those with mild obesity who have developed snoring with weight gain, even 15-30 pound weight loss can improve and occasionally eliminate sleep apnea.  Structured and life-long dietary and health habits are necessary to lose weight and keep healthier weight levels.     Though there may be significant health benefits from weight loss, long-term weight loss is very difficult to achieve- studies show success with dietary management in less than 10% of people. In addition, substantial weight loss may require years of dietary control and may be difficult if patients have severe obesity. In these  cases, surgical management may be considered.  Finally, older individuals who have tolerated obesity without health complications may be less likely to benefit from weight loss strategies.        Your BMI is There is no height or weight on file to calculate BMI.  Weight management is a personal decision.  If you are interested in exploring weight loss strategies, the following discussion covers the approaches that may be successful. Body mass index (BMI) is one way to tell whether you are at a healthy weight, overweight, or obese. It measures your weight in relation to your height.  A BMI of 18.5 to 24.9 is in the healthy range. A person with a BMI of 25 to 29.9 is considered overweight, and someone with a BMI of 30 or greater is considered obese. More than two-thirds of American adults are considered overweight or obese.  Being overweight or obese increases the risk for further weight gain. Excess weight may lead to heart disease and diabetes.  Creating and following plans for healthy eating and physical activity may help you improve your health.  Weight control is part of healthy lifestyle and includes exercise, emotional health, and healthy eating habits. Careful eating habits lifelong are the mainstay of weight control. Though there are significant health benefits from weight loss, long-term weight loss with diet alone may be very difficult to achieve- studies show long-term success with dietary management in less than 10% of people. Attaining a healthy weight may be especially difficult to achieve in those with severe obesity. In some cases, medications, devices and surgical management might be considered.  What can you do?  If you are overweight or obese and are interested in methods for weight loss, you should discuss this with your provider.     Consider reducing daily calorie intake by 500 calories.     Keep a food journal.     Avoiding skipping meals, consider cutting portions instead.    Diet combined  with exercise helps maintain muscle while optimizing fat loss. Strength training is particularly important for building and maintaining muscle mass. Exercise helps reduce stress, increase energy, and improves fitness. Increasing exercise without diet control, however, may not burn enough calories to loose weight.       Start walking three days a week 10-20 minutes at a time    Work towards walking thirty minutes five days a week     Eventually, increase the speed of your walking for 1-2 minutes at time    And look into health and wellness programs that may be available through your health insurance provider, employer, local community center, or [employer].          Surgery:    Surgery for obstructive sleep apnea is considered generally only when other therapies fail to work. Surgery may be discussed with you if you are having a difficult time tolerating CPAP and or when there is an abnormal structure that requires surgical correction.  Nose and throat surgeries often enlarge the airway to prevent collapse.  Most of these surgeries create pain for 1-2 weeks and up to half of the most common surgeries are not effective throughout life.  You should carefully discuss the benefits and drawbacks to surgery with your sleep provider and surgeon to determine if it is the best solution for you.   More information  Surgery for AMY is directed at areas that are responsible for narrowing or complete obstruction of the airway during sleep.  There are a wide range of procedures available to enlarge and/or stabilize the airway to prevent blockage of breathing in the three major areas where it can occur: the palate, tongue, and nasal regions.  Successful surgical treatment depends on the accurate identification of the factors responsible for obstructive sleep apnea in each person.  A personalized approach is required because there is no single treatment that works well for everyone.  Because of anatomic variation, consultation with  an examination by a sleep surgeon is a critical first step in determining what surgical options are best for each patient.  In some cases, examination during sedation may be recommended in order to guide the selection of procedures.  Patients will be counseled about risks and benefits as well as the typical recovery course after surgery. Surgery is typically not a cure for a person s AMY.  However, surgery will often significantly improve one s AMY severity (termed  success rate ).  Even in the absence of a cure, surgery will decrease the cardiovascular risk associated with OSA7; improve overall quality of life8 (sleepiness, functionality, sleep quality, etc).      Palate Procedures:  Patients with AMY often have narrowing of their airway in the region of their tonsils and uvula.  The goals of palate procedures are to widen the airway in this region as well as to help the tissues resist collapse.  Modern palate procedure techniques focus on tissue conservation and soft tissue rearrangement, rather than tissue removal.  Often the uvula is preserved in this procedure. Residual sleep apnea is common in patient after pharyngoplasty with an average reduction in sleep apnea events of 33%2.      Tongue Procedures:  ExamWhile patients are awake, the muscles that surround the throat are active and keep this region open for breathing. These muscles relax during sleep, allowing the tongue and other structures to collapse and block breathing.  There are several different tongue procedures available.  Selection of a tongue base procedure depends on characteristics seen on physical exam.  Generally, procedures are aimed at removing bulky tissues in this area or preventing the back of the tongue from falling back during sleep.  Success rates for tongue surgery range from 50-62%3.    Hypoglossal Nerve Stimulation:  Hypoglossal nerve stimulation has recently received approval from the [place] Food and Drug Administration for  the treatment of obstructive sleep apnea.  This is based on research showing that the system was safe and effective in treating sleep apnea6.  Results showed that the median AHI score decreased 68%, from 29.3 to 9.0. This therapy uses an implant system that senses breathing patterns and delivers mild stimulation to airway muscles, which keeps the airway open during sleep.  The system consists of three fully implanted components: a small generator (similar in size to a pacemaker), a breathing sensor, and a stimulation lead.  Using a small handheld remote, a patient turns the therapy on before bed and off upon awakening.    Candidates for this device must be greater than 22 years of age, have moderate to severe AMY (AHI between 20-65), BMI less than 32, have tried CPAP/oral appliance without success, and have appropriate upper airway anatomy (determined by a sleep endoscopy performed by Dr. Sales).    Hypoglossal Nerve Stimulation Pathway:    The sleep surgeon s office will work with the patient through the insurance prior-authorization process (including communications and appeals).    Nasal Procedures:  Nasal obstruction can interfere with nasal breathing during the day and night.  Studies have shown that relief of nasal obstruction can improve the ability of some patients to tolerate positive airway pressure therapy for obstructive sleep apnea1.  Treatment options include medications such as nasal saline, topical corticosteroid and antihistamine sprays, and oral medications such as antihistamines or decongestants. Non-surgical treatments can include external nasal dilators for selected patients. If these are not successful by themselves, surgery can improve the nasal airway either alone or in combination with these other options.      Combination Procedures:  Combination of surgical procedures and other treatments may be recommended, particularly if patients have more than one area of narrowing or persistent  positional disease.  The success rate of combination surgery ranges from 66-80%2,3.    References  1. Madelyn ROMERO. The Role of the Nose in Snoring and Obstructive Sleep Apnoea: An Update.  Eur Arch Otorhinolaryngol. 2011; 268: 1365-73.  2.  Edie SM; Jessie JA; Shy JR; Pallanch JF; Zaheer MB; Julisa SG; Alon JULES. Surgical modifications of the upper airway for obstructive sleep apnea in adults: a systematic review and meta-analysis. SLEEP 2010;33(10):6497-2741. Redd CADET. Hypopharyngeal surgery in obstructive sleep apnea: an evidence-based medicine review.  Arch Otolaryngol Head Neck Surg. 2006 Feb;132(2):206-13.  3. Yoel YH1, Anton Y, Byron ADRIANNE. The efficacy of anatomically based multilevel surgery for obstructive sleep apnea. Otolaryngol Head Neck Surg. 2003 Oct;129(4):327-35.  4. Redd CADET, Goldberg A. Hypopharyngeal Surgery in Obstructive Sleep Apnea: An Evidence-Based Medicine Review. Arch Otolaryngol Head Neck Surg. 2006 Feb;132(2):206-13.  5. Billy PJ et al. Upper-Airway Stimulation for Obstructive Sleep Apnea.  N Engl J Med. 2014 Jan 9;370(2):139-49.  6. Geraldo Y et al. Increased Incidence of Cardiovascular Disease in Middle-aged Men with Obstructive Sleep Apnea. Am J Respir Crit Care Med; 2002 166: 159-165  7. Snow EM et al. Studying Life Effects and Effectiveness of Palatopharyngoplasty (SLEEP) study: Subjective Outcomes of Isolated Uvulopalatopharyngoplasty. Otolaryngol Head Neck Surg. 2011; 144: 623-631.        WHAT IF I ONLY HAVE SNORING?      Mandibular advancement devices, lateral sleep positioning, long-term weight loss and treatment of nasal allergies have been shown to improve snoring.    Exercising tongue muscles with a game (https://www.ncbi.nlm.nih.gov/pubmed/05662162) or stimulating the tongue during the day with a device (https://doi.org/10.3390/hqr23557469) have improved snoring in some individuals.    Remember to Drive Safe... Drive Alive     Sleep health profoundly affects your  health, mood, and your safety.  Thirty three percent of the population (one in three of us) is not getting enough sleep and many have a sleep disorder. Not getting enough sleep or having an untreated / undertreated sleep condition may make us sleepy without even knowing it. In fact, our driving could be dramatically impaired due to our sleep health. As your provider, here are some things I would like you to know about driving:     Here are some warning signs for impairment and dangerous drowsy driving:              -Having been awake more than 16 hours               -Looking tired               -Eyelid drooping              -Head nodding (it could be too late at this point)              -Driving for more than 30 minutes     Some things you could do to make the driving safer if you are experiencing some drowsiness:              -Stop driving and rest              -Call for transportation              -Make sure your sleep disorder is adequately treated     Some things that have been shown NOT to work when experiencing drowsiness while driving:              -Turning on the radio              -Opening windows              -Eating any  distracting  /  entertaining  foods (e.g., sunflower seeds, candy, or any other)              -Talking on the phone      Your decision may not only impact your life, but also the life of others. Please, remember to drive safe for yourself and all of us.

## 2022-05-12 ENCOUNTER — OFFICE VISIT (OUTPATIENT)
Dept: SLEEP MEDICINE | Facility: CLINIC | Age: 52
End: 2022-05-12
Attending: PHYSICIAN ASSISTANT
Payer: COMMERCIAL

## 2022-05-12 DIAGNOSIS — R06.83 SNORES: ICD-10-CM

## 2022-05-12 DIAGNOSIS — R29.818 SUSPECTED SLEEP APNEA: ICD-10-CM

## 2022-05-12 PROCEDURE — G0399 HOME SLEEP TEST/TYPE 3 PORTA: HCPCS | Performed by: OTOLARYNGOLOGY

## 2022-05-13 ENCOUNTER — DOCUMENTATION ONLY (OUTPATIENT)
Dept: SLEEP MEDICINE | Facility: CLINIC | Age: 52
End: 2022-05-13
Payer: COMMERCIAL

## 2022-05-13 NOTE — PROGRESS NOTES
This HSAT was performed using a Noxturnal T3 device which recorded snore, sound, movement activity, body position, nasal pressure, oronasal thermal airflow, pulse, oximetry and both chest and abdominal respiratory effort. HSAT data was restricted to the time patient states they were in bed.     HSAT was scored using 1B 4% hypopnea rule.     AHI: 56.6. Snoring was reported as loud.  Time with SpO2 below 89% was 42.2 minutes.   Overall signal quality was good     Pt will follow up with sleep provider to determine appropriate therapy.     Ordering Provider, Adryan Islas PA-C Charles O., BA, [facility], RST  System Clinical Specialist 5/13/2022

## 2022-05-13 NOTE — PROGRESS NOTES
HST POST-STUDY QUESTIONNAIRE    1. What time did you go to bed?  10:30 pm  2. How long do you think it took to fall asleep?  20 minutes  3. What time did you wake up to start the day?  6:30 am  4. Did you get up during the night at all?  no  5. If you woke up, do you remember approximately what time(s)? NA  6. Did you have any difficulty with the equipment?  No  7. Did you us any type of treatment with this study?  None  8. Was the head of the bed elevated? No  9. Did you sleep in a recliner?  No  10. Did you stop using CPAP at least 3 days before this test?  NA  11. Any other information you'd like us to know? NA

## 2022-05-25 NOTE — PROGRESS NOTES
Does Meño have a CPAP/Bipap?  No     [place] Sleep Scale:     Meño is a 51 year old who is being evaluated via a billable video visit.      How would you like to obtain your AVS? MyChart  If the video visit is dropped, the invitation should be resent by: Text to cell phone: [phone]  Will anyone else be joining your video visit? No      Video Start Time: 1004        Subjective   Meño is a 51 year old who presents for the following health issues study results    Vitals:  No vitals were obtained today due to virtual visit.        Video-Visit Details    Type of service:  Video Visit    Video End Time:10:18 AM    Originating Location (pt. Location): Home    Distant Location (provider location):  [place] [facility]     Platform used for Video Visit: QSecure       Sleep Study Follow-Up Visit:    Date on this visit: 5/27/2022    Meño Omalley comes in today for follow-up of his home sleep study done on 5/12/22  for possible sleep apnea.     AHI was 56.6, with significant desaturations. Supine RDI 66.2, consistent with severe SUPINE AMY.     These findings were reviewed with patient.     Past medical/surgical history, family history, social history, medications and allergies were reviewed.      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   He will follow up with me in about 3 month(s).     Fifteen minutes spent with patient, all of which were spent face-to-face counseling, consulting, coordinating plan of care.          CC: Delio Alcala

## 2022-05-27 ENCOUNTER — VIRTUAL VISIT (OUTPATIENT)
Dept: SLEEP MEDICINE | Facility: CLINIC | Age: 52
End: 2022-05-27
Payer: COMMERCIAL

## 2022-05-27 DIAGNOSIS — G47.33 OSA (OBSTRUCTIVE SLEEP APNEA): Primary | ICD-10-CM

## 2022-05-27 PROCEDURE — 99213 OFFICE O/P EST LOW 20 MIN: CPT | Mod: 95 | Performed by: PHYSICIAN ASSISTANT

## 2022-05-27 ASSESSMENT — SLEEP AND FATIGUE QUESTIONNAIRES
HOW LIKELY ARE YOU TO NOD OFF OR FALL ASLEEP WHILE WATCHING TV: SLIGHT CHANCE OF DOZING
HOW LIKELY ARE YOU TO NOD OFF OR FALL ASLEEP WHILE SITTING QUIETLY AFTER LUNCH WITHOUT ALCOHOL: WOULD NEVER DOZE
HOW LIKELY ARE YOU TO NOD OFF OR FALL ASLEEP IN A CAR, WHILE STOPPED FOR A FEW MINUTES IN TRAFFIC: WOULD NEVER DOZE
HOW LIKELY ARE YOU TO NOD OFF OR FALL ASLEEP WHILE SITTING AND READING: WOULD NEVER DOZE
HOW LIKELY ARE YOU TO NOD OFF OR FALL ASLEEP WHILE SITTING AND TALKING TO SOMEONE: WOULD NEVER DOZE
HOW LIKELY ARE YOU TO NOD OFF OR FALL ASLEEP WHEN YOU ARE A PASSENGER IN A CAR FOR AN HOUR WITHOUT A BREAK: SLIGHT CHANCE OF DOZING
HOW LIKELY ARE YOU TO NOD OFF OR FALL ASLEEP WHILE LYING DOWN TO REST IN THE AFTERNOON WHEN CIRCUMSTANCES PERMIT: MODERATE CHANCE OF DOZING
HOW LIKELY ARE YOU TO NOD OFF OR FALL ASLEEP WHILE SITTING INACTIVE IN A PUBLIC PLACE: WOULD NEVER DOZE

## 2022-05-27 NOTE — PROGRESS NOTES
"HOME SLEEP STUDY INTERPRETATION    Patient: Meño Omalley  MRN: [id number]  YOB: 1970  Study Date: 2022  PCP/Referring Provider: Delio Alcala MD  Ordering Provider: Adryan Islas PA-C     Indications for Home Study: Meño Omalley is a 51 year old male with a history of snoring, daytime fatigue, obesity who presents with symptoms suggestive of obstructive sleep apnea.    Estimated body mass index is 34.06 kg/m  as calculated from the following:    Height as of 21: 1.727 m (5' 8\").    Weight as of 21: 101.6 kg (224 lb).  Total score - Sinclair: 8 (2022  8:00 AM)  STOP-BAN/8    Data: A full night home sleep study was performed recording the standard physiologic parameters including body position, movement, sound, nasal pressure, thermal oral airflow, chest and abdominal movements with respiratory inductance plethysmography, and oxygen saturation by pulse oximetry. Pulse rate was estimated by oximetry recording. This study was considered adequate based on > 4 hours of quality oximetry and respiratory recording. As specified by the AASM Manual for the Scoring of Sleep and Associated events, version 2.3, Rule VIII.D 1B, 4% oxygen desaturation scoring for hypopneas is used as a standard of care on all home sleep apnea testing.    Analysis Time:  464 minutes    Respiration:   Sleep Associated Hypoxemia: sustained hypoxemia was present. Baseline oxygen saturation was 92%.  Time with saturation less than or equal to 88% was 34.4 minutes. The lowest oxygen saturation was 67%.   Snoring: Snoring was present.  Respiratory events: The home study revealed a presence of 325 obstructive apneas and 24 mixed and central apneas. There were 98 hypopneas resulting in a combined apnea/hypopnea index [AHI] of 56.6 events per hour.  AHI was 66.2 per hour supine, 0 per hour prone, 42.3 per hour on left side, and 22.3 per hour on right side.   Pattern: Excluding events noted above, " respiratory rate and pattern was Normal.    Position: Percent of time spent: supine - 61.8%, prone - 0%, on left - 35.9%, on right - 2.3%.    Heart Rate: By pulse oximetry tachycardia was noted.     Assessment:   Severe obstructive sleep apnea.  Sleep associated hypoxemia was present.    Recommendations:  Auto CPAP pressure range 5 to 15 cm or oral appliance(only if the patient is intolerant of CPAP)  Suggest optimizing sleep hygiene and avoiding sleep deprivation.  Weight management.(BMI>30).    Diagnosis Code(s): Obstructive Sleep Apnea G47.33, Hypoxemia G47.36    Dez Sánchez MD,  May 27, 2022   Diplomate, American Board of Otolaryngology, Sleep Medicine

## 2022-05-31 ENCOUNTER — MYC MEDICAL ADVICE (OUTPATIENT)
Dept: SLEEP MEDICINE | Facility: CLINIC | Age: 52
End: 2022-05-31
Payer: COMMERCIAL

## 2022-05-31 ENCOUNTER — TELEPHONE (OUTPATIENT)
Dept: SLEEP MEDICINE | Facility: CLINIC | Age: 52
End: 2022-05-31
Payer: COMMERCIAL

## 2022-06-14 ENCOUNTER — APPOINTMENT (OUTPATIENT)
Dept: SLEEP MEDICINE | Facility: CLINIC | Age: 52
End: 2022-06-14
Payer: COMMERCIAL

## 2022-06-14 ENCOUNTER — DOCUMENTATION ONLY (OUTPATIENT)
Dept: SLEEP MEDICINE | Facility: CLINIC | Age: 52
End: 2022-06-14
Payer: COMMERCIAL

## 2022-06-14 DIAGNOSIS — G47.33 OSA (OBSTRUCTIVE SLEEP APNEA): Primary | ICD-10-CM

## 2022-07-15 ENCOUNTER — DOCUMENTATION ONLY (OUTPATIENT)
Dept: SLEEP MEDICINE | Facility: CLINIC | Age: 52
End: 2022-07-15

## 2022-07-15 NOTE — PROGRESS NOTES
30 DAY STM VISIT    Diagnostic AHI:  56.6 HST    Subjective measures:   Patient reports CPAP is going okay for him though he has some nasal discomfort where the nasal pillow makes contact with his nostril. I advised him to use nasal saline gel during the daytime to soothe that area and advised he use the nasal cradle version of his n30i mask to reduce contact with that irritation point.     Assessment: Pt not meeting objective benchmarks for compliance  Patient failing following subjective benchmarks: mask discomfort   Action plan: pt to have 6 month [facility] visit  Patient has scheduled a follow up visit with Adryan Islas PA-C on 8/10/2022.   Device type: Auto-CPAP  PAP settings: CPAP min 5.0 cm  H20     CPAP max 15.0 cm  H20    95th% pressure 8.5 cm  H20      RESMED EPR level Setting: TWO    RESMED Soft response setting:  OFF  Mask type:  Per patient choice  Objective measures: 14 day rolling measures      Compliance  50 %      Leak  3.09 lpm  last  upload      AHI 0.96   last  upload      Average number of minutes 195      Objective measure goal  Compliance   Goal >70%  Leak   Goal < 24 lpm  AHI  Goal < 5  Usage  Goal >240        Total time spent on accessing and interpreting remote patient PAP therapy data  10 minutes    Total time spent counseling, coaching  and reviewing PAP therapy data with patient  11 minutes     [id number] this call  [id number] no  at 3 or 14 day [facility]

## 2022-08-08 NOTE — PROGRESS NOTES
Does Meño have a CPAP/Bipap?  Yes     Type of mask: Nasal     [facility]: [facility] [phone]    [email or web address]     [place] Sleep Scale: 5  Meño is a 52 year old who is being evaluated via a billable video visit.      How would you like to obtain your AVS? MyChart  If the video visit is dropped, the invitation should be resent by: Text to cell phone: [phone]  Will anyone else be joining your video visit? No              Subjective   Meño is a 52 year old, presenting for the following health issues:  No chief complaint on file.            Objective           Vitals:  No vitals were obtained today due to virtual visit.    Physical Exam   GENERAL: Healthy, alert and no distress  EYES: Eyes grossly normal to inspection.  No discharge or erythema, or obvious scleral/conjunctival abnormalities.  RESP: No audible wheeze, cough, or visible cyanosis.  No visible retractions or increased work of breathing.    SKIN: Visible skin clear. No significant rash, abnormal pigmentation or lesions.  NEURO: Cranial nerves grossly intact.  Mentation and speech appropriate for age.  PSYCH: Mentation appears normal, affect normal/bright, judgement and insight intact, normal speech and appearance well-groomed.            Video-Visit Details    Video Start Time: 9:09 AM    Type of service:  Video Visit    Video End Time:9:23 AM    Originating Location (pt. Location): Home    Distant Location (provider location):  [facility]     Platform used for Video Visit: [employer]    .  ..  Sleep Apnea - Follow-up Visit:    Impression/Plan:    Severe Sleep apnea. He is Tolerating PAP well. Daytime symptoms are improved.   Tolerating cpap well, has been taking some nights off due to nasal irritation due to the mask, but this is improving. Apneas are well controlled. Encouraged more consistent use, Supplies re ordered.     Meño Omalley will follow up in about 1  year(s).     Fifteen minutes spent with patient, all of which were spent face-to-face counseling, consulting, coordinating plan of care.      CC:  Delio Alcala,         History of Present Illness:  Chief Complaint   Patient presents with     CPAP Follow Up       Meño Omalley presents for follow-up of their severe sleep apnea, managed with CPAP.     No specialty comments available.    Do you use a CPAP Machine at home: Yes  Overall, on a scale of 0-10 how would you rate your CPAP (0 poor, 10 great): 7    What type of mask do you use: Nasal Pillow  Is your mask comfortable: No  If not, why: it's a mask    Is your mask leaking: No  If yes, where do you feel it:    How many night per week does the mask leak (0-7):      Do you notice snoring with mask on: No  Do you notice gasping arousals with mask on: No  Are you having significant oral or nasal dryness: No  Is the pressure setting comfortable: Yes  If not, why:      What is your typical bedtime: 10:30  How long does it take you to go to sleep on PAP therapy: 30  What time do you typically get out of bed for the day: 7  How many hours on average per night are you using PAP therapy: 7  How many hours are you sleeping per night: 8  Do you feel well rested in the morning: No      [facility]Med   CPAP  cmH2O 30 day usage data:  70% of days with > 4 hours of use. 8/30 days with no use.   Average use 313 minutes per day.   95%ile Leak 4.96 L/min.   AHI 1.17 events per hour.     Auto-PAP 5.0 - 15.0 cmH2O 30 day usage data:    70% of days with > 4 hours of use. 8/30 days with no use.   Average use 313 minutes per day.   95%ile Leak 4.96 L/min.   CPAP 95% pressure 9.5 cm.   AHI 1.17 events per hour.        EPWORTH SLEEPINESS SCALE      [place] Sleepiness Scale ( LISA Guerrero  [id number]<br>ESS - [place]/English - Final version - 21 Nov 07 - [facility] [place].) 8/9/2022   Sitting and reading Would never doze   Watching TV Slight chance of dozing   Sitting, inactive in a  public place (e.g. a theatre or a meeting) Would never doze   As a passenger in a car for an hour without a break Slight chance of dozing   Lying down to rest in the afternoon when circumstances permit Moderate chance of dozing   Sitting and talking to someone Would never doze   Sitting quietly after a lunch without alcohol Slight chance of dozing   In a car, while stopped for a few minutes in traffic Would never doze   Martin Score (MC) 5   Martin Score (Sleep) 5       INSOMNIA SEVERITY INDEX (HOWIE)      Insomnia Severity Index (HOWIE) 8/9/2022   Difficulty falling asleep 1   Difficulty staying asleep 1   Problems waking up too early 2   How SATISFIED/DISSATISFIED are you with your CURRENT sleep pattern? 2   How NOTICEABLE to others do you think your sleep problem is in terms of impairing the quality of your life? 1   How WORRIED/DISTRESSED are you about your current sleep problem? 1   To what extent do you consider your sleep problem to INTERFERE with your daily functioning (e.g. daytime fatigue, mood, ability to function at work/daily chores, concentration, memory, mood, etc.) CURRENTLY? 2   HOWIE Total Score 10       Guidelines for Scoring/Interpretation:  Total score categories:  0-7 = No clinically significant insomnia   8-14 = Subthreshold insomnia   15-21 = Clinical insomnia (moderate severity)  22-28 = Clinical insomnia (severe)  Used via courtesy of www.[employer].va.gov with permission from Michele Rojas PhD., [facility]      Past medical/surgical history, family history, social history, medications and allergies were reviewed.        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

## 2022-08-09 ASSESSMENT — SLEEP AND FATIGUE QUESTIONNAIRES
HOW LIKELY ARE YOU TO NOD OFF OR FALL ASLEEP WHILE WATCHING TV: SLIGHT CHANCE OF DOZING
HOW LIKELY ARE YOU TO NOD OFF OR FALL ASLEEP WHILE SITTING INACTIVE IN A PUBLIC PLACE: WOULD NEVER DOZE
HOW LIKELY ARE YOU TO NOD OFF OR FALL ASLEEP WHEN YOU ARE A PASSENGER IN A CAR FOR AN HOUR WITHOUT A BREAK: SLIGHT CHANCE OF DOZING
HOW LIKELY ARE YOU TO NOD OFF OR FALL ASLEEP WHILE SITTING AND TALKING TO SOMEONE: WOULD NEVER DOZE
HOW LIKELY ARE YOU TO NOD OFF OR FALL ASLEEP IN A CAR, WHILE STOPPED FOR A FEW MINUTES IN TRAFFIC: WOULD NEVER DOZE
HOW LIKELY ARE YOU TO NOD OFF OR FALL ASLEEP WHILE SITTING QUIETLY AFTER LUNCH WITHOUT ALCOHOL: SLIGHT CHANCE OF DOZING
HOW LIKELY ARE YOU TO NOD OFF OR FALL ASLEEP WHILE LYING DOWN TO REST IN THE AFTERNOON WHEN CIRCUMSTANCES PERMIT: MODERATE CHANCE OF DOZING
HOW LIKELY ARE YOU TO NOD OFF OR FALL ASLEEP WHILE SITTING AND READING: WOULD NEVER DOZE

## 2022-08-10 ENCOUNTER — VIRTUAL VISIT (OUTPATIENT)
Dept: SLEEP MEDICINE | Facility: CLINIC | Age: 52
End: 2022-08-10
Payer: COMMERCIAL

## 2022-08-10 DIAGNOSIS — G47.33 OSA (OBSTRUCTIVE SLEEP APNEA): Primary | ICD-10-CM

## 2022-08-10 PROCEDURE — 99213 OFFICE O/P EST LOW 20 MIN: CPT | Mod: 95 | Performed by: PHYSICIAN ASSISTANT

## 2022-10-05 ENCOUNTER — TELEPHONE (OUTPATIENT)
Dept: SLEEP MEDICINE | Facility: CLINIC | Age: 52
End: 2022-10-05

## 2022-10-05 NOTE — TELEPHONE ENCOUNTER
CALLED AND SPOKE WITH THE PT REGARDING CONNECT ORDER. LET THE PT KNOW HE IS NOT ELIGIBLE FOR THE WATER CHAMBER AND OR WHOLE MASK SYSTEM AS REQUESTED. DISCUSS WITH THE PT WHICH ITEMS HE CAN CURRENTLY GET AND WILL BE SHIPPING FROM THE [address] [place] [address]. PT HAD NO OTHER QUESTIONS OR CONCERNS AT THIS TIME.

## 2023-01-14 ENCOUNTER — HEALTH MAINTENANCE LETTER (OUTPATIENT)
Age: 53
End: 2023-01-14

## 2023-01-20 ENCOUNTER — ESTABLISHED PATIENT (OUTPATIENT)
Dept: URBAN - METROPOLITAN AREA CLINIC 25 | Facility: CLINIC | Age: 53
End: 2023-01-20

## 2023-01-20 DIAGNOSIS — H52.4: ICD-10-CM

## 2023-01-20 DIAGNOSIS — H52.203: ICD-10-CM

## 2023-01-20 PROCEDURE — 92014 COMPRE OPH EXAM EST PT 1/>: CPT

## 2023-01-20 PROCEDURE — 92015 DETERMINE REFRACTIVE STATE: CPT

## 2023-01-20 ASSESSMENT — KERATOMETRY
OD_AXISANGLE_DEGREES: 180
OD_K2POWER_DIOPTERS: 42.50
OS_AXISANGLE_DEGREES: 156
OS_K1POWER_DIOPTERS: 42.00
OD_AXISANGLE2_DEGREES: 90
OS_AXISANGLE2_DEGREES: 66
OD_K1POWER_DIOPTERS: 42.50
OS_K2POWER_DIOPTERS: 42.50

## 2023-01-20 ASSESSMENT — TONOMETRY
OS_IOP_MMHG: 10
OD_IOP_MMHG: 10

## 2023-01-20 ASSESSMENT — VISUAL ACUITY
OD_SC: 20/25
OS_SC: 20/25

## 2023-05-29 PROBLEM — G47.33 OSA ON CPAP: Status: ACTIVE | Noted: 2022-05-05

## 2023-05-29 PROBLEM — Z86.0101 H/O ADENOMATOUS POLYP OF COLON: Status: ACTIVE | Noted: 2018-06-11

## 2023-05-30 ENCOUNTER — OFFICE VISIT (OUTPATIENT)
Dept: FAMILY MEDICINE | Facility: CLINIC | Age: 53
End: 2023-05-30
Payer: COMMERCIAL

## 2023-05-30 VITALS
OXYGEN SATURATION: 97 % | WEIGHT: 230.2 LBS | HEIGHT: 69 IN | HEART RATE: 75 BPM | BODY MASS INDEX: 34.1 KG/M2 | TEMPERATURE: 98.4 F | SYSTOLIC BLOOD PRESSURE: 146 MMHG | RESPIRATION RATE: 13 BRPM | DIASTOLIC BLOOD PRESSURE: 96 MMHG

## 2023-05-30 DIAGNOSIS — K76.0 FATTY LIVER DISEASE, NONALCOHOLIC: ICD-10-CM

## 2023-05-30 DIAGNOSIS — Z86.0101 H/O ADENOMATOUS POLYP OF COLON: ICD-10-CM

## 2023-05-30 DIAGNOSIS — Z00.00 ANNUAL PHYSICAL EXAM: Primary | ICD-10-CM

## 2023-05-30 DIAGNOSIS — E66.811 CLASS 1 OBESITY DUE TO EXCESS CALORIES WITHOUT SERIOUS COMORBIDITY WITH BODY MASS INDEX (BMI) OF 34.0 TO 34.9 IN ADULT: ICD-10-CM

## 2023-05-30 DIAGNOSIS — Z12.5 SCREENING FOR PROSTATE CANCER: ICD-10-CM

## 2023-05-30 DIAGNOSIS — C20 RECTAL CANCER (H): ICD-10-CM

## 2023-05-30 DIAGNOSIS — Z23 HIGH PRIORITY FOR 2019-NCOV VACCINE: ICD-10-CM

## 2023-05-30 DIAGNOSIS — R03.0 ELEVATED BLOOD PRESSURE READING WITHOUT DIAGNOSIS OF HYPERTENSION: ICD-10-CM

## 2023-05-30 DIAGNOSIS — Z11.59 NEED FOR HEPATITIS C SCREENING TEST: ICD-10-CM

## 2023-05-30 DIAGNOSIS — F33.0 MAJOR DEPRESSIVE DISORDER, RECURRENT EPISODE, MILD (H): ICD-10-CM

## 2023-05-30 DIAGNOSIS — G47.33 OSA ON CPAP: ICD-10-CM

## 2023-05-30 DIAGNOSIS — E66.09 CLASS 1 OBESITY DUE TO EXCESS CALORIES WITHOUT SERIOUS COMORBIDITY WITH BODY MASS INDEX (BMI) OF 34.0 TO 34.9 IN ADULT: ICD-10-CM

## 2023-05-30 DIAGNOSIS — E78.5 HYPERLIPIDEMIA LDL GOAL <160: ICD-10-CM

## 2023-05-30 DIAGNOSIS — J45.990 EXERCISE-INDUCED ASTHMA: ICD-10-CM

## 2023-05-30 PROBLEM — U07.1 LAB TEST POSITIVE FOR DETECTION OF COVID-19 VIRUS: Status: RESOLVED | Noted: 2020-11-02 | Resolved: 2023-05-30

## 2023-05-30 LAB
ALBUMIN SERPL BCG-MCNC: 4.4 G/DL (ref 3.5–5.2)
ALP SERPL-CCNC: 50 U/L (ref 40–129)
ALT SERPL W P-5'-P-CCNC: 50 U/L (ref 10–50)
ANION GAP SERPL CALCULATED.3IONS-SCNC: 14 MMOL/L (ref 7–15)
AST SERPL W P-5'-P-CCNC: 36 U/L (ref 10–50)
BILIRUB SERPL-MCNC: 0.6 MG/DL
BUN SERPL-MCNC: 17.1 MG/DL (ref 6–20)
CALCIUM SERPL-MCNC: 9.2 MG/DL (ref 8.6–10)
CHLORIDE SERPL-SCNC: 105 MMOL/L (ref 98–107)
CHOLEST SERPL-MCNC: 221 MG/DL
CREAT SERPL-MCNC: 0.86 MG/DL (ref 0.67–1.17)
DEPRECATED HCO3 PLAS-SCNC: 22 MMOL/L (ref 22–29)
ERYTHROCYTE [DISTWIDTH] IN BLOOD BY AUTOMATED COUNT: 12.3 % (ref 10–15)
GFR SERPL CREATININE-BSD FRML MDRD: >90 ML/MIN/1.73M2
GLUCOSE SERPL-MCNC: 93 MG/DL (ref 70–99)
HCT VFR BLD AUTO: 41.7 % (ref 40–53)
HCV AB SERPL QL IA: NONREACTIVE
HDLC SERPL-MCNC: 53 MG/DL
HGB BLD-MCNC: 14.3 G/DL (ref 13.3–17.7)
LDLC SERPL CALC-MCNC: 140 MG/DL
MCH RBC QN AUTO: 31.6 PG (ref 26.5–33)
MCHC RBC AUTO-ENTMCNC: 34.3 G/DL (ref 31.5–36.5)
MCV RBC AUTO: 92 FL (ref 78–100)
NONHDLC SERPL-MCNC: 168 MG/DL
PLATELET # BLD AUTO: 281 10E3/UL (ref 150–450)
POTASSIUM SERPL-SCNC: 4.5 MMOL/L (ref 3.4–5.3)
PROT SERPL-MCNC: 6.3 G/DL (ref 6.4–8.3)
PSA SERPL DL<=0.01 NG/ML-MCNC: 0.9 NG/ML (ref 0–3.5)
RBC # BLD AUTO: 4.53 10E6/UL (ref 4.4–5.9)
SODIUM SERPL-SCNC: 141 MMOL/L (ref 136–145)
TRIGL SERPL-MCNC: 142 MG/DL
WBC # BLD AUTO: 4.7 10E3/UL (ref 4–11)

## 2023-05-30 PROCEDURE — 99214 OFFICE O/P EST MOD 30 MIN: CPT | Mod: 25 | Performed by: INTERNAL MEDICINE

## 2023-05-30 PROCEDURE — 0134A COVID-19 BIVALENT 18+ (MODERNA): CPT | Performed by: INTERNAL MEDICINE

## 2023-05-30 PROCEDURE — 36415 COLL VENOUS BLD VENIPUNCTURE: CPT | Performed by: INTERNAL MEDICINE

## 2023-05-30 PROCEDURE — G0103 PSA SCREENING: HCPCS | Performed by: INTERNAL MEDICINE

## 2023-05-30 PROCEDURE — 99396 PREV VISIT EST AGE 40-64: CPT | Mod: 25 | Performed by: INTERNAL MEDICINE

## 2023-05-30 PROCEDURE — 86803 HEPATITIS C AB TEST: CPT | Performed by: INTERNAL MEDICINE

## 2023-05-30 PROCEDURE — 85027 COMPLETE CBC AUTOMATED: CPT | Performed by: INTERNAL MEDICINE

## 2023-05-30 PROCEDURE — 80053 COMPREHEN METABOLIC PANEL: CPT | Performed by: INTERNAL MEDICINE

## 2023-05-30 PROCEDURE — 91313 COVID-19 BIVALENT 18+ (MODERNA): CPT | Performed by: INTERNAL MEDICINE

## 2023-05-30 PROCEDURE — 80061 LIPID PANEL: CPT | Performed by: INTERNAL MEDICINE

## 2023-05-30 PROCEDURE — 96127 BRIEF EMOTIONAL/BEHAV ASSMT: CPT | Performed by: INTERNAL MEDICINE

## 2023-05-30 ASSESSMENT — ASTHMA QUESTIONNAIRES
ACT_TOTALSCORE: 23
QUESTION_4 LAST FOUR WEEKS HOW OFTEN HAVE YOU USED YOUR RESCUE INHALER OR NEBULIZER MEDICATION (SUCH AS ALBUTEROL): NOT AT ALL
QUESTION_1 LAST FOUR WEEKS HOW MUCH OF THE TIME DID YOUR ASTHMA KEEP YOU FROM GETTING AS MUCH DONE AT WORK, SCHOOL OR AT HOME: NONE OF THE TIME
QUESTION_5 LAST FOUR WEEKS HOW WOULD YOU RATE YOUR ASTHMA CONTROL: WELL CONTROLLED
QUESTION_2 LAST FOUR WEEKS HOW OFTEN HAVE YOU HAD SHORTNESS OF BREATH: ONCE OR TWICE A WEEK
QUESTION_3 LAST FOUR WEEKS HOW OFTEN DID YOUR ASTHMA SYMPTOMS (WHEEZING, COUGHING, SHORTNESS OF BREATH, CHEST TIGHTNESS OR PAIN) WAKE YOU UP AT NIGHT OR EARLIER THAN USUAL IN THE MORNING: NOT AT ALL
ACT_TOTALSCORE: 23

## 2023-05-30 ASSESSMENT — PATIENT HEALTH QUESTIONNAIRE - PHQ9
SUM OF ALL RESPONSES TO PHQ QUESTIONS 1-9: 1
SUM OF ALL RESPONSES TO PHQ QUESTIONS 1-9: 1
10. IF YOU CHECKED OFF ANY PROBLEMS, HOW DIFFICULT HAVE THESE PROBLEMS MADE IT FOR YOU TO DO YOUR WORK, TAKE CARE OF THINGS AT HOME, OR GET ALONG WITH OTHER PEOPLE: NOT DIFFICULT AT ALL

## 2023-05-30 ASSESSMENT — ENCOUNTER SYMPTOMS
HEMATOCHEZIA: 0
SHORTNESS OF BREATH: 0
CHILLS: 0
PALPITATIONS: 0
SORE THROAT: 0
COUGH: 0
NERVOUS/ANXIOUS: 0
DYSURIA: 0
CONSTIPATION: 0
DIARRHEA: 0
HEMATURIA: 0
MYALGIAS: 0
HEARTBURN: 0
DIZZINESS: 0
WEAKNESS: 0
JOINT SWELLING: 0
HEADACHES: 0
EYE PAIN: 0
NAUSEA: 0
ABDOMINAL PAIN: 0
FEVER: 0
ARTHRALGIAS: 0
FREQUENCY: 0
PARESTHESIAS: 0

## 2023-05-30 ASSESSMENT — PAIN SCALES - GENERAL: PAINLEVEL: NO PAIN (0)

## 2023-05-30 NOTE — LETTER
My Asthma Action Plan    Name: Meño Omalley   YOB: 1970  Date: 5/30/2023   My doctor: Tal Guerrero MD   My clinic: [facility]        My Rescue Medicine:   Albuterol inhaler (Proair/Ventolin/Proventil HFA)  2-4 puffs EVERY 4 HOURS as needed. Use a spacer if recommended by your provider.   My Asthma Severity:   Intermittent / Exercise Induced  Know your asthma triggers: exercise or sports             GREEN ZONE   Good Control  I feel good  No cough or wheeze  Can work, sleep and play without asthma symptoms       Take your asthma control medicine every day.     If exercise triggers your asthma, take your rescue medication  15 minutes before exercise or sports, and  During exercise if you have asthma symptoms  Spacer to use with inhaler: If you have a spacer, make sure to use it with your inhaler             YELLOW ZONE Getting Worse  I have ANY of these:  I do not feel good  Cough or wheeze  Chest feels tight  Wake up at night   Keep taking your Green Zone medications  Start taking your rescue medicine:  every 20 minutes for up to 1 hour. Then every 4 hours for 24-48 hours.  If you stay in the Yellow Zone for more than 12-24 hours, contact your doctor.  If you do not return to the Green Zone in 12-24 hours or you get worse, start taking your oral steroid medicine if prescribed by your provider.           RED ZONE Medical Alert - Get Help  I have ANY of these:  I feel awful  Medicine is not helping  Breathing getting harder  Trouble walking or talking  Nose opens wide to breathe       Take your rescue medicine NOW  If your provider has prescribed an oral steroid medicine, start taking it NOW  Call your doctor NOW  If you are still in the Red Zone after 20 minutes and you have not reached your doctor:  Take your rescue medicine again and  Call 911 or go to the emergency room right away    See your regular doctor within 2 weeks of an Emergency Room or Urgent Care visit  for follow-up treatment.          Annual Reminders:  Meet with Asthma Educator,  Flu Shot in the Fall, consider Pneumonia Vaccination for patients with asthma (aged 19 and older).    Pharmacy:    [facility] [address] [place] [address], [place] - [address]  [facility] [place], [place] - [address] [facility] [place], [place] - [address]  [facility] [address] [facility] [address] [facility] [address]    Electronically signed by Tal Guerrero MD   Date: 05/30/23                    Asthma Triggers  How To Control Things That Make Your Asthma Worse    Triggers are things that make your asthma worse.  Look at the list below to help you find your triggers and   what you can do about them. You can help prevent asthma flare-ups by staying away from your triggers.      Trigger                                                          What you can do   Cigarette Smoke  Tobacco smoke can make asthma worse. Do not allow smoking in your home, car or around you.  Be sure no one smokes at a child s day care or school.  If you smoke, ask your health care provider for ways to help you quit.  Ask family members to quit too.  Ask your health care provider for a referral to Quit Plan to help you quit smoking, or call 8-005-718-PLAN.     Colds, Flu, Bronchitis  These are common triggers of asthma. Wash your hands often.  Don t touch your eyes, nose or mouth.  Get a flu shot every year.     Dust Mites  These are tiny bugs that live in cloth or carpet. They are too small to see. Wash sheets and blankets in hot water every week.   Encase pillows and mattress in dust mite proof covers.  Avoid having carpet if you can. If you have carpet, vacuum weekly.   Use a dust mask and HEPA vacuum.   Pollen and Outdoor Mold  Some people are allergic to trees, grass, or weed pollen, or molds. Try to keep your windows closed.  Limit time out doors when pollen count is high.    Ask you health care provider about taking medicine during allergy season.     Animal Dander  Some people are allergic to skin flakes, urine or saliva from pets with fur or feathers. Keep pets with fur or feathers out of your home.    If you can t keep the pet outdoors, then keep the pet out of your bedroom.  Keep the bedroom door closed.  Keep pets off cloth furniture and away from stuffed toys.     Mice, Rats, and Cockroaches  Some people are allergic to the waste from these pests.   Cover food and garbage.  Clean up spills and food crumbs.  Store grease in the refrigerator.   Keep food out of the bedroom.   Indoor Mold  This can be a trigger if your home has high moisture. Fix leaking faucets, pipes, or other sources of water.   Clean moldy surfaces.  Dehumidify basement if it is damp and smelly.   Smoke, Strong Odors, and Sprays  These can reduce air quality. Stay away from strong odors and sprays, such as perfume, powder, hair spray, paints, smoke incense, paint, cleaning products, candles and new carpet.   Exercise or Sports  Some people with asthma have this trigger. Be active!  Ask your doctor about taking medicine before sports or exercise to prevent symptoms.    Warm up for 5-10 minutes before and after sports or exercise.     Other Triggers of Asthma  Cold air:  Cover your nose and mouth with a scarf.  Sometimes laughing or crying can be a trigger.  Some medicines and food can trigger asthma.

## 2023-05-30 NOTE — PROGRESS NOTES
SUBJECTIVE:   CC: Meño is an 52 year old who presents for preventative health visit.     52-year-old gentleman comes in for annual physical examination and follow-up on some chronic health issues which include history of rectal cancer, exercise-induced asthma, mild hyperlipidemia.  The patient also has class I obesity and fatty liver disease.  He has not been able to lose weight.  He does not do any regular exercise but remains active i.e. walking dog etc.  He does not smoke.  He has obstructive sleep apnea but has not been using CPAP consistently.        5/30/2023     9:26 AM   Additional Questions   Roomed by megan   Accompanied by self   Patient has been advised of split billing requirements and indicates understanding: Yes  Healthy Habits:     Getting at least 3 servings of Calcium per day:  Yes    Bi-annual eye exam:  Yes    Dental care twice a year:  Yes    Sleep apnea or symptoms of sleep apnea:  Sleep apnea    Diet:  Regular (no restrictions)    Frequency of exercise:  2-3 days/week    Duration of exercise:  15-30 minutes    Taking medications regularly:  Yes    Medication side effects:  None    PHQ-2 Total Score: 0    Additional concerns today:  No  Imm/Inj  Pertinent negatives include no abdominal pain, arthralgias, chest pain, chills, congestion, coughing, fever, headaches, joint swelling, myalgias, nausea, rash, sore throat or weakness.         Social History     Tobacco Use     Smoking status: Never     Smokeless tobacco: Never   Vaping Use     Vaping status: Never Used     Passive vaping exposure: Yes   Substance Use Topics     Alcohol use: Yes     Comment: beer couple times per months             5/30/2023     7:55 AM   Alcohol Use   Prescreen: >3 drinks/day or >7 drinks/week? No       Last PSA:   Prostate Specific Antigen Screen   Date Value Ref Range Status   08/09/2021 0.66 0.00 - 4.00 ug/L Final       Reviewed orders with patient. Reviewed health maintenance and updated orders accordingly -  Yes  BP Readings from Last 3 Encounters:   05/30/23 (!) 146/96   08/09/21 138/88   01/08/20 123/81    Wt Readings from Last 3 Encounters:   05/30/23 104.4 kg (230 lb 3.2 oz)   08/09/21 101.6 kg (224 lb)   01/08/20 100 kg (220 lb 8 oz)                  Patient Active Problem List   Diagnosis     Hyperlipidemia LDL goal <160     Family history of colon cancer     Class 1 obesity due to excess calories without serious comorbidity with body mass index (BMI) of 34.0 to 34.9 in adult     Rectal cancer (H)     Adenocarcinoma of rectum (H)     Medial knee pain, left     Exercise-induced asthma     Fatty liver disease, nonalcoholic     Major depressive disorder, recurrent episode, mild (H)     AMY on CPAP     H/O adenomatous polyp of colon     Past Surgical History:   Procedure Laterality Date     ARTHROSCOPY KNEE WITH MEDIAL MENISCECTOMY Left 7/25/2018    Procedure: ARTHROSCOPY KNEE WITH MEDIAL MENISCECTOMY;  Arthroscopy left knee with Meniscal Debridement;  Surgeon: Rashad Skaggs MD;  Location: [facility]     LAPAROSCOPIC COLECTOMY LOW ANTERIOR N/A 4/12/2017    Procedure: LAPAROSCOPIC COLECTOMY LOW ANTERIOR;  Surgeon: Ignacio Rose MD;  Location: [facility]     NO HISTORY OF SURGERY       REMOVE PORT VASCULAR ACCESS Right 11/8/2017    Procedure: REMOVE PORT VASCULAR ACCESS;  Right chest Port Removal;  Surgeon: Chuy Steiner PA-C;  [facility]     SIGMOIDOSCOPY FLEXIBLE N/A 4/12/2017    Procedure: SIGMOIDOSCOPY FLEXIBLE;  Surgeon: Ignacio Rose MD;  Location: [facility]       Social History     Tobacco Use     Smoking status: Never     Smokeless tobacco: Never   Vaping Use     Vaping status: Never Used     Passive vaping exposure: Yes   Substance Use Topics     Alcohol use: Yes     Alcohol/week: 5.0 standard drinks of alcohol     Types: 5 Cans of beer per week     Comment: beer couple times per months     Family History   Problem Relation Age of Onset     ALS Mother 70        ALS     Cancer - colorectal Father 62      "C.A.D. Father 59     Hypertension Father      Other - See Comments Sister         8 yrs older     Other - See Comments Sister         twin 6 yrs older     Hypertension Sister      Other - See Comments Sister         twin     Hypertension Sister      Other - See Comments Sister         5 yrs older     Cancer - colorectal Sister 54        17 yrs older     Other - See Comments Brother         15 yrs older     Colon Polyps Brother      Other - See Comments Brother         14 yrs older     Colon Polyps Brother      Other - See Comments Brother         10 yrs older     Other - See Comments Daughter         16 yrs old adopted     Other - See Comments Son         19 yr old adopted         Current Outpatient Medications   Medication Sig Dispense Refill     escitalopram (LEXAPRO) 20 MG tablet Take 1 tablet (20 mg) by mouth every morning 90 tablet 3     No Known Allergies    Reviewed and updated as needed this visit by clinical staff   Tobacco  Allergies  Meds              Reviewed and updated as needed this visit by Provider                 Past Medical History:   Diagnosis Date     Bucket-handle tear of medial meniscus of left knee as current injury, initial encounter 07/19/2018     Colon cancer (H) (rectal cancer) 04/12/2017     Depressive disorder      Family history of malignant hyperthermia     UNCONFIRMED BUT FATHER HAD \"FEVER WITH ANESTHESIA\"     H/O adenomatous polyp of colon 06/11/2018    2 adenoma polyps     Nephrolithiasis     asymptomatic     Obese      AMY on CPAP 05/05/2022    Sever     Uncomplicated asthma     exercise induced      Past Surgical History:   Procedure Laterality Date     ARTHROSCOPY KNEE WITH MEDIAL MENISCECTOMY Left 7/25/2018    Procedure: ARTHROSCOPY KNEE WITH MEDIAL MENISCECTOMY;  Arthroscopy left knee with Meniscal Debridement;  Surgeon: Rashad Skaggs MD;  [facility]     LAPAROSCOPIC COLECTOMY LOW ANTERIOR N/A 4/12/2017    Procedure: LAPAROSCOPIC COLECTOMY LOW ANTERIOR;  " Surgeon: Ignacio Rose MD;  [facility]     NO HISTORY OF SURGERY       REMOVE PORT VASCULAR ACCESS Right 11/8/2017    Procedure: REMOVE PORT VASCULAR ACCESS;  Right chest Port Removal;  Surgeon: Chuy Steiner PA-C;  [facility]     SIGMOIDOSCOPY FLEXIBLE N/A 4/12/2017    Procedure: SIGMOIDOSCOPY FLEXIBLE;  Surgeon: Ignacio Rose MD;  Location[facility]       Review of Systems   Constitutional: Negative for chills and fever.   HENT: Negative for congestion, ear pain, hearing loss and sore throat.    Eyes: Negative for pain and visual disturbance.   Respiratory: Negative for cough and shortness of breath.    Cardiovascular: Negative for chest pain, palpitations and peripheral edema.   Gastrointestinal: Negative for abdominal pain, constipation, diarrhea, heartburn, hematochezia and nausea.   Genitourinary: Negative for dysuria, frequency, genital sores, hematuria, impotence, penile discharge and urgency.   Musculoskeletal: Negative for arthralgias, joint swelling and myalgias.   Skin: Negative for rash.   Neurological: Negative for dizziness, weakness, headaches and paresthesias.   Psychiatric/Behavioral: Negative for mood changes. The patient is not nervous/anxious.      CONSTITUTIONAL: NEGATIVE for fever, chills, change in weight  INTEGUMENTARY/SKIN: NEGATIVE for worrisome rashes, moles or lesions  EYES: NEGATIVE for vision changes or irritation  ENT: NEGATIVE for ear, mouth and throat problems  RESP: NEGATIVE for significant cough or SOB  CV: NEGATIVE for chest pain, palpitations or peripheral edema  GI: NEGATIVE for nausea, abdominal pain, heartburn, or change in bowel habits   male: negative for dysuria, hematuria, decreased urinary stream, erectile dysfunction, urethral discharge  MUSCULOSKELETAL: NEGATIVE for significant arthralgias or myalgia  NEURO: NEGATIVE for weakness, dizziness or paresthesias  ENDOCRINE: NEGATIVE for temperature intolerance, skin/hair changes  HEME/ALLERGY/IMMUNE:  "NEGATIVE for bleeding problems  PSYCHIATRIC: NEGATIVE for changes in mood or affect    OBJECTIVE:   BP (!) 149/92 (BP Location: Right arm, Patient Position: Sitting, Cuff Size: Adult Large)   Pulse 75   Temp 98.4  F (36.9  C) (Oral)   Resp 13   Ht 1.75 m (5' 8.9\")   Wt 104.4 kg (230 lb 3.2 oz)   SpO2 97%   BMI 34.10 kg/m      Physical Exam  GENERAL: healthy, alert and no distress  EYES: Eyes grossly normal to inspection, PERRL and conjunctivae and sclerae normal  HENT: ear canals and TM's normal, nose and mouth without ulcers or lesions  NECK: no adenopathy, no asymmetry, masses, or scars and thyroid normal to palpation  RESP: lungs clear to auscultation - no rales, rhonchi or wheezes  CV: regular rate and rhythm, normal S1 S2, no S3 or S4, no murmur, click or rub, no peripheral edema and peripheral pulses strong  ABDOMEN: soft, nontender, no hepatosplenomegaly, no masses and bowel sounds normal   (male): normal male genitalia without lesions or urethral discharge, no hernia  MS: no gross musculoskeletal defects noted, no edema  SKIN: no suspicious lesions or rashes  NEURO: Normal strength and tone, mentation intact and speech normal  PSYCH: mentation appears normal, affect normal/bright  LYMPH: no cervical, supraclavicular, axillary, or inguinal adenopathy        ASSESSMENT/PLAN:     1.  Annual physical exam completed.  2.  Elevated blood pressure reading without a diagnosis of hypertension.  Low-salt diet discussed.  Advised to monitor blood pressure at home and uses CPAP consistently.  Return for recheck on elevated blood pressure in 2 months.  3.  Hyperlipidemia.  Patient has history of mild hyperlipidemia we will check nonfasting lipids today.  4.  Exercise-induced asthma.  Currently not active since he does not do any vigorous exercise.  He has not required to use bronchodilators.  5.  Major depression disorder recurrent mild since he was in his 20s.  Currently under care of a psychiatrist and being " "treated with escitalopram 20 mg a day.  Doing well.  6.  Class I obesity.  The antidepressant is probably contributing to his obesity.  7.  Obstructive sleep apnea on CPAP though he is not using it consistently.  With elevated blood pressure today he did mention he will start using it consistently and see if it helps.  8.  Rectal cancer treated surgically with rectosigmoid anastomosis in 2017.  No evidence of recurrence.  Scheduled for colonoscopy exam.  Referral placed.  9.  History of adenomatous colon polyp x2 removed in 2018.  Patient due for repeat colonoscopy exam and order placed.  10.  Fatty liver disease.  We will be checking nonfasting CMP today  11.  Screening for prostate cancer.  PSA ordered.  12. Bivalent COVID-19 vaccine provided today.  13.  Advised Shingrix vaccine.  Patient plans to get it at a local drugstore.  14.  Discussed hepatitis B vaccine which patient decided to be get it at a local pharmacy.    Patient will have CBC, CMP, hepatitis C screening, nonfasting lipids and PSA done today.  His CEA in the past has never been elevated and did not see a need to repeat it today.  I will get back to him with results.        Patient has been advised of split billing requirements and indicates understanding: Yes      COUNSELING:   Reviewed preventive health counseling, as reflected in patient instructions       Regular exercise       Healthy diet/nutrition       Vision screening      BMI:   Estimated body mass index is 34.1 kg/m  as calculated from the following:    Height as of this encounter: 1.75 m (5' 8.9\").    Weight as of this encounter: 104.4 kg (230 lb 3.2 oz).   Weight management plan: Discussed healthy diet and exercise guidelines      He reports that he has never smoked. He has never used smokeless tobacco.        Tal Guerrero MD  [facility]  Answers for HPI/ROS submitted by the patient on 5/30/2023  If you checked off any problems, how difficult have these " problems made it for you to do your work, take care of things at home, or get along with other people?: Not difficult at all  PHQ9 TOTAL SCORE: 1

## 2023-05-30 NOTE — LETTER
My Depression Action Plan  Name: Meño Omalley   Date of Birth 1970  Date: 5/30/2023    My doctor: Delio Alcala   My clinic: [address] [address] [phone]            GREEN    ZONE   Good Control    What it looks like:   Things are going generally well. You have normal up s and down s. You may even feel depressed from time to time, but bad moods usually last less than a day.   What you need to do:  Continue to care for yourself (see self care plan)  Check your depression survival kit and update it as needed  Follow your physician s recommendations including any medication.  Do not stop taking medication unless you consult with your physician first.             YELLOW         ZONE Getting Worse    What it looks like:   Depression is starting to interfere with your life.   It may be hard to get out of bed; you may be starting to isolate yourself from others.  Symptoms of depression are starting to last most all day and this has happened for several days.   You may have suicidal thoughts but they are not constant.   What you need to do:     Call your care team, your response to treatment will improve if you keep your care team informed of your progress. Yellow periods are signs an adjustment may need to be made.     Continue your self-care, even if you have to fake it!    Talk to someone in your support network    Open up your depression survival kit             RED    ZONE Medical Alert - Get Help    What it looks like:   Depression is seriously interfering with your life.   You may experience these or other symptoms: You can t get out of bed most days, can t work or engage in other necessary activities, you have trouble taking care of basic hygiene, or basic responsibilities, thoughts of suicide or death that will not go away, self-injurious behavior.     What you need to do:  Call your care team and request a same-day  appointment. If they are not available (weekends or after hours) call your local crisis line, emergency room or 911.      Electronically signed by: Tal Guerrero MD, May 30, 2023    Depression Self Care Plan / Survival Kit    Self-Care for Depression  Here s the deal. Your body and mind are really not as separate as most people think.  What you do and think affects how you feel and how you feel influences what you do and think. This means if you do things that people who feel good do, it will help you feel better.  Sometimes this is all it takes.  There is also a place for medication and therapy depending on how severe your depression is, so be sure to consult with your medical provider and/ or Behavioral Health Consultant if your symptoms are worsening or not improving.     In order to better manage my stress, I will:    Exercise  Get some form of exercise, every day. This will help reduce pain and release endorphins, the  feel good  chemicals in your brain. This is almost as good as taking antidepressants!  This is not the same as joining a gym and then never going! (they count on that by the way ) It can be as simple as just going for a walk or doing some gardening, anything that will get you moving.      Hygiene   Maintain good hygiene (Get out of bed in the morning, Make your bed, Brush your teeth, Take a shower, and Get dressed like you were going to work, even if you are unemployed).  If your clothes don't fit try to get ones that do.    Diet  I will strive to eat foods that are good for me, drink plenty of water, and avoid excessive sugar, caffeine, alcohol, and other mood-altering substances.  Some foods that are helpful in depression are: complex carbohydrates, B vitamins, flaxseed, fish or fish oil, fresh fruits and vegetables.    Psychotherapy  I agree to participate in Individual Therapy (if recommended).    Medication  If prescribed medications, I agree to take them.  Missing doses can result in  serious side effects.  I understand that drinking alcohol, or other illicit drug use, may cause potential side effects.  I will not stop my medication abruptly without first discussing it with my provider.    Staying Connected With Others  I will stay in touch with my friends, family members, and my primary care provider/team.    Use your imagination  Be creative.  We all have a creative side; it doesn t matter if it s oil painting, sand castles, or mud pies! This will also kick up the endorphins.    Witness Beauty  (AKA stop and smell the roses) Take a look outside, even in mid-winter. Notice colors, textures. Watch the squirrels and birds.     Service to others  Be of service to others.  There is always someone else in need.  By helping others we can  get out of ourselves  and remember the really important things.  This also provides opportunities for practicing all the other parts of the program.    Humor  Laugh and be silly!  Adjust your TV habits for less news and crime-drama and more comedy.    Control your stress  Try breathing deep, massage therapy, biofeedback, and meditation. Find time to relax each day.     My support system    Clinic Contact:  Phone number:    Contact 1:  Phone number:    Contact 2:  Phone number:    [facility]thodoxy/:  Phone number:    Therapist:  Phone number:    Local crisis center:    Phone number:    Other community support:  Phone number:

## 2023-06-12 ENCOUNTER — TELEPHONE (OUTPATIENT)
Dept: GASTROENTEROLOGY | Facility: CLINIC | Age: 53
End: 2023-06-12
Payer: COMMERCIAL

## 2023-06-12 NOTE — TELEPHONE ENCOUNTER
"Endoscopy Scheduling Screen    Have you had a positive Covid test in the last 14 days?  No    Are you active on MyChart?   Yes    What insurance is in the chart?  Other:  PREFERREDONE    Ordering/Referring Provider: SWATHI MICHEL   (If ordering provider performs procedure, schedule with ordering provider unless otherwise instructed. )    BMI: Estimated body mass index is 34.1 kg/m  as calculated from the following:    Height as of 5/30/23: 1.75 m (5' 8.9\").    Weight as of 5/30/23: 104.4 kg (230 lb 3.2 oz).     Sedation Ordered  moderate sedation.   If patient BMI > 50 do not schedule in [facility].    Are you taking any prescription medications for pain?   No    Are you taking methadone or Suboxone?  No    Do you have a history of malignant hyperthermia or adverse reaction to anesthesia?  No    (Females) Are you currently pregnant?   No     Have you been diagnosed or told you have pulmonary hypertension?   No    Do you have an LVAD?  No    Have you been told you have moderate to severe sleep apnea?  Yes (RN Review required for scheduling unless scheduling in Hospital.)    Have you been told you have COPD, asthma, or any other lung disease?  Yes     What breathing problems do you have?  Asthma     Do you use home oxygen?  No    Have your breathing problems required an ED visit or hospitalization in the last year?  No    Do you have any heart conditions?  No     Have you ever had or are you awaiting a heart or lung transplant?   No    Have you had a stroke or transient ischemic attack (TIA aka \"mini stroke\" in the last 6 months?   No    Have you been diagnosed with or been told you have cirrhosis of the liver?   No    Are you currently on dialysis?   No    Do you need assistance transferring?   No    BMI: Estimated body mass index is 34.1 kg/m  as calculated from the following:    Height as of 5/30/23: 1.75 m (5' 8.9\").    Weight as of 5/30/23: 104.4 kg (230 lb 3.2 oz).     Is patients BMI > 40 and scheduling location " [facility]?  No    Do you take the medication Phentermine, Ozempic or Wegovy?  No    Do you take the medication Naltrexone?  No    Do you take blood thinners?  No    Preferred Pharmacy:    [facility],   [address] [address] [place] [id number]  [phone]      Prep   Are you currently on dialysis or do you have chronic kidney disease?  No (standard prep)    Do you have a diagnosis of diabetes?  No    Do you have a diagnosis of cystic fibrosis (CF)?  No    On a regular basis do you go 3 -5 days between bowel movements?  No    BMI > 40?  No    Final Scheduling Details   Colonoscopy prep sent?  MiraLAX (No Mag Citrate)    Procedure scheduled  COLONOSCOPY    Surgeon:  MANJINDER     Date of procedure:  8/17/23     Schedule PAC:   No    Location  [facility]    Sedation   Moderate Sedation    Patient Reminders:    You will receive a call from a Nurse to review instructions and health history.  This assessment must be completed prior to your procedure.  Failure to complete the Nurse assessment may result in the procedure being cancelled.       On the day of your procedure, please designate an adult(s) who can drive you home stay with you for the next 24 hours. The medicines used in the exam will make you sleepy. You will not be able to drive.       You cannot take public transportation, ride share services, or non-medical taxi service without a responsible caregiver.  Medical transport services are allowed with the requirement that a responsible caregiver will receive you at your destination.  We require that drivers and caregivers are confirmed prior to your procedure.

## 2023-08-01 ENCOUNTER — OFFICE VISIT (OUTPATIENT)
Dept: FAMILY MEDICINE | Facility: CLINIC | Age: 53
End: 2023-08-01
Payer: COMMERCIAL

## 2023-08-01 VITALS
WEIGHT: 226 LBS | HEART RATE: 77 BPM | HEIGHT: 69 IN | TEMPERATURE: 98.6 F | BODY MASS INDEX: 33.47 KG/M2 | DIASTOLIC BLOOD PRESSURE: 80 MMHG | SYSTOLIC BLOOD PRESSURE: 125 MMHG | OXYGEN SATURATION: 98 % | RESPIRATION RATE: 15 BRPM

## 2023-08-01 DIAGNOSIS — Z12.5 SCREENING FOR PROSTATE CANCER: ICD-10-CM

## 2023-08-01 DIAGNOSIS — E78.00 MILD HYPERCHOLESTEROLEMIA: ICD-10-CM

## 2023-08-01 DIAGNOSIS — K76.0 FATTY LIVER DISEASE, NONALCOHOLIC: ICD-10-CM

## 2023-08-01 DIAGNOSIS — Z23 NEED FOR TDAP VACCINATION: ICD-10-CM

## 2023-08-01 DIAGNOSIS — G47.33 OSA ON CPAP: ICD-10-CM

## 2023-08-01 DIAGNOSIS — R73.03 PRE-DIABETES: ICD-10-CM

## 2023-08-01 DIAGNOSIS — C20 RECTAL CANCER (H): ICD-10-CM

## 2023-08-01 DIAGNOSIS — R03.0 ELEVATED BLOOD PRESSURE READING WITHOUT DIAGNOSIS OF HYPERTENSION: Primary | ICD-10-CM

## 2023-08-01 PROCEDURE — 99213 OFFICE O/P EST LOW 20 MIN: CPT | Mod: 25 | Performed by: INTERNAL MEDICINE

## 2023-08-01 PROCEDURE — 90715 TDAP VACCINE 7 YRS/> IM: CPT | Performed by: INTERNAL MEDICINE

## 2023-08-01 PROCEDURE — 90471 IMMUNIZATION ADMIN: CPT | Performed by: INTERNAL MEDICINE

## 2023-08-01 ASSESSMENT — PAIN SCALES - GENERAL: PAINLEVEL: NO PAIN (0)

## 2023-08-01 NOTE — PROGRESS NOTES
Assessment & Plan     1.  Elevated blood pressure reading without diagnosis of hypertension.  Blood pressure readings have been below 140/90 at home.  He has been using his CPAP a lot more consistently.  Blood pressure today in the clinic was excellent.  Advised low-salt diet.  Follow-up in yearly basis in May 2024.  2.  Need for Tdap vaccine.  Vaccine provided today.  3.  Obstructive sleep apnea on CPAP.  Strongly encouraged that he use the CPAP daily.  4.  Prediabetes we will be rechecking in May 2024 with A1c.  5.  Rectal cancer patient scheduled in August to have colonoscopy exam.  He will follow-up with me in May for annual physical.  6.  Fatty liver disease nonalcoholic.  LFTs were normal on 5/30/2023.  7.  Mild hypercholesterolemia.  We will be rechecking May 2024.  8.  Prediabetes with A1c of 6.3.  Diet and exercise has been discussed.  We will recheck again in May 2024.    Patient will return for annual examination in May 2024.  He will have A1c, lipids, CBC CMP and screening PSA checked at that time.    Tal Guerrero MD  [facility]    Rosmery Wilder is a 53 year old, presenting for the following health issues:  Hypertension      8/1/2023     8:00 AM   Additional Questions   Roomed by Crystal   Accompanied by self         8/1/2023     8:00 AM   Patient Reported Additional Medications   Patient reports taking the following new medications none       History of Present Illness       Reason for visit:  Followup    He eats 0-1 servings of fruits and vegetables daily.He consumes 1 sweetened beverage(s) daily.He exercises with enough effort to increase his heart rate 10 to 19 minutes per day.  He exercises with enough effort to increase his heart rate 3 or less days per week. He is missing 1 dose(s) of medications per week.  He is not taking prescribed medications regularly due to remembering to take.    51-year-old gentleman has returned regarding follow-up on elevated blood  pressure during physical exam in May.  Since then he has been using his CPAP more consistently and at home blood pressure has been below 140/90.  Trying to follow a low-salt diet.  Also in May he was found to have prediabetes with A1c of 6.3 and mild hyperlipidemia.  We discussed diet and exercise.  He will be returning in May 2024 for physical examination and recheck.    Review of Systems   Constitutional, HEENT, cardiovascular, pulmonary, GI, , musculoskeletal, neuro, skin, endocrine and psych systems are negative, except as otherwise noted.      Objective    There were no vitals taken for this visit.  There is no height or weight on file to calculate BMI.  Physical Exam   GENERAL: healthy, alert and no distress  NECK: no adenopathy, no asymmetry, masses, or scars and thyroid normal to palpation  RESP: lungs clear to auscultation - no rales, rhonchi or wheezes  CV: regular rate and rhythm, normal S1 S2, no S3 or S4, no murmur, click or rub, no peripheral edema and peripheral pulses strong  ABDOMEN: soft, nontender, no hepatosplenomegaly, no masses and bowel sounds normal  MS: no gross musculoskeletal defects noted, no edema

## 2023-08-01 NOTE — NURSING NOTE
Prior to immunization administration, verified patients identity using patient s name and date of birth. Please see Immunization Activity for additional information.     Screening Questionnaire for Adult Immunization    Are you sick today?   No   Do you have allergies to medications, food, a vaccine component or latex?   No   Have you ever had a serious reaction after receiving a vaccination?   No   Do you have a long-term health problem with heart, lung, kidney, or metabolic disease (e.g., diabetes), asthma, a blood disorder, no spleen, complement component deficiency, a cochlear implant, or a spinal fluid leak?  Are you on long-term aspirin therapy?   No   Do you have cancer, leukemia, HIV/AIDS, or any other immune system problem?   No   Do you have a parent, brother, or sister with an immune system problem?   No   In the past 3 months, have you taken medications that affect  your immune system, such as prednisone, other steroids, or anticancer drugs; drugs for the treatment of rheumatoid arthritis, Crohn s disease, or psoriasis; or have you had radiation treatments?   No   Have you had a seizure, or a brain or other nervous system problem?   No   During the past year, have you received a transfusion of blood or blood    products, or been given immune (gamma) globulin or antiviral drug?   No   For women: Are you pregnant or is there a chance you could become       pregnant during the next month?   No   Have you received any vaccinations in the past 4 weeks?   No     Immunization questionnaire answers were all negative.      Patient instructed to remain in clinic for 15 minutes afterwards, and to report any adverse reactions.     Screening performed by Jessie Alcala MA on 8/1/2023 at 8:38 AM.

## 2023-08-17 ENCOUNTER — HOSPITAL ENCOUNTER (OUTPATIENT)
Facility: CLINIC | Age: 53
Discharge: HOME | End: 2023-08-17
Attending: COLON & RECTAL SURGERY | Admitting: COLON & RECTAL SURGERY
Payer: COMMERCIAL

## 2023-08-17 VITALS
OXYGEN SATURATION: 94 % | WEIGHT: 225 LBS | DIASTOLIC BLOOD PRESSURE: 89 MMHG | BODY MASS INDEX: 33.33 KG/M2 | RESPIRATION RATE: 13 BRPM | HEIGHT: 69 IN | HEART RATE: 72 BPM | SYSTOLIC BLOOD PRESSURE: 132 MMHG

## 2023-08-17 LAB — COLONOSCOPY: NORMAL

## 2023-08-17 PROCEDURE — G0500 MOD SEDAT ENDO SERVICE >5YRS: HCPCS | Performed by: COLON & RECTAL SURGERY

## 2023-08-17 PROCEDURE — 45378 DIAGNOSTIC COLONOSCOPY: CPT | Performed by: COLON & RECTAL SURGERY

## 2023-08-17 PROCEDURE — 250N000011 HC RX IP 250 OP 636: Performed by: COLON & RECTAL SURGERY

## 2023-08-17 PROCEDURE — G0121 COLON CA SCRN NOT HI RSK IND: HCPCS | Performed by: COLON & RECTAL SURGERY

## 2023-08-17 RX ORDER — LIDOCAINE 40 MG/G
CREAM TOPICAL
Status: DISCONTINUED | OUTPATIENT
Start: 2023-08-17 | End: 2023-08-17 | Stop reason: HOSPADM

## 2023-08-17 RX ORDER — FENTANYL CITRATE 50 UG/ML
INJECTION, SOLUTION INTRAMUSCULAR; INTRAVENOUS PRN
Status: DISCONTINUED | OUTPATIENT
Start: 2023-08-17 | End: 2023-08-17 | Stop reason: HOSPADM

## 2023-08-17 RX ORDER — ONDANSETRON 2 MG/ML
4 INJECTION INTRAMUSCULAR; INTRAVENOUS
Status: DISCONTINUED | OUTPATIENT
Start: 2023-08-17 | End: 2023-08-17 | Stop reason: HOSPADM

## 2023-08-17 ASSESSMENT — ACTIVITIES OF DAILY LIVING (ADL): ADLS_ACUITY_SCORE: 35

## 2023-08-17 NOTE — H&P
Colon & Rectal Surgery History and Physical  Pre-Endoscopy Procedure Note    History of Present Illness   I have been asked by Dr. Guerrero to evaluate this 53 year old male for colorectal cancer surveillance. He currently denies any abdominal pain, weight loss, bleeding per rectum, or recent change in bowel habits.    Past Medical History  Diagnosis Date    Bucket-handle tear of medial meniscus of left knee as current injury, initial encounter 07/19/2018    Colon cancer (rectal cancer) 04/12/2017    Depressive disorder     H/O adenomatous polyp of colon 06/11/2018    2 adenomatous polyps    Nephrolithiasis     asymptomatic    Obese     AMY on CPAP 05/05/2022    Severe    Pre-diabetes 08/01/2023    Uncomplicated asthma     exercise induced       Past Surgical History  Procedure Laterality Date    ARTHROSCOPY KNEE WITH MEDIAL MENISCECTOMY Left 7/25/2018    Procedure: ARTHROSCOPY KNEE WITH MEDIAL MENISCECTOMY;  Arthroscopy left knee with Meniscal Debridement;  Surgeon: Rashad Skaggs MD;  [facility]    LAPAROSCOPIC COLECTOMY LOW ANTERIOR N/A 4/12/2017    Procedure: LAPAROSCOPIC COLECTOMY LOW ANTERIOR;  Surgeon: Ignacio Rose MD;  [facility]    REMOVE PORT VASCULAR ACCESS Right 11/8/2017    Procedure: REMOVE PORT VASCULAR ACCESS;  Right chest Port Removal;  Surgeon: Chuy Steiner PA-C;  [facility]    SIGMOIDOSCOPY FLEXIBLE N/A 4/12/2017    Procedure: SIGMOIDOSCOPY FLEXIBLE;  Surgeon: Ignacio Rose MD;  [facility]        Medications  Medications Prior to Admission   Medication Sig    escitalopram (LEXAPRO) 20 MG tablet Take 1 tablet (20 mg) by mouth every morning       Allergies   No Known Allergies     Family History   Family history includes ALS (age of onset: 70) in his mother; C.A.D. (age of onset: 59) in his father; Cancer - colorectal (age of onset: 54) in his sister; Cancer - colorectal (age of onset: 62) in his father; Colon Polyps in his brother and brother; Hypertension in his  "father, sister, and sister.     Social History    He reports that he has never smoked. He has never used smokeless tobacco. He reports current alcohol use of about 5.0 standard drinks of alcohol per week. He reports that he does not use drugs.    Review of Systems   Constitutional:  No fever, weight change or fatigue.    Eyes:     No dry eyes or vision changes.   Ears/Nose/Throat/Neck:  No oral ulcers, sore throat or voice change.    Cardiovascular:   No palpitations, syncope, angina or edema.   Respiratory:    No chest pain, excessive sleepiness, shortness of breath or hemoptysis.    Gastrointestinal:   No abdominal pain, nausea, vomiting, diarrhea or heartburn.    Genitourinary:   No dysuria, hematuria, urinary retention or urinary frequency.   Musculoskeletal:  No joint swelling or arthralgias.    Dermatologic:  No skin rash or other skin changes.   Neurologic:    No focal weakness or numbness. No neuropathy.   Psychiatric:    No depression, anxiety, suicidal ideation, or paranoid ideation.   Endocrine:   No cold or heat intolerance, polydipsia, hirsutism, change in libido, or flushing.   Hematology/Lymphatic:  No bleeding or lymphadenopathy.    Allergy/Immunology:  No rhinitis or hives.     Physical Exam   Vitals:  Blood pressure 129/91, pulse 76, resp. rate 16, height 1.753 m (5' 9\"), weight 102.1 kg (225 lb), SpO2 96 %.    General:  Alert and oriented to person, place and time   Airway: Normal oropharyngeal airway and neck mobility   Lungs:  Clear bilaterally   Heart:  Regular rate and rhythm   Abdomen: Soft, NT, ND, no masses   Extremities: Warm, good capillary refill    ASA Grade: II (mild systemic disease)    Impression: Cleared for use of conscious sedation for colorectal cancer surveillance    Plan: Proceed with colonoscopy     Valentina Manning MD  [facility]  [phone]  "

## 2023-11-16 ENCOUNTER — PATIENT OUTREACH (OUTPATIENT)
Dept: GASTROENTEROLOGY | Facility: CLINIC | Age: 53
End: 2023-11-16
Payer: COMMERCIAL

## 2024-02-16 ENCOUNTER — DOCUMENTATION ONLY (OUTPATIENT)
Dept: SLEEP MEDICINE | Facility: CLINIC | Age: 54
End: 2024-02-16

## 2024-02-16 DIAGNOSIS — G47.33 OBSTRUCTIVE SLEEP APNEA (ADULT) (PEDIATRIC): Primary | ICD-10-CM

## 2024-02-23 ENCOUNTER — MYC MEDICAL ADVICE (OUTPATIENT)
Dept: SLEEP MEDICINE | Facility: CLINIC | Age: 54
End: 2024-02-23

## 2024-05-28 ENCOUNTER — OFFICE VISIT (OUTPATIENT)
Dept: FAMILY MEDICINE | Facility: CLINIC | Age: 54
End: 2024-05-28
Payer: COMMERCIAL

## 2024-05-28 ENCOUNTER — TELEPHONE (OUTPATIENT)
Dept: FAMILY MEDICINE | Facility: CLINIC | Age: 54
End: 2024-05-28

## 2024-05-28 VITALS
WEIGHT: 189.6 LBS | OXYGEN SATURATION: 97 % | DIASTOLIC BLOOD PRESSURE: 83 MMHG | TEMPERATURE: 98 F | RESPIRATION RATE: 12 BRPM | SYSTOLIC BLOOD PRESSURE: 148 MMHG | HEART RATE: 66 BPM | BODY MASS INDEX: 27.14 KG/M2 | HEIGHT: 70 IN

## 2024-05-28 DIAGNOSIS — Z12.5 SCREENING FOR PROSTATE CANCER: ICD-10-CM

## 2024-05-28 DIAGNOSIS — E66.3 OVERWEIGHT (BMI 25.0-29.9): ICD-10-CM

## 2024-05-28 DIAGNOSIS — Z23 NEED FOR COVID-19 VACCINE: ICD-10-CM

## 2024-05-28 DIAGNOSIS — K76.0 FATTY LIVER DISEASE, NONALCOHOLIC: ICD-10-CM

## 2024-05-28 DIAGNOSIS — R35.0 URINARY FREQUENCY: Primary | ICD-10-CM

## 2024-05-28 DIAGNOSIS — J45.990 EXERCISE-INDUCED ASTHMA: ICD-10-CM

## 2024-05-28 DIAGNOSIS — E78.00 MILD HYPERCHOLESTEROLEMIA: ICD-10-CM

## 2024-05-28 DIAGNOSIS — G47.33 OSA (OBSTRUCTIVE SLEEP APNEA): ICD-10-CM

## 2024-05-28 DIAGNOSIS — Z23 NEED FOR PNEUMOCOCCAL VACCINE: ICD-10-CM

## 2024-05-28 DIAGNOSIS — R03.0 ELEVATED BLOOD PRESSURE READING WITHOUT DIAGNOSIS OF HYPERTENSION: ICD-10-CM

## 2024-05-28 DIAGNOSIS — R73.03 PREDIABETES: ICD-10-CM

## 2024-05-28 DIAGNOSIS — F39 MOOD DISORDER (H): ICD-10-CM

## 2024-05-28 DIAGNOSIS — C20 RECTAL CANCER (H): ICD-10-CM

## 2024-05-28 LAB
ALBUMIN UR-MCNC: NEGATIVE MG/DL
APPEARANCE UR: CLEAR
BASOPHILS # BLD AUTO: 0.1 10E3/UL (ref 0–0.2)
BASOPHILS NFR BLD AUTO: 1 %
BILIRUB UR QL STRIP: NEGATIVE
COLOR UR AUTO: YELLOW
EOSINOPHIL # BLD AUTO: 0.1 10E3/UL (ref 0–0.7)
EOSINOPHIL NFR BLD AUTO: 2 %
ERYTHROCYTE [DISTWIDTH] IN BLOOD BY AUTOMATED COUNT: 12.3 % (ref 10–15)
GLUCOSE UR STRIP-MCNC: NEGATIVE MG/DL
HBA1C MFR BLD: 5.3 % (ref 0–5.6)
HCT VFR BLD AUTO: 42.8 % (ref 40–53)
HGB BLD-MCNC: 14.7 G/DL (ref 13.3–17.7)
HGB UR QL STRIP: NEGATIVE
IMM GRANULOCYTES # BLD: 0 10E3/UL
IMM GRANULOCYTES NFR BLD: 0 %
KETONES UR STRIP-MCNC: NEGATIVE MG/DL
LEUKOCYTE ESTERASE UR QL STRIP: NEGATIVE
LYMPHOCYTES # BLD AUTO: 1.9 10E3/UL (ref 0.8–5.3)
LYMPHOCYTES NFR BLD AUTO: 30 %
MCH RBC QN AUTO: 31.1 PG (ref 26.5–33)
MCHC RBC AUTO-ENTMCNC: 34.3 G/DL (ref 31.5–36.5)
MCV RBC AUTO: 91 FL (ref 78–100)
MONOCYTES # BLD AUTO: 0.3 10E3/UL (ref 0–1.3)
MONOCYTES NFR BLD AUTO: 5 %
NEUTROPHILS # BLD AUTO: 3.9 10E3/UL (ref 1.6–8.3)
NEUTROPHILS NFR BLD AUTO: 62 %
NITRATE UR QL: NEGATIVE
PH UR STRIP: 7 [PH] (ref 5–7)
PLATELET # BLD AUTO: 346 10E3/UL (ref 150–450)
RBC # BLD AUTO: 4.73 10E6/UL (ref 4.4–5.9)
SP GR UR STRIP: 1.01 (ref 1–1.03)
UROBILINOGEN UR STRIP-ACNC: 0.2 E.U./DL
WBC # BLD AUTO: 6.3 10E3/UL (ref 4–11)

## 2024-05-28 PROCEDURE — 36415 COLL VENOUS BLD VENIPUNCTURE: CPT

## 2024-05-28 PROCEDURE — 93000 ELECTROCARDIOGRAM COMPLETE: CPT

## 2024-05-28 PROCEDURE — 99214 OFFICE O/P EST MOD 30 MIN: CPT

## 2024-05-28 PROCEDURE — G0103 PSA SCREENING: HCPCS

## 2024-05-28 PROCEDURE — 81003 URINALYSIS AUTO W/O SCOPE: CPT

## 2024-05-28 PROCEDURE — 83036 HEMOGLOBIN GLYCOSYLATED A1C: CPT

## 2024-05-28 PROCEDURE — 80053 COMPREHEN METABOLIC PANEL: CPT

## 2024-05-28 PROCEDURE — 96127 BRIEF EMOTIONAL/BEHAV ASSMT: CPT

## 2024-05-28 PROCEDURE — 85025 COMPLETE CBC W/AUTO DIFF WBC: CPT

## 2024-05-28 RX ORDER — ALBUTEROL SULFATE 90 UG/1
2 AEROSOL, METERED RESPIRATORY (INHALATION) EVERY 6 HOURS PRN
Qty: 18 G | Refills: 1 | Status: SHIPPED | OUTPATIENT
Start: 2024-05-28

## 2024-05-28 RX ORDER — ARIPIPRAZOLE 15 MG/1
TABLET ORAL
COMMUNITY
Start: 2024-05-21

## 2024-05-28 RX ORDER — TRAZODONE HYDROCHLORIDE 50 MG/1
50 TABLET, FILM COATED ORAL
COMMUNITY
Start: 2024-04-23

## 2024-05-28 ASSESSMENT — ASTHMA QUESTIONNAIRES
QUESTION_2 LAST FOUR WEEKS HOW OFTEN HAVE YOU HAD SHORTNESS OF BREATH: NOT AT ALL
QUESTION_5 LAST FOUR WEEKS HOW WOULD YOU RATE YOUR ASTHMA CONTROL: COMPLETELY CONTROLLED
QUESTION_3 LAST FOUR WEEKS HOW OFTEN DID YOUR ASTHMA SYMPTOMS (WHEEZING, COUGHING, SHORTNESS OF BREATH, CHEST TIGHTNESS OR PAIN) WAKE YOU UP AT NIGHT OR EARLIER THAN USUAL IN THE MORNING: NOT AT ALL
QUESTION_1 LAST FOUR WEEKS HOW MUCH OF THE TIME DID YOUR ASTHMA KEEP YOU FROM GETTING AS MUCH DONE AT WORK, SCHOOL OR AT HOME: NONE OF THE TIME
QUESTION_4 LAST FOUR WEEKS HOW OFTEN HAVE YOU USED YOUR RESCUE INHALER OR NEBULIZER MEDICATION (SUCH AS ALBUTEROL): NOT AT ALL
ACT_TOTALSCORE: 25
ACT_TOTALSCORE: 25

## 2024-05-28 ASSESSMENT — PATIENT HEALTH QUESTIONNAIRE - PHQ9
SUM OF ALL RESPONSES TO PHQ QUESTIONS 1-9: 2
SUM OF ALL RESPONSES TO PHQ QUESTIONS 1-9: 2
10. IF YOU CHECKED OFF ANY PROBLEMS, HOW DIFFICULT HAVE THESE PROBLEMS MADE IT FOR YOU TO DO YOUR WORK, TAKE CARE OF THINGS AT HOME, OR GET ALONG WITH OTHER PEOPLE: NOT DIFFICULT AT ALL

## 2024-05-28 NOTE — PROGRESS NOTES
Assessment & Plan     Urinary frequency  - discussed ddx, could be related to increased amt of water lately  - UA Macroscopic with reflex to Microscopic and Culture - Lab Collect  - hemoglobin A1c   - REVIEW OF HEALTH MAINTENANCE PROTOCOL ORDERS    Mood disorder (H24)  - followed by psychiatry, on abilify and prn trazodone  - will update labs  - CBC with platelets and differential  - Comprehensive metabolic panel (BMP + Alb, Alk Phos, ALT, AST, Total. Bili, TP)  - EKG per psychiatrist request, pt asymptomatic   - EKG 12-lead complete w/read - Clinics  - EKG w/o acute ischemic changes or QT prolongation, appears comparable to prior EKGs  - psychiatrist: Dr. Pradhan, ([phone]) through [facility]     Overweight (BMI 25.0-29.9)  - praise for recent weight loss provided  - continue lifestyle modifications    Elevated blood pressure reading without diagnosis of hypertension  - normal at home per patient, advised to monitor over the next few weeks and bring readings to office  - Comprehensive metabolic panel (BMP + Alb, Alk Phos, ALT, AST, Total. Bili, TP)    AMY (obstructive sleep apnea)  - managed with CPAP machine, has been using regularly     Prediabetes  - history of prediabetes  - continue lifestyle modifications  - Hemoglobin A1c  - continue to monitor w/ psychiatric med history     Rectal cancer (H)  - history of rectal cancer, last colonoscopy 8/17/23 with plan to repeat in 5y for surveillance     Fatty liver disease, nonalcoholic  - history of fatty liver disease, non alcoholic   - continue lifestyle modifications  - Comprehensive metabolic panel (BMP + Alb, Alk Phos, ALT, AST, Total. Bili, TP)    Mild hypercholesterolemia  - discussed repeat lipid panel, but pt not fasting today  - advised to complete fasting at physical     Exercise-induced asthma  - updated Asthma Action Plan, refilled albuterol inhaler for prn use  - The uses and side effects, including black box warnings as appropriate, were  "discussed in detail.  All patient questions were answered.  The patient was instructed to call immediately if any side effects developed.    Screening for prostate cancer  - PSA, screen    Need for COVID-19 vaccine  - patient declines covid-19 vaccine    Need for pneumococcal vaccine  - patient declines pneumonia vaccine     BMI  Estimated body mass index is 27.2 kg/m  as calculated from the following:    Height as of this encounter: 1.778 m (5' 10\").    Weight as of this encounter: 86 kg (189 lb 9.6 oz).   Weight management plan: Discussed healthy diet and exercise guidelines    RTC for WME, prn sooner. The patient verbalized understanding and agreement with the plan today and has no additional questions or concerns at this time.    Rosmery Wilder is a 53 year old, presenting for the following health issues:  Urinary Frequency and PSA check (Had a hx of colon cancer)        5/28/2024     1:12 PM   Additional Questions   Roomed by ildefonso   Accompanied by self     History of Present Illness       Reason for visit:  Frequent urination  Symptom onset:  3-4 weeks ago  Symptom intensity:  Moderate  Symptom progression:  Staying the same  Had these symptoms before:  No    He eats 2-3 servings of fruits and vegetables daily.He consumes 1 sweetened beverage(s) daily.He exercises with enough effort to increase his heart rate 30 to 60 minutes per day.  He exercises with enough effort to increase his heart rate 5 days per week.   He is taking medications regularly.     Genitourinary - Male  Onset/Duration: 1 month   Description:   Dysuria (painful urination): No  Hematuria (blood in urine): No  Frequency: YES  Waking at night to urinate: No  Hesitancy (delay in urine): No  Retention (unable to empty): No  Decrease in urinary flow: No  Incontinence: No  Progression of Symptoms:  same  Accompanying Signs & Symptoms:  Fever: No  Back/Flank pain: No  Urethral discharge: No  Testicle lumps/masses/pain: No  Nausea and/or " "vomiting: No  Abdominal pain: No  History:   History of frequent UTI s: No  History of kidney stones: No  History of hernias: No  Personal or Family history of Prostate problems: No  Sexually active: YES  Precipitating or alleviating factors: None  Therapies tried and outcome: NOne    Reports urinary frequency for a few months, but has been significantly increasing water intake. Has been participating in a exercise program lately.     Elevated BP - reports hasn't recently been checking BP at home. Reports at home they were normal a few months ago. Denies chest pain, shortness of breath, headaches, vision changes, urinary changes, palpations, or edema. Father had very bad heart issues. He had cancer and cardiac surgery.     BP Readings from Last 3 Encounters:   05/28/24 (!) 148/83   08/17/23 132/89   08/01/23 125/80     Review of Systems  Constitutional, neuro, ENT, endocrine, pulmonary, cardiac, gastrointestinal, genitourinary, musculoskeletal, integument and psychiatric systems are negative, except as otherwise noted.      Objective    BP (!) 148/83 (BP Location: Left arm, Patient Position: Sitting, Cuff Size: Adult Regular)   Pulse 66   Temp 98  F (36.7  C) (Oral)   Resp 12   Ht 1.778 m (5' 10\")   Wt 86 kg (189 lb 9.6 oz)   SpO2 97%   BMI 27.20 kg/m    Body mass index is 27.2 kg/m .  Physical Exam     General:  awake, alert, NAD. Non-toxic  HEENT:  normocephalic. No conjunctivitis or rhinorrhea  Neck:  supple, FROM  Pulm: Unlabored, regular rate. CTAB, full air movement  CV: S1/S2, rrr, no m/r/g, no edema  MSK: steady gait, FROM  Derm: no rashes or erythema  Neuro: clear and logical thought and speech  Psych: calm mood and congruent affect    Signed Electronically by: SARWAT Santana CNP  "

## 2024-05-28 NOTE — TELEPHONE ENCOUNTER
Plan does not cover albuterol (PROAIR HFA/PROVENTIL HFA/VENTOLIN HFA) 108 (90 Base) MCG/ACT inhaler. Please log on to [employer] to initiate PA or switch to alternative medication.    TRX code: [id number]

## 2024-05-28 NOTE — LETTER
My Asthma Action Plan    Name: Meño Omalley   YOB: 1970  Date: 5/28/2024   My doctor: SARWAT Santana CNP   My clinic: [facility]        My Rescue Medicine:   Albuterol inhaler (Proair/Ventolin/Proventil HFA)  2-4 puffs EVERY 4 HOURS as needed. Use a spacer if recommended by your provider.   My Asthma Severity:   Intermittent / Exercise Induced  Know your asthma triggers: pollens and exercise or sports             GREEN ZONE   Good Control  I feel good  No cough or wheeze  Can work, sleep and play without asthma symptoms       Take your asthma control medicine every day.     If exercise triggers your asthma, take your rescue medication  15 minutes before exercise or sports, and  During exercise if you have asthma symptoms  Spacer to use with inhaler: If you have a spacer, make sure to use it with your inhaler             YELLOW ZONE Getting Worse  I have ANY of these:  I do not feel good  Cough or wheeze  Chest feels tight  Wake up at night   Keep taking your Green Zone medications  Start taking your rescue medicine:  every 20 minutes for up to 1 hour. Then every 4 hours for 24-48 hours.  If you stay in the Yellow Zone for more than 12-24 hours, contact your doctor.  If you do not return to the Green Zone in 12-24 hours or you get worse, start taking your oral steroid medicine if prescribed by your provider.           RED ZONE Medical Alert - Get Help  I have ANY of these:  I feel awful  Medicine is not helping  Breathing getting harder  Trouble walking or talking  Nose opens wide to breathe       Take your rescue medicine NOW  If your provider has prescribed an oral steroid medicine, start taking it NOW  Call your doctor NOW  If you are still in the Red Zone after 20 minutes and you have not reached your doctor:  Take your rescue medicine again and  Call 911 or go to the emergency room right away    See your regular doctor within 2 weeks of an Emergency Room  or Urgent Care visit for follow-up treatment.          Annual Reminders:  Meet with Asthma Educator,  Flu Shot in the Fall, consider Pneumonia Vaccination for patients with asthma (aged 19 and older).    Pharmacy:    [facility] [address] [place] [address] - [address]  [facility] [place], [place] - [address] [facility] [place], [place] - [address]  [facility] [address] [facility] [address] [place] [id number] [place]    Electronically signed by SARWAT Santana CNP   Date: 05/28/24                    Asthma Triggers  How To Control Things That Make Your Asthma Worse    Triggers are things that make your asthma worse.  Look at the list below to help you find your triggers and   what you can do about them. You can help prevent asthma flare-ups by staying away from your triggers.      Trigger                                                          What you can do   Cigarette Smoke  Tobacco smoke can make asthma worse. Do not allow smoking in your home, car or around you.  Be sure no one smokes at a child s day care or school.  If you smoke, ask your health care provider for ways to help you quit.  Ask family members to quit too.  Ask your health care provider for a referral to Quit Plan to help you quit smoking, or call 3-014-634-PLAN.     Colds, Flu, Bronchitis  These are common triggers of asthma. Wash your hands often.  Don t touch your eyes, nose or mouth.  Get a flu shot every year.     Dust Mites  These are tiny bugs that live in cloth or carpet. They are too small to see. Wash sheets and blankets in hot water every week.   Encase pillows and mattress in dust mite proof covers.  Avoid having carpet if you can. If you have carpet, vacuum weekly.   Use a dust mask and HEPA vacuum.   Pollen and Outdoor Mold  Some people are allergic to trees, grass, or weed pollen, or molds. Try to keep your windows closed.  Limit time out doors  when pollen count is high.   Ask you health care provider about taking medicine during allergy season.     Animal Dander  Some people are allergic to skin flakes, urine or saliva from pets with fur or feathers. Keep pets with fur or feathers out of your home.    If you can t keep the pet outdoors, then keep the pet out of your bedroom.  Keep the bedroom door closed.  Keep pets off cloth furniture and away from stuffed toys.     Mice, Rats, and Cockroaches  Some people are allergic to the waste from these pests.   Cover food and garbage.  Clean up spills and food crumbs.  Store grease in the refrigerator.   Keep food out of the bedroom.   Indoor Mold  This can be a trigger if your home has high moisture. Fix leaking faucets, pipes, or other sources of water.   Clean moldy surfaces.  Dehumidify basement if it is damp and smelly.   Smoke, Strong Odors, and Sprays  These can reduce air quality. Stay away from strong odors and sprays, such as perfume, powder, hair spray, paints, smoke incense, paint, cleaning products, candles and new carpet.   Exercise or Sports  Some people with asthma have this trigger. Be active!  Ask your doctor about taking medicine before sports or exercise to prevent symptoms.    Warm up for 5-10 minutes before and after sports or exercise.     Other Triggers of Asthma  Cold air:  Cover your nose and mouth with a scarf.  Sometimes laughing or crying can be a trigger.  Some medicines and food can trigger asthma.

## 2024-05-29 LAB
ALBUMIN SERPL BCG-MCNC: 4.5 G/DL (ref 3.5–5.2)
ALP SERPL-CCNC: 53 U/L (ref 40–150)
ALT SERPL W P-5'-P-CCNC: 20 U/L (ref 0–70)
ANION GAP SERPL CALCULATED.3IONS-SCNC: 13 MMOL/L (ref 7–15)
AST SERPL W P-5'-P-CCNC: 24 U/L (ref 0–45)
BILIRUB SERPL-MCNC: 0.7 MG/DL
BUN SERPL-MCNC: 11.9 MG/DL (ref 6–20)
CALCIUM SERPL-MCNC: 9.6 MG/DL (ref 8.6–10)
CHLORIDE SERPL-SCNC: 101 MMOL/L (ref 98–107)
CREAT SERPL-MCNC: 0.85 MG/DL (ref 0.67–1.17)
DEPRECATED HCO3 PLAS-SCNC: 25 MMOL/L (ref 22–29)
EGFRCR SERPLBLD CKD-EPI 2021: >90 ML/MIN/1.73M2
GLUCOSE SERPL-MCNC: 96 MG/DL (ref 70–99)
POTASSIUM SERPL-SCNC: 4.1 MMOL/L (ref 3.4–5.3)
PROT SERPL-MCNC: 7.4 G/DL (ref 6.4–8.3)
PSA SERPL DL<=0.01 NG/ML-MCNC: 0.81 NG/ML (ref 0–3.5)
SODIUM SERPL-SCNC: 139 MMOL/L (ref 135–145)

## 2024-06-06 NOTE — TELEPHONE ENCOUNTER
Retail Pharmacy Prior Authorization Team   Phone: [phone]    PA Initiation    Medication: ALBUTEROL SULFATE  (90 BASE) MCG/ACT IN AERS  Insurance Company: [employer] - Phone [phone] Fax [phone]  Pharmacy Filling the Rx: [facility] [id number] [place] [facility] [place] [place] - [facility] [place]  Filling Pharmacy Phone: [phone]  Filling Pharmacy Fax:    Start Date: 6/6/2024    Called pharmacy, provided NDC of [address]-59, they were able to get a paid claim. **Instructed pharmacy to notify patient when script is ready to /ship.**

## 2024-06-30 ENCOUNTER — HEALTH MAINTENANCE LETTER (OUTPATIENT)
Age: 54
End: 2024-06-30

## 2024-10-12 ENCOUNTER — HOSPITAL ENCOUNTER (OUTPATIENT)
Facility: CLINIC | Age: 54
Setting detail: OBSERVATION
Discharge: HOME | End: 2024-10-13
Admitting: EMERGENCY MEDICINE
Payer: COMMERCIAL

## 2024-10-12 DIAGNOSIS — F31.62 BIPOLAR DISORDER, CURRENT EPISODE MIXED, MODERATE (H): Primary | ICD-10-CM

## 2024-10-12 DIAGNOSIS — F41.9 ANXIETY: ICD-10-CM

## 2024-10-12 PROBLEM — F31.9 BIPOLAR DEPRESSION (H): Status: RESOLVED | Noted: 2024-10-12 | Resolved: 2024-10-12

## 2024-10-12 PROBLEM — F31.9 BIPOLAR DEPRESSION (H): Status: ACTIVE | Noted: 2024-10-12

## 2024-10-12 PROBLEM — F39 UNSPECIFIED MOOD (AFFECTIVE) DISORDER (H): Status: ACTIVE | Noted: 2024-10-12

## 2024-10-12 LAB
AMPHETAMINES UR QL SCN: ABNORMAL
BARBITURATES UR QL SCN: ABNORMAL
BASOPHILS # BLD AUTO: 0 10E3/UL (ref 0–0.2)
BASOPHILS NFR BLD AUTO: 1 %
BENZODIAZ UR QL SCN: ABNORMAL
BZE UR QL SCN: ABNORMAL
CANNABINOIDS UR QL SCN: ABNORMAL
EOSINOPHIL # BLD AUTO: 0.2 10E3/UL (ref 0–0.7)
EOSINOPHIL NFR BLD AUTO: 3 %
ERYTHROCYTE [DISTWIDTH] IN BLOOD BY AUTOMATED COUNT: 12 % (ref 10–15)
FENTANYL UR QL: ABNORMAL
HCT VFR BLD AUTO: 38.4 % (ref 40–53)
HGB BLD-MCNC: 13.4 G/DL (ref 13.3–17.7)
IMM GRANULOCYTES # BLD: 0 10E3/UL
IMM GRANULOCYTES NFR BLD: 0 %
LYMPHOCYTES # BLD AUTO: 2 10E3/UL (ref 0.8–5.3)
LYMPHOCYTES NFR BLD AUTO: 38 %
MCH RBC QN AUTO: 30.9 PG (ref 26.5–33)
MCHC RBC AUTO-ENTMCNC: 34.9 G/DL (ref 31.5–36.5)
MCV RBC AUTO: 89 FL (ref 78–100)
MONOCYTES # BLD AUTO: 0.4 10E3/UL (ref 0–1.3)
MONOCYTES NFR BLD AUTO: 7 %
NEUTROPHILS # BLD AUTO: 2.8 10E3/UL (ref 1.6–8.3)
NEUTROPHILS NFR BLD AUTO: 52 %
NRBC # BLD AUTO: 0 10E3/UL
NRBC BLD AUTO-RTO: 0 /100
OPIATES UR QL SCN: ABNORMAL
PCP QUAL URINE (ROCHE): ABNORMAL
PLATELET # BLD AUTO: 247 10E3/UL (ref 150–450)
RBC # BLD AUTO: 4.34 10E6/UL (ref 4.4–5.9)
WBC # BLD AUTO: 5.3 10E3/UL (ref 4–11)

## 2024-10-12 PROCEDURE — 36415 COLL VENOUS BLD VENIPUNCTURE: CPT | Performed by: NURSE PRACTITIONER

## 2024-10-12 PROCEDURE — 99285 EMERGENCY DEPT VISIT HI MDM: CPT | Mod: 25

## 2024-10-12 PROCEDURE — 80307 DRUG TEST PRSMV CHEM ANLYZR: CPT | Performed by: NURSE PRACTITIONER

## 2024-10-12 PROCEDURE — 250N000013 HC RX MED GY IP 250 OP 250 PS 637: Performed by: NURSE PRACTITIONER

## 2024-10-12 PROCEDURE — 80346 BENZODIAZEPINES1-12: CPT | Performed by: NURSE PRACTITIONER

## 2024-10-12 PROCEDURE — G0378 HOSPITAL OBSERVATION PER HR: HCPCS

## 2024-10-12 PROCEDURE — 99222 1ST HOSP IP/OBS MODERATE 55: CPT | Performed by: NURSE PRACTITIONER

## 2024-10-12 PROCEDURE — 85025 COMPLETE CBC W/AUTO DIFF WBC: CPT | Performed by: NURSE PRACTITIONER

## 2024-10-12 RX ORDER — ACETAMINOPHEN 325 MG/1
650 TABLET ORAL EVERY 4 HOURS PRN
Status: DISCONTINUED | OUTPATIENT
Start: 2024-10-12 | End: 2024-10-13 | Stop reason: HOSPADM

## 2024-10-12 RX ORDER — CLONAZEPAM 0.5 MG/1
0.5 TABLET ORAL 2 TIMES DAILY PRN
Status: DISCONTINUED | OUTPATIENT
Start: 2024-10-12 | End: 2024-10-13 | Stop reason: HOSPADM

## 2024-10-12 RX ORDER — ESCITALOPRAM OXALATE 10 MG/1
10 TABLET ORAL DAILY
Status: DISCONTINUED | OUTPATIENT
Start: 2024-10-12 | End: 2024-10-13 | Stop reason: HOSPADM

## 2024-10-12 RX ORDER — ONDANSETRON 4 MG/1
4 TABLET, ORALLY DISINTEGRATING ORAL EVERY 6 HOURS PRN
Status: DISCONTINUED | OUTPATIENT
Start: 2024-10-12 | End: 2024-10-13 | Stop reason: HOSPADM

## 2024-10-12 RX ORDER — HYDROXYZINE HYDROCHLORIDE 50 MG/1
50 TABLET, FILM COATED ORAL EVERY 6 HOURS PRN
Status: DISCONTINUED | OUTPATIENT
Start: 2024-10-12 | End: 2024-10-13 | Stop reason: HOSPADM

## 2024-10-12 RX ORDER — TRAZODONE HYDROCHLORIDE 50 MG/1
50 TABLET, FILM COATED ORAL
Status: DISCONTINUED | OUTPATIENT
Start: 2024-10-12 | End: 2024-10-13 | Stop reason: HOSPADM

## 2024-10-12 RX ORDER — QUETIAPINE FUMARATE 100 MG/1
100 TABLET, FILM COATED ORAL 3 TIMES DAILY PRN
Status: DISCONTINUED | OUTPATIENT
Start: 2024-10-12 | End: 2024-10-13 | Stop reason: HOSPADM

## 2024-10-12 RX ORDER — ARIPIPRAZOLE 5 MG/1
10 TABLET ORAL DAILY
Status: DISCONTINUED | OUTPATIENT
Start: 2024-10-13 | End: 2024-10-13 | Stop reason: HOSPADM

## 2024-10-12 RX ORDER — CLONAZEPAM 0.5 MG/1
0.5 TABLET ORAL 2 TIMES DAILY PRN
COMMUNITY
Start: 2024-09-24

## 2024-10-12 RX ORDER — QUETIAPINE FUMARATE 100 MG/1
100 TABLET, FILM COATED ORAL AT BEDTIME
Status: DISCONTINUED | OUTPATIENT
Start: 2024-10-12 | End: 2024-10-13 | Stop reason: HOSPADM

## 2024-10-12 RX ORDER — ESCITALOPRAM OXALATE 10 MG/1
1 TABLET ORAL
COMMUNITY
Start: 2024-09-24

## 2024-10-12 RX ORDER — LURASIDONE HYDROCHLORIDE 20 MG/1
20 TABLET, FILM COATED ORAL
Status: DISCONTINUED | OUTPATIENT
Start: 2024-10-12 | End: 2024-10-13 | Stop reason: HOSPADM

## 2024-10-12 RX ADMIN — LURASIDONE HYDROCHLORIDE 20 MG: 20 TABLET, COATED ORAL at 23:07

## 2024-10-12 RX ADMIN — QUETIAPINE FUMARATE 100 MG: 100 TABLET ORAL at 22:35

## 2024-10-12 RX ADMIN — ESCITALOPRAM OXALATE 10 MG: 10 TABLET, FILM COATED ORAL at 23:07

## 2024-10-12 ASSESSMENT — ACTIVITIES OF DAILY LIVING (ADL)
ADLS_ACUITY_SCORE: 35

## 2024-10-12 ASSESSMENT — COLUMBIA-SUICIDE SEVERITY RATING SCALE - C-SSRS
1. IN THE PAST MONTH, HAVE YOU WISHED YOU WERE DEAD OR WISHED YOU COULD GO TO SLEEP AND NOT WAKE UP?: NO
2. HAVE YOU ACTUALLY HAD ANY THOUGHTS OF KILLING YOURSELF IN THE PAST MONTH?: NO
6. HAVE YOU EVER DONE ANYTHING, STARTED TO DO ANYTHING, OR PREPARED TO DO ANYTHING TO END YOUR LIFE?: NO

## 2024-10-12 NOTE — ED NOTES
[facility]  ED to EMPATH Checklist:      Goal for EMPATH: Anxiety management    Current Behavior: Restless    Safety Concerns: None    Legal Hold Status: Voluntary    Medically Cleared by ED provider: Yes    Patient Therapeutically Searched: Therapeutic search by ED staff (strings, belts, shoes, pockets, electronics, etc.)    Belongings: Remain with patient    Independent Ambulation at Baseline: Yes/No: Yes    Participates in Care/Conversation: Yes/No: Yes    Patient Informed about EMPATH: Yes/No: Yes    DEC: Ordered and pending    Patient Ready to be Transferred to EMPATH? Yes/No: Yes

## 2024-10-12 NOTE — ED PROVIDER NOTES
Emergency Department Note      History of Present Illness     Chief Complaint   Mental Health Problem, Insomnia, and Anxiety      HPI   Meño Omalley is a 54 year old male presents the emergency department with his spouse for anxiety.  Patient's been struggling with anxiety for a while, having difficulty sleeping things were not going well at home.  He has been compliant with his medications.  He has no acute suicidality has not taken any extra medications and self-harm attempt.  Wife reports he has been following his outpatient recommendations and things are not improving and she is concerned he might need to be hospitalized for the symptoms.  No fever no cough no shortness of breath or new pain symptoms.    Independent Historian   None    Review of External Notes       Past Medical History     Medical History and Problem List   Past Medical History:   Diagnosis Date    Bucket-handle tear of medial meniscus of left knee as current injury, initial encounter 07/19/2018    Colon cancer (H) (rectal cancer) 04/12/2017    Depressive disorder     Family history of malignant hyperthermia     H/O adenomatous polyp of colon 06/11/2018    Nephrolithiasis     Obese     AMY on CPAP 05/05/2022    Pre-diabetes 08/01/2023    Uncomplicated asthma        Medications   albuterol (PROAIR HFA/PROVENTIL HFA/VENTOLIN HFA) 108 (90 Base) MCG/ACT inhaler  ARIPiprazole (ABILIFY) 15 MG tablet  traZODone (DESYREL) 50 MG tablet        Surgical History   Past Surgical History:   Procedure Laterality Date    ARTHROSCOPY KNEE WITH MEDIAL MENISCECTOMY Left 7/25/2018    Procedure: ARTHROSCOPY KNEE WITH MEDIAL MENISCECTOMY;  Arthroscopy left knee with Meniscal Debridement;  Surgeon: Rashad Skaggs MD;  Location[facility]    COLONOSCOPY N/A 8/17/2023    Procedure: Colonoscopy;  Surgeon: Valentina Manning MD;  Location: [facility]    LAPAROSCOPIC COLECTOMY LOW ANTERIOR N/A 4/12/2017    Procedure: LAPAROSCOPIC COLECTOMY LOW ANTERIOR;  Surgeon:  Ignacio Rose MD;  Location[facility]    NO HISTORY OF SURGERY      REMOVE PORT VASCULAR ACCESS Right 11/8/2017    Procedure: REMOVE PORT VASCULAR ACCESS;  Right chest Port Removal;  Surgeon: Chuy Steiner PA-C;  [facility]    SIGMOIDOSCOPY FLEXIBLE N/A 4/12/2017    Procedure: SIGMOIDOSCOPY FLEXIBLE;  Surgeon: Ignacio Rose MD;  Location: [facility] OR       Physical Exam     Patient Vitals for the past 24 hrs:   BP Temp Temp src Pulse Resp SpO2   10/12/24 1219 138/67 97  F (36.1  C) Oral 89 16 98 %     Physical Exam    Gen: Resting comfortably   Eyes: Clear conjunctiva, no discharge  Ears: External ears normal without swelling or drainage  Mouth: Mucous membranes moist  CV: regular rate  Resp: speaking in full sentences without any resp distress  Skin: warm dry well perfused  Neuro: Alert, no gross motor or sensory deficits,   Psych: pleasant, affect appropriate      Diagnostics     Lab Results   Labs Ordered and Resulted from Time of ED Arrival to Time of ED Departure - No data to display    Imaging   No orders to display       EKG       Independent Interpretation   None    ED Course      Medications Administered   Medications - No data to display    Procedures   Procedures     Discussion of Management   None    ED Course   ED Course as of 10/12/24 1232   Sat Oct 12, 2024   1226 I evaluated the patient and obtained history.        Additional Documentation  None    Medical Decision Making / Diagnosis     CMS Diagnoses: None    MIPS       None    MDM   Meño Omalley is a 54 year old male presents the emergency department struggling with anxiety, here with a spell of spouse requesting mental health assistance.  He has not right suicidality by my assessment just struggles with anxiety and has had a lot of difficulty sleeping as of late.  Wife was concerned he might need hospitalization for his symptoms.  We discussed being evaluated in the empath unit and I explained how patient can meet with counseling  services and mental health providers for discussion of medication adjustments as needed.  They seemed amenable for evaluation there.  I feel patient is medically clear for focus on his mental health in the empath unit.    Dis[facility]   [facility]     Diagnosis     ICD-10-CM    1. Anxiety  F41.9            Discharge Medications   New Prescriptions    No medications on file         MD Cher Palafox, Rl Garces MD  10/12/24 [id number]

## 2024-10-12 NOTE — ED NOTES
54 year old male received from ED due to anxiety. Patient reports he has been sleeping 4 to 5 hours per night and reports he has been sleeping in chuncks. He also reports that his anxiety has been really bad and he has been having trouble concentrating. He said that things at the point that he is not able to function at all.  He denies any auditory or visual hallucinations. He also denies any substance use. Reports that he has no enjoyment in life currently. Patient and his wife confirmed to this writer that he took trazodone 100 mg at bedtime last night and he also took another 100 mg this morning due to the insomnia. He is also currently taking 10 mg of Lexapro, 20 mg of Abilify in the morning. He also took 0.5 mg of clonazepam this morning which he is supposed to take twice daily as needed.  Patient reports having had a manic episode in [place] back in March this year. He is seeking help with medication management so he can get some relief. Presents with flat affect. Brightens upon approach. Patient denies any suicidal thoughts and has no plans of harming himself. Nursing and risk assessments completed. Assessments reviewed with [facility] and physician. Admission information reviewed with patient. Patient given a tour of [facility] and instructions on using the facility. Questions regarding [facility] addressed. Pt safety search completed.

## 2024-10-12 NOTE — ED PROVIDER NOTES
" [facility] Unit - Initial Psychiatric Observation Note  [facility] Emergency Department  Observation Initiation Date: Oct 12, 2024    Meño Omalley MRN: [id number]   Age: 54 year old YOB: 1970     History     Chief Complaint   Patient presents with    Mental Health Problem    Insomnia    Anxiety     HPI  Meño Omalley is a 54 year old male with a past history notable for a history bipolar disorder with mixed features. He presented to the emergency department voluntarily with the support of his wife for worsening depressive symptoms. He was cleared medically and transferred to [facility] for additional assessment and treatment planning. At the time of the interview today, 10/12/24 he is nearing 5 hours in emergency care.    Please see the [facility] assessment & reassessment (if available), ED physician notes and nursing notes for additional information and collateral content if available. All were reviewed prior to meeting with the patient. Pertinent content includes the following:  He experienced his first and only manic episode occurred last spring while on vacation in [place]. The heightened mood lasted from 1 - 2 weeks and manifested as a lack of sleep and perceived need for sleep, spending more money than usual, feeling \"up\" the entire time. He was started on Abilify, and has been taking 20 mg daily, which he has been taking in the morning.    On approach today, Meño is found sitting in a chair in the milieu. He accompanied me to a private section for an interview. He was engaged throughout the visit, albeit quiet. He reports anhedonia, and decreased energy and depression with insomnia. He denies SI/HI, A/V hallucinations, delusions or paranoia. He agrees to remain in [facility] overnight, to try some medication changes and to reassess tomorrow.     Past Medical History  Past Medical History:   Diagnosis Date    Bucket-handle tear of medial meniscus of left knee as current injury, initial encounter " "07/19/2018    Colon cancer (H) (rectal cancer) 04/12/2017    Depressive disorder     Family history of malignant hyperthermia     UNCONFIRMED BUT FATHER HAD \"FEVER WITH ANESTHESIA\"    H/O adenomatous polyp of colon 06/11/2018    2 adenoma polyps    Nephrolithiasis     asymptomatic    Obese     AMY on CPAP 05/05/2022    Sever    Pre-diabetes 08/01/2023    Uncomplicated asthma     exercise induced     Past Surgical History:   Procedure Laterality Date    ARTHROSCOPY KNEE WITH MEDIAL MENISCECTOMY Left 7/25/2018    Procedure: ARTHROSCOPY KNEE WITH MEDIAL MENISCECTOMY;  Arthroscopy left knee with Meniscal Debridement;  Surgeon: Rashad Skaggs MD;  Location: [facility] OR    COLONOSCOPY N/A 8/17/2023    Procedure: Colonoscopy;  Surgeon: Valentina Manning MD;  Location: [facility]    LAPAROSCOPIC COLECTOMY LOW ANTERIOR N/A 4/12/2017    Procedure: LAPAROSCOPIC COLECTOMY LOW ANTERIOR;  Surgeon: Ignacio Rose MD;  Location: [facility] OR    NO HISTORY OF SURGERY      REMOVE PORT VASCULAR ACCESS Right 11/8/2017    Procedure: REMOVE PORT VASCULAR ACCESS;  Right chest Port Removal;  Surgeon: Chuy Steiner PA-C;  Location: [facility] OR    SIGMOIDOSCOPY FLEXIBLE N/A 4/12/2017    Procedure: SIGMOIDOSCOPY FLEXIBLE;  Surgeon: Ignacio Rose MD;  Location: [facility] OR     albuterol (PROAIR HFA/PROVENTIL HFA/VENTOLIN HFA) 108 (90 Base) MCG/ACT inhaler  ARIPiprazole (ABILIFY) 10 MG tablet  clonazePAM (KLONOPIN) 0.5 MG tablet  escitalopram (LEXAPRO) 10 MG tablet  lurasidone (LATUDA) 20 MG TABS tablet  QUEtiapine (SEROQUEL) 100 MG tablet  traZODone (DESYREL) 50 MG tablet      No Known Allergies  Family History  Family History   Problem Relation Age of Onset    ALS Mother 70        ALS    Colon Cancer Father     Cancer - colorectal Father 62    C.A.D. Father 59    Hypertension Father     Other - See Comments Brother         15 yrs older    Colon Polyps Brother     Other - See Comments Brother         14 yrs older    Colon Polyps Brother     Other - See " "Comments Brother         10 yrs older    Other - See Comments Sister         8 yrs older    Other - See Comments Sister         twin 6 yrs older    Hypertension Sister     Other - See Comments Sister         twin    Hypertension Sister     Other - See Comments Sister         5 yrs older    Colon Cancer Sister     Cancer - colorectal Sister 54        17 yrs older    Other - See Comments Son         19 yr old adopted    Other - See Comments Daughter         16 yrs old adopted     Social History   Social History     Tobacco Use    Smoking status: Never    Smokeless tobacco: Never   Vaping Use    Vaping status: Never Used   Substance Use Topics    Alcohol use: Yes     Alcohol/week: 5.0 standard drinks of alcohol     Types: 5 Cans of beer per week     Comment: beer couple times per months    Drug use: No          Review of Systems  A medically appropriate review of systems was performed with pertinent positives and negatives noted in the HPI, and all other systems negative.    Physical Examination   BP: 138/67  Pulse: 89  Temp: 97  F (36.1  C)  Resp: 16  Height: 175.3 cm (5' 9\")  Weight: 87.3 kg (192 lb 8 oz)  SpO2: 98 %    Physical Exam  General: Appears stated age.   Neuro: Alert and fully oriented. Extremities appear to demonstrate normal strength on visual inspection.   Integumentary/Skin: no rash visualized, normal color    Psychiatric Examination   Appearance: awake, alert  Attitude:  cooperative  Eye Contact:  good  Mood:  depressed  Affect:  mood congruent  Speech:  clear, coherent and paucity of speech  Psychomotor Behavior:  no evidence of tardive dyskinesia, dystonia, or tics  Thought Process:  linear and goal oriented  Associations:  no loose associations  Thought Content:  no evidence of suicidal ideation or homicidal ideation and no evidence of psychotic thought  Insight:  good  Judgement:  intact  Oriented to:  time, person, and place  Attention Span and Concentration:  intact  Recent and Remote Memory:  " intact  Language: able to name/identify objects without impairment  Fund of Knowledge: intact with awareness of current and past events    ED Course     ED Course as of 10/12/24 1740   Sat Oct 12, 2024   1226 I evaluated the patient and obtained history.        Labs Ordered and Resulted from Time of ED Arrival to Time of ED Departure   URINE DRUG SCREEN PANEL - Abnormal       Result Value    Amphetamines Urine Screen Negative      Barbituates Urine Screen Negative      Benzodiazepine Urine Screen Positive (*)     Cannabinoids Urine Screen Negative      Cocaine Urine Screen Negative      Fentanyl Qual Urine Screen Negative      Opiates Urine Screen Negative      PCP Urine Screen Negative     COMPREHENSIVE METABOLIC PANEL   TSH WITH FREE T4 REFLEX   CBC WITH PLATELETS AND DIFFERENTIAL   BENZODIAZEPINES, URINE, QUANTITATIVE       Assessments & Plan (with Medical Decision Making)   Patient presenting with an increase in depressive symptoms, with insomnia in the presence of a past history of a mood disorder, likely bipolar disorder with mixed features. Together, through a shared decision-making process, a plan of care was developed as is noted below . Nursing notes reviewed noting no acute issues.     I have reviewed the assessment completed by the [facility].     During the observation period, the patient did not require medications for agitation, and did not require restraints/seclusion for patient and/or provider safety.     The patient was found to have a psychiatric condition that would benefit from an observation stay in the emergency department for further psychiatric stabilization and/or coordination of a safe disposition. The observation plan includes serial assessments of psychiatric condition, potential administration of medications if indicated, further disposition pending the patient's psychiatric course during the monitoring period.     Preliminary diagnosis:    ICD-10-CM    1. Bipolar disorder, current episode  mixed, moderate (H)  F31.62       2. Anxiety  F41.9            Treatment Plan:  - Admit for Observation  - Taper off Abilify, will decrease from 20 mg to 10 mg daily x 1 week, then decrease to 5 mg daily x 1 week, then stop.  - Will start Latuda 20 mg daily with follow up with an established psychiatric medication provider for additional dose optimization.  - Continue Lexapro 10 mg for anxiety and depression for now.  - Will give Seroquel 100 mg tonight, anticipate decreasing this over time as mood and sleep improves.  - [facility] standing orders.  -Medication education provided this visit includes, rationale for medication, importance of compliance, medication interactions, and common side effects.   -Supportive psychotherapy provided regarding patient's stressors and past mental health symptoms problem solving ways to improve overall wellness and cope with acute and chronic mental health symptoms.  -Observe overnight and reassess tomorrow.    SARWAT Lozano CNP   [facility] EMERGENCY DEPT  [facility] Unit      Leatha Peguero APRN CNP  10/13/24 6034

## 2024-10-12 NOTE — PROGRESS NOTES
Pt resting quietly in the recliner, soft speaking. Presenting with flat affect. This writer called Lab to come collect Lab.  Contracts for safety on the unit.

## 2024-10-13 VITALS
RESPIRATION RATE: 16 BRPM | HEART RATE: 79 BPM | HEIGHT: 69 IN | OXYGEN SATURATION: 95 % | BODY MASS INDEX: 28.51 KG/M2 | TEMPERATURE: 97.8 F | SYSTOLIC BLOOD PRESSURE: 124 MMHG | WEIGHT: 192.5 LBS | DIASTOLIC BLOOD PRESSURE: 71 MMHG

## 2024-10-13 PROCEDURE — 99239 HOSP IP/OBS DSCHRG MGMT >30: CPT

## 2024-10-13 PROCEDURE — G0378 HOSPITAL OBSERVATION PER HR: HCPCS

## 2024-10-13 PROCEDURE — 250N000013 HC RX MED GY IP 250 OP 250 PS 637: Performed by: NURSE PRACTITIONER

## 2024-10-13 RX ORDER — ARIPIPRAZOLE 10 MG/1
10 TABLET ORAL DAILY
Qty: 14 TABLET | Refills: 0 | Status: SHIPPED | OUTPATIENT
Start: 2024-10-13

## 2024-10-13 RX ORDER — LURASIDONE HYDROCHLORIDE 20 MG/1
20 TABLET, FILM COATED ORAL
Qty: 14 TABLET | Refills: 0 | Status: SHIPPED | OUTPATIENT
Start: 2024-10-13

## 2024-10-13 RX ORDER — QUETIAPINE FUMARATE 100 MG/1
100 TABLET, FILM COATED ORAL AT BEDTIME
Qty: 14 TABLET | Refills: 0 | Status: SHIPPED | OUTPATIENT
Start: 2024-10-13

## 2024-10-13 RX ADMIN — QUETIAPINE FUMARATE 100 MG: 100 TABLET ORAL at 09:12

## 2024-10-13 RX ADMIN — ESCITALOPRAM OXALATE 10 MG: 10 TABLET, FILM COATED ORAL at 07:56

## 2024-10-13 RX ADMIN — CLONAZEPAM 0.5 MG: 0.5 TABLET ORAL at 10:48

## 2024-10-13 RX ADMIN — HYDROXYZINE HYDROCHLORIDE 50 MG: 50 TABLET, FILM COATED ORAL at 08:09

## 2024-10-13 RX ADMIN — ARIPIPRAZOLE 10 MG: 5 TABLET ORAL at 07:55

## 2024-10-13 ASSESSMENT — ACTIVITIES OF DAILY LIVING (ADL)
ADLS_ACUITY_SCORE: 35

## 2024-10-13 ASSESSMENT — COLUMBIA-SUICIDE SEVERITY RATING SCALE - C-SSRS
2. HAVE YOU ACTUALLY HAD ANY THOUGHTS OF KILLING YOURSELF?: NO
TOTAL  NUMBER OF ABORTED OR SELF INTERRUPTED ATTEMPTS SINCE LAST CONTACT: NO
SUICIDE, SINCE LAST CONTACT: NO
1. SINCE LAST CONTACT, HAVE YOU WISHED YOU WERE DEAD OR WISHED YOU COULD GO TO SLEEP AND NOT WAKE UP?: NO
ATTEMPT SINCE LAST CONTACT: NO
TOTAL  NUMBER OF INTERRUPTED ATTEMPTS SINCE LAST CONTACT: NO
6. HAVE YOU EVER DONE ANYTHING, STARTED TO DO ANYTHING, OR PREPARED TO DO ANYTHING TO END YOUR LIFE?: NO

## 2024-10-13 NOTE — ED NOTES
Discharge instructions reviewed with patient including follow-up care plan. Educated on medication regime and advised not to stop prescribed medication without consulting their physician. Picking up meds at his home pharmacy.  Reviewed safety plan and outpatient resources/appointments.Verbalized understanding of discharge instructions. Denies SI. All belongings which where brought into the hospital have been returned to patient. Escorted off the unit @ 2763 with staff.    Discharged to home with wife.

## 2024-10-13 NOTE — CONSULTS
"Diagnostic Evaluation Consultation  Crisis Assessment    Patient Name: Meño Omalley  Age:  54 year old  Legal Sex: male  Gender Identity: male  Pronouns: he/him  Race: White  Ethnicity: Not  or   Language: English      Patient was assessed: In person   Crisis Assessment Start Date: 10/12/24  Crisis Assessment Start Time: 1520  Crisis Assessment Stop Time: 1550  Patient location: [facility] EMERGENCY DEPT                             [facility]    Referral Data and Chief Complaint  Meño Omalley presents to the ED with family/friends. Patient is presenting to the ED for the following concerns: Anxiety, Depression.   Factors that make the mental health crisis life threatening or complex are:  Pt arrives to the ED due to a marked increase in anxiety and depression symptoms as well as insomnia. Pt reports that he has been having difficulty with falling asleep and has been getting about four hours per sleep each night. Pt says his anxiety and depression symptoms have also noticeably increased and impacting his functioning. Pt reports experiencing severe anhedonia, trouble with focusing, difficulty with sitting still, feeling \"on edge,\" and lack of motivation. Pt says that his symptoms have worsened to the point that he has been isolating and having trouble engaging with his friends and family, which he previously did not have issues doing. Pt reports that he has been cancelling events and activities that he normally enjoyed going to due to feeling too depressed and anxious to participate and that he has not been able to function \"beyond the bare minimum\" at this job during the day. Pt denies having any AH/VH, NSSIB, SI, or HI. Pt denies any recent or current substance use. Pt is currently receiving psychiatry services for medication management as well as outpatient psychotherapy services. Pt is unsure of what his current formal diagnosis is but believes it's possibly a mood disorder. " Per chart review, an August 2024 ED encounter notes that pt has a history of Mood Disorder Not Otherwise Specified. Pt takes prescribed psychotherapy medications and reports being consistent in taking them. Pt identifies multiple supports in his life, including his wife Chasidy and six sisters. Pt is able to identify various hobbies, but notes that he has recently been unable to participate in them due to his deteriorating mental health symptoms..      Informed Consent and Assessment Methods  Explained the crisis assessment process, including applicable information disclosures and limits to confidentiality, assessed understanding of the process, and obtained consent to proceed with the assessment.  Assessment methods included conducting a formal interview with patient, review of medical records, collaboration with medical staff, and obtaining relevant collateral information from family and community providers when available.  : done     Patient response to interventions: acceptance expressed  Coping skills were attempted to reduce the crisis:  Pt reports that he attempted to use coping skills to mitigate his crisis, such as doing breathing exercises and meditating, but that these efforts were ineffective.     History of the Crisis   Pt reports that he had an episode of frances when he was in [place] this past spring and says his anxiety symptoms started presenting after that event. Pt notes that his anxiety has markedly worsened this past week, however, and the coping skills that he used to find comfort in have not been helpful in reducing his symptoms or current mental health crisis. Pt reports that he has a longstanding history of depression symptoms but noted these have also worsened in the past month. Pt reports that his difficulty with insomnia started about a week ago. Pt denies any recent life changes or stressors that could be contributing to or possibly exacerbating his worsening symptoms.    Brief Psychosocial  History  Family:  , Children yes  Support System:  Wife, Sibling(s)  Employment Status:  employed full-time  Source of Income:  salary/wages  Financial Environmental Concerns:  none  Current Hobbies:  outdoor activities, music, exercise/fitness, group/social activities  Barriers in Personal Life:  lack of motivation, mental health concerns    Significant Clinical History  Current Anxiety Symptoms:  racing thoughts, excessive worry, anxious  Current Depression/Trauma:  withdrawl/isolation, difficulty concentrating (anhedonia)  Current Somatic Symptoms:  anxious  Current Psychosis/Thought Disturbance:     Current Eating Symptoms:     Chemical Use History:  Alcohol: None  Benzodiazepines: None  Opiates: None  Cocaine: None  Marijuana: None  Other Use: None   Past diagnosis:  Depression  Family history:  Bipolar Disorder, Depression, Schizophrenia  Past treatment:  Individual therapy, Inpatient Hospitalization, Psychiatric Medication Management  Details of most recent treatment:  Pt is currently receiving psychiatry services for medication management as well as outpatient psychotherapy. Pt reports one previous inpatient psychiatric hospitalization in [place] in the spring of 2024 after experiencing an episode of frances. Pt most recently was seen in an ED in [place] for anxiety.  Other relevant history:          Collateral Information  Is there collateral information: Yes     Collateral information name, relationship, phone number:  Chasidy Omalley - pt's wife - [phone]    What happened today: Pt and his family decided to have him come in for assessment due to his deteriorating functioning. Pt has had markedly increased anxiety and depression symptoms as well as difficulty with sleeping which has resulted in deteriorating social and professional functioning. Chasidy says pt had a manic episode in [place] in the spring of 2024 and was hospitalized for a few days. Chasidy says he hasn't been the same since then  "and started having anxiety problems afterward, which have been increasing in severity. Pt has difficulty sitting still, focusing, and finding motivation to do activities that he used to do with ease. Pt has also been isolating and having difficulty with being able to sleep this past week and just sits in his room with the light off after work.     What is different about patient's functioning: Since this spring, pt has \"changed completely\" due to his mental health symptoms. He has not been engaging in conversations with family and friends like he used to, not going out of the house to do hobbies or outdoor activities, and has become largely dependent on his wife to do all chores and errands when he used to do these independently.     Concern about alcohol/drug use:  no    What do you think the patient needs:  medication review and possible adjustments    Has patient made comments about wanting to kill themselves/others: no    If d/c is recommended, can they take part in safety/aftercare planning: yes    Additional collateral information:  Pt regularly sees his psychiatrist and has an upcoming appointment in a couple weeks. He's had various medication adjustments, but Chasidy hasn't noticed any of these adjustments resulting in improvement with pt's symptoms.     Risk Assessment  [place] Suicide Severity Rating Scale Full Clinical Version: 10/12/2024  Suicidal Ideation  Q1 Wish to be Dead (Lifetime): No  Q2 Non-Specific Active Suicidal Thoughts (Lifetime): No  Q6 Suicide Behavior (Lifetime): no     Suicidal Behavior (Lifetime)  Actual Attempt (Lifetime): No  Has subject engaged in non-suicidal self-injurious behavior? (Lifetime): No  Interrupted Attempts (Lifetime): No  Aborted or Self-Interrupted Attempt (Lifetime): No  Preparatory Acts or Behavior (Lifetime): No    [place] Suicide Severity Rating Scale Recent: 10/12/2024  Suicidal Ideation (Recent)  Q1 Wished to be Dead (Past Month): no  Q2 Suicidal Thoughts (Past " Month): no  Level of Risk per Screen: no risks indicated     Suicidal Behavior (Recent)  Actual Attempt (Past 3 Months): No  Has subject engaged in non-suicidal self-injurious behavior? (Past 3 Months): No  Interrupted Attempts (Past 3 Months): No  Aborted or Self-Interrupted Attempt (Past 3 Months): No  Preparatory Acts or Behavior (Past 3 Months): No    Environmental or Psychosocial Events: social isolation  Protective Factors: Protective Factors: strong bond to family unit, community support, or employment, intact marriage or domestic partnership, lives in a responsibly safe and stable environment, good treatment engagement, help seeking, good problem-solving, coping, and conflict resolution skills, cultural, spiritual , or Shinto beliefs associated with meaning and value in life, reality testing ability, responsibilities and duties to others, including pets and children    Does the patient have thoughts of harming others? Feels Like Hurting Others: no  Previous Attempt to Hurt Others: no  Is the patient engaging in sexually inappropriate behavior?: no    Is the patient engaging in sexually inappropriate behavior?  no        Mental Status Exam   Affect: Blunted  Appearance: Appropriate  Attention Span/Concentration: Attentive  Eye Contact: Engaged    Fund of Knowledge: Appropriate   Language /Speech Content: Fluent  Language /Speech Volume: Normal  Language /Speech Rate/Productions: Articulate  Recent Memory: Intact  Remote Memory: Intact  Mood: Depressed  Orientation to Person: Yes   Orientation to Place: Yes  Orientation to Time of Day: Yes  Orientation to Date: Yes     Situation (Do they understand why they are here?): Yes  Psychomotor Behavior: Normal  Thought Content: Clear  Thought Form: Intact         Medication  Psychotropic medications:   Medication Orders - Psychiatric (From admission, onward)      Start     Dose/Rate Route Frequency Ordered Stop    10/13/24 0800  ARIPiprazole (ABILIFY) tablet 10 mg          10 mg Oral DAILY 10/12/24 1733      10/12/24 2200  QUEtiapine (SEROquel) tablet 100 mg         100 mg Oral AT BEDTIME 10/12/24 1737      10/12/24 1740  lurasidone (LATUDA) tablet 20 mg         20 mg Oral DAILY WITH SUPPER 10/12/24 1735      10/12/24 1738  QUEtiapine (SEROquel) tablet 100 mg         100 mg Oral 3 TIMES DAILY PRN 10/12/24 1738      10/12/24 1737  hydrOXYzine HCl (ATARAX) tablet 50 mg         50 mg Oral EVERY 6 HOURS PRN 10/12/24 1738      10/12/24 1735  escitalopram (LEXAPRO) tablet 10 mg         10 mg Oral DAILY 10/12/24 1733      10/12/24 1733  traZODone (DESYREL) tablet 50 mg         50 mg Oral AT BEDTIME PRN 10/12/24 1733               Current Care Team  Patient Care Team:  Tal Guerrero MD as PCP - General (Internal Medicine)  Jeanette Mcarthur MD as MD (Hematology & Oncology)  Tal Guerrero MD as Assigned PCP    Diagnosis  Patient Active Problem List   Diagnosis Code    Mild hypercholesterolemia E78.00    Family history of colon cancer Z80.0    Class 1 obesity due to excess calories without serious comorbidity with body mass index (BMI) of 34.0 to 34.9 in adult E66.811, E66.09, Z68.34    Rectal cancer (H) C20    Adenocarcinoma of rectum (H) C20    Medial knee pain, left M25.562    Exercise-induced asthma J45.990    Fatty liver disease, nonalcoholic K76.0    Major depressive disorder, recurrent episode, mild (H) F33.0    AMY on CPAP G47.33    H/O adenomatous polyp of colon Z86.0101    Pre-diabetes R73.03    Anxiety F41.9    Unspecified mood (affective) disorder (H) F39       Primary Problem This Admission  Active Hospital Problems    Anxiety F41.9      *Unspecified mood (affective) disorder (H) F39        Clinical Summary and Substantiation of Recommendations   It is the recommendation of the clinician that the pt remain at [facility] in observation status. Pt arrived to the ED due to a marked increased in depression and anxiety symptoms, as well as insomnia. Pt reports that he attempted to  use coping skills to mitigate his crisis, such as doing breathing exercises and meditating, but that these efforts were ineffective. Pt is denying any current, recent, or lifetime suicidal ideation and has no history of suicide attempts or non suicidal self injurious behaviors. Pt is oriented and able to identify multiple supports, such as his wife and sisters. Pt is future focused in thinking, help-seeking, and engaged with his providers. Pt is currently connected with outpatient psychotherapy services and psychiatry for medication management. Pt would like to stay overnight at [facility] for stabilization of symptoms and to get some sleep. Pt identifies the main goals he had for coming to [facility] were to stabilize his anxiety symptoms and to discuss possible medication changes with the psychiatric provider. Observation is the least restrictive level of care that also provides adequate risk mitigation.      Patient coping skills attempted to reduce the crisis:  Pt reports that he attempted to use coping skills to mitigate his crisis, such as doing breathing exercises and meditating, but that these efforts were ineffective.    Disposition  Recommended disposition: Individual Therapy, Observation, Medication Management        Reviewed case and recommendations with attending provider. Attending Name: Leatha Peguero CNP       Attending concurs with disposition: yes       Patient and/or validated legal guardian concurs with disposition:   yes       Final disposition:  observation    Legal status on admission: Voluntary/Patient has signed consent for treatment    Assessment Details   Total duration spent with the patient: 30 min     CPT code(s) utilized: 10630 - Psychotherapy for Crisis - 60 (30-74*) min    ELSY Salinas, Psychotherapist  [facility] - Triage & Transition Services  Callback: [phone]

## 2024-10-13 NOTE — PLAN OF CARE
Meño Omalley  October 12, 2024  Plan of Care Hand-off Note     Patient Care Path: observation    Plan for Care:   It is the recommendation of the clinician that the pt remain at [facility] in observation status. Pt arrived to the ED due to a marked increased in depression and anxiety symptoms, as well as insomnia. Pt reports that he attempted to use coping skills to mitigate his crisis, such as doing breathing exercises and meditating, but that these efforts were ineffective. Pt is denying any current, recent, or lifetime suicidal ideation and has no history of suicide attempts or non suicidal self injurious behaviors. Pt is oriented and able to identify multiple supports, such as his wife and sisters. Pt is future focused in thinking, help-seeking, and engaged with his providers. Pt is currently connected with outpatient psychotherapy services and psychiatry for medication management. Pt would like to stay overnight at [facility] for stabilization of symptoms and to get some sleep. Pt identifies the main goals he had for coming to [facility] were to stabilize his anxiety symptoms and to discuss possible medication changes with the psychiatric provider. Observation is the least restrictive level of care that also provides adequate risk mitigation.    Identified Goals and Safety Issues: reduction of anxiety symptoms and need for sleep    Overview:  pt's wife Chasidy PETE has been signed by pt            Legal Status: Legal Status at Admission: Voluntary/Patient has signed consent for treatment    Psychiatry Consult: no       Updated RN regarding plan of care.           ELSY Salinas

## 2024-10-13 NOTE — PROGRESS NOTES
Triage & Transition Services, Extended Care     Client Name: Meño Omalley   Date: October 12, 2024    Patient was seen    Mode of Assessment: in person       Service Type: refused to attend group session  Session Start Time:  2000    Site Location: [facility] EMERGENCY DEPT                             EMP08  Total Number ofAttendees: 0       ELSY Salinas   Licensed Mental Health Professional (LMHP), Extended Care  [phone]

## 2024-10-13 NOTE — PROGRESS NOTES
Pt has been resting on the recliner this evening. Calm and cooperative with meds and VSS. Pt was able to take a nap after dinner, and he reports he feels better since coming here to [facility]. He still endorsing sx of hopelessness, and anxiety. Denies any SI/HI. He contracts for safety on the unit.

## 2024-10-13 NOTE — PROGRESS NOTES
"Triage and Transition Services Extended Care Reassessment     Patient: Meño goes by \"Meño,\" uses he/him pronouns  Date of Service: October 13, 2024  Site of Service: [facility] EMERGENCY DEPT                             EMP08  Patient was seen yes  Mode of Assessment: In person     Reason for Reassessment:  (Reassessment as part of daily [facility] protocol.)    History of Patient's Original Emergency Room Encounter: Pt reports that he had an episode of frances when he was in [place] this past spring and says his anxiety symptoms started presenting after that event. Pt notes that his anxiety has markedly worsened this past week, however, and the coping skills that he used to find comfort in have not been helpful in reducing his symptoms or current mental health crisis. Pt reports that he has a longstanding history of depression symptoms but noted these have also worsened in the past month. Pt reports that his difficulty with insomnia started about a week ago. Pt denies any recent life changes or stressors that could be contributing to or possibly exacerbating his worsening symptoms.    Current Patient Presentation: Pt reports readiness to discharge as his goal of medication assessment has been completed.    Presentation Summary: Pt reported that he got some sleep last night, but not much as he struggles with sleeping in this environment. He is hopeful that he can sleep better at home with new medications. He reported that his mood is feeling ok now, but that his anxiety is increased due to being at [facility]. Pt reported that he has been able to keep going to work and do his job in spite of his ongoing mental health struggles. We explored IOP/PHP as a future treatment options if symptoms persist. Pt reported that at this point he wants to keep working, but has explored this option with his OP providers.Pt denied SI, SIB and HI as well as AH and VG. Pt reported readiness to discharge and feels that he " has received what he came for, which was medication assessment.    Changes Observed Since Initial Assessment: decrease in presenting symptoms    Therapeutic Interventions Provided: Engaged in safety planning, Reviewed healthy living that supports positive mental health, including looking at sleep hygiene, regular movement, nutrition, and regular socialization., Explored strategies for self-soothing., Explored and identified early warning signs to mental health crisis. Explored motivation for discharge.    Current Symptoms: anxious withdrawl/isolation anxious        Mental Status Exam   Affect: Flat  Appearance: Appropriate  Attention Span/Concentration: Attentive  Eye Contact: Engaged    Fund of Knowledge: Appropriate   Language /Speech Content: Fluent  Language /Speech Volume: Normal  Language /Speech Rate/Productions: Normal  Recent Memory: Intact  Remote Memory: Intact  Mood: Anxious, Depressed  Orientation to Person: Yes   Orientation to Place: Yes  Orientation to Time of Day: Yes  Orientation to Date: Yes     Situation (Do they understand why they are here?): Yes  Psychomotor Behavior: Normal  Thought Content: Clear  Thought Form: Intact    Treatment Objective(s) Addressed: rapport building, identifying and practicing coping strategies, processing feelings, safety planning, identifying an appropriate aftercare plan    Patient Response to Interventions: eager to participate    Progress Towards Goals:  Patient Reports Symptoms Are: ongoing  Patient Progress Toward Goals: is making progress  Next Step to Work Toward Discharge:  (Pt will discharge today.)    Case Management: Summary of Interaction: None today, pt has established mental health providers.    C-SSRS Since Last Contact:   1. Wish to be Dead (Since Last Contact): No  2. Non-Specific Active Suicidal Thoughts (Since Last Contact): No     Actual Attempt (Since Last Contact): No  Has subject engaged in non-suicidal self-injurious behavior? (Since Last  Contact): No  Interrupted Attempts (Since Last Contact): No  Aborted or Self-Interrupted Attempt (Since Last Contact): No  Preparatory Acts or Behavior (Since Last Contact): No  Suicide (Since Last Contact): No     Calculated C-SSRS Risk Score (Since Last Contact): No Risk Indicated    Plan: Final Disposition / Recommended Care Path: discharge  Plan for Care reviewed with assigned Medical Provider: yes  Plan for Care Team Review: provider, RN  Comments: SARWAT Londono  Patient and/or validated legal guardian concurs: yes  Clinical Substantiation: Pt reported that he got some sleep last night, but not much as he struggles with sleeping in this environment. He is hopeful that he can sleep better at home with new medications. He reported that his mood is feeling ok now, but that his anxiety is increased due to being at [facility]. Pt reported that he has been able to keep going to work and do his job in spite of his ongoing mental health struggles. We explored IOP/PHP as a future treatment options if symptoms persist. Pt reported that at this point he wants to keep working, but has explored this option with his OP providers.Pt denied SI, SIB and HI as well as AH and VG. Pt reported readiness to discharge and feels that he has received what he came for, which was medication assessment.          After the period in observation care, the patient's circumstances and mental state were safe for outpatient management.  After therapeutic treatment, intervention and aftercare planning by [facility] care team and consultation with attending provider, the patient was discharged. Close follow-up with a psychiatrist and/or therapist was recommended and community psychiatric resources were provided. Patient is to return to the ED if any urgent or potentially life-threatening concerns arise.       At the time of discharge, the patient's acute suicide risk was determined to be low due to the following factors: denial of suicidal  thoughts, denies feeling helpless or hopeless, not currently under the influence of alcohol or illicit substances, denies experiencing command hallucinations, and no immediate access to firearms. Protective factors include: social support, voluntarily seeking mental health support, displays resiliency , established relationship community mental health provider(s), future focused thinking, expresses desire to engage in treatment, sense of obligation to people/pets, safe/stable housing, and fulfilling employment.    Legal Status: Legal Status at Admission: Voluntary/Patient has signed consent for treatment    Session Status: Time session started: 0953  Time session ended: 1003  Session Duration (minutes): 10 minutes  Session Number: 1  Anticipated number of sessions or this episode of care: 1    Session Start Time: 0953  Session Stop Time: 1003  CPT codes: Non-Billable  Time Spent: 10 minutes      CPT code(s) utilized: Non-Billable    Diagnosis:   Patient Active Problem List   Diagnosis Code    Mild hypercholesterolemia E78.00    Family history of colon cancer Z80.0    Class 1 obesity due to excess calories without serious comorbidity with body mass index (BMI) of 34.0 to 34.9 in adult E66.811, E66.09, Z68.34    Rectal cancer (H) C20    Adenocarcinoma of rectum (H) C20    Medial knee pain, left M25.562    Exercise-induced asthma J45.990    Fatty liver disease, nonalcoholic K76.0    Major depressive disorder, recurrent episode, mild (H) F33.0    AMY on CPAP G47.33    H/O adenomatous polyp of colon Z86.0101    Pre-diabetes R73.03    Anxiety F41.9    Unspecified mood (affective) disorder (H) F39       Primary Problem This Admission: Active Hospital Problems    Anxiety      *Unspecified mood (affective) disorder (H)        Ellyn Abraham   Licensed Mental Health Professional (LMHP), [facility] Care  [phone]

## 2024-10-13 NOTE — ED NOTES
Patient did not sleep well.  He is anxious this am and pacing around.  He wants to discharge. I gave him hydroxyzine which did not help. An hour later I gave him seroquel. This did not help.  Will give him his klonopin.

## 2024-10-13 NOTE — ED PROVIDER NOTES
[facility] Unit - Psychiatric Observation Discharge Summary  [facility] Emergency Department  Discharge Date: 10/13/2024    Meño Omalley MRN: [id number]   Age: 54 year old YOB: 1970     Brief HPI & Initial ED Course     Chief Complaint   Patient presents with    Mental Health Problem    Insomnia    Anxiety     HPI  Meño Omalley is a 54 year old male with history notable for bipolar I disorder who presented to the ED with worsening anxiety, insomnia and depressed mood. In the emergency department, this patient was determined to be medically stable and transferred to the [facility] unit for psychiatric assessment. Patient reports that he was hospitalized in [place] in March 2024 for frances. Patient was initially assessed by SARWAT Lozano CNP at [facility]. Following initial assessment Abilify was decreased and Latuda was initiated further targeting mood stabilization. They have currently been in the emergency department for 23 hours. Overnight patient was observed to be sleeping yet patient did report difficulty sleeping in the recliner. Meño was agreeable to meet with me and accompanied me to a consult room. He feels as though his mood has improved since coming to [facility]. He has not noticed a significant change in anxiety however he attributes this to the milieu environment. He was able to sleep last night however did wake up due to the recliner. He denies SI/HI and denies AH/VH. When asked about previous mental health dx, he tells me he as struggled with anxiety and depression in the past. Last Spring he had a manic episode that last 1-2 weeks. During that time he recalls elevated thoughts, decreased sleep, increased energy, and impulsive spending. He denies any current symptoms of frances but has struggled more with depression and anxiety since then. He denies side effects from medication changes made yesterday. Discussed plans to taper off of Abilify and transition to Latuda, he verbalized  "understanding. He has an established psychiatric medication provider, next appointment in in 10 days. Given that he does not perceive the therapeutic milieu to be calming, he is asking to discharge and follow-up with his established outpatient supports. His family is supportive.        Physical Examination   BP: 124/71  Pulse: 79  Temp: 97.8  F (36.6  C)  Resp: 16  Height: 175.3 cm (5' 9\")  Weight: 87.3 kg (192 lb 8 oz)  SpO2: 95 %    Physical Exam  General: Appears stated age.   Neuro: Alert and fully oriented. Extremities appear to demonstrate normal strength on visual inspection.   Integumentary/Skin: no rash visualized, normal color    Psychiatric Examination   Appearance: awake, alert, adequately groomed, dressed in hospital scrubs, and appeared as age stated  Attitude:  cooperative  Eye Contact:  good  Mood:   \"better\"  Affect:  appropriate and in normal range and mood congruent  Speech:  clear, coherent and normal prosody  Psychomotor Behavior:  no evidence of tardive dyskinesia, dystonia, or tics and intact station, gait and muscle tone  Thought Process:  logical and linear  Associations:  no loose associations  Thought Content:  no evidence of suicidal ideation or homicidal ideation and no evidence of psychotic thought  Insight:  fair  Judgement:  fair  Oriented to:  time, person, and place  Attention Span and Concentration:  intact  Recent and Remote Memory:  intact  Language: able to name/identify objects without impairment  Fund of Knowledge: intact with awareness of current and past events    Results     ED Course as of 10/13/24 1206   Sat Oct 12, 2024   1226 I evaluated the patient and obtained history.        Labs Ordered and Resulted from Time of ED Arrival to Time of ED Departure   CBC WITH PLATELETS AND DIFFERENTIAL - Abnormal       Result Value    WBC Count 5.3      RBC Count 4.34 (*)     Hemoglobin 13.4      Hematocrit 38.4 (*)     MCV 89      MCH 30.9      MCHC 34.9      RDW 12.0      Platelet " Count 247      % Neutrophils 52      % Lymphocytes 38      % Monocytes 7      % Eosinophils 3      % Basophils 1      % Immature Granulocytes 0      NRBCs per 100 WBC 0      Absolute Neutrophils 2.8      Absolute Lymphocytes 2.0      Absolute Monocytes 0.4      Absolute Eosinophils 0.2      Absolute Basophils 0.0      Absolute Immature Granulocytes 0.0      Absolute NRBCs 0.0     URINE DRUG SCREEN PANEL - Abnormal    Amphetamines Urine Screen Negative      Barbituates Urine Screen Negative      Benzodiazepine Urine Screen Positive (*)     Cannabinoids Urine Screen Negative      Cocaine Urine Screen Negative      Fentanyl Qual Urine Screen Negative      Opiates Urine Screen Negative      PCP Urine Screen Negative     COMPREHENSIVE METABOLIC PANEL   TSH WITH FREE T4 REFLEX   BENZODIAZEPINES, URINE, QUANTITATIVE       Observation Course   The patient was found to have a psychiatric condition that would benefit from an observation stay in the emergency department for further psychiatric stabilization and/or coordination of a safe disposition. The plan upon observation admission included serial assessments of psychiatric condition, potential administration of medications if indicated, further disposition pending the patient's psychiatric course during the monitoring period.     Serial assessments of the patient's psychiatric condition were performed. Nursing notes were reviewed. During the observation period, the patient did not require medications for agitation, and did not require restraints/seclusion for patient and/or provider safety.     After a period of working with the treatment team on the [facility] unit, the patient's mental state improved to allow a safe transition to outpatient care. After counseling on the diagnosis, work-up, and treatment plan, the patient was discharged. Close follow-up with a psychiatrist and/or therapist was recommended and community psychiatric resources were provided. Patient is to return  to the ED if any urgent or potentially life-threatening concerns.     Discharge Diagnoses:   Final diagnoses:   Anxiety   Bipolar disorder, current episode mixed, moderate (H)       Treatment Plan:  -Continue Abilify 10mg daily for mood stabilization, discussed following up with established psychiatric medication provider for additional taper as patient transitions to Latuda and to minimize polypharmacy.  -Continue Latuda 20mg daily targeting depressive symptoms and mood stabilization, discussed following up with established psychiatric medication provider for additional dose optimization   -Continue Lexapro 10mg daily for anxiety and depression   -Continue Seroquel 100mg at bedtime for sleep  -Medication education provided this visit including but not limited to: Rationale for medication, importance of medication adherence, medication interactions, common medication side effects, benefits of medications.  -Anticipate resuming outpatient medication management appointments and psychotherapy  -Patient would benefit from programmatic care yet declines at time of assessment   -Problem focused supportive therapy and education provided today related to patient's current and acute stressors, symptoms, and diagnoses.  -Patient is requesting to discharge from [facility] with outpatient and crisis supports     At the time of discharge, the patient's acute suicide risk was determined to be low due to the following factors: Reduction in the intensity of mood/anxiety symptoms that preceded the admission, denial of suicidal thoughts, denies feeling helpless or helpless, not currently under the influence of alcohol or illicit substances, denies experiencing command hallucinations, no immediate access to firearms. The patient's acute risk could be higher if noncompliant with their treatment plan, medications, follow-up appointments or using illicit substances or alcohol. Protective factors include: social supports, children, stable  housing, employment    I spent more than 31 minutes on discharge day activities.    --  SARWAT Londono CNP  [facility] EMERGENCY DEPT  [facility] Unit       Lana Nunn APRN CNP  10/13/24 [id number]

## 2024-10-14 ENCOUNTER — TELEPHONE (OUTPATIENT)
Dept: BEHAVIORAL HEALTH | Facility: CLINIC | Age: 54
End: 2024-10-14
Payer: COMMERCIAL

## 2024-10-15 LAB
1OH-MIDAZOLAM UR CFM-MCNC: <20 NG/ML
7AMINOCLONAZEPAM UR CFM-MCNC: 491 NG/ML
A-OH ALPRAZ UR CFM-MCNC: <5 NG/ML
ALPRAZ UR CFM-MCNC: <5 NG/ML
CHLORDIAZEP UR CFM-MCNC: <20 NG/ML
CLONAZEPAM UR CFM-MCNC: 10 NG/ML
DIAZEPAM UR CFM-MCNC: <20 NG/ML
LORAZEPAM UR CFM-MCNC: <20 NG/ML
MIDAZOLAM UR CFM-MCNC: <20 NG/ML
NORDIAZEPAM UR CFM-MCNC: <20 NG/ML
OXAZEPAM UR CFM-MCNC: <20 NG/ML
TEMAZEPAM UR CFM-MCNC: <20 NG/ML

## 2024-10-16 ENCOUNTER — PATIENT OUTREACH (OUTPATIENT)
Dept: FAMILY MEDICINE | Facility: CLINIC | Age: 54
End: 2024-10-16
Payer: COMMERCIAL

## 2024-10-16 NOTE — TELEPHONE ENCOUNTER
Transitions of Care Outreach  Chief Complaint   Patient presents with    hospital follow up       Most Recent Admission Date: 10/12/2024   Most Recent Admission Diagnosis:      Most Recent Discharge Date: 10/13/2024   Most Recent Discharge Diagnosis: Anxiety - F41.9  Bipolar disorder, current episode mixed, moderate (H) - F31.62     Transitions of Care Assessment    Discharge Assessment  How are you doing now that you are home?: doing ok  How are your symptoms? (Red Flag symptoms escalate to triage hotline per guidelines): Unchanged  Do you know how to contact your clinic care team if you have future questions or changes to your health status? : Yes  Does the patient have their discharge instructions? : Yes  Does the patient have questions regarding their discharge instructions? : No  Were you started on any new medications or were there changes to any of your previous medications? : Yes  Does the patient have all of their medications?: Yes  Do you have questions regarding any of your medications? : No  Do you have all of your needed medical supplies or equipment (DME)?  (i.e. oxygen tank, CPAP, cane, etc.): Yes    Follow up Plan     Discharge Follow-Up  Discharge follow up appointment scheduled in alignment with recommended follow up timeframe or Transitions of Risk Category? (Low = within 30 days; Moderate= within 14 days; High= within 7 days): Yes  Discharge Follow Up Appointment Date:  (already scheduled)  Discharge Follow Up Appointment Scheduled with?: Specialty Care Provider    No future appointments.    Outpatient Plan as outlined on AVS reviewed with patient.    For any urgent concerns, please contact our 24 hour nurse triage line: [phone] ([phone])       Rhina Riley RN

## 2025-07-13 ENCOUNTER — HEALTH MAINTENANCE LETTER (OUTPATIENT)
Age: 55
End: 2025-07-13

## (undated) DEVICE — PREP CHLORAPREP 26ML TINTED ORANGE  260815

## (undated) DEVICE — BNDG ESMARK 6" STERILE

## (undated) DEVICE — PACK AB HYST II

## (undated) DEVICE — SU SILK 4-0 RB-1 CR 8X18" C054D

## (undated) DEVICE — CAST PADDING 4" WEBRIL STERILE

## (undated) DEVICE — STPL ENDO HANDLE GIA ULTRA UNIVERSAL STD EGIAUSTND

## (undated) DEVICE — ENDO CANNULA 05MM VERSAONE UNIVERSAL UNVCA5STF

## (undated) DEVICE — BLADE KNIFE SURG 15 371115

## (undated) DEVICE — PACK ARTHROSCOPY KNEE LATEX FREE SOP32CAFCN

## (undated) DEVICE — GLOVE PROTEXIS POWDER FREE SMT 7.5  2D72PT75X

## (undated) DEVICE — SOL ISOPROPYL ALCOHOL USP 70% 16OZ  NDC10565-002-16 D0022

## (undated) DEVICE — ENDO CAP AND TUBING STERILE FOR ENDOGATOR  100130

## (undated) DEVICE — APPLICATOR COTTON TIP 6"X2 STERILE LF 6012

## (undated) DEVICE — DRAPE IOBAN INCISE 23X17" 6650EZ

## (undated) DEVICE — SUCTION MANIFOLD DORNOCH ULTRA CART UL-CL500

## (undated) DEVICE — STPL CONTOUR CUT CVD GREEN CS40G

## (undated) DEVICE — SU PROLENE 2-0 SHDA 36" 8523H

## (undated) DEVICE — SU MONOCRYL 4-0 PS-2 27" UND Y426H

## (undated) DEVICE — BLADE SHAVER ARTHRO 4MM TOMCAT

## (undated) DEVICE — SU DERMABOND ADVANCED .7ML DNX12

## (undated) DEVICE — STPL 80 X 3.8MM GIA8038S

## (undated) DEVICE — TUBING INSUFFLATION W/FILTER CPC TO LUER 620-030-301

## (undated) DEVICE — SU MONOCRYL 4-0 P-3 18" UND Y494G

## (undated) DEVICE — SOL NACL 0.9% IRRIG 3000ML BAG 07972-08

## (undated) DEVICE — SU VICRYL 0 CT-1 36" J346H

## (undated) DEVICE — SU VICRYL 2-0 TIE 54" J615H

## (undated) DEVICE — SU VICRYL 3-0 SH CR 8X18" J774

## (undated) DEVICE — GOWN XLG DISP 9545

## (undated) DEVICE — ENDO TROCAR 05MM VERSAONE BLADELESS W/STD FIX CAN NONB5STF

## (undated) DEVICE — SU VICRYL 0 TIE 54" J608H

## (undated) DEVICE — SU PDS II 1 TP-1 54" Z879G

## (undated) DEVICE — ENDO TUBING INSUFFLATOR CO2 EFFICIENT 710324

## (undated) DEVICE — SOL WATER IRRIG 1000ML BOTTLE 2F7114

## (undated) DEVICE — ESU ELEC BLADE 6" COATED E1450-6

## (undated) DEVICE — Device

## (undated) DEVICE — TUBING SUCTION 12"X1/4" N612

## (undated) DEVICE — SU VICRYL 3-0 SH 27" J316H

## (undated) DEVICE — TUBING SUCTION 10'X3/16" N510

## (undated) DEVICE — SU ETHILON 3-0 PS-2 18" 1669H

## (undated) DEVICE — ADH LIQUID MASTISOL TOPICAL VIAL 2-3ML 0523-48

## (undated) DEVICE — STPL CIRCULAR DST 28MM W/TILT TIP EEA28

## (undated) DEVICE — LINEN TOWEL PACK X5 5464

## (undated) DEVICE — ANTIFOG SOLUTION W/FOAM PAD 31142527

## (undated) DEVICE — KIT PATIENT POSITIONING PIGAZZI LATEX FREE 40580

## (undated) DEVICE — ENDO CONNECTOR ENDOGATOR AUX WATER JET FOR OLYMPUS SCOPE

## (undated) DEVICE — ENDO TROCAR 12MM VERSAONE BLADELESS W/STD FIX CAN NONB12STF

## (undated) DEVICE — ENDO TROCAR BLUNT TIP KII BALLOON 12X100MM C0R47

## (undated) DEVICE — SUCTION IRR STRYKERFLOW II W/TIP 250-070-520

## (undated) DEVICE — SPONGE RAY-TEC 4X8" 7318

## (undated) DEVICE — BLADE CLIPPER SGL USE 9680

## (undated) DEVICE — ESU PENCIL W/COATED BLADE E2450H

## (undated) DEVICE — CATH TRAY FOLEY SURESTEP 16FR W/URNE MTR STLK LATEX A303316A

## (undated) DEVICE — DRSG XEROFORM 1X8"

## (undated) DEVICE — DRAPE SHEET REV FOLD 3/4 9349

## (undated) DEVICE — BNDG ELASTIC 6" DBL LENGTH UNSTERILE 6611-16

## (undated) DEVICE — DRSG GAUZE 4X4" TRAY

## (undated) DEVICE — DRAPE LEGGINGS CLEAR 8430

## (undated) DEVICE — ENDO SHEARS 5MM 176643

## (undated) DEVICE — SOL WATER IRRIG 3000ML BAG 2B7117

## (undated) DEVICE — SU VICRYL 2-0 TIE 12X18" J905T

## (undated) DEVICE — GLOVE PROTEXIS W/NEU-THERA 8.5  2D73TE85

## (undated) DEVICE — SU VICRYL 0 UR-6 27" J603H

## (undated) DEVICE — DRSG ABDOMINAL 07 1/2X8" 7197D

## (undated) DEVICE — TUBING ARTHRO PUMP STRYKER

## (undated) DEVICE — JELLY LUBRICATING SURGILUBE 2OZ TUBE

## (undated) DEVICE — STPL ENDO RELOAD 45X2.5MM ROTIC 030454

## (undated) DEVICE — GLOVE PROTEXIS W/NEU-THERA 8.0  2D73TE80

## (undated) DEVICE — ESU LIGASURE LAPAROSCOPIC BLUNT TIP SEALER 5MMX37CM LF1637

## (undated) DEVICE — CAST PADDING 4" WEBRIL UNSTERILE

## (undated) RX ORDER — HYDROMORPHONE HYDROCHLORIDE 1 MG/ML
INJECTION, SOLUTION INTRAMUSCULAR; INTRAVENOUS; SUBCUTANEOUS
Status: DISPENSED
Start: 2017-04-12

## (undated) RX ORDER — FENTANYL CITRATE 50 UG/ML
INJECTION, SOLUTION INTRAMUSCULAR; INTRAVENOUS
Status: DISPENSED
Start: 2017-04-12

## (undated) RX ORDER — ONDANSETRON 2 MG/ML
INJECTION INTRAMUSCULAR; INTRAVENOUS
Status: DISPENSED
Start: 2017-11-08

## (undated) RX ORDER — SODIUM CHLORIDE 9 MG/ML
INJECTION, SOLUTION INTRAVENOUS
Status: DISPENSED
Start: 2017-05-22

## (undated) RX ORDER — PROPOFOL 10 MG/ML
INJECTION, EMULSION INTRAVENOUS
Status: DISPENSED
Start: 2017-11-08

## (undated) RX ORDER — FENTANYL CITRATE 50 UG/ML
INJECTION, SOLUTION INTRAMUSCULAR; INTRAVENOUS
Status: DISPENSED
Start: 2017-05-22

## (undated) RX ORDER — FENTANYL CITRATE 50 UG/ML
INJECTION, SOLUTION INTRAMUSCULAR; INTRAVENOUS
Status: DISPENSED
Start: 2023-08-17

## (undated) RX ORDER — FENTANYL CITRATE 50 UG/ML
INJECTION, SOLUTION INTRAMUSCULAR; INTRAVENOUS
Status: DISPENSED
Start: 2018-07-25

## (undated) RX ORDER — CEFAZOLIN SODIUM 2 G/100ML
INJECTION, SOLUTION INTRAVENOUS
Status: DISPENSED
Start: 2017-05-22

## (undated) RX ORDER — EPINEPHRINE 1 MG/ML
INJECTION, SOLUTION, CONCENTRATE INTRAVENOUS
Status: DISPENSED
Start: 2018-07-25

## (undated) RX ORDER — HYDROMORPHONE HYDROCHLORIDE 1 MG/ML
INJECTION, SOLUTION INTRAMUSCULAR; INTRAVENOUS; SUBCUTANEOUS
Status: DISPENSED
Start: 2018-07-25

## (undated) RX ORDER — BUPIVACAINE HYDROCHLORIDE 5 MG/ML
INJECTION, SOLUTION EPIDURAL; INTRACAUDAL
Status: DISPENSED
Start: 2018-07-25

## (undated) RX ORDER — LIDOCAINE HYDROCHLORIDE 20 MG/ML
INJECTION, SOLUTION EPIDURAL; INFILTRATION; INTRACAUDAL; PERINEURAL
Status: DISPENSED
Start: 2018-07-25

## (undated) RX ORDER — PHENYLEPHRINE HCL IN 0.9% NACL 1 MG/10 ML
SYRINGE (ML) INTRAVENOUS
Status: DISPENSED
Start: 2018-07-25

## (undated) RX ORDER — FENTANYL CITRATE 50 UG/ML
INJECTION, SOLUTION INTRAMUSCULAR; INTRAVENOUS
Status: DISPENSED
Start: 2017-03-24

## (undated) RX ORDER — PROPOFOL 10 MG/ML
INJECTION, EMULSION INTRAVENOUS
Status: DISPENSED
Start: 2018-07-25

## (undated) RX ORDER — ACETAMINOPHEN 325 MG/1
TABLET ORAL
Status: DISPENSED
Start: 2017-04-12

## (undated) RX ORDER — ERTAPENEM 1 G/1
INJECTION, POWDER, LYOPHILIZED, FOR SOLUTION INTRAMUSCULAR; INTRAVENOUS
Status: DISPENSED
Start: 2017-04-12

## (undated) RX ORDER — SODIUM CHLORIDE, SODIUM LACTATE, POTASSIUM CHLORIDE, CALCIUM CHLORIDE 600; 310; 30; 20 MG/100ML; MG/100ML; MG/100ML; MG/100ML
INJECTION, SOLUTION INTRAVENOUS
Status: DISPENSED
Start: 2017-04-12

## (undated) RX ORDER — DEXAMETHASONE SODIUM PHOSPHATE 10 MG/ML
INJECTION INTRAMUSCULAR; INTRAVENOUS
Status: DISPENSED
Start: 2018-07-25

## (undated) RX ORDER — ONDANSETRON 2 MG/ML
INJECTION INTRAMUSCULAR; INTRAVENOUS
Status: DISPENSED
Start: 2018-07-25

## (undated) RX ORDER — HEPARIN SODIUM (PORCINE) LOCK FLUSH IV SOLN 100 UNIT/ML 100 UNIT/ML
SOLUTION INTRAVENOUS
Status: DISPENSED
Start: 2017-05-22

## (undated) RX ORDER — LIDOCAINE HYDROCHLORIDE 20 MG/ML
INJECTION, SOLUTION EPIDURAL; INFILTRATION; INTRACAUDAL; PERINEURAL
Status: DISPENSED
Start: 2017-11-08

## (undated) RX ORDER — ACETAMINOPHEN 325 MG/1
TABLET ORAL
Status: DISPENSED
Start: 2017-11-08

## (undated) RX ORDER — DEXAMETHASONE SODIUM PHOSPHATE 4 MG/ML
INJECTION, SOLUTION INTRA-ARTICULAR; INTRALESIONAL; INTRAMUSCULAR; INTRAVENOUS; SOFT TISSUE
Status: DISPENSED
Start: 2017-11-08